# Patient Record
Sex: FEMALE | Race: BLACK OR AFRICAN AMERICAN | NOT HISPANIC OR LATINO | Employment: FULL TIME | URBAN - METROPOLITAN AREA
[De-identification: names, ages, dates, MRNs, and addresses within clinical notes are randomized per-mention and may not be internally consistent; named-entity substitution may affect disease eponyms.]

---

## 2017-09-18 ENCOUNTER — HOSPITAL ENCOUNTER (OUTPATIENT)
Dept: RADIOLOGY | Facility: OTHER | Age: 37
Discharge: HOME | End: 2017-09-18
Attending: ADVANCED PRACTICE MIDWIFE
Payer: COMMERCIAL

## 2017-09-18 ENCOUNTER — OFFICE VISIT (OUTPATIENT)
Dept: OBSTETRICS AND GYNECOLOGY | Facility: CLINIC | Age: 37
End: 2017-09-18
Payer: COMMERCIAL

## 2017-09-18 VITALS
BODY MASS INDEX: 51.4 KG/M2 | WEIGHT: 279.31 LBS | DIASTOLIC BLOOD PRESSURE: 90 MMHG | SYSTOLIC BLOOD PRESSURE: 150 MMHG | HEIGHT: 62 IN

## 2017-09-18 DIAGNOSIS — Z32.00 POSSIBLE PREGNANCY: ICD-10-CM

## 2017-09-18 DIAGNOSIS — O26.849: ICD-10-CM

## 2017-09-18 DIAGNOSIS — O16.2 HIGH BLOOD PRESSURE AFFECTING PREGNANCY IN SECOND TRIMESTER, ANTEPARTUM: ICD-10-CM

## 2017-09-18 DIAGNOSIS — O09.512 ADVANCED MATERNAL AGE, PRIMIGRAVIDA IN SECOND TRIMESTER, ANTEPARTUM: ICD-10-CM

## 2017-09-18 PROCEDURE — 87086 URINE CULTURE/COLONY COUNT: CPT

## 2017-09-18 PROCEDURE — 76817 TRANSVAGINAL US OBSTETRIC: CPT | Mod: TC

## 2017-09-18 PROCEDURE — 76801 OB US < 14 WKS SINGLE FETUS: CPT | Mod: 26,,, | Performed by: INTERNAL MEDICINE

## 2017-09-18 PROCEDURE — 76801 OB US < 14 WKS SINGLE FETUS: CPT | Mod: TC

## 2017-09-18 PROCEDURE — 99204 OFFICE O/P NEW MOD 45 MIN: CPT | Mod: S$GLB,,, | Performed by: ADVANCED PRACTICE MIDWIFE

## 2017-09-18 PROCEDURE — 3008F BODY MASS INDEX DOCD: CPT | Mod: S$GLB,,, | Performed by: ADVANCED PRACTICE MIDWIFE

## 2017-09-18 PROCEDURE — 76817 TRANSVAGINAL US OBSTETRIC: CPT | Mod: 26,,, | Performed by: INTERNAL MEDICINE

## 2017-09-18 PROCEDURE — 87591 N.GONORRHOEAE DNA AMP PROB: CPT

## 2017-09-18 PROCEDURE — 99999 PR PBB SHADOW E&M-NEW PATIENT-LVL III: CPT | Mod: PBBFAC,,, | Performed by: ADVANCED PRACTICE MIDWIFE

## 2017-09-18 NOTE — LETTER
September 18, 2017      [facility]  [address] [place] [address]  Phone: [phone]  Fax: [phone]       Patient: Wang Warren   YOB: 1980  Date of Visit: 09/18/2017    To Whom It May Concern:    Mohini Warren  was at [facility] on 09/18/2017. She may return to work on 9/19/17 with no restrictions. If you have any questions or concerns, or if I can be of further assistance, please do not hesitate to contact me.    Sincerely,        Leyda costa CNM

## 2017-09-18 NOTE — PROGRESS NOTES
Wang Warren is a 36 y.o. No obstetric history on file., presents today for amenorrhea.    Has not seen any other provider for this possible pregnancy.     C/C: amenorrhea    HPI: Reports amenorrhea since No LMP recorded (lmp unknown). Patient is pregnant. Between  and July.  Prior to LMP, menses were  regular occuring every 30 days.  This would be a planned pregnancy. She is not currently on any contraception. + UPT the end of August. Also reports nausea w/o vomiting. Has noticed breast tenderness. Denies vaginal bleeding since LMP.  P150/90 today and reports /80 last Friday. Denies history of high blood pressure.   Genetic screening discussed. Wants MaT21, AFP, CF and SCS    Reports no long-term chronic medical conditions.    Review of patient's allergies indicates:  No Known Allergies  Past Medical History:   Diagnosis Date    Infertility, female     States she has been trying to conceive for four years. Was evaluated at feritility clinic and was given Clomid for three months two years ago.     Past Surgical History:   Procedure Laterality Date    none       Past Surgical History:   Procedure Laterality Date    none       OB History    Para Term  AB Living   1             SAB TAB Ectopic Multiple Live Births                  # Outcome Date GA Lbr Conrado/2nd Weight Sex Delivery Anes PTL Lv   1 Current                 OB History      Para Term  AB Living    1              SAB TAB Ectopic Multiple Live Births                     Social History     Social History    Marital status:      Spouse name: Husam    Number of children: 0    Years of education: N/A     Occupational History         Drives [employer]      Social History Main Topics    Smoking status: Former Smoker    Smokeless tobacco: Never Used    Alcohol use No    Drug use: No    Sexual activity: Yes     Partners: Male     Birth control/ protection: None     Other Topics Concern     Not on file     Social History Narrative    Walks for exercise.     to [place] for five years.     Family History   Problem Relation Age of Onset    Cancer Father      prostate    Diabetes Maternal Aunt      History   Sexual Activity    Sexual activity: Yes    Partners: Male    Birth control/ protection: None       Primary Doctor No     ROS:    Constitutional/Gen: Denies fevers, chills, malaise, or weight loss. + fatigue   Psych: Denies depression, anxiety  Eyes: Denies changes in vision or scotomata  Ears, nose, mouth, throat: Denies sinus tenderness, swelling, or dentition problems  CV/vasc: Denies heart palpitations or edema  Resp: Denies SOB or dyspnea  Breasts: Denies mass, nipple discharge, or trauma. + breast tenderness.  GI: Denies constipation, diarrhea, or vomiting.  : Denies vaginal discharge, dysuria or pelvic pain, urinary frequency  MS: Denies weakness, soreness, or changes in ROM    OBJECTIVE:  General Appearance:    Alert, cooperative, no distress, appears stated age   Head:    Normocephalic, without obvious abnormality, atraumatic   Eyes:    PERRL, conjunctiva/corneas clear both eyes   Ears:    Normal external ear canals, both ears   Nose:   Nares normal, septum midline, mucosa normal, no drainage    or sinus tenderness   Throat:   Lips, mucosa, and tongue normal; teeth and gums normal   Neck:   Supple, symmetrical, trachea midline, no adenopathy;     thyroid:  no enlargement/tenderness/nodules   Back:     Symmetric, no curvature, ROM normal, no CVA tenderness   Lungs:     Clear to auscultation bilaterally, respirations unlabored   Chest Wall:    No tenderness or deformity    Heart:    Regular rate and rhythm, S1 and S2 normal, no murmur, rub   or gallop   Breast Exam:    No tenderness, masses, or nipple abnormality   Abdomen:     Soft, non-tender, bowel sounds active all four quadrants,     no masses, no organomegaly. FH=U-4.   Genitalia:    Normal female without lesion, discharge or  tenderness                               Cervix:   Long, closed, non-tender   Uterus:   Enlargement compatible with approximately 16 weeks gestation; regular, mobile and nontende   Adnexa:   Non-tender, without masses bilaterally       Extremities:   Extremities normal, atraumatic, no cyanosis or edema   Pulses:   2+ and symmetric all extremities   Skin:   Skin color, texture, turgor normal, no rashes or lesions   Lymph nodes:   Cervical, supraclavicular, and axillary nodes normal   Neurologic:   Normal strength, sensation and reflexes     throughout     UPT + in office    ASSESSMENT:  36 y.o. female No obstetric history on file. with amenorrhea  Likely at 16 weeks per size with unknown LMP  Body mass index is 51.09 kg/m².  Patient Active Problem List   Diagnosis    Possible pregnancy    BMI 50.0-59.9, adult    High blood pressure affecting pregnancy in second trimester, antepartum    Advanced maternal age, primigravida in second trimester, antepartum    Unknown LMP with uterine size compatible with 16 weeks gestation   Patient is not a candidate for ABC or [facility] care due to high blood pressures x 2 in early pregnancy and BMI of 51..    PLAN:  Initial OB labs ordered. CMP and early 1 hour GTT ordered.  Dating ultrasound ordered stat. Report of ultrasound of 9/18: 16w5d with JUAN MIGUEL of 2/28/2018  Message sent to [facility] staff regarding desire for MaT21. AFP, CF and Hgb jeff ordered.Pregnancy education and couseling; handouts and booklet provided and reviewed.  Options for transfer to MD care discussed. Will send a message to Dr. Martin regarding accepting this patient.  -- Oriented to practice and anticipated prenatal course of care  -- Precautions/warning signs and how to reach us reviewed  -- Common complaints of pregnancy  -- Routine prenatal labs including HIV  -- Ultrasounds  -- Childbirth education/hospital/[facility] facilities  -- Nutrition and food safety  -- Toxoplasmosis precautions (Cats/Raw Meat)  -- Sexual  activity and exercise  -- Environmental/Work hazards  -- Travel  -- Tobacco (Ask, Advise, Assess, Assist, and Arrange), as well as alcohol and drug use  -- Use of any medications (Including supplements, Vitamins, Herbs, or OTC Drugs)      Reviewed use of B6/Unisom prn and dietary modifications to reduce nausea. Pt will notify us if no relief/worsening symptoms, will consider alternative therapies prn

## 2017-09-19 ENCOUNTER — TELEPHONE (OUTPATIENT)
Dept: MATERNAL FETAL MEDICINE | Facility: CLINIC | Age: 37
End: 2017-09-19

## 2017-09-19 ENCOUNTER — TELEPHONE (OUTPATIENT)
Dept: OBSTETRICS AND GYNECOLOGY | Facility: CLINIC | Age: 37
End: 2017-09-19

## 2017-09-19 PROBLEM — O16.2 HIGH BLOOD PRESSURE AFFECTING PREGNANCY IN SECOND TRIMESTER, ANTEPARTUM: Status: ACTIVE | Noted: 2017-09-19

## 2017-09-19 PROBLEM — O26.849: Status: ACTIVE | Noted: 2017-09-19

## 2017-09-19 PROBLEM — O09.512 ADVANCED MATERNAL AGE, PRIMIGRAVIDA IN SECOND TRIMESTER, ANTEPARTUM: Status: ACTIVE | Noted: 2017-09-19

## 2017-09-19 LAB
C TRACH DNA SPEC QL NAA+PROBE: NOT DETECTED
N GONORRHOEA DNA SPEC QL NAA+PROBE: NOT DETECTED

## 2017-09-19 NOTE — TELEPHONE ENCOUNTER
----- Message from Jayne Joseph sent at 9/19/2017  3:49 PM CDT -----  _x  1st Request  _  2nd Request  _  3rd Request        Who: [email or web address]    Why: pt. Returning phone. Please call to discuss    What Number to Call Back:[phone]    When to Expect a call back: (With in 24 hours)

## 2017-09-19 NOTE — TELEPHONE ENCOUNTER
Patient will call [employer] and then call me back.----- Message from Leyda Boss CNM sent at 9/19/2017  9:12 AM CDT -----  Regarding: Desires MaT21  Hi,   This patient doesn't know her LMP. Ultrasound yesterday dated her pregnancy at 16w5d with JUAN MIGUEL of 2/28/2018. She was given the brochure of MaT21 and instructed to call regarding insurance coverage.  Thanks!  Leyda

## 2017-09-19 NOTE — TELEPHONE ENCOUNTER
----- Message from Abigail Quan sent at 9/18/2017  3:23 PM CDT -----  Contact: Patient   X _1st Request  _  2nd Request  _  3rd Request    Who:MATILDA MCINTOSH [[id number]]    Why:Patient was referred from [facility] she suffers with high blood pressure her last LMP mid may but she was having a ultrasound on today     What Number to Call Back:[phone]    When to Expect a call back: (Before the end of the day)   -- if call after 3:00 call back will be tomorrow.

## 2017-09-19 NOTE — TELEPHONE ENCOUNTER
Pt was informed that the doctor has to be asked if she will see her for high risk before I can schedule an apt for her.

## 2017-09-20 ENCOUNTER — LAB VISIT (OUTPATIENT)
Dept: LAB | Facility: OTHER | Age: 37
End: 2017-09-20
Attending: ADVANCED PRACTICE MIDWIFE
Payer: COMMERCIAL

## 2017-09-20 DIAGNOSIS — Z32.00 POSSIBLE PREGNANCY: ICD-10-CM

## 2017-09-20 DIAGNOSIS — O09.512 ADVANCED MATERNAL AGE, PRIMIGRAVIDA IN SECOND TRIMESTER, ANTEPARTUM: ICD-10-CM

## 2017-09-20 LAB
ABO + RH BLD: NORMAL
ALBUMIN SERPL BCP-MCNC: 3.1 G/DL
ALP SERPL-CCNC: 88 U/L
ALT SERPL W/O P-5'-P-CCNC: 17 U/L
ANION GAP SERPL CALC-SCNC: 8 MMOL/L
AST SERPL-CCNC: 13 U/L
BACTERIA UR CULT: NORMAL
BACTERIA UR CULT: NORMAL
BASOPHILS # BLD AUTO: 0.02 K/UL
BASOPHILS NFR BLD: 0.4 %
BILIRUB SERPL-MCNC: 0.7 MG/DL
BLD GP AB SCN CELLS X3 SERPL QL: NORMAL
BUN SERPL-MCNC: 12 MG/DL
CALCIUM SERPL-MCNC: 9.3 MG/DL
CHLORIDE SERPL-SCNC: 105 MMOL/L
CO2 SERPL-SCNC: 22 MMOL/L
CREAT SERPL-MCNC: 0.8 MG/DL
DIFFERENTIAL METHOD: ABNORMAL
EOSINOPHIL # BLD AUTO: 0.1 K/UL
EOSINOPHIL NFR BLD: 1 %
ERYTHROCYTE [DISTWIDTH] IN BLOOD BY AUTOMATED COUNT: 12.6 %
EST. GFR  (AFRICAN AMERICAN): >60 ML/MIN/1.73 M^2
EST. GFR  (NON AFRICAN AMERICAN): >60 ML/MIN/1.73 M^2
GLUCOSE SERPL-MCNC: 239 MG/DL
GLUCOSE SERPL-MCNC: 243 MG/DL
HCT VFR BLD AUTO: 34 %
HGB BLD-MCNC: 11.1 G/DL
LYMPHOCYTES # BLD AUTO: 1.4 K/UL
LYMPHOCYTES NFR BLD: 27.1 %
MCH RBC QN AUTO: 27.8 PG
MCHC RBC AUTO-ENTMCNC: 32.6 G/DL
MCV RBC AUTO: 85 FL
MONOCYTES # BLD AUTO: 0.3 K/UL
MONOCYTES NFR BLD: 5.6 %
NEUTROPHILS # BLD AUTO: 3.3 K/UL
NEUTROPHILS NFR BLD: 65.3 %
PLATELET # BLD AUTO: 290 K/UL
PMV BLD AUTO: 10.9 FL
POTASSIUM SERPL-SCNC: 3.9 MMOL/L
PROT SERPL-MCNC: 7.5 G/DL
RBC # BLD AUTO: 4 M/UL
SODIUM SERPL-SCNC: 135 MMOL/L
WBC # BLD AUTO: 5.01 K/UL

## 2017-09-20 PROCEDURE — 83020 HEMOGLOBIN ELECTROPHORESIS: CPT

## 2017-09-20 PROCEDURE — 86850 RBC ANTIBODY SCREEN: CPT

## 2017-09-20 PROCEDURE — 86592 SYPHILIS TEST NON-TREP QUAL: CPT

## 2017-09-20 PROCEDURE — 87340 HEPATITIS B SURFACE AG IA: CPT

## 2017-09-20 PROCEDURE — 82950 GLUCOSE TEST: CPT

## 2017-09-20 PROCEDURE — 86703 HIV-1/HIV-2 1 RESULT ANTBDY: CPT

## 2017-09-20 PROCEDURE — 36415 COLL VENOUS BLD VENIPUNCTURE: CPT

## 2017-09-20 PROCEDURE — 83021 HEMOGLOBIN CHROMOTOGRAPHY: CPT

## 2017-09-20 PROCEDURE — 81220 CFTR GENE COM VARIANTS: CPT

## 2017-09-20 PROCEDURE — 82105 ALPHA-FETOPROTEIN SERUM: CPT

## 2017-09-20 PROCEDURE — 85025 COMPLETE CBC W/AUTO DIFF WBC: CPT

## 2017-09-20 PROCEDURE — 80053 COMPREHEN METABOLIC PANEL: CPT

## 2017-09-20 PROCEDURE — 86762 RUBELLA ANTIBODY: CPT

## 2017-09-20 PROCEDURE — 86900 BLOOD TYPING SEROLOGIC ABO: CPT

## 2017-09-21 LAB
HBV SURFACE AG SERPL QL IA: NEGATIVE
HIV 1+2 AB+HIV1 P24 AG SERPL QL IA: NEGATIVE
RPR SER QL: NORMAL
RUBV IGG SER-ACNC: 17 IU/ML
RUBV IGG SER-IMP: REACTIVE

## 2017-09-25 ENCOUNTER — TELEPHONE (OUTPATIENT)
Dept: OBSTETRICS AND GYNECOLOGY | Facility: CLINIC | Age: 37
End: 2017-09-25

## 2017-09-25 DIAGNOSIS — O24.919 DIABETES MELLITUS DURING PREGNANCY, ANTEPARTUM, UNSPECIFIED DIABETES MELLITUS TYPE: ICD-10-CM

## 2017-09-25 DIAGNOSIS — O09.512 ADVANCED MATERNAL AGE, PRIMIGRAVIDA IN SECOND TRIMESTER, ANTEPARTUM: Primary | ICD-10-CM

## 2017-09-25 DIAGNOSIS — O99.810 ABNORMAL GLUCOSE TOLERANCE TEST (GTT) DURING PREGNANCY, ANTEPARTUM: Primary | ICD-10-CM

## 2017-09-25 DIAGNOSIS — O10.919 CHRONIC HYPERTENSION AFFECTING PREGNANCY: Primary | ICD-10-CM

## 2017-09-25 LAB
AFP INTERPRETATION: NORMAL
AFP MATERNAL: NEGATIVE
ALPHA FETOPROTEIN MATERNAL: 24.2 NG/ML
CFTR MUT ANL BLD/T: NORMAL
ETHNIC ORIGIN: NORMAL
GESTATIONAL AGE (DAYS): 0
GESTATIONAL AGE (WEEKS): 17
GESTATIONAL AGE METHOD: NORMAL
HGB A2 MFR BLD HPLC: 2.3 %
HGB FRACT BLD ELPH-IMP: NORMAL
HGB FRACT BLD ELPH-IMP: NORMAL
INSULIN DEPEND. DIABETES: NORMAL
M.O.M. ALPHA FETOPROTEIN: 0.87
MATERNAL AGE AT EDD (YRS): 38
MATERNAL WEIGHT (LBS): 279
MULTIPLE GESTATION: NORMAL
SMOKING STATUS FTND: NO

## 2017-09-25 NOTE — TELEPHONE ENCOUNTER
Called patient and told patient Instead of the 3 /4 hour lab . Dr. Valenzuela would like for you to have 1 lab done and a urine test before your appointment with [facility] October 4th . She also put in orders for Diabetic education someone will contact you to schedule that appointment. Patient verbalized and understood.

## 2017-09-25 NOTE — TELEPHONE ENCOUNTER
----- Message from Mariela Valenzuela MD sent at 9/25/2017 10:45 AM CDT -----  Regarding: FW: Transfer of care of this high risk patient  Please schedule routine OB patient for any morning. Ask patient if she wants to have her glucose tolerance test done that morning. She will need to be fasting and will need to be here for 4 hours that day. She can come see me any time during that morning.     Thanks  ----- Message -----  From: Leyda Boss CNM  Sent: 9/25/2017   8:53 AM  To: Mariela Valenzuela MD  Subject: Transfer of care of this high risk patient       Mariela Mariscal. I saw this patient for an initial amenorrhea visit and she has several risk factors that make her ineligible for our care. I will try to call you about her.   I hope you can accept her as a patient.  Thanks!  Leyda

## 2017-09-25 NOTE — PROGRESS NOTES
Talked to Dr. Valenzuela about this patient this morning and she will accept her as a patient. Patient called and given Dr. Valenzuela's name. Patient informed of 1 hour GTT of 239 and implications of gestational diabetes. AFP ordered as discussed at patient's first visit. Shown is happy with the transfer of care.

## 2017-09-26 ENCOUNTER — PATIENT OUTREACH (OUTPATIENT)
Dept: DIABETES | Facility: OTHER | Age: 37
End: 2017-09-26

## 2017-09-26 NOTE — TELEPHONE ENCOUNTER
----- Message from Violeta Reyes sent at 9/20/2017 10:28 AM CDT -----  Contact: self  x_  1st Request  _  2nd Request  _  3rd Request    Who: pt    Why: pt is 16 weeks and would like to transfer care from [facility]... Please advise    What Number to Call Back: [phone]    When to Expect a call back: (Before the end of the day)   -- if call after 3:00 call back will be tomorrow.

## 2017-09-28 ENCOUNTER — TELEPHONE (OUTPATIENT)
Dept: DIABETES | Facility: OTHER | Age: 37
End: 2017-09-28

## 2017-09-28 ENCOUNTER — TELEPHONE (OUTPATIENT)
Dept: OBSTETRICS AND GYNECOLOGY | Facility: CLINIC | Age: 37
End: 2017-09-28

## 2017-09-28 NOTE — TELEPHONE ENCOUNTER
Returned patient's phone call regarding which prenatal testing would tell her the gender of her baby. Voice mail left stating the MaT21 test would give her that information. There is no note that the testing was done. A message was sent to [facility] informing them of the patient's JUAN MIGUEL and desire for MaT21 secondary to AMA.

## 2017-10-04 ENCOUNTER — HOSPITAL ENCOUNTER (OUTPATIENT)
Dept: DIABETES | Facility: OTHER | Age: 37
Discharge: HOME | End: 2017-10-04
Attending: OBSTETRICS & GYNECOLOGY
Payer: COMMERCIAL

## 2017-10-04 ENCOUNTER — OFFICE VISIT (OUTPATIENT)
Dept: MATERNAL FETAL MEDICINE | Facility: CLINIC | Age: 37
End: 2017-10-04
Payer: COMMERCIAL

## 2017-10-04 VITALS
WEIGHT: 279.75 LBS | BODY MASS INDEX: 51.17 KG/M2 | SYSTOLIC BLOOD PRESSURE: 130 MMHG | DIASTOLIC BLOOD PRESSURE: 78 MMHG

## 2017-10-04 VITALS — BODY MASS INDEX: 51.21 KG/M2 | WEIGHT: 280 LBS

## 2017-10-04 DIAGNOSIS — O24.410 DIET CONTROLLED GESTATIONAL DIABETES MELLITUS (GDM) IN SECOND TRIMESTER: ICD-10-CM

## 2017-10-04 DIAGNOSIS — O10.919 CHRONIC HYPERTENSION AFFECTING PREGNANCY: ICD-10-CM

## 2017-10-04 DIAGNOSIS — Z36.89 ENCOUNTER FOR FETAL ANATOMIC SURVEY: ICD-10-CM

## 2017-10-04 DIAGNOSIS — O09.512 ADVANCED MATERNAL AGE, PRIMIGRAVIDA IN SECOND TRIMESTER, ANTEPARTUM: ICD-10-CM

## 2017-10-04 DIAGNOSIS — O24.812 OTHER PRE-EXISTING DIABETES MELLITUS DURING PREGNANCY IN SECOND TRIMESTER: ICD-10-CM

## 2017-10-04 DIAGNOSIS — Z36.89 ENCOUNTER FOR ULTRASOUND TO CHECK FETAL GROWTH: Primary | ICD-10-CM

## 2017-10-04 PROCEDURE — G0108 DIAB MANAGE TRN  PER INDIV: HCPCS

## 2017-10-04 PROCEDURE — 76811 OB US DETAILED SNGL FETUS: CPT | Mod: S$GLB,,, | Performed by: OBSTETRICS & GYNECOLOGY

## 2017-10-04 PROCEDURE — 99243 OFF/OP CNSLTJ NEW/EST LOW 30: CPT | Mod: 25,S$GLB,, | Performed by: OBSTETRICS & GYNECOLOGY

## 2017-10-04 PROCEDURE — 99999 PR PBB SHADOW E&M-EST. PATIENT-LVL II: CPT | Mod: PBBFAC,,, | Performed by: OBSTETRICS & GYNECOLOGY

## 2017-10-05 NOTE — PROGRESS NOTES
Diabetes Education  Author: Cayla Delgadillo RN  Date: 10/5/2017    Diabetes Education Visit  Diabetes Education Record Assessment/Progress: Initial    Diabetes Type  Diabetes Type : Gestational    Diabetes History  Diabetes Diagnosis: 0-1 year    Nutrition  Meal Plan 24 Hour Recall - Breakfast: 1 c of cooked oatmeal, 1 apple, 1 L water    Meal Plan 24 Hour Recall - Lunch: broccoli, >8oz cream of broccoli soup - water   Meal Plan 24 Hour Recall - Dinner: bowl of cheerios w/ skim milk - water   Meal Plan 24 Hour Recall - Snack: usually fruit if hungry,  not too often     Monitoring   Self Monitoring : meter ed done today   Blood Glucose Logs: No    Exercise   Exercise Type: walking  Intensity: Low  Frequency:  (2 days a week)  Duration: 45 min ([facility].5 miles at [facility])    Current Diabetes Treatment   Current Treatment: Diet    Social History  Preferred Learning Method: Video  Primary Support: Spouse  Educational Level: Some College  Occupation: drive lift bus for [employer]   Smoking Status: Never a Smoker  Alcohol Use: Never                             Barriers to Change  Barriers to Change: None  Learning Challenges : None    Readiness to Learn   Readiness to Learn : Acceptance    Cultural Influences  Cultural Influences: No    Diabetes Education Assessment/Progress  Acute Complications (preventing, detecting, and treating acute complications): Discussion, Written Materials Provided, No Knowledge (discussed s/s of hypoglycemia and how to prevent/treat, discussed acute issues r/t GDM )  Chronic Complications (preventing, detecting, and treating chronic complications): Discussion, Written Materials Provided, No Knowledge (discussed long term effects of GDM - risks possible throughout pregnancy as well as risk for developing Type II DM later on in life; stressed importance of PCP f/u throughout lifetime and maintaining some lifestyle modifications)  Diabetes Disease Process (diabetes disease process and treatment options):  Discussion, Written Materials Provided, No Knowledge (discussed what GDM is, how it affects the pregnancy, mother and baby's health - reviewed all ways to control BG during pregnancy)  Nutrition (Incorporating nutritional management into one's lifestyle): Discussion, Written Materials Provided, Requires Assistance, Instructed, Demonstration, Return Demonstration (CHO counting taught, rec 3 meals/day w/ restricted CHO, plus snacks - rec more complex CHO, avoid concentrated sweets and eliminate sweet drinks, encouraged healthy fats and adequate protein )  Physical Activity (incorporating physical activity into one's lifestyle): Discussion, Written Materials Provided, No Knowledge, Instructed (reviewed ADA rec for physical activity during pregnancy and safety considerations while pregnant )  Medications (states correct name, dose, onset, peak, duration, side effects & timing of meds): Discussion, Written Materials Provided, No Knowledge (discussed potential needs for medications and what they are if diet and exercise alone are not enough to control BG)  Monitoring (monitoring blood glucose/other parameters & using results): Discussion, Demonstration, Return Demonstration, Written Materials Provided, No Knowledge, Instructed (meter demo and ed done today, log sheets given and reviewed BG goals and when to call the Dr. stressed importance of bringing logs to all OB appointments)  Goal Setting and Problem Solving (verbalizes behavior change strategies & sets realistic goals): Discussion, Written Materials Provided, No Knowledge  Behavior Change (developing personal strategies to health & behavior change): Discussion, Written Materials Provided, No Knowledge  Psychosocial Issues (developing personal srategies to address psychosocial concerns): Discussion, Written Materials Provided, No Knowledge (pt  present during appointment, also diabetic and very supportive )    Goals  Healthy Eating: Set (will count CHO and  restrict to rec amounts )  Start Date: 10/04/17  Monitoring: Set (will SMBG 4x/day and bring logs to all OB appointments)  Start Date: 10/04/17         Diabetes Care Plan/Intervention  Education Plan/Intervention: Individual Follow-Up DSMT    Diabetes Meal Plan  Restrictions: Restricted Carbohydrate  Carbohydrate Per Meal:  (30-45grams /Breakfast; 45-60grams/ Lunch & Dinner )    Provided custom [facility] educational materials.     Education Units of Time   Time Spent: 120 min      Health Maintenance Topics with due status: Not Due       Topic Last Completion Date    TETANUS VACCINE 12/02/2013    Hemoglobin A1c 10/04/2017    Urine Microalbumin 10/04/2017     Health Maintenance Due   Topic Date Due    Foot Exam  12/13/1989    Eye Exam  12/13/1989    Pneumococcal PPSV23 (Medium Risk) (1) 12/13/1997    Pap Smear with HPV Cotest  12/13/2000    Lipid Panel  07/28/2015    Influenza Vaccine  08/01/2017

## 2017-10-07 NOTE — PROGRESS NOTES
"Indication  ========    Consult:Fetal anatomy survey/DM/AMA/Obesity/CHTN.    History  ======    General History  Height 157 cm  Height (ft) 5 ft  Height (in) 2 in  Other: OB: MAHENDRA GLEASON  Previous Outcomes   1  Para 0  Risk Factors  History risk factors: Obesity  History risk factors: AMA  History risk factors: Diabetes mellitus  History risk factors: Personal history of hypertension    Pregnancy History  ==============    Maternal Lab Tests  Result:  Single marker AFP negative    Wants to know gender: yes    Maternal Assessment  =================    Weight 126 kg  Weight (lb) 278 lb  Height 157 cm  Height (ft) 5 ft  Height (in) 2 in  BP syst 130 mmHg  BP diast 78 mmHg  BMI 50.81 kg/m²    Method  ======    Transabdominal ultrasound examination, [place] E10. View: Suboptimal view: limited by maternal body habitus. Suboptimal view: limited by  fetal position. Suboptimal view: limited by fetal movements.    Pregnancy  =========    [facility] pregnancy. Number of fetuses: 1.    Dating  ======    Cycle: regular cycle  GA by "stated dating" 18 w + 6 d  JUAN MIGUEL by "stated dating": 3/1/2018  Ultrasound examination on: 10/4/2017  GA by U/S based upon: AC, BPD, Femur, HC  GA by U/S 19 w + 1 d  JUAN MIGUEL by U/S: 2018  Assigned: Dating performed on 10/4/2017, based on the external assessment  Assigned GA 18 w + 6 d  Assigned JUAN MIGUEL: 3/1/2018    General Evaluation  ==============    Cardiac activity: present.  bpm.  Fetal movements: visualized.  Presentation: cephalic.  Placenta:  Placental site: posterior.  Umbilical cord: Cord vessels: 3 vessel cord. Cord insertion: placental insertion: normal.  Amniotic fluid: normal amount.    Fetal Biometry  ============    Fetal Biometry  BPD 44.4 mm 19w 3d Hadlock  OFD 56.8 mm 20w 0d Joseph  .3 mm 19w 0d Hadlock  .8 mm 19w 0d Hadlock  Femur 29.2 mm 19w 0d Hadlock  Cerebellum tr 17.7 mm 18w 1d Mario  CM 3.6 mm  Nuchal fold 4.03 mm  Humerus 29.1 mm 19w 3d " Joseph   g  Calculated by: Hadlock (BPD-HC-AC-FL)  EFW (lb) 0 lb  EFW (oz) 9 oz  Cephalic index 0.78  HC / AC 1.19  FL / BPD 0.66  FL / AC 0.22   bpm  Head / Face / Neck   5.9 mm  Nasal bone 4.5 mm    Fetal Anatomy  ============    Cranium: normal  Lateral ventricles: normal  Choroid plexus: normal  Midline falx: normal  Cavum septi pellucidi: normal  Cerebellum: normal  Cisterna magna: normal  Head shape: normal  Rt lateral ventricle: normal  Lt lateral ventricle: normal  Rt choroid plexus: normal  Lt choroid plexus: normal  Parenchyma: normal  Third ventricle: normal  Posterior fossa: normal  Cerebellar lobes: normal  Vermis: normal  Neck: appears normal  Nuchal fold: normal  Lips: normal  Profile: normal  Nose: normal  Maxilla: normal  Mandible: normal  4-chamber view: normal  RVOT: normal  LVOT: normal  Heart / Thorax: Septal views: normal  Situs: normal  Aortic arch: suboptimal  Ductal arch: suboptimal  SVC: suboptimal  IVC: suboptimal  3-vessel view: normal  3-vessel-trachea view: normal  Cardiac position: normal  Cardiac axis: normal  Cardiac size: normal  Cardiac rhythm: normal  Rt lung: normal  Lt lung: normal  Diaphragm: normal  Cord insertion: normal  Stomach: normal  Kidneys: normal  Bladder: normal  Genitals: normal  Abdom. wall: appears normal  Abdom. cavity: normal  Rt kidney: normal  Lt kidney: normal  Liver: normal  Bowel: normal  Cervical spine: normal  Thoracic spine: normal  Lumbar spine: normal  Sacral spine: normal  Arms: normal  Legs: normal  Rt arm: normal  Lt arm: normal  Rt hand: present  Lt hand: present  Rt leg: normal  Lt leg: normal  Rt foot: normal  Lt foot: normal  Position of hands: normal  Position of feet: normal  Gender: female  Wants to know gender: yes    Maternal Structures  ===============    Uterus / Cervix  Uterus: Normal  Approach: Transabdominal  Cervical length 32.8 mm  Ovaries / Tubes / Adnexa  Rt ovary: Not visualized  Lt ovary: Not  visualized  Other: Uterus and adnexa normal    Consultation  ==========    Type: CHTN, Diabetes, Obesity, AMA.  Ms. Warren is a 37y/o soon to be 36 y/o  at 18 and 6 weeks gestation who presents for a consult due diabetes, CHTN, AMA.  BP in office noted before 150/90 and 155/80  PMHx: HTN  Diabetes  Obesity  AMA  PSHx: None  Social: Denies smoking, alcohol, illicit drug use  Family history: no history of birth defects, no history of sickle cell  Screening: masfp single marker normal  PObHx: none  Glucola 239; Glucose: 243, cr 0.8, AST 13, ALT 17, Hgb elec normal, MSAFP only normal, Plt 290  The patient just met with the dietician and just obtained her meter. Her one hour postprandial was 121    /78    A/P:1. IUP at 18 and 6    2. CHTN:She was counseled on maternal/fetal risks associated with CHTN during pregnancy including but not limited to fetal growth  restriction,  miscarriage,  delivery, development of superimposed preeclampsia, abruption and eclampsia. She was counseled on the  recommendations for blood pressure control, serial sonos for fetal growth, and timing of delivery. We also discussed the potential maternal  consequences of chronic hypertension    A. We have scheduled patient for a follow up sono with us in 4 weeks for fetal anatomical survey and assess interval growth    B. Serial sonos for fetal growth starting at 24 wks gestation.    C. Baseline labs: CMP, CBC, Protein/creatinine ratio or 24 hour urine for protein and creatinine clearance are recommended    D. Diabetes control is recommended    E. Close monitoring of patient's blood pressures:  If BP < 160 mmHg systolic or 105 mmHg diastolic and no evidence of end-organ damage, no antihypertensive therapy suggested    If patient on antihypertensive medication, it is suggested that BP levels be maintained between 120 mmHg systolic and 80mmHg diastolic  and  160 mmHg systolic and 105 mmHg diastolic    F. Fetal surveillance recommended  with twice weekly testing-weekly BPP and weekly NST (on a monday/thursday or tuesday/friday schedule)  beginning at 32 weeks or earlier if indicated.    G. Timing of delivery: Recommend delivery at 37-38 weeks with chronic hypertension, obesity, and diabetes. Earlier delivery may be  warranted.    H. With morbid obesity , recommend obtaining a baseline EKG and a maternal echocardiogram      3. Diabetes-uncertain if pregestational: HgA1c was pending at time of visit and later returned at 7.9 and urine p/c .08. The HgA1c is more  consistent with pregestational diabetes which will again be reviewed with the patient; we did our discussion as if she were pregestational    Today the patient was counseled on the risks of diabetes in pregnancy. I reviewed the physiologic effects of pregnancy on diabetes  and I stressed with the patient the need for meticulous glucose control. I discussed with the patient the increased risks of fetal structural  anomalies, macrosomia, prematurity, shoulder dystocia,  hyperbilirubinemia and electrolyte issues, pulmonary immaturity and  sudden  stillbirth in those patients with poor glucose control. I also discussed with the patient the need for increased fetal surveillance with monthly  fetal  growth assessments and third trimester fetal testing. I also reviewed the possibility of need for early delivery in those with poor glucose  control  or evidence of fetal growth abnormalities.  Recommendations from our MFM group at [facility]:  -Diet and/or medication should be used to keep fasting glucose levels <90-95 mg/dL and 2 hour postprandial levels <120 mg/dL. The patient  had dietary counseling today. She will return in a week with a blood sugar log. She was advised to call prior to that time if her blood sugars  were less than 70 or over 200.  -In those patients with diabetes existing prior to pregnancy we recommend a fetal echocardiogram around 22-24 weeks gestation to rule  out  congenital heart disease.  -Periodic fetal growth assessments should be performed to assess for growth abnormalities and to assess the fetal anomalies.  -Secondary to the high incidence of preeclampsia in patients with pregestational diabetes we recommend a baseline assessment of renal  function which has been done  -The patient should have an eye exam. I would also recommend a podiatric exam.  -Given obesity, hypertension and diabetes an ekg and maternal echocardiogram are recommended.  -Recommend checking a TSH  -I discussed with the patient that insulin is the recommended treatment in pregestational diabetics.  -A baby aspirin daily 81mg PO is recommended for preeclampsia prevention.  - Hemoglobin A1c pending at time of visit.  -Supplemental folic acid 4mg PO daily is typically recommended.    4. Obesity:Maternal Complications of Obesity  Obesity adversely affects maternal health by predisposing patients to gestational hypertension (OR = 2.5-3.2), preeclampsia (1.6-3.3),  gestational diabetes (2.6-4.0), and fetal macrosomia (1.7-1.9). Obese patients, as compared to those of normal weight, are also more likely to  undergo  delivery (47.4% vs. 20.7%) with higher rates of operative complications - greater blood loss, longer operative times, and  increased wound infection/endometritis. Anesthetic difficulties with both regional anesthesia as well as general endotracheal intubation may  complicate delivery.    Fetal Complications of Obesity  Obesity is an independent risk factor for spontaneous  after both natural conception as well as conception following infertility treatment.  For ongoing pregnancies, fetal risks include prematurity, stillbirth, fetal demise, and macrosomia. Moreover, some studies indicate that fetuses  of obese women are more likely to have congenital anomalies (NTDs, cardiac, and gastrointestinal) even when controlled for pregestational  diabetes.    Management of Obesity in  Pregnancy  At the first prenatal visit, height and weight should be recorded and used to calculate BMI. Based on the patients weight, recommendations for  appropriate weight gain should be made. The [employer] recommends a gain of 25-35 lb for women of normal weight, 15-25 pounds  for overweight women, and 11-20 pounds for obese women. Overweight and obese women should be counseled on diet and exercise.    Because of the maternal complications of obesity, certain additional measures are suggested in the antepartum period.  Patients should receive adequate counseling regarding risk factors and possible difficulties with accurate labor monitoring,  delivery  operative complications, and anesthesia.  With the increased risk of gestational diabetes, early 1st trimester screening is often advocated, however, there are no data demonstrating the  effectiveness of this early screening in improving pregnancy outcomes. Until such data are available, early screening should generally be  reserved for patients with a prior history of gestational diabetes or those with a diagnosis of borderline diabetes in the non-pregnant state.  This patient has already been diagnosed with diabetes.  All patients should have a routine anatomy ultrasound at 16-18 weeks. Because of the increased risk of congenital anomalies, all patients with  a BMI 40 or over should undergo targeted ultrasound (TSCAN) between 19-21 weeks gestation. If abnormal screening views or cardiac  anomalies are noted, or if the patient has concomitant diabetes, formal fetal echocardiography should be performed.  Fetal growth assessment via fundal height monitoring is difficult in the setting of obesity. When the fundal height cannot be reliably followed or  BMI 35 or over, serial ultrasounds for fetal growth every 4 weeks are recommended to screen for growth abnormalities starting at 28 weeks.  Patients with obesity are at increased risk for cardiac  and pulmonary complications including sleep apnea, hypertension and cardiomyopathy.  Providers should have a low threshold to seek further evaluation of the cardiac status--echocardiogram, EKG--in patients who present with  symptoms suggestive of cardiac or pulmonary compromise. In addition, given the risk of anesthetic difficulties, consultation with an  anesthesiologist is suggested for patients with a BMI 50 or over, maternal weight over 400 lb, or patients with sleep apnea.  Patients should be counseled on weight loss in the postpartum period.    Intrapartum and Postpartum    The obese patient has an increased risk for abnormal labor progression and  delivery. All patients with a scheduled  should  be instructed to shower with antibacterial soap and wash the abdomen on the day of surgery with cholorhexidine soap. Given the increased  risk of  delivery in the obese population, all obese patients who are undergoing induction of labor should also be instructed to shower  with antibacterial soap prior to the initiation of the induction.    All patients should receive antibiotic prophylaxis for  delivery; however, recent studies suggest that obese patients may require a  higher dosage. If cefazolin is utilized, a weight-based dosing regimen is recommended:  <80kg 1 gm  81-160kg 2 gm  >160kg 3 gm    There are no conclusive data regarding the best incision type for the obese patient undergoing  delivery. Pfannensteil incision with  retraction of the panniculus cepahalad has been advocated, but may be associated with vena caval compression and fetal heart rate changes.  Patient discomfort and inability to ventilate the patient may limit the retraction of the panniculus. Vertical midline incision may be considered  but is associated with a higher rate of wound breakdown and need for classical uterine incision. The incision choice should be based on  individual patient anatomy and  assessment in a flat position and the comfort of the surgeon. Regardless of incision placement, the space under  the panniculus should be prepped prior to starting the procedure.    The subcutaneous dead space should be closed with a running or interrupted suture (e.g. vicryl) for all patients in whom this layer measures >2  cm. There is no benefit (and even harm) to placement of a surgical drain in the subcutaneous layer.    Pneumatic sequential compression stockings is recommended in obese patients undergoing induction and should be placed prior to surgery  for those having a . Early ambulation should be encouraged in the postpartum period and the pneumatic devices should be used in the  postpartum period for thromboembolism prophylaxis at least until post-operative day 2 and until the patient is regularly ambulating.      5. AMA:We discussed the increased risk of chromosomal abnormalities with advanced maternal age. We reviewed the risks, benefits,  alternatives, limitations, and sensitivities of the quad screen. We discussed the risks, benefits, alternatives, limitations, sensitivities of NIPT  and she accepted if her insurance is verified at the lab. The patient was advised of the risks, benefits, alternatives, and limitations of  amniocentesis and the 1 in 1000 risk of pregnancy related complications including pregnancy loss and declined. We discussed the limitations  of ultrasound in detecting chromosomal  abnormalities such as Trisomy 21. We also reviewed that AMA is associated with an increased risk of adverse pregnancy outcomes such as  diabetes, hypertension, and other adverse outcomes. Limitations of NIPT with obesity were reviewed.    6. Please also confirm how the patient was dated.      A total of 45 minutes was spent in face to face time with greater than half of that time spent in counseling and coordination of care.        Impression  =========    Peres live intrauterine  pregnancy.  Biometry agrees with the clinical dating.  Amniotic fluid volume is normal by qualitative assessment.  Some of the anatomy was suboptimally visualized. The anatomy that was seen appeared normal.  Placenta is posterior without previa.  Transabdominal cervical length is normal.    Recommendation  ==============    Follow up visit in one week for blood sugar check.  Follow up ultrasound for fetal growth and suboptimal anatomy in 4 weeks.  Fetal echo due to suboptimal cardiac views and diabetes.  NIPT if insurance verified.

## 2017-10-09 ENCOUNTER — TELEPHONE (OUTPATIENT)
Dept: PEDIATRIC CARDIOLOGY | Facility: CLINIC | Age: 37
End: 2017-10-09

## 2017-10-09 NOTE — TELEPHONE ENCOUNTER
Spoke with patient at [phone]. appt rescheduled for 11/3/17 @ 12 noon. Office number given. Informed patient to arrive at least 10 minutes prior to appt. Address verified.

## 2017-10-10 ENCOUNTER — TELEPHONE (OUTPATIENT)
Dept: MATERNAL FETAL MEDICINE | Facility: CLINIC | Age: 37
End: 2017-10-10

## 2017-10-10 NOTE — TELEPHONE ENCOUNTER
Negative [id number] results phoned to patient. Patient notified and aware that her lab specimen showed an expected representation of chromosome 21, 18 and 13 material and that it was consistent with a female fetus.     Patient verbalized understanding of reported results.

## 2017-10-11 ENCOUNTER — LAB VISIT (OUTPATIENT)
Dept: LAB | Facility: OTHER | Age: 37
End: 2017-10-11
Attending: OBSTETRICS & GYNECOLOGY
Payer: COMMERCIAL

## 2017-10-11 ENCOUNTER — PATIENT MESSAGE (OUTPATIENT)
Dept: OBSTETRICS AND GYNECOLOGY | Facility: CLINIC | Age: 37
End: 2017-10-11

## 2017-10-11 ENCOUNTER — ROUTINE PRENATAL (OUTPATIENT)
Dept: MATERNAL FETAL MEDICINE | Facility: CLINIC | Age: 37
End: 2017-10-11
Payer: COMMERCIAL

## 2017-10-11 ENCOUNTER — ROUTINE PRENATAL (OUTPATIENT)
Dept: OBSTETRICS AND GYNECOLOGY | Facility: CLINIC | Age: 37
End: 2017-10-11
Payer: COMMERCIAL

## 2017-10-11 VITALS
DIASTOLIC BLOOD PRESSURE: 76 MMHG | WEIGHT: 275.81 LBS | SYSTOLIC BLOOD PRESSURE: 132 MMHG | BODY MASS INDEX: 50.44 KG/M2

## 2017-10-11 VITALS — SYSTOLIC BLOOD PRESSURE: 136 MMHG | WEIGHT: 275.56 LBS | DIASTOLIC BLOOD PRESSURE: 78 MMHG | BODY MASS INDEX: 50.4 KG/M2

## 2017-10-11 DIAGNOSIS — O99.210 OBESITY IN PREGNANCY: ICD-10-CM

## 2017-10-11 DIAGNOSIS — Z3A.19 19 WEEKS GESTATION OF PREGNANCY: Primary | ICD-10-CM

## 2017-10-11 DIAGNOSIS — O10.919 CHRONIC HYPERTENSION AFFECTING PREGNANCY: ICD-10-CM

## 2017-10-11 DIAGNOSIS — O24.112 PRE-EXISTING TYPE 2 DIABETES MELLITUS DURING PREGNANCY IN SECOND TRIMESTER: ICD-10-CM

## 2017-10-11 DIAGNOSIS — O24.919 DIABETES MELLITUS DURING PREGNANCY, ANTEPARTUM, UNSPECIFIED DIABETES MELLITUS TYPE: Primary | ICD-10-CM

## 2017-10-11 PROBLEM — O26.849: Status: RESOLVED | Noted: 2017-09-19 | Resolved: 2017-10-11

## 2017-10-11 PROBLEM — Z32.00 POSSIBLE PREGNANCY: Status: RESOLVED | Noted: 2017-09-18 | Resolved: 2017-10-11

## 2017-10-11 PROBLEM — N97.9 INFERTILITY, FEMALE: Status: ACTIVE | Noted: 2017-10-11

## 2017-10-11 PROBLEM — E11.9 DIABETES MELLITUS TYPE II, CONTROLLED, WITH NO COMPLICATIONS: Status: ACTIVE | Noted: 2017-10-11

## 2017-10-11 LAB — TSH SERPL DL<=0.005 MIU/L-ACNC: 0.63 UIU/ML

## 2017-10-11 PROCEDURE — 84443 ASSAY THYROID STIM HORMONE: CPT

## 2017-10-11 PROCEDURE — 99999 PR PBB SHADOW E&M-EST. PATIENT-LVL II: CPT | Mod: PBBFAC,,, | Performed by: OBSTETRICS & GYNECOLOGY

## 2017-10-11 PROCEDURE — 36415 COLL VENOUS BLD VENIPUNCTURE: CPT

## 2017-10-11 PROCEDURE — 99999 PR PBB SHADOW E&M-EST. PATIENT-LVL I: CPT | Mod: PBBFAC,,, | Performed by: OBSTETRICS & GYNECOLOGY

## 2017-10-11 PROCEDURE — 99213 OFFICE O/P EST LOW 20 MIN: CPT | Mod: S$GLB,,, | Performed by: OBSTETRICS & GYNECOLOGY

## 2017-10-11 PROCEDURE — 0502F SUBSEQUENT PRENATAL CARE: CPT | Mod: S$GLB,,, | Performed by: OBSTETRICS & GYNECOLOGY

## 2017-10-11 RX ORDER — DOCUSATE SODIUM 100 MG/1
100 CAPSULE, LIQUID FILLED ORAL 2 TIMES DAILY
Qty: 60 CAPSULE | Refills: 3 | Status: SHIPPED | OUTPATIENT
Start: 2017-10-11 | End: 2018-03-12

## 2017-10-11 RX ORDER — FOLIC ACID 1 MG/1
4 TABLET ORAL DAILY
Qty: 120 TABLET | Refills: 4 | Status: ON HOLD | OUTPATIENT
Start: 2017-10-11 | End: 2018-02-14 | Stop reason: HOSPADM

## 2017-10-11 RX ORDER — LANCETS
1 EACH MISCELLANEOUS 4 TIMES DAILY
Qty: 100 EACH | Refills: 3 | Status: SHIPPED | OUTPATIENT
Start: 2017-10-11

## 2017-10-11 RX ORDER — NAPROXEN SODIUM 220 MG/1
81 TABLET, FILM COATED ORAL DAILY
COMMUNITY
End: 2018-03-12

## 2017-10-11 NOTE — PROGRESS NOTES
The patient was seen today for a follow up blood sugar check. She reports she lost a little bit of weight over the past week. She has been following the diet and is eating better.  She has not been checking all of her blood sugars.  We again reviewed the concept of checking fasting blood sugars and 2 hour postprandials.  She has actually been checking fasting blood sugars, 2 hours after breakfast and 2 hours after dinner but not 2 hours after lunch and they are not labelled correctly on her log.  Her fasting blood sugars have been  (with 2 values 100-125)  2 hour post breakfast have been   2 hour post dinner     The patient did not bring her meter for me to confirm her blood sugars.  Her 9/20 CMP showed a glucose of 243.    We discussed that her hemoglobin A1c returned elevated after our visit at 7.9. We discussed that these findings are most consistent with diabetes outside of pregnancy. We had previously reviewed the risks of pregestational diabetes and again reviewed those today.    Our nurses notified the patient of her negative NIPT.    I reviewed with the patient the importance of compliance with her diabetes.  Plan to have patient return in one week after monitoring blood sugars correctly. I have also asked her to bring her meter to confirm the numbers as her glucose was high on her CMP a few weeks ago.    The patient reports some constipation with iron and will discuss a stool softener with . Dr. Valenzuela will also refill her test strips.  Patient should call if her blood sugar is less than 70 or over 200.    Her blood pressures was normal today at 136/78. FHT 140s-150s.    A/P:  1. IUP at 19 and 6  2. Diabetes-return in one week for BS check-see above  3. HTN stable  4. Keep fetal echo and follow up anatomy appointments (confirm patient scheduled follow up anatomy scan at next visit)    A total of 15 minutes was spent in face to face time with greater than half of that time spent in  counseling and coordination of care.  Discussed with Dr. Valenzuela.

## 2017-10-11 NOTE — PROGRESS NOTES
OB transfer of care from [facility] group. Patient is a 37 y/o G1 with morbid obesity who was found to have CHTN and pre-existing DMII. She was seen for MFM consult and the note was reviewed with the patient. Reviewed plans for repeat growth scans and Peds Cardiology appointment for fetal echo. TSH ordered today.    Patient feeling well today, good fetal movement, no CTX, LOF or VB. Rx for colace sent to pharmacy. Patient is now taking a baby ASA daily. Rx for folic acid 4 mg sent to pharmacy. Refill placed for lancets and test strips.    1. DMII - She was seen for follow up by Dr. Reyes today and reviewed her glucose log. Dr. Reyes instructed patient on correct timing of BS measurement. Patient states that she has been working very hard on her diet, eating more vegetables, apples, ect. Counseled patient to increase protein as this may help with satiety when limiting carbohydrates. Patient has eye doctor (MD) that she will contact for an eye exam. Will refer to Podiatry, but will need to check if our providers accept Medicaid.     2. CHTN - no medications. /76 today. Reviewed risks associated with CHTN in pregnancy. [facility] recommends EKG and maternal Echo, will schedule.

## 2017-10-11 NOTE — Clinical Note
We need to schedule this patient for an EKG and and Echocardiogram. The order was placed.   I also sent a referral for her to see podiatry, we may need to call that department to see if they accept medicaid. Will most likely be at the main campus.

## 2017-10-18 ENCOUNTER — OFFICE VISIT (OUTPATIENT)
Dept: PODIATRY | Facility: CLINIC | Age: 37
End: 2017-10-18
Payer: COMMERCIAL

## 2017-10-18 ENCOUNTER — HOSPITAL ENCOUNTER (OUTPATIENT)
Dept: CARDIOLOGY | Facility: CLINIC | Age: 37
Discharge: HOME | End: 2017-10-18
Payer: COMMERCIAL

## 2017-10-18 ENCOUNTER — LAB VISIT (OUTPATIENT)
Dept: LAB | Facility: OTHER | Age: 37
End: 2017-10-18
Attending: OBSTETRICS & GYNECOLOGY
Payer: COMMERCIAL

## 2017-10-18 ENCOUNTER — ROUTINE PRENATAL (OUTPATIENT)
Dept: MATERNAL FETAL MEDICINE | Facility: CLINIC | Age: 37
End: 2017-10-18
Payer: COMMERCIAL

## 2017-10-18 VITALS
SYSTOLIC BLOOD PRESSURE: 138 MMHG | WEIGHT: 272.5 LBS | DIASTOLIC BLOOD PRESSURE: 82 MMHG | WEIGHT: 272.5 LBS | BODY MASS INDEX: 50.14 KG/M2 | HEIGHT: 62 IN | BODY MASS INDEX: 49.84 KG/M2

## 2017-10-18 DIAGNOSIS — O99.210 OBESITY IN PREGNANCY: ICD-10-CM

## 2017-10-18 DIAGNOSIS — E11.9 CONTROLLED TYPE 2 DIABETES MELLITUS WITHOUT COMPLICATION, WITHOUT LONG-TERM CURRENT USE OF INSULIN: ICD-10-CM

## 2017-10-18 DIAGNOSIS — O24.119 TYPE 2 DIABETES MELLITUS AFFECTING PREGNANCY, ANTEPARTUM: ICD-10-CM

## 2017-10-18 DIAGNOSIS — M20.10 VALGUS DEFORMITY OF GREAT TOE, UNSPECIFIED LATERALITY: ICD-10-CM

## 2017-10-18 DIAGNOSIS — O10.919 CHRONIC HYPERTENSION AFFECTING PREGNANCY: ICD-10-CM

## 2017-10-18 DIAGNOSIS — Z36.89 ENCOUNTER FOR ULTRASOUND TO CHECK FETAL GROWTH: Primary | ICD-10-CM

## 2017-10-18 DIAGNOSIS — E11.9 COMPREHENSIVE DIABETIC FOOT EXAMINATION, TYPE 2 DM, ENCOUNTER FOR: Primary | ICD-10-CM

## 2017-10-18 LAB
DIASTOLIC DYSFUNCTION: NO
GLUCOSE SERPL-MCNC: 78 MG/DL
RETIRED EF AND QEF - SEE NOTES: 55 (ref 55–65)

## 2017-10-18 PROCEDURE — 99999 PR PBB SHADOW E&M-EST. PATIENT-LVL II: CPT | Mod: PBBFAC,,, | Performed by: OBSTETRICS & GYNECOLOGY

## 2017-10-18 PROCEDURE — 99203 OFFICE O/P NEW LOW 30 MIN: CPT | Mod: S$GLB,,,

## 2017-10-18 PROCEDURE — 93307 TTE W/O DOPPLER COMPLETE: CPT | Mod: S$GLB,,, | Performed by: INTERNAL MEDICINE

## 2017-10-18 PROCEDURE — 82947 ASSAY GLUCOSE BLOOD QUANT: CPT

## 2017-10-18 PROCEDURE — 93000 ELECTROCARDIOGRAM COMPLETE: CPT | Mod: S$GLB,,, | Performed by: INTERNAL MEDICINE

## 2017-10-18 PROCEDURE — 36415 COLL VENOUS BLD VENIPUNCTURE: CPT

## 2017-10-18 PROCEDURE — 99213 OFFICE O/P EST LOW 20 MIN: CPT | Mod: S$GLB,,, | Performed by: OBSTETRICS & GYNECOLOGY

## 2017-10-18 RX ORDER — FERROUS SULFATE 325(65) MG
TABLET ORAL
Refills: 1 | COMMUNITY
Start: 2017-09-27

## 2017-10-18 NOTE — PROGRESS NOTES
Subjective:      Patient ID: Wang Warren is a 36 y.o. female.    Chief Complaint: Diabetes Mellitus (HgbA1c: 7.9 10/4/17 PCP: Nicolas Zurita) 10/18/17 )    Wang is a 36 y.o. female who presents to the clinic upon referral from Dr. Valenzuela  for evaluation and treatment of diabetic feet She has chronic HTN and gestational diabetes, relates being closely monitored for her pregnancy, newly diagnosed DM. She relates no foot problems or pain.  Occasional arch pain, but no calluses, wounds or foot abnormalities.  She is controlling the diabetes with diet.      Current shoe gear: Casual shoes    Hemoglobin A1C   Date Value Ref Range Status   10/04/2017 7.9 (H) 4.0 - 5.6 % Final     Comment:     According to ADA guidelines, hemoglobin A1c <7.0% represents  optimal control in non-pregnant diabetic patients. Different  metrics may apply to specific patient populations.   Standards of Medical Care in Diabetes-2016.  For the purpose of screening for the presence of diabetes:  <5.7%     Consistent with the absence of diabetes  5.7-6.4%  Consistent with increasing risk for diabetes   (prediabetes)  >or=6.5%  Consistent with diabetes  Currently, no consensus exists for use of hemoglobin A1c  for diagnosis of diabetes for children.  This Hemoglobin A1c assay has significant interference with fetal   hemoglobin   (HbF). The results are invalid for patients with abnormal amounts of   HbF,   including those with known Hereditary Persistence   of Fetal Hemoglobin. Heterozygous hemoglobin variants (HbAS, HbAC,   HbAD, HbAE, HbA2) do not significantly interfere with this assay;   however, presence of multiple variants in a sample may impact the %   interference.             Past Medical History:   Diagnosis Date    Chronic hypertension affecting pregnancy     Diabetes mellitus type II, controlled, with no complications 10/11/2017    Infertility, female     States she has been trying to conceive for four years. Was  evaluated at [facility] and was given Clomid for three months two years ago.       Past Surgical History:   Procedure Laterality Date    none         Family History   Problem Relation Age of Onset    Cancer Father      prostate    Diabetes Maternal Aunt     Breast cancer Neg Hx        Social History     Social History    Marital status:      Spouse name: Husam    Number of children: 0    Years of education: N/A     Occupational History         Drives [employer]      Social History Main Topics    Smoking status: Former Smoker    Smokeless tobacco: Never Used    Alcohol use No    Drug use: No    Sexual activity: Yes     Partners: Male     Birth control/ protection: None     Other Topics Concern    None     Social History Narrative    Walks for exercise.     to Husam for five years.       Current Outpatient Prescriptions   Medication Sig Dispense Refill    aspirin 81 MG Chew Take 81 mg by mouth once daily.      blood sugar diagnostic (ONETOUCH VERIO) Strp 1 strip by Misc.(Non-Drug; Combo Route) route 4 (four) times daily. 200 each 3    docusate sodium (COLACE) 100 MG capsule Take 1 capsule (100 mg total) by mouth 2 (two) times daily. 60 capsule 3    ferrous sulfate 325 mg (65 mg iron) Tab tablet TK 1 T PO BID  1    folic acid (FOLVITE) 1 MG tablet Take 4 tablets (4 mg total) by mouth once daily. 120 tablet 4    lancets (ONETOUCH ULTRASOFT LANCETS) Misc 1 lancet by Misc.(Non-Drug; Combo Route) route 4 (four) times daily. 200 each 3    PRENATAL VIT CALC,IRON,FOLIC (PRENATAL VITAMIN ORAL) Take by mouth.       No current facility-administered medications for this visit.        Review of patient's allergies indicates:  No Known Allergies      ROS  ROS:  Constitution: Negative for chills, fever, weakness and malaise/fatigue.   HEENT: Negative for headaches.   Cardiovascular: Negative for chest pain and claudication.   Respiratory: Negative for cough and shortness of breath.    Musculoskeletal: Negative for foot pain.  Negative for muscle cramps and muscle weakness.   Gastrointestinal: Negative for nausea and vomiting.   Neurological: Negative for numbness and paresthesias.   Dermatological: Negative for wound.          Objective:      Physical Exam  Constitutional:  Patient is oriented to person, place, and time. Vital signs are normal.  Appears well-developed and well-nourished.     Vascular:  Dorsalis pedis pulses are 2/4 on the right side, and 2/4 on the left side.   Posterior tibial pulses are 2/4 on the right side, and 2/4 on the left side.   - digital hair growth, capillary fill time to all toes <3 seconds, warm toes,  trace pedal swelling    Skin/Dermatological:  Skin is warm and intact.  No cyanosis or clubbing.  No rashes noted.  No open wounds.  All ten toenails mild thickening, elongated, mild discoloration  (--) keratosis     Musculoskeletal:      Pes planus, mild hallux abducto valgus bilaterally.  Pedal rom within normal limits.  Ankle joint DF no restriction with both knee flexed and extened.    Neurological:  (--) deficits to sharp/dull, light touch or vibratory sensation.   Muscle strength to tibialis anterior, extensor hallucis longus, extensor digitorum longus, peroneal muscles, flexor hallucis/digotorum longus, posterior tibial and gastrosoleal complex is 5/5, normal tone without assymmetry   Patellar reflexes are 2+ on the right side and 2+ on the left side.  Achilles reflexes are 2+ on the right side and 2+ on the left side.        Assessment:       Encounter Diagnoses   Name Primary?    Controlled type 2 diabetes mellitus without complication, without long-term current use of insulin Yes    Chronic hypertension affecting pregnancy     BMI 50.0-59.9, adult     Valgus deformity of great toe, unspecified laterality     Comprehensive diabetic foot examination, type 2 DM, encounter for          Plan:       Wang was seen today for diabetes  mellitus.    Diagnoses and all orders for this visit:    Controlled type 2 diabetes mellitus without complication, without long-term current use of insulin    Chronic hypertension affecting pregnancy    BMI 50.0-59.9, adult    Valgus deformity of great toe, unspecified laterality    Comprehensive diabetic foot examination, type 2 DM, encounter for      I counseled the patient on her conditions, their implications and medical management.    Shoe inspection. Diabetic Foot Education. Patient reminded of the importance of good nutrition and blood sugar control to help prevent podiatric complications of diabetes. Patient instructed on proper foot hygeine. We discussed wearing proper shoe gear, daily foot inspections, never walking without protective shoe gear, need for arch supports (recommened Spenco), never putting sharp instruments to feet.  We also discussed padding and shoes with high toe boxes for foot deformities. Disc controlling weight.  Patient will  inspect her feet, wear protective shoe gear when ambulatory, moisturizer to maintain skin integrity and follow in this office in approximately 6 months, sooner p.r.n.        Balwinder Owens DPM

## 2017-10-18 NOTE — LETTER
October 18, 2017      Mariela Valenzuela MD  [address] [facility] [address]           [facility] - [facility]  [address] [address]  Phone: [phone]  Fax: [phone]          Patient: Wang Warren   MR Number: [id number]   YOB: 1980   Date of Visit: 10/18/2017       Dear Dr. Mariela Valenzuela:    Thank you for referring Wang Warren to me for evaluation. Attached you will find relevant portions of my assessment and plan of care.    If you have questions, please do not hesitate to call me. I look forward to following Wang Warren along with you.    Sincerely,    Balwinder Constantino, EZ    Enclosure  CC:  No Recipients    If you would like to receive this communication electronically, please contact [email or web address] or [phone] to request more information on [employer] Link access.    For providers and/or their staff who would like to refer a patient to [facility], please contact us through our one-stop-shop provider referral line, [facility], at [phone].    If you feel you have received this communication in error or would no longer like to receive these types of communications, please e-mail [email or web address]

## 2017-10-18 NOTE — PROGRESS NOTES
F/u MD visit for CHTN and GDM-diet controlled.    BS log reviewed (to be scanned in).  Fastin-106 (2/6 elevated)  Breakfast:  (1 elevated)  Lunch:  (no elevated)  Dinner:  (no elevated)    Recommend continued diet. Meter not brought with her to calibrate, but random glucose obtained in lab-78, so suspect would correlate. Continue to obtain pattern blood sugars. Repeat MD visit in 2 weeks for BS review, next growth scan in 4 weeks. EKG today normal. Echo normal. Fetal echo 11/3. Podiatry visit today. Needs eye exam.    Normotensive today. Recommend continue baby aspirin.    The approximate physician face to face time was 15 minutes. The majority of time (greater than 50%) was spent on counseling of the patient or coordination of care.

## 2017-10-25 ENCOUNTER — PATIENT MESSAGE (OUTPATIENT)
Dept: OBSTETRICS AND GYNECOLOGY | Facility: CLINIC | Age: 37
End: 2017-10-25

## 2017-11-01 ENCOUNTER — OFFICE VISIT (OUTPATIENT)
Dept: MATERNAL FETAL MEDICINE | Facility: CLINIC | Age: 37
End: 2017-11-01
Payer: COMMERCIAL

## 2017-11-01 VITALS
BODY MASS INDEX: 47.98 KG/M2 | SYSTOLIC BLOOD PRESSURE: 138 MMHG | DIASTOLIC BLOOD PRESSURE: 86 MMHG | WEIGHT: 262.38 LBS

## 2017-11-01 DIAGNOSIS — Z36.89 ENCOUNTER FOR ULTRASOUND TO CHECK FETAL GROWTH: ICD-10-CM

## 2017-11-01 DIAGNOSIS — O09.512 ADVANCED MATERNAL AGE, PRIMIGRAVIDA IN SECOND TRIMESTER, ANTEPARTUM: ICD-10-CM

## 2017-11-01 PROCEDURE — 99499 UNLISTED E&M SERVICE: CPT | Mod: S$GLB,,, | Performed by: OBSTETRICS & GYNECOLOGY

## 2017-11-01 PROCEDURE — 99999 PR PBB SHADOW E&M-EST. PATIENT-LVL III: CPT | Mod: PBBFAC,,, | Performed by: OBSTETRICS & GYNECOLOGY

## 2017-11-01 NOTE — LETTER
November 1, 2017      MARIELA CHOWDHURY  [address] [facility] [address]           [facility] - Maternal Fetal [facility]  [address] [facility] [address]  Phone: [phone]          Patient: Wang Warren   MR Number: [id number]   YOB: 1980   Date of Visit: 11/1/2017       Dear Mariela Chowdhury:    Thank you for referring Wang Warren to me for evaluation. Attached you will find relevant portions of my assessment and plan of care.    If you have questions, please do not hesitate to call me. I look forward to following Wang Warren along with you.    Sincerely,    Kayla Zurita MD    Enclosure  CC:  No Recipients    If you would like to receive this communication electronically, please contact [email or web address] or [phone] to request more information on [employer] Link access.    For providers and/or their staff who would like to refer a patient to [facility], please contact us through our one-stop-shop provider referral line, [facility], at [phone].    If you feel you have received this communication in error or would no longer like to receive these types of communications, please e-mail [email or web address]

## 2017-11-01 NOTE — PROGRESS NOTES
Return MD Visit for Dm, CHTN    /86, weight 119 kg (-4 kg) FHTs 138-143 bpm.    Meds: PNV, Folic acid, ASA, colace, iron    Doing well. Eating healthy foods. Feeling flutters and kicks.    Blood sugar log reviewed-all in range. Out of strips. Dr. Valenzuela's office working on getting these refilled. Asked her to please notify us by tomorrow if not resolved.  Continue checking 4x/day. Next growth US on 11/15 with MD visit. S/p normal podiatry visit. Has eye appointment today and fetal echo on Friday.    Will keep eye on weight loss.    CHTN-no meds. Cont low dose ASA for preeclampsia prevention.    The approximate physician face to face time was 15 minutes. The majority of time (greater than 50%) was spent on counseling of the patient or coordination of care.

## 2017-11-03 ENCOUNTER — PATIENT MESSAGE (OUTPATIENT)
Dept: PEDIATRIC CARDIOLOGY | Facility: CLINIC | Age: 37
End: 2017-11-03

## 2017-11-03 ENCOUNTER — CLINICAL SUPPORT (OUTPATIENT)
Dept: PEDIATRIC CARDIOLOGY | Facility: CLINIC | Age: 37
End: 2017-11-03
Attending: PEDIATRICS
Payer: COMMERCIAL

## 2017-11-03 VITALS
WEIGHT: 262.38 LBS | HEIGHT: 62 IN | SYSTOLIC BLOOD PRESSURE: 138 MMHG | BODY MASS INDEX: 48.28 KG/M2 | DIASTOLIC BLOOD PRESSURE: 86 MMHG

## 2017-11-03 DIAGNOSIS — O09.512 ADVANCED MATERNAL AGE, PRIMIGRAVIDA IN SECOND TRIMESTER, ANTEPARTUM: Primary | ICD-10-CM

## 2017-11-03 DIAGNOSIS — O09.512 ADVANCED MATERNAL AGE, PRIMIGRAVIDA IN SECOND TRIMESTER, ANTEPARTUM: ICD-10-CM

## 2017-11-03 DIAGNOSIS — O24.812 OTHER PRE-EXISTING DIABETES MELLITUS DURING PREGNANCY IN SECOND TRIMESTER: ICD-10-CM

## 2017-11-03 DIAGNOSIS — E11.9 CONTROLLED TYPE 2 DIABETES MELLITUS WITHOUT COMPLICATION, WITHOUT LONG-TERM CURRENT USE OF INSULIN: ICD-10-CM

## 2017-11-03 DIAGNOSIS — O10.919 CHRONIC HYPERTENSION AFFECTING PREGNANCY: ICD-10-CM

## 2017-11-03 PROCEDURE — 76825 ECHO EXAM OF FETAL HEART: CPT | Mod: S$GLB,,, | Performed by: PEDIATRICS

## 2017-11-03 PROCEDURE — 93325 DOPPLER ECHO COLOR FLOW MAPG: CPT | Mod: S$GLB,,, | Performed by: PEDIATRICS

## 2017-11-03 PROCEDURE — 99203 OFFICE O/P NEW LOW 30 MIN: CPT | Mod: 25,S$GLB,, | Performed by: PEDIATRICS

## 2017-11-03 PROCEDURE — 76827 ECHO EXAM OF FETAL HEART: CPT | Mod: S$GLB,,, | Performed by: PEDIATRICS

## 2017-11-03 PROCEDURE — 99999 PR PBB SHADOW E&M-EST. PATIENT-LVL III: CPT | Mod: PBBFAC,,, | Performed by: PEDIATRICS

## 2017-11-03 NOTE — PROGRESS NOTES
I had the pleasure of evaluating Wang Warren who is now 36 y.o.  and carrying her 1st pregnancy at 23 1/7 weeks gestation. She was referred for evaluation of the fetal heart due to increased risks for congenital heart disease associated with advanced maternal age, history of diabetes and maternal hypertension  As well as difficulty demonstrating cadiac anatomy at [facility] ultrasound..      Current Outpatient Prescriptions:     aspirin 81 MG Chew, Take 81 mg by mouth once daily., Disp: , Rfl:     blood sugar diagnostic (ONETOUCH VERIO) Strp, 1 strip by Misc.(Non-Drug; Combo Route) route 4 (four) times daily., Disp: 200 each, Rfl: 3    docusate sodium (COLACE) 100 MG capsule, Take 1 capsule (100 mg total) by mouth 2 (two) times daily., Disp: 60 capsule, Rfl: 3    ferrous sulfate 325 mg (65 mg iron) Tab tablet, TK 1 T PO daily, Disp: , Rfl: 1    folic acid (FOLVITE) 1 MG tablet, Take 4 tablets (4 mg total) by mouth once daily., Disp: 120 tablet, Rfl: 4    lancets (ONETOUCH ULTRASOFT LANCETS) Misc, 1 lancet by Misc.(Non-Drug; Combo Route) route 4 (four) times daily., Disp: 200 each, Rfl: 3    PRENATAL VIT CALC,IRON,FOLIC (PRENATAL VITAMIN ORAL), Take by mouth., Disp: , Rfl:   Review of patient's allergies indicates:  No Known Allergies    Maternal factors increasing risk for congenital heart disease: As noted above  Paternal factors increasing risk for congenital heart disease: Un remarkable.    FETAL ECHOCARDIOGRAM (preliminary):  Normal fetal cardiac connections and function for EGA  (Full report in electronic medical record)    I reviewed the strengths and weaknesses of fetal echocardiography. I also reviewed the current study. The echocardiogram today demonstrates normal anatomical connections and function for the estimated gestational age. Within the limitations imposed by fetal physiology, I would expect a high probability that this infant will be born with a normal heart.    I reviewed the  concerns related to advanced maternal age and in particular the risk for Down syndrome which is frequently associated with congenital heart disease. Ms. Warren elected to perform maternal cell free DNA testing which demonstrated no evidence for trisomy 21,18 or 13. This test has a high predictive value and is very reassuring. In addition, the fetal cardiac evaluation also does not demonstrate any evidence for congenital heart disease which is highly associated with each of these genetic abnormalities in some form.    I reviewed the concerns related to maternal diabetes. With normal fetal anatomy and function at this stage of development, the primary concern is the possibility of developing hypertrophic cardiomyopathy if there is difficulty controlling the maternal glucose and insulin. I carefully reviewed the possibility of this problem in the  period, the problems that it could cause and the high probability that it would regress after the infant is no longer exposed to the maternal glucose and insulin. I also reinforced the need to regulate the insulin and glucose carefully during the remainder of the pregnancy to minimize the risk for this complication.    I answered all the parent's questions. I have not scheduled another evaluation; however, if questions arise later during gestation or after delivery, I would be happy to assist in any way. Ms. Warren is comfortable with the plan.    RECOMMENDATIONS:  Evaluation of the infant after delivery if the clinical exam is abnormal        Note:  Over 50% of visit in consultation with the family >30 minutes

## 2017-11-03 NOTE — LETTER
November 5, 2017      Kayla Zurita MD  [address] [facility]  [address] [facility] [address]           [facility] - Pediatric Cardiology  [address] [place] [address] [address] [facility] [address]  Phone: [phone]  Fax: [phone]          Patient: Wang Warren   MR Number: [id number]   YOB: 1980   Date of Visit: 11/3/2017       Dear Dr. Kayla Zurita:    Thank you for referring Wang Warren to me for evaluation. Attached you will find relevant portions of my assessment and plan of care.    If you have questions, please do not hesitate to call me. I look forward to following Wang Warren along with you.    Sincerely,    Ha Rodriguez MD    Enclosure  CC:  No Recipients    If you would like to receive this communication electronically, please contact [email or web address] or [phone] to request more information on [employer] Link access.    For providers and/or their staff who would like to refer a patient to [facility], please contact us through our one-stop-shop provider referral line, [facility], at [phone].    If you feel you have received this communication in error or would no longer like to receive these types of communications, please e-mail [email or web address]

## 2017-11-05 ENCOUNTER — PATIENT MESSAGE (OUTPATIENT)
Dept: PODIATRY | Facility: CLINIC | Age: 37
End: 2017-11-05

## 2017-11-05 ENCOUNTER — PATIENT MESSAGE (OUTPATIENT)
Dept: OBSTETRICS AND GYNECOLOGY | Facility: CLINIC | Age: 37
End: 2017-11-05

## 2017-11-08 ENCOUNTER — LAB VISIT (OUTPATIENT)
Dept: LAB | Facility: OTHER | Age: 37
End: 2017-11-08
Attending: OBSTETRICS & GYNECOLOGY
Payer: COMMERCIAL

## 2017-11-08 ENCOUNTER — ROUTINE PRENATAL (OUTPATIENT)
Dept: OBSTETRICS AND GYNECOLOGY | Facility: CLINIC | Age: 37
End: 2017-11-08
Payer: COMMERCIAL

## 2017-11-08 VITALS
SYSTOLIC BLOOD PRESSURE: 124 MMHG | DIASTOLIC BLOOD PRESSURE: 82 MMHG | BODY MASS INDEX: 48.16 KG/M2 | WEIGHT: 261.69 LBS

## 2017-11-08 DIAGNOSIS — E66.01 MORBID OBESITY WITH BMI OF 45.0-49.9, ADULT: ICD-10-CM

## 2017-11-08 DIAGNOSIS — O10.919 CHRONIC HYPERTENSION AFFECTING PREGNANCY: ICD-10-CM

## 2017-11-08 DIAGNOSIS — O24.112 PRE-EXISTING TYPE 2 DIABETES MELLITUS DURING PREGNANCY IN SECOND TRIMESTER: ICD-10-CM

## 2017-11-08 DIAGNOSIS — Z3A.23 23 WEEKS GESTATION OF PREGNANCY: ICD-10-CM

## 2017-11-08 DIAGNOSIS — Z3A.23 23 WEEKS GESTATION OF PREGNANCY: Primary | ICD-10-CM

## 2017-11-08 LAB
BASOPHILS # BLD AUTO: 0.02 K/UL
BASOPHILS NFR BLD: 0.4 %
DIFFERENTIAL METHOD: ABNORMAL
EOSINOPHIL # BLD AUTO: 0.1 K/UL
EOSINOPHIL NFR BLD: 0.9 %
ERYTHROCYTE [DISTWIDTH] IN BLOOD BY AUTOMATED COUNT: 13.3 %
HCT VFR BLD AUTO: 33.2 %
HGB BLD-MCNC: 11 G/DL
LYMPHOCYTES # BLD AUTO: 1.5 K/UL
LYMPHOCYTES NFR BLD: 27.8 %
MCH RBC QN AUTO: 28.7 PG
MCHC RBC AUTO-ENTMCNC: 33.1 G/DL
MCV RBC AUTO: 87 FL
MONOCYTES # BLD AUTO: 0.6 K/UL
MONOCYTES NFR BLD: 10.6 %
NEUTROPHILS # BLD AUTO: 3.2 K/UL
NEUTROPHILS NFR BLD: 59.7 %
PLATELET # BLD AUTO: 333 K/UL
PMV BLD AUTO: 11.6 FL
RBC # BLD AUTO: 3.83 M/UL
WBC # BLD AUTO: 5.28 K/UL

## 2017-11-08 PROCEDURE — 85025 COMPLETE CBC W/AUTO DIFF WBC: CPT

## 2017-11-08 PROCEDURE — 99214 OFFICE O/P EST MOD 30 MIN: CPT | Mod: TH,S$GLB,, | Performed by: OBSTETRICS & GYNECOLOGY

## 2017-11-08 PROCEDURE — 36415 COLL VENOUS BLD VENIPUNCTURE: CPT

## 2017-11-08 PROCEDURE — 99999 PR PBB SHADOW E&M-EST. PATIENT-LVL I: CPT | Mod: PBBFAC,,, | Performed by: OBSTETRICS & GYNECOLOGY

## 2017-11-08 NOTE — PROGRESS NOTES
Doing well, tolerating diabetic diet. BS Fastings 75-86 2 hour 22 all under 120 per patient. She has all diabetic supplies.  Good FM. Denies VB, LOF, CTX. Labor, ROM and bleeding precautions. SPADD and Flu shot today. 2nd trimester labs today. Patient has seen Podiatry, Peds Cardiology. EKG/Echo wnl. Fetal Echo wnl. Ophtho appt scheduled in one week. F/U 4 weeks.

## 2017-11-09 ENCOUNTER — PATIENT MESSAGE (OUTPATIENT)
Dept: OBSTETRICS AND GYNECOLOGY | Facility: CLINIC | Age: 37
End: 2017-11-09

## 2017-11-15 ENCOUNTER — OFFICE VISIT (OUTPATIENT)
Dept: MATERNAL FETAL MEDICINE | Facility: CLINIC | Age: 37
End: 2017-11-15
Payer: COMMERCIAL

## 2017-11-15 VITALS
SYSTOLIC BLOOD PRESSURE: 120 MMHG | DIASTOLIC BLOOD PRESSURE: 70 MMHG | BODY MASS INDEX: 48.32 KG/M2 | WEIGHT: 262.56 LBS

## 2017-11-15 DIAGNOSIS — E11.9 CONTROLLED TYPE 2 DIABETES MELLITUS WITHOUT COMPLICATION, WITHOUT LONG-TERM CURRENT USE OF INSULIN: ICD-10-CM

## 2017-11-15 DIAGNOSIS — Z36.89 ENCOUNTER FOR ULTRASOUND TO CHECK FETAL GROWTH: ICD-10-CM

## 2017-11-15 PROCEDURE — 76816 OB US FOLLOW-UP PER FETUS: CPT | Mod: S$GLB,,, | Performed by: OBSTETRICS & GYNECOLOGY

## 2017-11-15 PROCEDURE — 99999 PR PBB SHADOW E&M-EST. PATIENT-LVL III: CPT | Mod: PBBFAC,,, | Performed by: OBSTETRICS & GYNECOLOGY

## 2017-11-15 PROCEDURE — 99212 OFFICE O/P EST SF 10 MIN: CPT | Mod: 25,S$GLB,, | Performed by: OBSTETRICS & GYNECOLOGY

## 2017-11-15 NOTE — PROGRESS NOTES
"Indication  ========    Evaluation of fetal growth, BS check.    History  ======    General History  Height 157 cm  Height (ft) 5 ft  Height (in) 2 in  Other: OB: MAHENDRA GLEASON  Previous Outcomes   1  Para 0  Risk Factors  History risk factors: Obesity  History risk factors: AMA  History risk factors: Diabetes mellitus  History risk factors: Personal history of hypertension    Pregnancy History  ==============    Maternal Lab Tests  Result:  Single marker AFP negative    Test: Cell Free DNA Testing  Result: Negative Materni T21  Wants to know gender: yes    Maternal Assessment  =================    Weight 119 kg  Weight (lb) 262 lb  Height 157 cm  Height (ft) 5 ft  Height (in) 2 in  BP syst 120 mmHg  BP diast 70 mmHg  BMI 47.98 kg/m²    Method  ======    Transabdominal ultrasound examination.    Pregnancy  =========    Peres pregnancy. Number of fetuses: 1.    Dating  ======    Cycle: regular cycle  GA by "stated dating" 24 w + 6 d  JUAN MIGUEL by "stated dating": 3/1/2018  Ultrasound examination on: 11/15/2017  GA by U/S based upon: AC, BPD, Femur, HC  GA by U/S 24 w + 4 d  JUAN MIGUEL by U/S: 3/3/2018  Assigned: Dating performed on 10/4/2017, based on the external assessment  Assigned GA 24 w + 6 d  Assigned JUAN MIGUEL: 3/1/2018    General Evaluation  ==============    Cardiac activity: present.  bpm.  Fetal movements: visualized.  Presentation: breech.  Placenta: posterior.  Umbilical cord: 3 vessel cord.  Amniotic fluid: Amount of AF: normal amount. MVP 4.2 cm.    Fetal Biometry  ============    Fetal Biometry  BPD 59.8 mm 24w 3d Hadlock  OFD 77.8 mm 25w 3d Joseph  .7 mm 24w 1d Hadlock  .3 mm 24w 0d Hadlock  Femur 47.7 mm 26w 0d Hadlock   g 43% [place]  Calculated by: Hadlock (BPD-HC-AC-FL)  EFW (lb) 1 lb  EFW (oz) 10 oz  Cephalic index 0.77  HC / AC 1.14  FL / BPD 0.80  FL / AC 0.25  MVP 4.2 cm   bpm    Fetal Anatomy  ===========    Cranium: normal  Posterior fossa: normal  4-chamber " view: documented previously  Heart / Thorax: patient had fetal echo wnl  Aortic arch: normal  Ductal arch: normal  SVC: suboptimal  IVC: suboptimal  Stomach: normal  Kidneys: normal  Bladder: normal  Gender: female  Wants to know gender: yes  Other: A full anatomy survey previously performed.    Consultation  ==========    Return office visit-BS check    Diet controlled GDM-all sugars in range (see scanned log). Following diabetic diet. Growth and fluid normal. No concerns. s/p normal fetal  echocardiogram.  Cont diabetic diet, continue serial growth scans.    HTN-on LDA. Normotensive. Will start PNT at 32 weeks.    Time  I overall spent approximately 10 minutes in face to face time with the patient and her family, greater than 50% of which was in counseling and  care coordination.    Impression  =========    Interval fetal growth has been appropriate and the EFW plots at the 43% percentile for gestational age.  The AFV is normal.    Recommendation  ==============    Recommend MFM visit for BS check in 2 weeks.  Recommend MFM visit/growth ultrasound in 4 weeks.

## 2017-11-15 NOTE — LETTER
November 15, 2017      Mariela Valenzuela MD  [address] [facility] [address]           [facility]etal [facility]  [address] [facility] [address]  Phone: [phone]          Patient: Wang Warren   MR Number: [id number]   YOB: 1980   Date of Visit: 11/15/2017       Dear Dr. Mariela Valenzuela:    Thank you for referring Wang Warren to me for evaluation. Attached you will find relevant portions of my assessment and plan of care.    If you have questions, please do not hesitate to call me. I look forward to following Wang Warren along with you.    Sincerely,    Kayla Zurita MD    Enclosure  CC:  No Recipients    If you would like to receive this communication electronically, please contact [email or web address] or [phone] to request more information on [employer] Link access.    For providers and/or their staff who would like to refer a patient to [facility], please contact us through our one-stop-shop provider referral line, [facility], at [phone].    If you feel you have received this communication in error or would no longer like to receive these types of communications, please e-mail [email or web address]

## 2017-11-30 ENCOUNTER — OFFICE VISIT (OUTPATIENT)
Dept: MATERNAL FETAL MEDICINE | Facility: CLINIC | Age: 37
End: 2017-11-30
Attending: OBSTETRICS & GYNECOLOGY
Payer: COMMERCIAL

## 2017-11-30 VITALS
DIASTOLIC BLOOD PRESSURE: 76 MMHG | BODY MASS INDEX: 49.49 KG/M2 | SYSTOLIC BLOOD PRESSURE: 120 MMHG | WEIGHT: 268.94 LBS

## 2017-11-30 DIAGNOSIS — E11.9 CONTROLLED TYPE 2 DIABETES MELLITUS WITHOUT COMPLICATION, WITHOUT LONG-TERM CURRENT USE OF INSULIN: Primary | ICD-10-CM

## 2017-11-30 PROCEDURE — 99999 PR PBB SHADOW E&M-EST. PATIENT-LVL II: CPT | Mod: PBBFAC,,, | Performed by: OBSTETRICS & GYNECOLOGY

## 2017-11-30 PROCEDURE — 99212 OFFICE O/P EST SF 10 MIN: CPT | Mod: S$GLB,,, | Performed by: OBSTETRICS & GYNECOLOGY

## 2017-11-30 NOTE — PROGRESS NOTES
37 G1 JUAN MIGUEL 3/1/18; 27w    Pt brings BS for review; 4 weeks; very complaint and in good control    SHe has T2DM and is diet controlled    A1c in Oct was 7.0    No changes are recommended

## 2017-11-30 NOTE — LETTER
November 30, 2017      Mariela Valenzuela MD  [address] [facility] [address]           [facility]  [address] [facility] [address]  Phone: [phone]          Patient: Wang Warren   MR Number: [id number]   YOB: 1980   Date of Visit: 11/30/2017       Dear Dr. Mariela Valenzuela:    Thank you for referring Wang Warren to me for evaluation. Attached you will find relevant portions of my assessment and plan of care.    If you have questions, please do not hesitate to call me. I look forward to following Wang Warren along with you.    Sincerely,    Rush Ma III, MD    Enclosure  CC:  No Recipients    If you would like to receive this communication electronically, please contact [email or web address] or [phone] to request more information on [employer] Link access.    For providers and/or their staff who would like to refer a patient to [facility], please contact us through our one-stop-shop provider referral line, [facility], at [phone].    If you feel you have received this communication in error or would no longer like to receive these types of communications, please e-mail [email or web address]

## 2017-12-06 ENCOUNTER — TELEPHONE (OUTPATIENT)
Dept: OBSTETRICS AND GYNECOLOGY | Facility: CLINIC | Age: 37
End: 2017-12-06

## 2017-12-06 ENCOUNTER — PATIENT MESSAGE (OUTPATIENT)
Dept: OBSTETRICS AND GYNECOLOGY | Facility: CLINIC | Age: 37
End: 2017-12-06

## 2017-12-06 NOTE — TELEPHONE ENCOUNTER
----- Message from Cachorro Palacios sent at 12/6/2017  9:12 AM CST -----  Please call pt regarding her fax it was not received [phone]

## 2017-12-11 ENCOUNTER — TELEPHONE (OUTPATIENT)
Dept: OBSTETRICS AND GYNECOLOGY | Facility: CLINIC | Age: 37
End: 2017-12-11

## 2017-12-11 NOTE — TELEPHONE ENCOUNTER
----- Message from Michelle Scott sent at 12/11/2017  9:56 AM CST -----  Contact: pt  _X  1st Request  _  2nd Request  _  3rd Request      Who:PT     Why:  Pt states that her FMLA needs to be faxed back ASAP     What Number to Call Back:     When to Expect a call back: (Before the end of the day)   -- if call after 3:00 call back will be tomorrow.

## 2017-12-12 ENCOUNTER — TELEPHONE (OUTPATIENT)
Dept: OBSTETRICS AND GYNECOLOGY | Facility: CLINIC | Age: 37
End: 2017-12-12

## 2017-12-12 ENCOUNTER — OFFICE VISIT (OUTPATIENT)
Dept: MATERNAL FETAL MEDICINE | Facility: CLINIC | Age: 37
End: 2017-12-12
Payer: COMMERCIAL

## 2017-12-12 VITALS
BODY MASS INDEX: 51.57 KG/M2 | HEIGHT: 61 IN | DIASTOLIC BLOOD PRESSURE: 72 MMHG | WEIGHT: 273.13 LBS | SYSTOLIC BLOOD PRESSURE: 124 MMHG

## 2017-12-12 DIAGNOSIS — O24.119 PRE-EXISTING TYPE 2 DIABETES MELLITUS DURING PREGNANCY, ANTEPARTUM: Primary | ICD-10-CM

## 2017-12-12 DIAGNOSIS — O10.919 CHRONIC HYPERTENSION AFFECTING PREGNANCY: ICD-10-CM

## 2017-12-12 DIAGNOSIS — Z36.89 ENCOUNTER FOR ULTRASOUND TO CHECK FETAL GROWTH: ICD-10-CM

## 2017-12-12 PROCEDURE — 99213 OFFICE O/P EST LOW 20 MIN: CPT | Mod: 25,S$GLB,, | Performed by: OBSTETRICS & GYNECOLOGY

## 2017-12-12 PROCEDURE — 76816 OB US FOLLOW-UP PER FETUS: CPT | Mod: S$GLB,,, | Performed by: OBSTETRICS & GYNECOLOGY

## 2017-12-12 PROCEDURE — 99999 PR PBB SHADOW E&M-EST. PATIENT-LVL III: CPT | Mod: PBBFAC,,, | Performed by: OBSTETRICS & GYNECOLOGY

## 2017-12-12 NOTE — LETTER
December 12, 2017      Mariela Valenzuela MD  [address] [facility] [address]           [facility] - [facility]  [address] [facility] [address]  Phone: [phone]          Patient: Wang Warren   MR Number: [id number]   YOB: 1980   Date of Visit: 12/12/2017       Dear Dr. Mariela Valenzuela:    Thank you for referring Wang Warren to me for evaluation. Attached you will find relevant portions of my assessment and plan of care.    If you have questions, please do not hesitate to call me. I look forward to following Wang Warren along with you.    Sincerely,    Keshia Reyes MD    Enclosure  CC:  No Recipients    If you would like to receive this communication electronically, please contact [email or web address] or [phone] to request more information on [employer] Link access.    For providers and/or their staff who would like to refer a patient to [facility], please contact us through our one-stop-shop provider referral line, [facility], at [phone].    If you feel you have received this communication in error or would no longer like to receive these types of communications, please e-mail [email or web address]

## 2017-12-12 NOTE — PROGRESS NOTES
"Indication  ========    Evaluation of fetal growth.    History  ======    General History  Height 157 cm  Height (ft) 5 ft  Height (in) 2 in  Other: OB: MAHENDRA VICTORINO  Previous Outcomes   1  Para 0  Risk Factors  History risk factors: Obesity  History risk factors: AMA  History risk factors: Diabetes mellitus  History risk factors: Personal history of hypertension    Pregnancy History  ==============    Maternal Lab Tests  Result:  Single marker AFP negative    Test: Cell Free DNA Testing  Result: Negative Materni T21  Wants to know gender: yes    Maternal Assessment  =================    Weight 124 kg  Weight (lb) 273 lb  Height 157 cm  Height (ft) 5 ft  Height (in) 2 in  BP syst 138 mmHg  BP diast 92 mmHg  BMI 50.00 kg/m²    Method  ======    Transabdominal ultrasound examination.    Pregnancy  =========    Peres pregnancy. Number of fetuses: 1.    Dating  ======    Cycle: regular cycle  GA by "stated dating" 28 w + 5 d  JUAN MIGUEL by "stated dating": 3/1/2018  Ultrasound examination on: 2017  GA by U/S based upon: AC, BPD, Femur, HC  GA by U/S 28 w + 6 d  JUAN MIGUEL by U/S: 2018  Assigned: Dating performed on 10/4/2017, based on the external assessment  Assigned GA 28 w + 5 d  Assigned JUAN MIGUEL: 3/1/2018    General Evaluation  ==============    Cardiac activity: present.  bpm.  Fetal movements: visualized.  Presentation: breech.  Placenta: posterior, fundal.  Umbilical cord: 3 vessel cord.  Amniotic fluid: MVP 5.8 cm.    Fetal Biometry  ============    Fetal Biometry  BPD 69.0 mm 27w 5d Hadlock  OFD 93.4 mm 30w 1d Joseph  .0 mm 28w 4d Hadlock  .2 mm 29w 0d Hadlock  Femur 57.7 mm 30w 1d Hadlock  EFW 1,368 g 52% [place]  Calculated by: Hadlock (BPD-HC-AC-FL)  EFW (lb) 3 lb  EFW (oz) 0 oz  Cephalic index 0.74  HC / AC 1.06  FL / BPD 0.84  FL / AC 0.23  MVP 5.8 cm   bpm  Head / Face / Neck   2.2 mm    Fetal Anatomy  ============    Cranium: normal  4-chamber view: normal  Heart / " Thorax: Patient seen by Peds Cardio WNL  SVC: suboptimal  IVC: suboptimal  Stomach: normal  Kidneys: normal  Bladder: normal  Gender: female  Wants to know gender: yes  Other: A full anatomy survey previously performed.    Consultation  ==========    Type: Follow up blood sugar check.  The patient denies any preeclampsia symptoms. She reports she rushed into the office and that was the reason for the initial elevation in her  blood pressure.  She reports she has been doing well from a diabetes standpoint on diet.    /92. Repeat 124/72    Fasting blood sugars: 63-78 (no symptoms with lows-eating bedtime snack-reviewed other snack choices)  2 hour post breakfast: 71-84  2 hour post lunch: 77-92  2 hour post dinner:   The patient reports having a normal eye exam and podiatry exam (no records provided). EKG and echo normal.    In reviewing records, noted patient had elevated HgA1c at 7.5 in 2014 but was called prediabetes. It was also noted that LFTs were increased  thought to be due to fatty liver and hepatitis C testing was recommended but I did not see results. Her AST and ALT have been normal this  pregnancy.    A/P:  1. IUP at 28 and 5  2. Diabetes: Good control on diet. No changes  Recommend blood sugar check in 2 weeks.  Recommend growth ultrasound in 4 weeks and serial ultrasounds for growth. We will do a blood sugar check in 4 weeks.  Recommend Dr. Valenzuela draw at HgA1c in January as will be 3 months and want to confirm control.  Antepartum fetal surveillance is recommended at 32 weeks-to be scheduled by Dr. Valenzuela  3. HTN: on baby aspirin.  First bp high, repeat normal. Has appointment in Dr. Valenzuela's office today. Recommend rechecking bp at Dr. Valenzuela's office. If any concerns, a    preeclampsia workup would be warranted.  Risks of preeclampsia were reviewed.  4. History of increased LFTs -normal this pregnancy, recommend hepatitis c testing as per prior internist recommendations  5. The patient  also had low vitamin D in the past. Recommend consideration of checking levels.    Discussed case with Dr. Valenzuela.    A total of 15 minutes was spent in face to face time with greater than half of that time spent in counseling and coordination of care.      Impression  =========    Peres live intrauterine pregnancy.  Overall normal fetal growth.  Amniotic fluid volume is normal.  Limited fetal anatomy appears normal. SVC and IVC remain suboptimal but the patient had a normal fetal echo.  BPP 8/8.    Recommendation  ==============    Follow up blood sugar check in 2 weeks.  Follow up ultrasound for fetal growth and blood sugar check in 4 weeks.   fetal surveillance to be scheduled beginning at 32 weeks gestation by patient's primary ob due to diabetes and CHTN.  See above note.

## 2017-12-27 ENCOUNTER — ROUTINE PRENATAL (OUTPATIENT)
Dept: OBSTETRICS AND GYNECOLOGY | Facility: CLINIC | Age: 37
End: 2017-12-27
Payer: COMMERCIAL

## 2017-12-27 ENCOUNTER — OFFICE VISIT (OUTPATIENT)
Dept: MATERNAL FETAL MEDICINE | Facility: CLINIC | Age: 37
End: 2017-12-27
Payer: COMMERCIAL

## 2017-12-27 VITALS — DIASTOLIC BLOOD PRESSURE: 78 MMHG | BODY MASS INDEX: 53.19 KG/M2 | WEIGHT: 281.5 LBS | SYSTOLIC BLOOD PRESSURE: 122 MMHG

## 2017-12-27 VITALS
BODY MASS INDEX: 53.28 KG/M2 | WEIGHT: 281.94 LBS | DIASTOLIC BLOOD PRESSURE: 78 MMHG | SYSTOLIC BLOOD PRESSURE: 130 MMHG

## 2017-12-27 DIAGNOSIS — O24.119 PRE-EXISTING TYPE 2 DIABETES MELLITUS DURING PREGNANCY, ANTEPARTUM: ICD-10-CM

## 2017-12-27 DIAGNOSIS — Z36.89 ENCOUNTER FOR ULTRASOUND TO CHECK FETAL GROWTH: ICD-10-CM

## 2017-12-27 DIAGNOSIS — O10.919 CHRONIC HYPERTENSION DURING PREGNANCY: Primary | ICD-10-CM

## 2017-12-27 DIAGNOSIS — Z34.03 ENCOUNTER FOR SUPERVISION OF NORMAL FIRST PREGNANCY IN THIRD TRIMESTER: Primary | ICD-10-CM

## 2017-12-27 PROCEDURE — 0502F SUBSEQUENT PRENATAL CARE: CPT | Mod: S$GLB,,, | Performed by: OBSTETRICS & GYNECOLOGY

## 2017-12-27 PROCEDURE — 99999 PR PBB SHADOW E&M-EST. PATIENT-LVL III: CPT | Mod: PBBFAC,,,

## 2017-12-27 PROCEDURE — 99999 PR PBB SHADOW E&M-EST. PATIENT-LVL III: CPT | Mod: PBBFAC,,, | Performed by: OBSTETRICS & GYNECOLOGY

## 2017-12-27 PROCEDURE — 99212 OFFICE O/P EST SF 10 MIN: CPT | Mod: S$GLB,,, | Performed by: OBSTETRICS & GYNECOLOGY

## 2017-12-27 NOTE — PROGRESS NOTES
Ms. Warren returns for a follow up blood sugar check. She and her significant other have adopted his cousin's baby who is 11 days old and they are thrilled.  She is doing well without any complaints.    /78  -144    Fasting: all below 90 (few in 60s in early December but not since)  2 hour post breakfast: all less than 120  2 hour post lunch: all less than 120  2 hour post dinner: all less than 120  For post prandials now low blood sugar    I congratulated the couple on their adoption    1. IUP at 30 and 6  2. Diabetes: Blood sugars in range on diet only. Continue current regimen. Follow up in 2 weeks with MFM for ultrasound for growth and BS check. PNT needs to be scheduled by primary ob for 32 weeks. HgA1c to be checked by primary of in January.  3. CHTN: stable. Continue baby ASA, no meds, monitor for signs of preeclampsia.  4. Hepatitis panel as per prior note.  5. Consider vitamin D level as per prior note      10 minutes was spent in face to face time with greater than half of that time spent in counseling and coordination of car.

## 2017-12-27 NOTE — PROGRESS NOTES
In with no c/o, reports good FM, reinforced BID. Denies LOF or bleeding. Glucose values reviewed and all WNL. FBS- 70-80. 2hr PP . Has follow up appt in 2 weeks.

## 2017-12-27 NOTE — LETTER
December 29, 2017      Mariela Valenzuela MD  [address] [facility] [address]           [facility]  [address] [facility] [address]  Phone: [phone]          Patient: Wang Warren   MR Number: [id number]   YOB: 1980   Date of Visit: 12/27/2017       Dear Dr. Mariela Valenzuela:    Thank you for referring Wang Warren to me for evaluation. Attached you will find relevant portions of my assessment and plan of care.    If you have questions, please do not hesitate to call me. I look forward to following Wang Warren along with you.    Sincerely,    Keshia Reyes MD    Enclosure  CC:  No Recipients    If you would like to receive this communication electronically, please contact [email or web address] or [phone] to request more information on [employer] Link access.    For providers and/or their staff who would like to refer a patient to [facility], please contact us through our one-stop-shop provider referral line, [facility], at [phone].    If you feel you have received this communication in error or would no longer like to receive these types of communications, please e-mail [email or web address]

## 2017-12-29 DIAGNOSIS — O24.113 PRE-EXISTING TYPE 2 DIABETES MELLITUS DURING PREGNANCY IN THIRD TRIMESTER: Primary | ICD-10-CM

## 2018-01-04 ENCOUNTER — PATIENT MESSAGE (OUTPATIENT)
Dept: OBSTETRICS AND GYNECOLOGY | Facility: CLINIC | Age: 38
End: 2018-01-04

## 2018-01-10 ENCOUNTER — OFFICE VISIT (OUTPATIENT)
Dept: MATERNAL FETAL MEDICINE | Facility: CLINIC | Age: 38
End: 2018-01-10
Payer: COMMERCIAL

## 2018-01-10 ENCOUNTER — LAB VISIT (OUTPATIENT)
Dept: LAB | Facility: OTHER | Age: 38
End: 2018-01-10
Attending: OBSTETRICS & GYNECOLOGY
Payer: COMMERCIAL

## 2018-01-10 ENCOUNTER — ROUTINE PRENATAL (OUTPATIENT)
Dept: OBSTETRICS AND GYNECOLOGY | Facility: CLINIC | Age: 38
End: 2018-01-10
Payer: COMMERCIAL

## 2018-01-10 VITALS — WEIGHT: 282.63 LBS | BODY MASS INDEX: 53.4 KG/M2 | DIASTOLIC BLOOD PRESSURE: 78 MMHG | SYSTOLIC BLOOD PRESSURE: 122 MMHG

## 2018-01-10 VITALS
BODY MASS INDEX: 53.61 KG/M2 | WEIGHT: 283.75 LBS | SYSTOLIC BLOOD PRESSURE: 126 MMHG | DIASTOLIC BLOOD PRESSURE: 80 MMHG

## 2018-01-10 DIAGNOSIS — O10.919 CHRONIC HYPERTENSION AFFECTING PREGNANCY: ICD-10-CM

## 2018-01-10 DIAGNOSIS — Z3A.32 32 WEEKS GESTATION OF PREGNANCY: Primary | ICD-10-CM

## 2018-01-10 DIAGNOSIS — O24.119 PRE-EXISTING TYPE 2 DIABETES MELLITUS DURING PREGNANCY, ANTEPARTUM: ICD-10-CM

## 2018-01-10 DIAGNOSIS — E11.9 CONTROLLED TYPE 2 DIABETES MELLITUS WITHOUT COMPLICATION, WITHOUT LONG-TERM CURRENT USE OF INSULIN: ICD-10-CM

## 2018-01-10 DIAGNOSIS — O24.113 PRE-EXISTING TYPE 2 DIABETES MELLITUS DURING PREGNANCY IN THIRD TRIMESTER: ICD-10-CM

## 2018-01-10 DIAGNOSIS — Z36.89 ENCOUNTER FOR ULTRASOUND TO CHECK FETAL GROWTH: ICD-10-CM

## 2018-01-10 DIAGNOSIS — Z36.89 ENCOUNTER FOR ULTRASOUND TO CHECK FETAL GROWTH: Primary | ICD-10-CM

## 2018-01-10 LAB
ESTIMATED AVG GLUCOSE: 229 MG/DL
HBA1C MFR BLD HPLC: 9.6 %

## 2018-01-10 PROCEDURE — 76819 FETAL BIOPHYS PROFIL W/O NST: CPT | Mod: S$GLB,,, | Performed by: OBSTETRICS & GYNECOLOGY

## 2018-01-10 PROCEDURE — 0502F SUBSEQUENT PRENATAL CARE: CPT | Mod: S$GLB,,, | Performed by: OBSTETRICS & GYNECOLOGY

## 2018-01-10 PROCEDURE — 80074 ACUTE HEPATITIS PANEL: CPT

## 2018-01-10 PROCEDURE — 83036 HEMOGLOBIN GLYCOSYLATED A1C: CPT

## 2018-01-10 PROCEDURE — 76816 OB US FOLLOW-UP PER FETUS: CPT | Mod: S$GLB,,, | Performed by: OBSTETRICS & GYNECOLOGY

## 2018-01-10 PROCEDURE — 99999 PR PBB SHADOW E&M-EST. PATIENT-LVL II: CPT | Mod: PBBFAC,,, | Performed by: OBSTETRICS & GYNECOLOGY

## 2018-01-10 PROCEDURE — 99999 PR PBB SHADOW E&M-EST. PATIENT-LVL III: CPT | Mod: PBBFAC,,, | Performed by: OBSTETRICS & GYNECOLOGY

## 2018-01-10 PROCEDURE — 99212 OFFICE O/P EST SF 10 MIN: CPT | Mod: 25,S$GLB,, | Performed by: OBSTETRICS & GYNECOLOGY

## 2018-01-10 PROCEDURE — 36415 COLL VENOUS BLD VENIPUNCTURE: CPT

## 2018-01-10 NOTE — PROGRESS NOTES
Doing well. Patient seen in M today. Starting PNT weekly (BPP). BS Fasting 60-80s B 70-90s L 80-110s D 90-100s. Discussed Peds and contraception. A1C and hep panel today. Tdap ordered today. Recommended  get Tdap as well as they have adopted a  baby as well. F/U 1 week for BPP and 2 weeks with me.

## 2018-01-10 NOTE — PROGRESS NOTES
"Indication  ========    Evaluation of fetal growth. Evaluation of fetal well-being.    History  ======    General History  Height 157 cm  Height (ft) 5 ft  Height (in) 2 in  Other: OB: MAHENDRA GLEASON  Previous Outcomes   1  Para 0  Risk Factors  History risk factors: Obesity  History risk factors: AMA  History risk factors: Diabetes mellitus  History risk factors: Personal history of hypertension    Pregnancy History  ==============    Maternal Lab Tests  Result:  Single marker AFP negative    Test: Cell Free DNA Testing  Result: Negative Materni T21  Wants to know gender: yes    Maternal Assessment  =================    Weight 128 kg  Weight (lb) 282 lb  Height 157 cm  Height (ft) 5 ft  Height (in) 2 in  BP syst 122 mmHg  BP diast 78 mmHg  BMI 51.61 kg/m²    Method  ======    Transabdominal ultrasound examination. View: Sufficient.    Pregnancy  =========    Peres pregnancy. Number of fetuses: 1.    Dating  ======    Cycle: regular cycle  GA by "stated dating" 32 w + 6 d  JUAN MIGUEL by "stated dating": 3/1/2018  Ultrasound examination on: 1/10/2018  GA by U/S based upon: AC, BPD, Femur, HC  GA by U/S 33 w + 2 d  JUAN MIGUEL by U/S: 2018  Assigned: Dating performed on 10/4/2017, based on the external assessment  Assigned GA 32 w + 6 d  Assigned JUAN MIGUEL: 3/1/2018    General Evaluation  ==============    Cardiac activity: present.  bpm.  Fetal movements: visualized.  Presentation: cephalic.  Placenta: posterior.  Umbilical cord: 3 vessel cord.  Amniotic fluid: MVP 4.0 cm.    Biophysical Profile  ==============    2: Fetal breathing movements  2: Gross body movements  2: Fetal tone  2: Amniotic fluid volume  : Biophysical profile score  Interpretation: normal    Fetal Biometry  ============    Fetal Biometry  BPD 80.0 mm 32w 1d Hadlock  .7 mm 33w 6d Joseph  .5 mm 32w 5d Hadlock  .1 mm 34w 5d Hadlock  Femur 64.7 mm 33w 3d Herman  EFW 2,300 g 42% [place]  Calculated by: Herman " (BPD-HC-AC-FL)  EFW (lb) 5 lb  EFW (oz) 1 oz  Cephalic index 0.77  HC / AC 0.96  FL / BPD 0.81  FL / AC 0.21  MVP 4.0 cm   bpm    Fetal Anatomy  ============    Cranium: normal  Stomach: normal  Kidneys: normal  Bladder: normal  Gender: female  Wants to know gender: yes  Other: A full anatomic survey has been previously performed.    Consultation  ==========    Type: Follow up blood sugars.  Patient doing well.  She felt baby move position.  All blood glucoses are within target range  Continue following diet.  /78.  Recommend primary ob schedule weekly BPP based on new division guidelines. HgA1c and hepatitis panel ordered by primary ob.  Dr. Nicolas nazario.    10 minutes was spent in face to face time with greater than half of that time spent in counseling and coordination of care.    Impression  =========    Peres live intrauterine pregnancy.  Overall normal fetal growth. AC measures at 93rd percentile.  Normal amniotic fluid volume.  Limited fetal anatomy appears normal.  BPP 8/8.    Recommendation  ==============    Follow up visit for blood sugar check in 2 weeks.  Follow up ultrasound for growth and MD visit for blood sugar check in 4 weeks.  Primary ob to schedule  fetal surveillance. Recommend weekly BPP.

## 2018-01-10 NOTE — LETTER
January 10, 2018      Mariela Valenzuela MD  [address] [facility] [address]           [facility]  [address] [facility] [address]  Phone: [phone]          Patient: Wang Warren   MR Number: [id number]   YOB: 1980   Date of Visit: 1/10/2018       Dear Dr. Mariela Valenzuela:    Thank you for referring Wang Warren to me for evaluation. Attached you will find relevant portions of my assessment and plan of care.    If you have questions, please do not hesitate to call me. I look forward to following Wang Warren along with you.    Sincerely,    Keshia Reyes MD    Enclosure  CC:  No Recipients    If you would like to receive this communication electronically, please contact [email or web address] or [phone] to request more information on [email or web address] Link access.    For providers and/or their staff who would like to refer a patient to [facility], please contact us through our one-stop-shop provider referral line, [facility], at [phone].    If you feel you have received this communication in error or would no longer like to receive these types of communications, please e-mail [email or web address]

## 2018-01-11 ENCOUNTER — PATIENT MESSAGE (OUTPATIENT)
Dept: ADMINISTRATIVE | Facility: OTHER | Age: 38
End: 2018-01-11

## 2018-01-11 ENCOUNTER — PATIENT MESSAGE (OUTPATIENT)
Dept: OBSTETRICS AND GYNECOLOGY | Facility: CLINIC | Age: 38
End: 2018-01-11

## 2018-01-11 DIAGNOSIS — R73.09 ELEVATED HEMOGLOBIN A1C: Primary | ICD-10-CM

## 2018-01-11 LAB
HAV IGM SERPL QL IA: NEGATIVE
HBV CORE IGM SERPL QL IA: NEGATIVE
HBV SURFACE AG SERPL QL IA: NEGATIVE
HCV AB SERPL QL IA: NEGATIVE

## 2018-01-17 ENCOUNTER — OFFICE VISIT (OUTPATIENT)
Dept: MATERNAL FETAL MEDICINE | Facility: CLINIC | Age: 38
End: 2018-01-17
Payer: COMMERCIAL

## 2018-01-17 ENCOUNTER — PATIENT MESSAGE (OUTPATIENT)
Dept: MATERNAL FETAL MEDICINE | Facility: CLINIC | Age: 38
End: 2018-01-17

## 2018-01-17 VITALS — BODY MASS INDEX: 53.57 KG/M2 | DIASTOLIC BLOOD PRESSURE: 84 MMHG | SYSTOLIC BLOOD PRESSURE: 124 MMHG | WEIGHT: 283.5 LBS

## 2018-01-17 DIAGNOSIS — Z36.89 ENCOUNTER FOR ULTRASOUND TO CHECK FETAL GROWTH: ICD-10-CM

## 2018-01-17 DIAGNOSIS — O10.919 CHRONIC HYPERTENSION AFFECTING PREGNANCY: ICD-10-CM

## 2018-01-17 DIAGNOSIS — O24.113 PRE-EXISTING TYPE 2 DIABETES MELLITUS DURING PREGNANCY IN THIRD TRIMESTER: Primary | ICD-10-CM

## 2018-01-17 PROCEDURE — 76819 FETAL BIOPHYS PROFIL W/O NST: CPT | Mod: S$GLB,,, | Performed by: OBSTETRICS & GYNECOLOGY

## 2018-01-17 PROCEDURE — 99213 OFFICE O/P EST LOW 20 MIN: CPT | Mod: 25,S$GLB,, | Performed by: OBSTETRICS & GYNECOLOGY

## 2018-01-17 PROCEDURE — 99999 PR PBB SHADOW E&M-EST. PATIENT-LVL III: CPT | Mod: PBBFAC,,, | Performed by: OBSTETRICS & GYNECOLOGY

## 2018-01-17 RX ORDER — METFORMIN HYDROCHLORIDE 500 MG/1
500 TABLET ORAL NIGHTLY
Qty: 30 TABLET | Refills: 2 | Status: ON HOLD | OUTPATIENT
Start: 2018-01-17 | End: 2018-02-12 | Stop reason: HOSPADM

## 2018-01-17 NOTE — PROGRESS NOTES
"Indication  ========    BPP; blood sugar check.    History  ======    General History  Height 157 cm  Height (ft) 5 ft  Height (in) 2 in  Other: OB: MAHENDRA GLEASON  Previous Outcomes   1  Para 0  Risk Factors  History risk factors: Obesity  History risk factors: AMA  History risk factors: Diabetes mellitus  History risk factors: Personal history of hypertension    Maternal Assessment  =================    Weight 129 kg  Weight (lb) 284 lb  Height 157 cm  Height (ft) 5 ft  Height (in) 2 in  BP syst 124 mmHg  BP diast 84 mmHg  BMI 52.02 kg/m²    Method  ======    Transabdominal ultrasound examination, [place] E10. View: Sufficient.    Pregnancy  =========    Peres pregnancy. Number of fetuses: 1.    Dating  ======    Cycle: regular cycle  GA by "stated dating" 33 w + 6 d  JUAN MIGUEL by "stated dating": 3/1/2018  Assigned: Dating performed on 10/4/2017, based on the external assessment  Assigned GA 33 w + 6 d  Assigned JUAN MIGUEL: 3/1/2018    General Evaluation  ==============    Cardiac activity: present.  bpm.  Fetal movements: visualized.  Presentation: cephalic.  Placenta: posterior, fundal.  Umbilical cord: normal.    Amniotic Fluid Assessment  =====================    Amount of AF: normal amount  MVP 8.6 cm. RACHELLE 22.6 cm. Q1 4.0 cm, Q2 7.5 cm, Q3 2.5 cm, Q4 8.6 cm    Biophysical Profile  ==============    2: Fetal breathing movements  2: Gross body movements  2: Fetal tone  2: Amniotic fluid volume  : Biophysical profile score    Consultation  ==========    Type: Blood sugar follow up.  Ms. Warren was added on today after her hga1c last week was 9.6. This did not correlate with all of the normal blood sugars the patient had  in her log. She did not bring her meter today. HgA1c in October was 7.9. The patient reports that she cheated last week and had [employer]  chicken around the time of her blood draw. We reviewed that the hemoglobin A1c is actually reflective of the patient's average glucose over a " 3  month time period so this cheating would not have affected the results. We discussed that she should bring her meter in. We also want to be  sure her meter is working properly and not underestimating her glucose. Also, we discussed that we should add a pharmacologic agent given  the high HgA1c to try to maintain better control the remainder of the pregnancy.  The patient's log initially showed one fasting of 125 and one of 96 and the other sugars were normal. We discussed insulin as the gold  standard but discussed Metformin and the risks as well. The patient desires to avoid insulin. We will try Metformin 500mg at bedtime with her  snack. She was advised to check a 2am blood sugar to be sure she was not going low. There was a second page to her log which had a  fasting of 131 and 116 and 2 postprandials that were increased (126 post lunch and 122 post dinner). I discussed with her that we can try the  Metformin with some normal fastings but if they remain high, insulin would be needed. We will reassess in one week. We will do her BPP  again next week. If the control does not improve, we would need to increase to twice weekly testing. I also recommended that the patient have  a random blood sugar check today (it will likely be about 2 hours postprandial for her). Uncontrolled diabetes can lead to maternal and fetal  adverse outcomes.    15 minutes was spent in face to face time with greater than half of that time spent in counseling and coordination of care.    Impression  =========    Peres live intrauterine pregnancy.  BPP 8/8.  Upper normal amniotic fluid volume.    Recommendation  ==============    Follow up in one week for blood sugar review in MFM and PNT for BPP.  Random glucose today.  Metformin 500mg PO qhs.  Precautions given.  Dr. Nicolas nazario.

## 2018-01-17 NOTE — LETTER
January 17, 2018      Mariela Valenzuela MD  [address] [facility] [address]           [facility]  [address] [facility] [address]  Phone: [phone]          Patient: Wang Warren   MR Number: [id number]   YOB: 1980   Date of Visit: 1/17/2018       Dear Dr. Mariela Valenzuela:    Thank you for referring Wang Warren to me for evaluation. Attached you will find relevant portions of my assessment and plan of care.    If you have questions, please do not hesitate to call me. I look forward to following Wang Warren along with you.    Sincerely,    Keshia Reyes MD    Enclosure  CC:  No Recipients    If you would like to receive this communication electronically, please contact [email or web address] or [phone] to request more information on [employer] Link access.    For providers and/or their staff who would like to refer a patient to [facility], please contact us through our one-stop-shop provider referral line, [facility], at [phone].    If you feel you have received this communication in error or would no longer like to receive these types of communications, please e-mail [email or web address]

## 2018-01-18 ENCOUNTER — HOSPITAL ENCOUNTER (INPATIENT)
Facility: OTHER | Age: 38
LOS: 4 days | Discharge: HOME | End: 2018-01-22
Attending: OBSTETRICS & GYNECOLOGY | Admitting: OBSTETRICS & GYNECOLOGY
Payer: COMMERCIAL

## 2018-01-18 ENCOUNTER — ANESTHESIA EVENT (OUTPATIENT)
Dept: OBSTETRICS AND GYNECOLOGY | Facility: OTHER | Age: 38
End: 2018-01-18

## 2018-01-18 ENCOUNTER — ANESTHESIA (OUTPATIENT)
Dept: OBSTETRICS AND GYNECOLOGY | Facility: OTHER | Age: 38
End: 2018-01-18

## 2018-01-18 ENCOUNTER — TELEPHONE (OUTPATIENT)
Dept: MATERNAL FETAL MEDICINE | Facility: CLINIC | Age: 38
End: 2018-01-18

## 2018-01-18 ENCOUNTER — TELEPHONE (OUTPATIENT)
Dept: OBSTETRICS AND GYNECOLOGY | Facility: CLINIC | Age: 38
End: 2018-01-18

## 2018-01-18 ENCOUNTER — PATIENT MESSAGE (OUTPATIENT)
Dept: MATERNAL FETAL MEDICINE | Facility: CLINIC | Age: 38
End: 2018-01-18

## 2018-01-18 DIAGNOSIS — O24.913 DIABETES MELLITUS AFFECTING PREGNANCY IN THIRD TRIMESTER: Primary | ICD-10-CM

## 2018-01-18 LAB
POCT GLUCOSE: 213 MG/DL (ref 70–110)
POCT GLUCOSE: 215 MG/DL (ref 70–110)
POCT GLUCOSE: 270 MG/DL (ref 70–110)

## 2018-01-18 PROCEDURE — 96372 THER/PROPH/DIAG INJ SC/IM: CPT

## 2018-01-18 PROCEDURE — 82962 GLUCOSE BLOOD TEST: CPT

## 2018-01-18 PROCEDURE — 63600175 PHARM REV CODE 636 W HCPCS: Performed by: STUDENT IN AN ORGANIZED HEALTH CARE EDUCATION/TRAINING PROGRAM

## 2018-01-18 PROCEDURE — 11000001 HC ACUTE MED/SURG PRIVATE ROOM

## 2018-01-18 PROCEDURE — 59025 FETAL NON-STRESS TEST: CPT

## 2018-01-18 RX ORDER — DIPHENHYDRAMINE HCL 25 MG
25 CAPSULE ORAL EVERY 4 HOURS PRN
Status: DISCONTINUED | OUTPATIENT
Start: 2018-01-18 | End: 2018-01-22 | Stop reason: HOSPADM

## 2018-01-18 RX ORDER — NAPROXEN SODIUM 220 MG/1
81 TABLET, FILM COATED ORAL DAILY
Status: DISCONTINUED | OUTPATIENT
Start: 2018-01-19 | End: 2018-01-22 | Stop reason: HOSPADM

## 2018-01-18 RX ORDER — AMOXICILLIN 250 MG
1 CAPSULE ORAL NIGHTLY PRN
Status: DISCONTINUED | OUTPATIENT
Start: 2018-01-18 | End: 2018-01-22 | Stop reason: HOSPADM

## 2018-01-18 RX ORDER — INSULIN ASPART 100 [IU]/ML
21 INJECTION, SOLUTION INTRAVENOUS; SUBCUTANEOUS
Status: DISCONTINUED | OUTPATIENT
Start: 2018-01-19 | End: 2018-01-18

## 2018-01-18 RX ORDER — PRENATAL WITH FERROUS FUM AND FOLIC ACID 3080; 920; 120; 400; 22; 1.84; 3; 20; 10; 1; 12; 200; 27; 25; 2 [IU]/1; [IU]/1; MG/1; [IU]/1; MG/1; MG/1; MG/1; MG/1; MG/1; MG/1; UG/1; MG/1; MG/1; MG/1; MG/1
1 TABLET ORAL DAILY
Status: DISCONTINUED | OUTPATIENT
Start: 2018-01-19 | End: 2018-01-22 | Stop reason: HOSPADM

## 2018-01-18 RX ORDER — ONDANSETRON 8 MG/1
8 TABLET, ORALLY DISINTEGRATING ORAL EVERY 8 HOURS PRN
Status: DISCONTINUED | OUTPATIENT
Start: 2018-01-18 | End: 2018-01-22 | Stop reason: HOSPADM

## 2018-01-18 RX ORDER — INSULIN ASPART 100 [IU]/ML
14 INJECTION, SOLUTION INTRAVENOUS; SUBCUTANEOUS
Status: DISCONTINUED | OUTPATIENT
Start: 2018-01-18 | End: 2018-01-19

## 2018-01-18 RX ORDER — SIMETHICONE 80 MG
1 TABLET,CHEWABLE ORAL EVERY 6 HOURS PRN
Status: DISCONTINUED | OUTPATIENT
Start: 2018-01-18 | End: 2018-01-22 | Stop reason: HOSPADM

## 2018-01-18 RX ADMIN — INSULIN DETEMIR 24 UNITS: 100 INJECTION, SOLUTION SUBCUTANEOUS at 08:01

## 2018-01-18 RX ADMIN — INSULIN ASPART 14 UNITS: 100 INJECTION, SOLUTION INTRAVENOUS; SUBCUTANEOUS at 06:01

## 2018-01-18 NOTE — TELEPHONE ENCOUNTER
Hi Ms. Warren,   I tried to reach you this morning.   Your random blood sugar returned and was very elevated.  I spoke with Dr. Valenzuela and we are recommending that you come into the hospital for blood sugar evaluation and treatment.  Uncontrolled blood sugar can be very dangerous to you and your baby.  Her office was also going to try to contact you.  Please give us a call back.  Keshia Reyes

## 2018-01-18 NOTE — HPI
Wang Warren is a 37 y.o. F at 34w0d presents for blood sugar monitoring after being seen in clinic with POCT 299, later found to have HgbA1c 9.6.   Patient has h/o of GDM diet controlled. She states she is checking her BG every 2 hours, ranging from .  Patient states she has been snacking often lately, reports she has not been following a diabetic diet.  Patient currently feels well. She is eating without nausea or vomiting.  She reports good FM, denies CTX, vaginal bleeding, LOF.   Patient also has h/o cHTN on no medications. She denies HA, denies visual changes, denies RUQ pain, denies SOB, denies CP.

## 2018-01-18 NOTE — TELEPHONE ENCOUNTER
Message left for patient to call MFM clinic at [phone] ASAP regarding random blood glucose lab.       ----- Message from Keshia Reyes MD sent at 1/18/2018  7:40 AM CST -----  Please call the patient regarding her abnormal result and recommend that she go to the OB Ed. I tried to reach her this am unsuccessfully and did let Dr. Valenzuela know as well. Please retry the patient this am.

## 2018-01-18 NOTE — SUBJECTIVE & OBJECTIVE
Obstetric History       T0      L0     SAB0   TAB0   Ectopic0   Multiple0   Live Births0       # Outcome Date GA Lbr Conrado/2nd Weight Sex Delivery Anes PTL Lv   1 Current                 Past Medical History:   Diagnosis Date    Chronic hypertension affecting pregnancy     Diabetes mellitus type II, controlled, with no complications 10/11/2017    Infertility, female     States she has been trying to conceive for four years. Was evaluated at [facility] and was given Clomid for three months two years ago.     Past Surgical History:   Procedure Laterality Date    none         PTA Medications   Medication Sig    aspirin 81 MG Chew Take 81 mg by mouth once daily.    blood sugar diagnostic (ONETOUCH VERIO) Strp 1 strip by Misc.(Non-Drug; Combo Route) route 4 (four) times daily.    docusate sodium (COLACE) 100 MG capsule Take 1 capsule (100 mg total) by mouth 2 (two) times daily.    ferrous sulfate 325 mg (65 mg iron) Tab tablet TK 1 T PO daily    folic acid (FOLVITE) 1 MG tablet Take 4 tablets (4 mg total) by mouth once daily.    lancets (ONETOUCH ULTRASOFT LANCETS) Misc 1 lancet by Misc.(Non-Drug; Combo Route) route 4 (four) times daily.    metFORMIN (GLUCOPHAGE) 500 MG tablet Take 1 tablet (500 mg total) by mouth every evening.    PRENATAL VIT CALC,IRON,FOLIC (PRENATAL VITAMIN ORAL) Take by mouth.       Review of patient's allergies indicates:  No Known Allergies     Family History     Problem Relation (Age of Onset)    Cancer Father    Diabetes Maternal Aunt        Social History Main Topics    Smoking status: Never Smoker    Smokeless tobacco: Never Used    Alcohol use No    Drug use: No    Sexual activity: Yes     Partners: Male     Birth control/ protection: None     Review of Systems   Constitutional: Negative for chills and fever.   Respiratory: Negative for shortness of breath and wheezing.    Cardiovascular: Negative for chest pain.   Gastrointestinal: Negative for  abdominal pain, diarrhea, nausea and vomiting.   Genitourinary: Negative for dysuria, hematuria, pelvic pain, vaginal bleeding and vaginal pain.   Neurological: Negative for headaches.      Objective:     Vital Signs (Most Recent):  Temp: 96.7 °F (35.9 °C) (01/18/18 1526)  Pulse: 70 (01/18/18 1526)  Resp: 18 (01/18/18 1526)  BP: 131/75 (01/18/18 1526)  SpO2: 100 % (01/18/18 1526) Vital Signs (24h Range):  Temp:  [96.7 °F (35.9 °C)] 96.7 °F (35.9 °C)  Pulse:  [70] 70  Resp:  [18] 18  SpO2:  [100 %] 100 %  BP: (131)/(75) 131/75     Weight: 128.6 kg (283 lb 8.2 oz)  Body mass index is 53.57 kg/m².    FHT: Cat 1 (reassuring) 130 bpm, Mod BTBV, + accel, - Decel  TOCO:  None    Physical Exam:   Constitutional: She is oriented to person, place, and time. She appears well-developed and well-nourished.     Eyes: EOM are normal.    Neck: Normal range of motion.    Cardiovascular: Normal rate.     Pulmonary/Chest: Effort normal. No respiratory distress. She has no wheezes.        Abdominal: Soft. She exhibits no distension. There is no tenderness. There is no rebound and no guarding.             Musculoskeletal: Normal range of motion.       Neurological: She is alert and oriented to person, place, and time.    Skin: Skin is warm and dry.    Psychiatric: She has a normal mood and affect. Her behavior is normal. Judgment and thought content normal.       Cervix:  Deferred     Significant Labs:  Lab Results   Component Value Date    GROUPTRH O POS 09/20/2017    HEPBSAG Negative 01/10/2018       I have personallly reviewed all pertinent lab results from the last 24 hours.

## 2018-01-18 NOTE — PROGRESS NOTES
Results for MATILDA MCINTOSH (MRN [id number]) as of 1/18/2018 15:43   Ref. Range 1/18/2018 15:22   POCT Glucose Latest Ref Range: 70 - 110 mg/dL 270 (H)    notified

## 2018-01-18 NOTE — HOSPITAL COURSE
01/18/2018- Admit for blood sugar patterning  01/19/2018  Started on insulin regimen of Levemir BID, Aspart TID w/ meals.  Patent without symptoms. A1C 9.6.  1/20-22/2018 - Glucose monitored closely and insulin titrated accordingly. Adequate control with following regimen: (will be lantus due to insurance): 32 units lantus q am; 10 units lantus qhs. Aspart 28 with breakfast/20 with lunch/18 with dinner. She met with DM education and was instructed on how use her glucometer. Stable for discharge home with close follow up in MFM clinic in 2 days. Also set up for PNT twice weekly.

## 2018-01-18 NOTE — ASSESSMENT & PLAN NOTE
- On no meds  - On ASA daily  - Asymptomatic  - BP: (131)/(75) 131/75  - Cont close monitoring while inhouse

## 2018-01-18 NOTE — ANESTHESIA PREPROCEDURE EVALUATION
Wang Warren is a 37 y.o. female  @ 34w here for management of her diabetes. There is currently no concern for a  delivery.     OB History    Para Term  AB Living   1             SAB TAB Ectopic Multiple Live Births                  # Outcome Date GA Lbr Conrado/2nd Weight Sex Delivery Anes PTL Lv   1 Current                   Wt Readings from Last 1 Encounters:   18 1526 128.6 kg (283 lb 8.2 oz)       BP Readings from Last 3 Encounters:   18 131/75   18 124/84   01/10/18 126/80       Patient Active Problem List   Diagnosis    BMI 50.0-59.9, adult    Chronic hypertension affecting pregnancy    Advanced maternal age, primigravida in second trimester, antepartum    Diabetes mellitus affecting pregnancy in third trimester    Infertility, female       Past Surgical History:   Procedure Laterality Date    none         Social History     Social History    Marital status:      Spouse name: Husam    Number of children: 0    Years of education: N/A     Occupational History         Drives [employer]      Social History Main Topics    Smoking status: Never Smoker    Smokeless tobacco: Never Used    Alcohol use No    Drug use: No    Sexual activity: Yes     Partners: Male     Birth control/ protection: None     Other Topics Concern    Not on file     Social History Narrative    Walks for exercise.     to [place] for five years.         Chemistry        Component Value Date/Time     (L) 2017 1007    K 3.9 2017 1007     2017 1007    CO2 22 (L) 2017 1007    BUN 12 2017 1007    CREATININE 0.8 2017 1007     (H) 2018 1443        Component Value Date/Time    CALCIUM 9.3 2017 1007    ALKPHOS 88 2017 1007    AST 13 2017 1007    ALT 17 2017 1007    BILITOT 0.7 2017 1007    ESTGFRAFRICA >60 2017 1007    EGFRNONAA >60 2017 1007            Lab Results    Component Value Date    WBC 5.28 11/08/2017    HGB 11.0 (L) 11/08/2017    HCT 33.2 (L) 11/08/2017    MCV 87 11/08/2017     11/08/2017       No results for input(s): PT, INR, PROTIME, APTT in the last 72 hours.                  Anesthesia Evaluation    I have reviewed the Patient Summary Reports.    I have reviewed the Nursing Notes.   I have reviewed the Medications.     Review of Systems  Anesthesia Hx:  No problems with previous Anesthesia  History of prior surgery of interest to airway management or planning: Denies Family Hx of Anesthesia complications.   Denies Personal Hx of Anesthesia complications.   Hematology/Oncology:  Hematology Normal   Oncology Normal     EENT/Dental:EENT/Dental Normal   Cardiovascular:   Exercise tolerance: good Hypertension    Pulmonary:  Pulmonary Normal    Renal/:  Renal/ Normal     Hepatic/GI:  Hepatic/GI Normal    Neurological:  Neurology Normal    Endocrine:   Diabetes, poorly controlled, type 2        Physical Exam  General:  Well nourished    Airway/Jaw/Neck:  Airway Findings: Mouth Opening: Normal Tongue: Normal  General Airway Assessment: Adult      Dental:  Dental Findings:   Chest/Lungs:  Chest/Lungs Findings: Clear to auscultation, Normal Respiratory Rate     Heart/Vascular:  Heart Findings: Rate: Normal  Rhythm: Regular Rhythm  Sounds: Normal  Heart murmur: negative    Abdomen:  Abdomen Findings: Normal           Anesthesia Plan  Type of Anesthesia, risks & benefits discussed:  Anesthesia Type:  epidural, CSE, general, spinal  Patient's Preference:   Intra-op Monitoring Plan: standard ASA monitors  Intra-op Monitoring Plan Comments:   Post Op Pain Control Plan:   Post Op Pain Control Plan Comments:   Induction:   IV  Beta Blocker:  Patient is not currently on a Beta-Blocker (No further documentation required).       Informed Consent: Patient understands risks and agrees with Anesthesia plan.  Questions answered. Anesthesia consent signed with patient.  ASA  Score: 3     Day of Surgery Review of History & Physical: I have interviewed and examined the patient. I have reviewed the patient's H&P dated:            Ready For Surgery From Anesthesia Perspective.

## 2018-01-18 NOTE — PROGRESS NOTES
[facility]  Obstetrics  Antepartum Progress Note    Patient Name: Wang Warren  MRN: [id number]  Admission Date: 2018  Hospital Length of Stay: 0 days  Attending Physician: Mariela Valenzuela MD  Primary Care Provider: Primary Doctor No    Subjective:     Principal Problem:Diabetes mellitus affecting pregnancy in third trimester    HPI:  Wang Warren is a 37 y.o. F at 34w0d presents for blood sugar monitoring after being seen in clinic with POCT 299, later found to have HgbA1c 9.6.   Patient has h/o of GDM diet controlled. She states she is checking her BG every 2 hours, ranging from .  Patient states she has been snacking often lately, reports she has not been following a diabetic diet.  Patient currently feels well. She is eating without nausea or vomiting.  She reports good FM, denies CTX, vaginal bleeding, LOF.   Patient also has h/o cHTN on no medications. She denies HA, denies visual changes, denies RUQ pain, denies SOB, denies CP.        Hospital Course:  2018- Admit for blood sugar patterning        Obstetric History       T0      L0     SAB0   TAB0   Ectopic0   Multiple0   Live Births0       # Outcome Date GA Lbr Conrado/2nd Weight Sex Delivery Anes PTL Lv   1 Current                 Past Medical History:   Diagnosis Date    Chronic hypertension affecting pregnancy     Diabetes mellitus type II, controlled, with no complications 10/11/2017    Infertility, female     States she has been trying to conceive for four years. Was evaluated at feritility clinic and was given Clomid for three months two years ago.     Past Surgical History:   Procedure Laterality Date    none         PTA Medications   Medication Sig    aspirin 81 MG Chew Take 81 mg by mouth once daily.    blood sugar diagnostic (ONETOUCH VERIO) Strp 1 strip by Misc.(Non-Drug; Combo Route) route 4 (four) times daily.    docusate sodium (COLACE) 100 MG capsule Take 1  capsule (100 mg total) by mouth 2 (two) times daily.    ferrous sulfate 325 mg (65 mg iron) Tab tablet TK 1 T PO daily    folic acid (FOLVITE) 1 MG tablet Take 4 tablets (4 mg total) by mouth once daily.    lancets (ONETOUCH ULTRASOFT LANCETS) Misc 1 lancet by Misc.(Non-Drug; Combo Route) route 4 (four) times daily.    metFORMIN (GLUCOPHAGE) 500 MG tablet Take 1 tablet (500 mg total) by mouth every evening.    PRENATAL VIT CALC,IRON,FOLIC (PRENATAL VITAMIN ORAL) Take by mouth.       Review of patient's allergies indicates:  No Known Allergies     Family History     Problem Relation (Age of Onset)    Cancer Father    Diabetes Maternal Aunt        Social History Main Topics    Smoking status: Never Smoker    Smokeless tobacco: Never Used    Alcohol use No    Drug use: No    Sexual activity: Yes     Partners: Male     Birth control/ protection: None     Review of Systems   Constitutional: Negative for chills and fever.   Respiratory: Negative for shortness of breath and wheezing.    Cardiovascular: Negative for chest pain.   Gastrointestinal: Negative for abdominal pain, diarrhea, nausea and vomiting.   Genitourinary: Negative for dysuria, hematuria, pelvic pain, vaginal bleeding and vaginal pain.   Neurological: Negative for headaches.      Objective:     Vital Signs (Most Recent):  Temp: 96.7 °F (35.9 °C) (01/18/18 1526)  Pulse: 70 (01/18/18 1526)  Resp: 18 (01/18/18 1526)  BP: 131/75 (01/18/18 1526)  SpO2: 100 % (01/18/18 1526) Vital Signs (24h Range):  Temp:  [96.7 °F (35.9 °C)] 96.7 °F (35.9 °C)  Pulse:  [70] 70  Resp:  [18] 18  SpO2:  [100 %] 100 %  BP: (131)/(75) 131/75     Weight: 128.6 kg (283 lb 8.2 oz)  Body mass index is 53.57 kg/m².    FHT: Cat 1 (reassuring) 130 bpm, Mod BTBV, + accel, - Decel  TOCO:  None    Physical Exam:   Constitutional: She is oriented to person, place, and time. She appears well-developed and well-nourished.     Eyes: EOM are normal.    Neck: Normal range of motion.     Cardiovascular: Normal rate.     Pulmonary/Chest: Effort normal. No respiratory distress. She has no wheezes.        Abdominal: Soft. She exhibits no distension. There is no tenderness. There is no rebound and no guarding.             Musculoskeletal: Normal range of motion.       Neurological: She is alert and oriented to person, place, and time.    Skin: Skin is warm and dry.    Psychiatric: She has a normal mood and affect. Her behavior is normal. Judgment and thought content normal.       Cervix:  Deferred     Significant Labs:  Lab Results   Component Value Date    GROUPTRH O POS 2017    HEPBSAG Negative 01/10/2018       I have personallly reviewed all pertinent lab results from the last 24 hours.    Assessment/Plan:     37 y.o. female  at 34w0d for:    * Diabetes mellitus affecting pregnancy in third trimester    - POCT 299 in clinic  - HgbA1c 9.6  - Per patient, glucose log ranging from   - Will start levemir 24 BID, Aspart 14 TID WM  - Blood sugar checks at 2AM, Fasting, PrePrandial, PostPrandial, 9PM  - Adjust medications accordingly        Infertility, female    - IVF pregnancy        Advanced maternal age, primigravida in second trimester, antepartum    - Maternity T21 negative        Chronic hypertension affecting pregnancy    - On no meds  - On ASA daily  - Asymptomatic  - BP: (131)/(75) 131/75  - Cont close monitoring while inhouse              Sunita Sellers MD  Obstetrics  [facility]

## 2018-01-18 NOTE — TELEPHONE ENCOUNTER
Spoke with patient this morning. Reviewed her random glucose that was collected yesterday was 299, which does correlate with the A1C of 9.6. Discussed case with MFM (Dr. Reyes) and we recommended to patient that she come into the hospital today to begin insulin regimen. Patient became very upset and stated that she is willing to take the metformin that was started yesterday by Dr. Reyes, but does not feel comfortable injecting herself with insulin, mostly due to concerns of harming her baby. Counseled patient that it is more harmful to have elevated BS and that insulin is safe in pregnancy. Patient agreed to come into the hospital and stated that she had transportation. L&D notified.

## 2018-01-18 NOTE — ASSESSMENT & PLAN NOTE
- POCT 299 in clinic  - HgbA1c 9.6  - Per patient, glucose log ranging from   - Will start levemir 24 BID, Aspart 14 TID WM  - Blood sugar checks at 2AM, Fasting, PrePrandial, PostPrandial, 9PM  - Adjust medications accordingly

## 2018-01-19 LAB
ABO + RH BLD: NORMAL
ALBUMIN SERPL BCP-MCNC: 2.5 G/DL
ALP SERPL-CCNC: 103 U/L
ALT SERPL W/O P-5'-P-CCNC: 8 U/L
ANION GAP SERPL CALC-SCNC: 9 MMOL/L
AST SERPL-CCNC: 11 U/L
BACTERIA #/AREA URNS HPF: ABNORMAL /HPF
BASOPHILS # BLD AUTO: 0.02 K/UL
BASOPHILS NFR BLD: 0.3 %
BILIRUB SERPL-MCNC: 0.4 MG/DL
BILIRUB UR QL STRIP: NEGATIVE
BLD GP AB SCN CELLS X3 SERPL QL: NORMAL
BUN SERPL-MCNC: 16 MG/DL
CALCIUM SERPL-MCNC: 9.1 MG/DL
CHLORIDE SERPL-SCNC: 103 MMOL/L
CLARITY UR: ABNORMAL
CO2 SERPL-SCNC: 21 MMOL/L
COLOR UR: YELLOW
CREAT SERPL-MCNC: 1 MG/DL
DIFFERENTIAL METHOD: ABNORMAL
EOSINOPHIL # BLD AUTO: 0.1 K/UL
EOSINOPHIL NFR BLD: 0.9 %
ERYTHROCYTE [DISTWIDTH] IN BLOOD BY AUTOMATED COUNT: 13 %
EST. GFR  (AFRICAN AMERICAN): >60 ML/MIN/1.73 M^2
EST. GFR  (NON AFRICAN AMERICAN): >60 ML/MIN/1.73 M^2
GLUCOSE SERPL-MCNC: 185 MG/DL
GLUCOSE UR QL STRIP: NEGATIVE
HCT VFR BLD AUTO: 34.2 %
HGB BLD-MCNC: 11.2 G/DL
HGB UR QL STRIP: ABNORMAL
HYALINE CASTS #/AREA URNS LPF: 0 /LPF
KETONES UR QL STRIP: NEGATIVE
LEUKOCYTE ESTERASE UR QL STRIP: ABNORMAL
LYMPHOCYTES # BLD AUTO: 1.9 K/UL
LYMPHOCYTES NFR BLD: 28.9 %
MCH RBC QN AUTO: 28.3 PG
MCHC RBC AUTO-ENTMCNC: 32.7 G/DL
MCV RBC AUTO: 86 FL
MICROSCOPIC COMMENT: ABNORMAL
MONOCYTES # BLD AUTO: 0.4 K/UL
MONOCYTES NFR BLD: 6.6 %
NEUTROPHILS # BLD AUTO: 4 K/UL
NEUTROPHILS NFR BLD: 62.5 %
NITRITE UR QL STRIP: NEGATIVE
NON-SQ EPI CELLS #/AREA URNS HPF: 1 /HPF
PH UR STRIP: 6 [PH] (ref 5–8)
PLATELET # BLD AUTO: 246 K/UL
PMV BLD AUTO: 12 FL
POCT GLUCOSE: 132 MG/DL (ref 70–110)
POCT GLUCOSE: 141 MG/DL (ref 70–110)
POCT GLUCOSE: 162 MG/DL (ref 70–110)
POCT GLUCOSE: 177 MG/DL (ref 70–110)
POCT GLUCOSE: 189 MG/DL (ref 70–110)
POCT GLUCOSE: 59 MG/DL (ref 70–110)
POCT GLUCOSE: 61 MG/DL (ref 70–110)
POTASSIUM SERPL-SCNC: 4.3 MMOL/L
PROT SERPL-MCNC: 6.5 G/DL
PROT UR QL STRIP: ABNORMAL
RBC # BLD AUTO: 3.96 M/UL
RBC #/AREA URNS HPF: 100 /HPF (ref 0–4)
SODIUM SERPL-SCNC: 133 MMOL/L
SP GR UR STRIP: >=1.03 (ref 1–1.03)
SQUAMOUS #/AREA URNS HPF: 3 /HPF
URN SPEC COLLECT METH UR: ABNORMAL
UROBILINOGEN UR STRIP-ACNC: 1 EU/DL
WBC # BLD AUTO: 6.47 K/UL
WBC #/AREA URNS HPF: 3 /HPF (ref 0–5)

## 2018-01-19 PROCEDURE — 99233 SBSQ HOSP IP/OBS HIGH 50: CPT | Mod: ,,, | Performed by: OBSTETRICS & GYNECOLOGY

## 2018-01-19 PROCEDURE — 80053 COMPREHEN METABOLIC PANEL: CPT

## 2018-01-19 PROCEDURE — 85025 COMPLETE CBC W/AUTO DIFF WBC: CPT

## 2018-01-19 PROCEDURE — 25000003 PHARM REV CODE 250: Performed by: STUDENT IN AN ORGANIZED HEALTH CARE EDUCATION/TRAINING PROGRAM

## 2018-01-19 PROCEDURE — 86850 RBC ANTIBODY SCREEN: CPT

## 2018-01-19 PROCEDURE — 81000 URINALYSIS NONAUTO W/SCOPE: CPT

## 2018-01-19 PROCEDURE — 87086 URINE CULTURE/COLONY COUNT: CPT

## 2018-01-19 PROCEDURE — 63600175 PHARM REV CODE 636 W HCPCS: Performed by: STUDENT IN AN ORGANIZED HEALTH CARE EDUCATION/TRAINING PROGRAM

## 2018-01-19 PROCEDURE — 11000001 HC ACUTE MED/SURG PRIVATE ROOM

## 2018-01-19 RX ORDER — NITROFURANTOIN 25; 75 MG/1; MG/1
100 CAPSULE ORAL EVERY 12 HOURS
Status: DISCONTINUED | OUTPATIENT
Start: 2018-01-19 | End: 2018-01-19

## 2018-01-19 RX ORDER — INSULIN ASPART 100 [IU]/ML
18 INJECTION, SOLUTION INTRAVENOUS; SUBCUTANEOUS
Status: DISCONTINUED | OUTPATIENT
Start: 2018-01-19 | End: 2018-01-19

## 2018-01-19 RX ORDER — INSULIN ASPART 100 [IU]/ML
22 INJECTION, SOLUTION INTRAVENOUS; SUBCUTANEOUS
Status: DISCONTINUED | OUTPATIENT
Start: 2018-01-19 | End: 2018-01-20

## 2018-01-19 RX ADMIN — NITROFURANTOIN (MONOHYDRATE/MACROCRYSTALS) 100 MG: 75; 25 CAPSULE ORAL at 03:01

## 2018-01-19 RX ADMIN — INSULIN ASPART 18 UNITS: 100 INJECTION, SOLUTION INTRAVENOUS; SUBCUTANEOUS at 01:01

## 2018-01-19 RX ADMIN — ASPIRIN 81 MG CHEWABLE TABLET 81 MG: 81 TABLET CHEWABLE at 10:01

## 2018-01-19 RX ADMIN — INSULIN ASPART 22 UNITS: 100 INJECTION, SOLUTION INTRAVENOUS; SUBCUTANEOUS at 06:01

## 2018-01-19 RX ADMIN — INSULIN ASPART 18 UNITS: 100 INJECTION, SOLUTION INTRAVENOUS; SUBCUTANEOUS at 11:01

## 2018-01-19 RX ADMIN — INSULIN DETEMIR 28 UNITS: 100 INJECTION, SOLUTION SUBCUTANEOUS at 10:01

## 2018-01-19 NOTE — H&P
[facility]  Obstetrics  History & Physical    Patient Name: Wang Warren  MRN: [id number]  Admission Date: 2018  Primary Care Provider: Primary Doctor No    Subjective:     Principal Problem:Diabetes mellitus affecting pregnancy in third trimester    History of Present Illness:  Wang Warren is a 37 y.o. F at 34w0d presents for blood sugar monitoring after being seen in clinic with POCT 299, later found to have HgbA1c 9.6.   Patient has h/o of GDM diet controlled. She states she is checking her BG every 2 hours, ranging from .  Patient states she has been snacking often lately, reports she has not been following a diabetic diet.  Patient currently feels well. She is eating without nausea or vomiting.  She reports good FM, denies CTX, vaginal bleeding, LOF.   Patient also has h/o cHTN on no medications. She denies HA, denies visual changes, denies RUQ pain, denies SOB, denies CP.            Obstetric History       T0      L0     SAB0   TAB0   Ectopic0   Multiple0   Live Births0       # Outcome Date GA Lbr Conrado/2nd Weight Sex Delivery Anes PTL Lv   1 Current                 Past Medical History:   Diagnosis Date    Chronic hypertension affecting pregnancy     Diabetes mellitus type II, controlled, with no complications 10/11/2017    Infertility, female     States she has been trying to conceive for four years. Was evaluated at feritility clinic and was given Clomid for three months two years ago.     Past Surgical History:   Procedure Laterality Date    none         PTA Medications   Medication Sig    aspirin 81 MG Chew Take 81 mg by mouth once daily.    blood sugar diagnostic (ONETOUCH VERIO) Strp 1 strip by Misc.(Non-Drug; Combo Route) route 4 (four) times daily.    docusate sodium (COLACE) 100 MG capsule Take 1 capsule (100 mg total) by mouth 2 (two) times daily.    ferrous sulfate 325 mg (65 mg iron) Tab tablet TK 1 T PO daily     folic acid (FOLVITE) 1 MG tablet Take 4 tablets (4 mg total) by mouth once daily.    lancets (ONETOUCH ULTRASOFT LANCETS) Misc 1 lancet by Misc.(Non-Drug; Combo Route) route 4 (four) times daily.    metFORMIN (GLUCOPHAGE) 500 MG tablet Take 1 tablet (500 mg total) by mouth every evening.    PRENATAL VIT CALC,IRON,FOLIC (PRENATAL VITAMIN ORAL) Take by mouth.       Review of patient's allergies indicates:  No Known Allergies     Family History     Problem Relation (Age of Onset)    Cancer Father    Diabetes Maternal Aunt        Social History Main Topics    Smoking status: Never Smoker    Smokeless tobacco: Never Used    Alcohol use No    Drug use: No    Sexual activity: Yes     Partners: Male     Birth control/ protection: None     Review of Systems   Constitutional: Negative for chills and fever.   HENT: Negative for tinnitus.    Respiratory: Negative for shortness of breath.    Cardiovascular: Negative for chest pain and leg swelling.   Gastrointestinal: Negative for abdominal pain, nausea and vomiting.   Genitourinary: Negative for dysuria, pelvic pain and vaginal bleeding.   Neurological: Negative for headaches.      Objective:     Vital Signs (Most Recent):  Temp: 96.4 °F (35.8 °C) (01/19/18 0753)  Pulse: 71 (01/19/18 0753)  Resp: 16 (01/19/18 0753)  BP: 132/74 (01/19/18 0753)  SpO2: 100 % (01/19/18 0753) Vital Signs (24h Range):  Temp:  [96.1 °F (35.6 °C)-97.3 °F (36.3 °C)] 96.4 °F (35.8 °C)  Pulse:  [70-83] 71  Resp:  [16-18] 16  SpO2:  [100 %] 100 %  BP: (116-133)/(61-75) 132/74     Weight: 128.6 kg (283 lb 8.2 oz)  Body mass index is 53.57 kg/m².    FHT: Cat 1 (reassuring) 130 bpm, mod BTBV, +accels, - decels  TOCO: none     Physical Exam:   Constitutional: She is oriented to person, place, and time. She appears well-developed and well-nourished. No distress.    HENT:   Head: Normocephalic and atraumatic.    Eyes: EOM are normal.     Cardiovascular: Normal rate and regular rhythm.  Exam reveals no  edema.     Pulmonary/Chest: Effort normal and breath sounds normal.        Abdominal: Soft. Bowel sounds are normal. She exhibits no distension. There is no tenderness. There is no rebound and no guarding.     Genitourinary: Uterus normal.               Neurological: She is alert and oriented to person, place, and time.    Skin: Skin is warm and dry.    Psychiatric: She has a normal mood and affect.       Cervix:  Deferred     Significant Labs:  Lab Results   Component Value Date    GROUPTRH O POS 2017    HEPBSAG Negative 01/10/2018         Assessment/Plan:     37 y.o. female  at 34w1d for:    * Diabetes mellitus affecting pregnancy in third trimester    - POCT 299 in clinic  - HgbA1c 9.6  - Per patient, glucose log ranging from   - Will start levemir 24 BID, Aspart 14 TID WM  - Blood sugar checks at 2AM, Fasting, PrePrandial, PostPrandial, 9PM  - Adjust medications accordingly        Infertility, female    - IVF pregnancy        Advanced maternal age, primigravida in second trimester, antepartum    - Maternity T21 negative        Chronic hypertension affecting pregnancy    - On no meds  - On ASA daily  - Asymptomatic  - BP: (131)/(75) 131/75  - Cont close monitoring while inhouse            Corina Miguel MD  Obstetrics  [facility]

## 2018-01-19 NOTE — H&P
[facility]  Obstetrics  History & Physical    Patient Name: Wang Warren  MRN: [id number]  Admission Date: 2018  Primary Care Provider: Primary Doctor No    Subjective:     Principal Problem:Diabetes mellitus affecting pregnancy in third trimester    History of Present Illness:  Wang Warren is a 37 y.o. F at 34w0d presents for blood sugar monitoring after being seen in clinic with POCT 299, later found to have HgbA1c 9.6.   Patient has h/o of GDM diet controlled. She states she is checking her BG every 2 hours, ranging from .  Patient states she has been snacking often lately, reports she has not been following a diabetic diet.  Patient currently feels well. She is eating without nausea or vomiting.  She reports good FM, denies CTX, vaginal bleeding, LOF.   Patient also has h/o cHTN on no medications. She denies HA, denies visual changes, denies RUQ pain, denies SOB, denies CP.            Obstetric History       T0      L0     SAB0   TAB0   Ectopic0   Multiple0   Live Births0       # Outcome Date GA Lbr Conrado/2nd Weight Sex Delivery Anes PTL Lv   1 Current                 Past Medical History:   Diagnosis Date    Chronic hypertension affecting pregnancy     Diabetes mellitus type II, controlled, with no complications 10/11/2017    Infertility, female     States she has been trying to conceive for four years. [facility] and was given Clomid for three months two years ago.     Past Surgical History:   Procedure Laterality Date    none         PTA Medications   Medication Sig    aspirin 81 MG Chew Take 81 mg by mouth once daily.    blood sugar diagnostic (ONETOUCH VERIO) Strp 1 strip by Misc.(Non-Drug; Combo Route) route 4 (four) times daily.    docusate sodium (COLACE) 100 MG capsule Take 1 capsule (100 mg total) by mouth 2 (two) times daily.    ferrous sulfate 325 mg (65 mg iron) Tab tablet TK 1 T PO daily     folic acid (FOLVITE) 1 MG tablet Take 4 tablets (4 mg total) by mouth once daily.    lancets (ONETOUCH ULTRASOFT LANCETS) Misc 1 lancet by Misc.(Non-Drug; Combo Route) route 4 (four) times daily.    metFORMIN (GLUCOPHAGE) 500 MG tablet Take 1 tablet (500 mg total) by mouth every evening.    PRENATAL VIT CALC,IRON,FOLIC (PRENATAL VITAMIN ORAL) Take by mouth.       Review of patient's allergies indicates:  No Known Allergies     Family History     Problem Relation (Age of Onset)    Cancer Father    Diabetes Maternal Aunt        Social History Main Topics    Smoking status: Never Smoker    Smokeless tobacco: Never Used    Alcohol use No    Drug use: No    Sexual activity: Yes     Partners: Male     Birth control/ protection: None     Review of Systems   Constitutional: Negative for chills and fever.   Respiratory: Negative for shortness of breath and wheezing.    Cardiovascular: Negative for chest pain.   Gastrointestinal: Negative for abdominal pain, diarrhea, nausea and vomiting.   Genitourinary: Negative for dysuria, hematuria, pelvic pain, vaginal bleeding and vaginal pain.   Neurological: Negative for headaches.      Objective:     Vital Signs (Most Recent):  Temp: 96.9 °F (36.1 °C) (01/19/18 1603)  Pulse: 75 (01/19/18 1603)  Resp: 16 (01/19/18 0753)  BP: 132/71 (01/19/18 1603)  SpO2: 100 % (01/19/18 1603) Vital Signs (24h Range):  Temp:  [96.1 °F (35.6 °C)-97.3 °F (36.3 °C)] 96.9 °F (36.1 °C)  Pulse:  [71-83] 75  Resp:  [16] 16  SpO2:  [100 %] 100 %  BP: (116-134)/(61-78) 132/71     Weight: 128.6 kg (283 lb 8.2 oz)  Body mass index is 53.57 kg/m².    FHT: Cat 1 (reassuring) 130 bpm, Mod BTBV, + accel, - Decel  TOCO:  None    Physical Exam:   Constitutional: She is oriented to person, place, and time. She appears well-developed and well-nourished.     Eyes: EOM are normal.    Neck: Normal range of motion.    Cardiovascular: Normal rate.     Pulmonary/Chest: Effort normal. No respiratory distress. She has  no wheezes.        Abdominal: Soft. She exhibits no distension. There is no tenderness. There is no rebound and no guarding.             Musculoskeletal: Normal range of motion.       Neurological: She is alert and oriented to person, place, and time.    Skin: Skin is warm and dry.    Psychiatric: She has a normal mood and affect. Her behavior is normal. Judgment and thought content normal.       Cervix:  Deferred     Significant Labs:  Lab Results   Component Value Date    GROUPTRH O POS 2017    HEPBSAG Negative 01/10/2018       I have personallly reviewed all pertinent lab results from the last 24 hours.    Obstetric History       T0      L0     SAB0   TAB0   Ectopic0   Multiple0   Live Births0       # Outcome Date GA Lbr Conrado/2nd Weight Sex Delivery Anes PTL Lv   1 Current                 Past Medical History:   Diagnosis Date    Chronic hypertension affecting pregnancy     Diabetes mellitus type II, controlled, with no complications 10/11/2017    Infertility, female     States she has been trying to conceive for four years. Was evaluated at [facility] and was given Clomid for three months two years ago.     Past Surgical History:   Procedure Laterality Date    none         PTA Medications   Medication Sig    aspirin 81 MG Chew Take 81 mg by mouth once daily.    blood sugar diagnostic (ONETOUCH VERIO) Strp 1 strip by Misc.(Non-Drug; Combo Route) route 4 (four) times daily.    docusate sodium (COLACE) 100 MG capsule Take 1 capsule (100 mg total) by mouth 2 (two) times daily.    ferrous sulfate 325 mg (65 mg iron) Tab tablet TK 1 T PO daily    folic acid (FOLVITE) 1 MG tablet Take 4 tablets (4 mg total) by mouth once daily.    lancets (ONETOUCH ULTRASOFT LANCETS) Misc 1 lancet by Misc.(Non-Drug; Combo Route) route 4 (four) times daily.    metFORMIN (GLUCOPHAGE) 500 MG tablet Take 1 tablet (500 mg total) by mouth every evening.    PRENATAL VIT CALC,IRON,FOLIC (PRENATAL VITAMIN  ORAL) Take by mouth.       Review of patient's allergies indicates:  No Known Allergies     Family History     Problem Relation (Age of Onset)    Cancer Father    Diabetes Maternal Aunt        Social History Main Topics    Smoking status: Never Smoker    Smokeless tobacco: Never Used    Alcohol use No    Drug use: No    Sexual activity: Yes     Partners: Male     Birth control/ protection: None     Review of Systems   Constitutional: Negative for chills and fever.   HENT: Negative for tinnitus.    Respiratory: Negative for shortness of breath.    Cardiovascular: Negative for chest pain and leg swelling.   Gastrointestinal: Negative for abdominal pain, nausea and vomiting.   Genitourinary: Negative for dysuria, pelvic pain and vaginal bleeding.   Neurological: Negative for headaches.      Objective:     Vital Signs (Most Recent):  Temp: 96.4 °F (35.8 °C) (01/19/18 0753)  Pulse: 71 (01/19/18 0753)  Resp: 16 (01/19/18 0753)  BP: 132/74 (01/19/18 0753)  SpO2: 100 % (01/19/18 0753) Vital Signs (24h Range):  Temp:  [96.1 °F (35.6 °C)-97.3 °F (36.3 °C)] 96.4 °F (35.8 °C)  Pulse:  [70-83] 71  Resp:  [16-18] 16  SpO2:  [100 %] 100 %  BP: (116-133)/(61-75) 132/74     Weight: 128.6 kg (283 lb 8.2 oz)  Body mass index is 53.57 kg/m².    FHT: Cat 1 (reassuring) 130 bpm, mod BTBV, +accels, - decels  TOCO: none     Physical Exam:   Constitutional: She is oriented to person, place, and time. She appears well-developed and well-nourished. No distress.    HENT:   Head: Normocephalic and atraumatic.    Eyes: EOM are normal.     Cardiovascular: Normal rate and regular rhythm.  Exam reveals no edema.     Pulmonary/Chest: Effort normal and breath sounds normal.        Abdominal: Soft. Bowel sounds are normal. She exhibits no distension. There is no tenderness. There is no rebound and no guarding.     Genitourinary: Uterus normal.               Neurological: She is alert and oriented to person, place, and time.    Skin: Skin is warm  and dry.    Psychiatric: She has a normal mood and affect.       Cervix:  Deferred     Significant Labs:  Lab Results   Component Value Date    GROUPTRH O POS 2017    HEPBSAG Negative 01/10/2018         Assessment/Plan:     37 y.o. female  at 34w1d for:    * Diabetes mellitus affecting pregnancy in third trimester    - POCT 299 in clinic  - HgbA1c 9.6  - Per patient, glucose log ranging from   - Will start levemir 24 BID, Aspart 14 TID WM  - Blood sugar checks at 2AM, Fasting, PrePrandial, PostPrandial, 9PM  - Adjust medications accordingly        Infertility, female    - IVF pregnancy        Advanced maternal age, primigravida in second trimester, antepartum    - Maternity T21 negative        Chronic hypertension affecting pregnancy    - On no meds  - On ASA daily  - Asymptomatic  - BP: (131)/(75) 131/75  - Cont close monitoring while inhouse            Corina Miguel MD  Obstetrics  [facility]

## 2018-01-19 NOTE — SUBJECTIVE & OBJECTIVE
Obstetric History       T0      L0     SAB0   TAB0   Ectopic0   Multiple0   Live Births0       # Outcome Date GA Lbr Conrado/2nd Weight Sex Delivery Anes PTL Lv   1 Current                 Past Medical History:   Diagnosis Date    Chronic hypertension affecting pregnancy     Diabetes mellitus type II, controlled, with no complications 10/11/2017    Infertility, female     States she has been trying to conceive for four years. Was evaluated at [facility] and was given Clomid for three months two years ago.     Past Surgical History:   Procedure Laterality Date    none         PTA Medications   Medication Sig    aspirin 81 MG Chew Take 81 mg by mouth once daily.    blood sugar diagnostic (ONETOUCH VERIO) Strp 1 strip by Misc.(Non-Drug; Combo Route) route 4 (four) times daily.    docusate sodium (COLACE) 100 MG capsule Take 1 capsule (100 mg total) by mouth 2 (two) times daily.    ferrous sulfate 325 mg (65 mg iron) Tab tablet TK 1 T PO daily    folic acid (FOLVITE) 1 MG tablet Take 4 tablets (4 mg total) by mouth once daily.    lancets (ONETOUCH ULTRASOFT LANCETS) Misc 1 lancet by Misc.(Non-Drug; Combo Route) route 4 (four) times daily.    metFORMIN (GLUCOPHAGE) 500 MG tablet Take 1 tablet (500 mg total) by mouth every evening.    PRENATAL VIT CALC,IRON,FOLIC (PRENATAL VITAMIN ORAL) Take by mouth.       Review of patient's allergies indicates:  No Known Allergies     Family History     Problem Relation (Age of Onset)    Cancer Father    Diabetes Maternal Aunt        Social History Main Topics    Smoking status: Never Smoker    Smokeless tobacco: Never Used    Alcohol use No    Drug use: No    Sexual activity: Yes     Partners: Male     Birth control/ protection: None     Review of Systems   Constitutional: Negative for chills and fever.   Respiratory: Negative for shortness of breath and wheezing.    Cardiovascular: Negative for chest pain.   Gastrointestinal: Negative for  abdominal pain, diarrhea, nausea and vomiting.   Genitourinary: Negative for dysuria, hematuria, pelvic pain, vaginal bleeding and vaginal pain.   Neurological: Negative for headaches.      Objective:     Vital Signs (Most Recent):  Temp: 96.9 °F (36.1 °C) (18 1603)  Pulse: 75 (18 1603)  Resp: 16 (18 0753)  BP: 132/71 (18 1603)  SpO2: 100 % (18 1603) Vital Signs (24h Range):  Temp:  [96.1 °F (35.6 °C)-97.3 °F (36.3 °C)] 96.9 °F (36.1 °C)  Pulse:  [71-83] 75  Resp:  [16] 16  SpO2:  [100 %] 100 %  BP: (116-134)/(61-78) 132/71     Weight: 128.6 kg (283 lb 8.2 oz)  Body mass index is 53.57 kg/m².    FHT: Cat 1 (reassuring) 130 bpm, Mod BTBV, + accel, - Decel  TOCO:  None    Physical Exam:   Constitutional: She is oriented to person, place, and time. She appears well-developed and well-nourished.     Eyes: EOM are normal.    Neck: Normal range of motion.    Cardiovascular: Normal rate.     Pulmonary/Chest: Effort normal. No respiratory distress. She has no wheezes.        Abdominal: Soft. She exhibits no distension. There is no tenderness. There is no rebound and no guarding.             Musculoskeletal: Normal range of motion.       Neurological: She is alert and oriented to person, place, and time.    Skin: Skin is warm and dry.    Psychiatric: She has a normal mood and affect. Her behavior is normal. Judgment and thought content normal.       Cervix:  Deferred     Significant Labs:  Lab Results   Component Value Date    GROUPTRH O POS 2017    HEPBSAG Negative 01/10/2018       I have personallly reviewed all pertinent lab results from the last 24 hours.    Obstetric History       T0      L0     SAB0   TAB0   Ectopic0   Multiple0   Live Births0       # Outcome Date GA Lbr Conrado/2nd Weight Sex Delivery Anes PTL Lv   1 Current                 Past Medical History:   Diagnosis Date    Chronic hypertension affecting pregnancy     Diabetes mellitus type II, controlled, with  no complications 10/11/2017    Infertility, female     States she has been trying to conceive for four years. Was evaluated at [facility] and was given Clomid for three months two years ago.     Past Surgical History:   Procedure Laterality Date    none         PTA Medications   Medication Sig    aspirin 81 MG Chew Take 81 mg by mouth once daily.    blood sugar diagnostic (ONETOUCH VERIO) Strp 1 strip by Misc.(Non-Drug; Combo Route) route 4 (four) times daily.    docusate sodium (COLACE) 100 MG capsule Take 1 capsule (100 mg total) by mouth 2 (two) times daily.    ferrous sulfate 325 mg (65 mg iron) Tab tablet TK 1 T PO daily    folic acid (FOLVITE) 1 MG tablet Take 4 tablets (4 mg total) by mouth once daily.    lancets (ONETOUCH ULTRASOFT LANCETS) Misc 1 lancet by Misc.(Non-Drug; Combo Route) route 4 (four) times daily.    metFORMIN (GLUCOPHAGE) 500 MG tablet Take 1 tablet (500 mg total) by mouth every evening.    PRENATAL VIT CALC,IRON,FOLIC (PRENATAL VITAMIN ORAL) Take by mouth.       Review of patient's allergies indicates:  No Known Allergies     Family History     Problem Relation (Age of Onset)    Cancer Father    Diabetes Maternal Aunt        Social History Main Topics    Smoking status: Never Smoker    Smokeless tobacco: Never Used    Alcohol use No    Drug use: No    Sexual activity: Yes     Partners: Male     Birth control/ protection: None     Review of Systems   Constitutional: Negative for chills and fever.   HENT: Negative for tinnitus.    Respiratory: Negative for shortness of breath.    Cardiovascular: Negative for chest pain and leg swelling.   Gastrointestinal: Negative for abdominal pain, nausea and vomiting.   Genitourinary: Negative for dysuria, pelvic pain and vaginal bleeding.   Neurological: Negative for headaches.      Objective:     Vital Signs (Most Recent):  Temp: 96.4 °F (35.8 °C) (01/19/18 0753)  Pulse: 71 (01/19/18 0753)  Resp: 16 (01/19/18 0753)  BP: 132/74  (01/19/18 0753)  SpO2: 100 % (01/19/18 0753) Vital Signs (24h Range):  Temp:  [96.1 °F (35.6 °C)-97.3 °F (36.3 °C)] 96.4 °F (35.8 °C)  Pulse:  [70-83] 71  Resp:  [16-18] 16  SpO2:  [100 %] 100 %  BP: (116-133)/(61-75) 132/74     Weight: 128.6 kg (283 lb 8.2 oz)  Body mass index is 53.57 kg/m².    FHT: Cat 1 (reassuring) 130 bpm, mod BTBV, +accels, - decels  TOCO: none     Physical Exam:   Constitutional: She is oriented to person, place, and time. She appears well-developed and well-nourished. No distress.    HENT:   Head: Normocephalic and atraumatic.    Eyes: EOM are normal.     Cardiovascular: Normal rate and regular rhythm.  Exam reveals no edema.     Pulmonary/Chest: Effort normal and breath sounds normal.        Abdominal: Soft. Bowel sounds are normal. She exhibits no distension. There is no tenderness. There is no rebound and no guarding.     Genitourinary: Uterus normal.               Neurological: She is alert and oriented to person, place, and time.    Skin: Skin is warm and dry.    Psychiatric: She has a normal mood and affect.       Cervix:  Deferred     Significant Labs:  Lab Results   Component Value Date    GROUPTRH O POS 09/20/2017    HEPBSAG Negative 01/10/2018

## 2018-01-19 NOTE — PROGRESS NOTES
[facility]  Obstetrics  Antepartum Progress Note    Patient Name: Wang Warren  MRN: [id number]  Admission Date: 2018  Hospital Length of Stay: 1 days  Attending Physician: Mariela Valenzuela MD  Primary Care Provider: Primary Doctor No    Subjective:     Principal Problem:Diabetes mellitus affecting pregnancy in third trimester    HPI:  Wang Warren is a 37 y.o. F at 34w0d presents for blood sugar monitoring after being seen in clinic with POCT 299, later found to have HgbA1c 9.6.   Patient has h/o of GDM diet controlled. She states she is checking her BG every 2 hours, ranging from .  Patient states she has been snacking often lately, reports she has not been following a diabetic diet.  Patient currently feels well. She is eating without nausea or vomiting.  She reports good FM, denies CTX, vaginal bleeding, LOF.   Patient also has h/o cHTN on no medications. She denies HA, denies visual changes, denies RUQ pain, denies SOB, denies CP.        Hospital Course:  2018- Admit for blood sugar patterning  2018  Started on Levemir 24 BID, Aspart 14 TID.  Patent without symptoms. A1C 9.6.      No new subjective & objective note has been filed under this hospital service since the last note was generated.    Assessment/Plan:     37 y.o. female  at 34w1d for:    * Diabetes mellitus affecting pregnancy in third trimester    - POCT 299 in clinic  - HgbA1c 9.6  - Per patient, glucose log ranging from   - Will start levemir 24 BID, Aspart 14 TID WM  - Blood sugar checks at 2AM, Fasting, PrePrandial, PostPrandial, 9PM  - Adjust medications accordingly        Infertility, female    - IVF pregnancy        Advanced maternal age, primigravida in second trimester, antepartum    - Maternity T21 negative        Chronic hypertension affecting pregnancy    - On no meds  - On ASA daily  - Asymptomatic  - BP: (131)/(75) 131/75  - Cont close monitoring while  inhouse              Coirna Miguel MD  Obstetrics  [facility]

## 2018-01-19 NOTE — PLAN OF CARE
Problem: Patient Care Overview  Goal: Plan of Care Review  Outcome: Ongoing (interventions implemented as appropriate)  Plan reviewed with pt and spouse, pt verbalizes understanding. VS stable and pt remains afebrile. +FM per pt. Denies pain, ctx, lof, or vaginal bleeding. BG remains elevated.  at bedside. Call light within reach. No questions or concerns at this time. Will continue to monitor.

## 2018-01-19 NOTE — PROGRESS NOTES
[facility]  Obstetrics  Antepartum Progress Note    Patient Name: Wang Warren  MRN: [id number]  Admission Date: 2018  Hospital Length of Stay: 1 days  Attending Physician: Mariela Valenzuela MD  Primary Care Provider: Primary Doctor No    Subjective:     Principal Problem:Diabetes mellitus affecting pregnancy in third trimester    HPI:  Wang Warren is a 37 y.o. F at 34w0d presents for blood sugar monitoring after being seen in clinic with POCT 299, later found to have HgbA1c 9.6.   Patient has h/o of GDM diet controlled. She states she is checking her BG every 2 hours, ranging from .  Patient states she has been snacking often lately, reports she has not been following a diabetic diet.  Patient currently feels well. She is eating without nausea or vomiting.  She reports good FM, denies CTX, vaginal bleeding, LOF.   Patient also has h/o cHTN on no medications. She denies HA, denies visual changes, denies RUQ pain, denies SOB, denies CP.        Hospital Course:  2018- Admit for blood sugar patterning  2018  Started on Levemir 24 BID, Aspart 14 TID.  Patent without symptoms. A1C 9.6.        Interval Hx:  Patient doing well. Denies Alcaraz, N/V, dizziness.  Reports normal fetal movement. Started on Levemir 24 BID, Aspart 14 TID.        Obstetric History       T0      L0     SAB0   TAB0   Ectopic0   Multiple0   Live Births0       # Outcome Date GA Lbr Conrado/2nd Weight Sex Delivery Anes PTL Lv   1 Current                 Past Medical History:   Diagnosis Date    Chronic hypertension affecting pregnancy     Diabetes mellitus type II, controlled, with no complications 10/11/2017    Infertility, female     States she has been trying to conceive for four years. Was evaluated [facility] and was given Clomid for three months two years ago.     Past Surgical History:   Procedure Laterality Date    none         PTA Medications    Medication Sig    aspirin 81 MG Chew Take 81 mg by mouth once daily.    blood sugar diagnostic (ONETOUCH VERIO) Strp 1 strip by Misc.(Non-Drug; Combo Route) route 4 (four) times daily.    docusate sodium (COLACE) 100 MG capsule Take 1 capsule (100 mg total) by mouth 2 (two) times daily.    ferrous sulfate 325 mg (65 mg iron) Tab tablet TK 1 T PO daily    folic acid (FOLVITE) 1 MG tablet Take 4 tablets (4 mg total) by mouth once daily.    lancets (ONETOUCH ULTRASOFT LANCETS) Misc 1 lancet by Misc.(Non-Drug; Combo Route) route 4 (four) times daily.    metFORMIN (GLUCOPHAGE) 500 MG tablet Take 1 tablet (500 mg total) by mouth every evening.    PRENATAL VIT CALC,IRON,FOLIC (PRENATAL VITAMIN ORAL) Take by mouth.       Review of patient's allergies indicates:  No Known Allergies     Family History     Problem Relation (Age of Onset)    Cancer Father    Diabetes Maternal Aunt        Social History Main Topics    Smoking status: Never Smoker    Smokeless tobacco: Never Used    Alcohol use No    Drug use: No    Sexual activity: Yes     Partners: Male     Birth control/ protection: None     Review of Systems   Constitutional: Negative for chills and fever.   Respiratory: Negative for shortness of breath and wheezing.    Cardiovascular: Negative for chest pain.   Gastrointestinal: Negative for abdominal pain, diarrhea, nausea and vomiting.   Genitourinary: Negative for dysuria, hematuria, pelvic pain, vaginal bleeding and vaginal pain.   Neurological: Negative for headaches.      Objective:     Vital Signs (Most Recent):  Temp: 96.1 °F (35.6 °C) (01/19/18 0613)  Pulse: 83 (01/19/18 0613)  Resp: 16 (01/19/18 0613)  BP: 116/61 (01/19/18 0613)  SpO2: 100 % (01/18/18 1526) Vital Signs (24h Range):  Temp:  [96.1 °F (35.6 °C)-97.3 °F (36.3 °C)] 96.1 °F (35.6 °C)  Pulse:  [70-83] 83  Resp:  [16-18] 16  SpO2:  [100 %] 100 %  BP: (116-133)/(61-75) 116/61     Weight: 128.6 kg (283 lb 8.2 oz)  Body mass index is 53.57  kg/m².    FHT: Cat 1 (reassuring) 125 bpm, Mod BTBV, + accel, - Decel  TOCO: Irregular    Physical Exam:   Constitutional: She is oriented to person, place, and time. She appears well-developed and well-nourished.     Eyes: EOM are normal.    Neck: Normal range of motion.    Cardiovascular: Normal rate.     Pulmonary/Chest: Effort normal. No respiratory distress. She has no wheezes.        Abdominal: Soft. She exhibits no distension. There is no tenderness. There is no rebound and no guarding.             Musculoskeletal: Normal range of motion.       Neurological: She is alert and oriented to person, place, and time.    Skin: Skin is warm and dry.    Psychiatric: She has a normal mood and affect. Her behavior is normal. Judgment and thought content normal.       Cervix:  Deferred     Significant Labs:  Lab Results   Component Value Date    GROUPTRH O POS 2017    HEPBSAG Negative 01/10/2018       I have personallly reviewed all pertinent lab results from the last 24 hours.    Assessment/Plan:     37 y.o. female  at 34w1d for:    * Diabetes mellitus affecting pregnancy in third trimester    - POCT 299 in clinic  - HgbA1c 9.6  - Per patient, glucose log ranging from   - Will start levemir 24 BID, Aspart 14 TID WM  - Blood sugar checks at 2AM, Fasting, PrePrandial, PostPrandial, 9PM  - Adjust medications accordingly        Infertility, female    - IVF pregnancy        Advanced maternal age, primigravida in second trimester, antepartum    - Maternity T21 negative        Chronic hypertension affecting pregnancy    - On no meds  - On ASA daily  - Asymptomatic  - BP: (131)/(75) 131/75  - Cont close monitoring while inhouse              Corina Miguel MD  Obstetrics  [facility]

## 2018-01-19 NOTE — SUBJECTIVE & OBJECTIVE
Interval Hx:  Patient doing well. Denies Alcaraz, N/V, dizziness.  Reports normal fetal movement. Started on Levemir 24 BID, Aspart 14 TID.        Obstetric History       T0      L0     SAB0   TAB0   Ectopic0   Multiple0   Live Births0       # Outcome Date GA Lbr Conrado/2nd Weight Sex Delivery Anes PTL Lv   1 Current                 Past Medical History:   Diagnosis Date    Chronic hypertension affecting pregnancy     Diabetes mellitus type II, controlled, with no complications 10/11/2017    Infertility, female     States she has been trying to conceive for four years. Was evaluated at [facility] and was given Clomid for three months two years ago.     Past Surgical History:   Procedure Laterality Date    none         PTA Medications   Medication Sig    aspirin 81 MG Chew Take 81 mg by mouth once daily.    blood sugar diagnostic (ONETOUCH VERIO) Strp 1 strip by Misc.(Non-Drug; Combo Route) route 4 (four) times daily.    docusate sodium (COLACE) 100 MG capsule Take 1 capsule (100 mg total) by mouth 2 (two) times daily.    ferrous sulfate 325 mg (65 mg iron) Tab tablet TK 1 T PO daily    folic acid (FOLVITE) 1 MG tablet Take 4 tablets (4 mg total) by mouth once daily.    lancets (ONETOUCH ULTRASOFT LANCETS) Misc 1 lancet by Misc.(Non-Drug; Combo Route) route 4 (four) times daily.    metFORMIN (GLUCOPHAGE) 500 MG tablet Take 1 tablet (500 mg total) by mouth every evening.    PRENATAL VIT CALC,IRON,FOLIC (PRENATAL VITAMIN ORAL) Take by mouth.       Review of patient's allergies indicates:  No Known Allergies     Family History     Problem Relation (Age of Onset)    Cancer Father    Diabetes Maternal Aunt        Social History Main Topics    Smoking status: Never Smoker    Smokeless tobacco: Never Used    Alcohol use No    Drug use: No    Sexual activity: Yes     Partners: Male     Birth control/ protection: None     Review of Systems   Constitutional: Negative for chills and fever.    Respiratory: Negative for shortness of breath and wheezing.    Cardiovascular: Negative for chest pain.   Gastrointestinal: Negative for abdominal pain, diarrhea, nausea and vomiting.   Genitourinary: Negative for dysuria, hematuria, pelvic pain, vaginal bleeding and vaginal pain.   Neurological: Negative for headaches.      Objective:     Vital Signs (Most Recent):  Temp: 96.1 °F (35.6 °C) (01/19/18 0613)  Pulse: 83 (01/19/18 0613)  Resp: 16 (01/19/18 0613)  BP: 116/61 (01/19/18 0613)  SpO2: 100 % (01/18/18 1526) Vital Signs (24h Range):  Temp:  [96.1 °F (35.6 °C)-97.3 °F (36.3 °C)] 96.1 °F (35.6 °C)  Pulse:  [70-83] 83  Resp:  [16-18] 16  SpO2:  [100 %] 100 %  BP: (116-133)/(61-75) 116/61     Weight: 128.6 kg (283 lb 8.2 oz)  Body mass index is 53.57 kg/m².    FHT: Cat 1 (reassuring) 130 bpm, Mod BTBV, + accel, - Decel  TOCO:  None    Physical Exam:   Constitutional: She is oriented to person, place, and time. She appears well-developed and well-nourished.     Eyes: EOM are normal.    Neck: Normal range of motion.    Cardiovascular: Normal rate.     Pulmonary/Chest: Effort normal. No respiratory distress. She has no wheezes.        Abdominal: Soft. She exhibits no distension. There is no tenderness. There is no rebound and no guarding.             Musculoskeletal: Normal range of motion.       Neurological: She is alert and oriented to person, place, and time.    Skin: Skin is warm and dry.    Psychiatric: She has a normal mood and affect. Her behavior is normal. Judgment and thought content normal.       Cervix:  Deferred     Significant Labs:  Lab Results   Component Value Date    GROUPTRH O POS 09/20/2017    HEPBSAG Negative 01/10/2018       I have personallly reviewed all pertinent lab results from the last 24 hours.

## 2018-01-20 LAB
BACTERIA UR CULT: NO GROWTH
POCT GLUCOSE: 111 MG/DL (ref 70–110)
POCT GLUCOSE: 113 MG/DL (ref 70–110)
POCT GLUCOSE: 125 MG/DL (ref 70–110)
POCT GLUCOSE: 128 MG/DL (ref 70–110)
POCT GLUCOSE: 147 MG/DL (ref 70–110)
POCT GLUCOSE: 75 MG/DL (ref 70–110)
POCT GLUCOSE: 90 MG/DL (ref 70–110)
POCT GLUCOSE: 94 MG/DL (ref 70–110)

## 2018-01-20 PROCEDURE — 99233 SBSQ HOSP IP/OBS HIGH 50: CPT | Mod: 25,,, | Performed by: OBSTETRICS & GYNECOLOGY

## 2018-01-20 PROCEDURE — 63600175 PHARM REV CODE 636 W HCPCS: Performed by: OBSTETRICS & GYNECOLOGY

## 2018-01-20 PROCEDURE — 11000001 HC ACUTE MED/SURG PRIVATE ROOM

## 2018-01-20 PROCEDURE — 59025 FETAL NON-STRESS TEST: CPT | Mod: 26,,, | Performed by: OBSTETRICS & GYNECOLOGY

## 2018-01-20 PROCEDURE — 25000003 PHARM REV CODE 250: Performed by: STUDENT IN AN ORGANIZED HEALTH CARE EDUCATION/TRAINING PROGRAM

## 2018-01-20 PROCEDURE — 63600175 PHARM REV CODE 636 W HCPCS: Performed by: STUDENT IN AN ORGANIZED HEALTH CARE EDUCATION/TRAINING PROGRAM

## 2018-01-20 RX ORDER — INSULIN ASPART 100 [IU]/ML
18 INJECTION, SOLUTION INTRAVENOUS; SUBCUTANEOUS
Status: DISCONTINUED | OUTPATIENT
Start: 2018-01-20 | End: 2018-01-20

## 2018-01-20 RX ORDER — INSULIN ASPART 100 [IU]/ML
14 INJECTION, SOLUTION INTRAVENOUS; SUBCUTANEOUS
Status: DISCONTINUED | OUTPATIENT
Start: 2018-01-21 | End: 2018-01-20

## 2018-01-20 RX ORDER — INSULIN ASPART 100 [IU]/ML
16 INJECTION, SOLUTION INTRAVENOUS; SUBCUTANEOUS
Status: DISCONTINUED | OUTPATIENT
Start: 2018-01-20 | End: 2018-01-20

## 2018-01-20 RX ORDER — INSULIN ASPART 100 [IU]/ML
14 INJECTION, SOLUTION INTRAVENOUS; SUBCUTANEOUS
Status: DISCONTINUED | OUTPATIENT
Start: 2018-01-20 | End: 2018-01-21

## 2018-01-20 RX ORDER — INSULIN ASPART 100 [IU]/ML
22 INJECTION, SOLUTION INTRAVENOUS; SUBCUTANEOUS 2 TIMES DAILY WITH MEALS
Status: DISCONTINUED | OUTPATIENT
Start: 2018-01-20 | End: 2018-01-21

## 2018-01-20 RX ADMIN — INSULIN ASPART 22 UNITS: 100 INJECTION, SOLUTION INTRAVENOUS; SUBCUTANEOUS at 12:01

## 2018-01-20 RX ADMIN — INSULIN DETEMIR 32 UNITS: 100 INJECTION, SOLUTION SUBCUTANEOUS at 08:01

## 2018-01-20 RX ADMIN — INSULIN ASPART 14 UNITS: 100 INJECTION, SOLUTION INTRAVENOUS; SUBCUTANEOUS at 05:01

## 2018-01-20 RX ADMIN — INSULIN ASPART 22 UNITS: 100 INJECTION, SOLUTION INTRAVENOUS; SUBCUTANEOUS at 09:01

## 2018-01-20 RX ADMIN — ASPIRIN 81 MG CHEWABLE TABLET 81 MG: 81 TABLET CHEWABLE at 09:01

## 2018-01-20 NOTE — PLAN OF CARE
Problem: Patient Care Overview  Goal: Plan of Care Review  Outcome: Ongoing (interventions implemented as appropriate)  Pt denies pain, blurry vision, n/v, h/a, dizziness, SOB, chest pains, LOF, vaginal bleeding, reports +FM, strip was reactive with accels and no decels, VSS, bed at lowest position, call light within reach, side rails up x2, spouse to bedside, pt has no further questions, comments, or concerns at this time, report given to night shift RN.

## 2018-01-20 NOTE — ASSESSMENT & PLAN NOTE
- On no meds  - On ASA daily  - Asymptomatic  BP: (112-135)/(61-78) 135/75  - Cont close monitoring while inhouse

## 2018-01-20 NOTE — PROGRESS NOTES
[facility]  Obstetrics  Antepartum Progress Note    Patient Name: Wang Warren  MRN: [id number]  Admission Date: 2018  Hospital Length of Stay: 2 days  Attending Physician: Mariela Valenzuela MD  Primary Care Provider: Primary Doctor No    Subjective:     Principal Problem:Diabetes mellitus affecting pregnancy in third trimester    HPI:  Wang Warren is a 37 y.o. F at 34w0d presents for blood sugar monitoring after being seen in clinic with POCT 299, later found to have HgbA1c 9.6.   Patient has h/o of GDM diet controlled. She states she is checking her BG every 2 hours, ranging from .  Patient states she has been snacking often lately, reports she has not been following a diabetic diet.  Patient currently feels well. She is eating without nausea or vomiting.  She reports good FM, denies CTX, vaginal bleeding, LOF.   Patient also has h/o cHTN on no medications. She denies HA, denies visual changes, denies RUQ pain, denies SOB, denies CP.        Hospital Course:  2018- Admit for blood sugar patterning  2018  Started on Levemir 24 BID, Aspart 18TID.  Patent without symptoms. A1C 9.6 Blood sugars in th 170s to 160s. Pre prandial and post prandial dinner 59/61  2018 Patient given 28qhs of levemir, 32 of levemir this AM. Aspart 22 with lunch and breakfast. Will decrease dinner dosage.        No events overnight. Patient is doing well. Denies CTX, VB, LOF.    Obstetric History       T0      L0     SAB0   TAB0   Ectopic0   Multiple0   Live Births0       # Outcome Date GA Lbr Conrado/2nd Weight Sex Delivery Anes PTL Lv   1 Current                 Past Medical History:   Diagnosis Date    Chronic hypertension affecting pregnancy     Diabetes mellitus type II, controlled, with no complications 10/11/2017    Infertility, female     States she has been trying to conceive for four years. [facility] and was given  Clomid for three months two years ago.     Past Surgical History:   Procedure Laterality Date    none         PTA Medications   Medication Sig    aspirin 81 MG Chew Take 81 mg by mouth once daily.    blood sugar diagnostic (ONETOUCH VERIO) Strp 1 strip by Misc.(Non-Drug; Combo Route) route 4 (four) times daily.    docusate sodium (COLACE) 100 MG capsule Take 1 capsule (100 mg total) by mouth 2 (two) times daily.    ferrous sulfate 325 mg (65 mg iron) Tab tablet TK 1 T PO daily    folic acid (FOLVITE) 1 MG tablet Take 4 tablets (4 mg total) by mouth once daily.    lancets (ONETOUCH ULTRASOFT LANCETS) Misc 1 lancet by Misc.(Non-Drug; Combo Route) route 4 (four) times daily.    metFORMIN (GLUCOPHAGE) 500 MG tablet Take 1 tablet (500 mg total) by mouth every evening.    PRENATAL VIT CALC,IRON,FOLIC (PRENATAL VITAMIN ORAL) Take by mouth.       Review of patient's allergies indicates:  No Known Allergies     Family History     Problem Relation (Age of Onset)    Cancer Father    Diabetes Maternal Aunt        Social History Main Topics    Smoking status: Never Smoker    Smokeless tobacco: Never Used    Alcohol use No    Drug use: No    Sexual activity: Yes     Partners: Male     Birth control/ protection: None     Review of Systems   Constitutional: Negative for chills and fever.   Respiratory: Negative for shortness of breath and wheezing.    Cardiovascular: Negative for chest pain.   Gastrointestinal: Negative for abdominal pain, diarrhea, nausea and vomiting.   Genitourinary: Negative for dysuria, hematuria, pelvic pain, vaginal bleeding and vaginal pain.   Neurological: Negative for headaches.      Objective:     Vital Signs (Most Recent):  Temp: 96.6 °F (35.9 °C) (01/20/18 0406)  Pulse: 69 (01/20/18 0409)  Resp: 18 (01/20/18 0406)  BP: 135/75 (01/20/18 0409)  SpO2: 97 % (01/20/18 0407) Vital Signs (24h Range):  Temp:  [96.1 °F (35.6 °C)-97.1 °F (36.2 °C)] 96.6 °F (35.9 °C)  Pulse:  [68-83] 69  Resp:   [16-18] 18  SpO2:  [96 %-100 %] 97 %  BP: (112-135)/(61-78) 135/75     Weight: 128.6 kg (283 lb 8.2 oz)  Body mass index is 53.57 kg/m².    FHT: AM NST pending  TOCO:  None    Physical Exam:   Constitutional: She is oriented to person, place, and time. She appears well-developed and well-nourished.     Eyes: EOM are normal.    Neck: Normal range of motion.    Cardiovascular: Normal rate.     Pulmonary/Chest: Effort normal. No respiratory distress. She has no wheezes.        Abdominal: Soft. She exhibits no distension. There is no tenderness. There is no rebound and no guarding.             Musculoskeletal: Normal range of motion.       Neurological: She is alert and oriented to person, place, and time.    Skin: Skin is warm and dry.    Psychiatric: She has a normal mood and affect. Her behavior is normal. Judgment and thought content normal.       Cervix:  Deferred     Significant Labs:  Lab Results   Component Value Date    GROUPTRH O POS 2017    HEPBSAG Negative 01/10/2018       I have personallly reviewed all pertinent lab results from the last 24 hours.    Obstetric History       T0      L0     SAB0   TAB0   Ectopic0   Multiple0   Live Births0       # Outcome Date GA Lbr Conrado/2nd Weight Sex Delivery Anes PTL Lv   1 Current                 Past Medical History:   Diagnosis Date    Chronic hypertension affecting pregnancy     Diabetes mellitus type II, controlled, with no complications 10/11/2017    Infertility, female     States she has been trying to conceive for four years. Was evaluated at [facility] and was given Clomid for three months two years ago.     Past Surgical History:   Procedure Laterality Date    none         PTA Medications   Medication Sig    aspirin 81 MG Chew Take 81 mg by mouth once daily.    blood sugar diagnostic (ONETOUCH VERIO) Strp 1 strip by Misc.(Non-Drug; Combo Route) route 4 (four) times daily.    docusate sodium (COLACE) 100 MG capsule Take 1 capsule  (100 mg total) by mouth 2 (two) times daily.    ferrous sulfate 325 mg (65 mg iron) Tab tablet TK 1 T PO daily    folic acid (FOLVITE) 1 MG tablet Take 4 tablets (4 mg total) by mouth once daily.    lancets (ONETOUCH ULTRASOFT LANCETS) Misc 1 lancet by Misc.(Non-Drug; Combo Route) route 4 (four) times daily.    metFORMIN (GLUCOPHAGE) 500 MG tablet Take 1 tablet (500 mg total) by mouth every evening.    PRENATAL VIT CALC,IRON,FOLIC (PRENATAL VITAMIN ORAL) Take by mouth.       Review of patient's allergies indicates:  No Known Allergies     Family History     Problem Relation (Age of Onset)    Cancer Father    Diabetes Maternal Aunt        Social History Main Topics    Smoking status: Never Smoker    Smokeless tobacco: Never Used    Alcohol use No    Drug use: No    Sexual activity: Yes     Partners: Male     Birth control/ protection: None     Review of Systems   Constitutional: Negative for chills and fever.   HENT: Negative for tinnitus.    Respiratory: Negative for shortness of breath.    Cardiovascular: Negative for chest pain and leg swelling.   Gastrointestinal: Negative for abdominal pain, nausea and vomiting.   Genitourinary: Negative for dysuria, pelvic pain and vaginal bleeding.   Neurological: Negative for headaches.      Objective:     Vital Signs (Most Recent):  Temp: 96.6 °F (35.9 °C) (01/20/18 0406)  Pulse: 69 (01/20/18 0409)  Resp: 18 (01/20/18 0406)  BP: 135/75 (01/20/18 0409)  SpO2: 97 % (01/20/18 0407) Vital Signs (24h Range):  Temp:  [96.1 °F (35.6 °C)-97.1 °F (36.2 °C)] 96.6 °F (35.9 °C)  Pulse:  [68-83] 69  Resp:  [16-18] 18  SpO2:  [96 %-100 %] 97 %  BP: (112-135)/(61-78) 135/75     Weight: 128.6 kg (283 lb 8.2 oz)  Body mass index is 53.57 kg/m².    FHT: Cat 1 (reassuring) 130 bpm, mod BTBV, +accels, - decels  TOCO: none     Physical Exam:   Constitutional: She is oriented to person, place, and time. She appears well-developed and well-nourished. No distress.    HENT:   Head:  Normocephalic and atraumatic.    Eyes: EOM are normal.     Cardiovascular: Normal rate and regular rhythm.  Exam reveals no edema.     Pulmonary/Chest: Effort normal and breath sounds normal.        Abdominal: Soft. Bowel sounds are normal. She exhibits no distension. There is no tenderness. There is no rebound and no guarding.     Genitourinary: Uterus normal.               Neurological: She is alert and oriented to person, place, and time.    Skin: Skin is warm and dry.    Psychiatric: She has a normal mood and affect.       Cervix:  Deferred     Significant Labs:  Lab Results   Component Value Date    GROUPTRH O POS 2017    HEPBSAG Negative 01/10/2018         Assessment/Plan:     37 y.o. female  at 34w2d for:    * Diabetes mellitus affecting pregnancy in third trimester    - HgbA1c 9.6  - Per patient, glucose log ranging from   - Levemir 28 BID yesterday, increased to 32 for AM dose. Given aspart 18 TID yesterday, increased to 22  - Blood sugar checks at 2AM, Fasting, PrePrandial, PostPrandial, 9PM, 9pm and 2am not drawn as nursing was not aware  - Blood sugars                         Fasting        Pre/ppB         Pre/ppL      Pre/ppD       9PM          2am                                         270                215            215           141                    171          162/189          189/132      59/61        Not drawn   Not drawn                     90    Will have to see how increase in levemir affects breakfast and lunch sugars, will decrease dinner levemir dosage.          Infertility, female    - IVF pregnancy        Advanced maternal age, primigravida in second trimester, antepartum    - Maternity T21 negative        Chronic hypertension affecting pregnancy    - On no meds  - On ASA daily  - Asymptomatic  BP: (112-135)/(61-78) 135/75  - Cont close monitoring while inhouse              B Lamin Wray MD  Obstetrics  [facility]

## 2018-01-20 NOTE — SUBJECTIVE & OBJECTIVE
No events overnight. Patient is doing well. Denies CTX, VB, LOF.    Obstetric History       T0      L0     SAB0   TAB0   Ectopic0   Multiple0   Live Births0       # Outcome Date GA Lbr Conrado/2nd Weight Sex Delivery Anes PTL Lv   1 Current                 Past Medical History:   Diagnosis Date    Chronic hypertension affecting pregnancy     Diabetes mellitus type II, controlled, with no complications 10/11/2017    Infertility, female     States she has been trying to conceive for four years. Was evaluated at [facility] and was given Clomid for three months two years ago.     Past Surgical History:   Procedure Laterality Date    none         PTA Medications   Medication Sig    aspirin 81 MG Chew Take 81 mg by mouth once daily.    blood sugar diagnostic (ONETOUCH VERIO) Strp 1 strip by Misc.(Non-Drug; Combo Route) route 4 (four) times daily.    docusate sodium (COLACE) 100 MG capsule Take 1 capsule (100 mg total) by mouth 2 (two) times daily.    ferrous sulfate 325 mg (65 mg iron) Tab tablet TK 1 T PO daily    folic acid (FOLVITE) 1 MG tablet Take 4 tablets (4 mg total) by mouth once daily.    lancets (ONETOUCH ULTRASOFT LANCETS) Misc 1 lancet by Misc.(Non-Drug; Combo Route) route 4 (four) times daily.    metFORMIN (GLUCOPHAGE) 500 MG tablet Take 1 tablet (500 mg total) by mouth every evening.    PRENATAL VIT CALC,IRON,FOLIC (PRENATAL VITAMIN ORAL) Take by mouth.       Review of patient's allergies indicates:  No Known Allergies     Family History     Problem Relation (Age of Onset)    Cancer Father    Diabetes Maternal Aunt        Social History Main Topics    Smoking status: Never Smoker    Smokeless tobacco: Never Used    Alcohol use No    Drug use: No    Sexual activity: Yes     Partners: Male     Birth control/ protection: None     Review of Systems   Constitutional: Negative for chills and fever.   Respiratory: Negative for shortness of breath and wheezing.    Cardiovascular:  Negative for chest pain.   Gastrointestinal: Negative for abdominal pain, diarrhea, nausea and vomiting.   Genitourinary: Negative for dysuria, hematuria, pelvic pain, vaginal bleeding and vaginal pain.   Neurological: Negative for headaches.      Objective:     Vital Signs (Most Recent):  Temp: 96.6 °F (35.9 °C) (18 0406)  Pulse: 69 (18 0409)  Resp: 18 (18 0406)  BP: 135/75 (18 0409)  SpO2: 97 % (18 0407) Vital Signs (24h Range):  Temp:  [96.1 °F (35.6 °C)-97.1 °F (36.2 °C)] 96.6 °F (35.9 °C)  Pulse:  [68-83] 69  Resp:  [16-18] 18  SpO2:  [96 %-100 %] 97 %  BP: (112-135)/(61-78) 135/75     Weight: 128.6 kg (283 lb 8.2 oz)  Body mass index is 53.57 kg/m².    FHT: AM NST pending  TOCO:  None    Physical Exam:   Constitutional: She is oriented to person, place, and time. She appears well-developed and well-nourished.     Eyes: EOM are normal.    Neck: Normal range of motion.    Cardiovascular: Normal rate.     Pulmonary/Chest: Effort normal. No respiratory distress. She has no wheezes.        Abdominal: Soft. She exhibits no distension. There is no tenderness. There is no rebound and no guarding.             Musculoskeletal: Normal range of motion.       Neurological: She is alert and oriented to person, place, and time.    Skin: Skin is warm and dry.    Psychiatric: She has a normal mood and affect. Her behavior is normal. Judgment and thought content normal.       Cervix:  Deferred     Significant Labs:  Lab Results   Component Value Date    GROUPTRH O POS 2017    HEPBSAG Negative 01/10/2018       I have personallly reviewed all pertinent lab results from the last 24 hours.    Obstetric History       T0      L0     SAB0   TAB0   Ectopic0   Multiple0   Live Births0       # Outcome Date GA Lbr Conrado/2nd Weight Sex Delivery Anes PTL Lv   1 Current                 Past Medical History:   Diagnosis Date    Chronic hypertension affecting pregnancy     Diabetes mellitus  type II, controlled, with no complications 10/11/2017    Infertility, female     States she has been trying to conceive for four years. Was evaluated at [facility] and was given Clomid for three months two years ago.     Past Surgical History:   Procedure Laterality Date    none         PTA Medications   Medication Sig    aspirin 81 MG Chew Take 81 mg by mouth once daily.    blood sugar diagnostic (ONETOUCH VERIO) Strp 1 strip by Misc.(Non-Drug; Combo Route) route 4 (four) times daily.    docusate sodium (COLACE) 100 MG capsule Take 1 capsule (100 mg total) by mouth 2 (two) times daily.    ferrous sulfate 325 mg (65 mg iron) Tab tablet TK 1 T PO daily    folic acid (FOLVITE) 1 MG tablet Take 4 tablets (4 mg total) by mouth once daily.    lancets (ONETOUCH ULTRASOFT LANCETS) Misc 1 lancet by Misc.(Non-Drug; Combo Route) route 4 (four) times daily.    metFORMIN (GLUCOPHAGE) 500 MG tablet Take 1 tablet (500 mg total) by mouth every evening.    PRENATAL VIT CALC,IRON,FOLIC (PRENATAL VITAMIN ORAL) Take by mouth.       Review of patient's allergies indicates:  No Known Allergies     Family History     Problem Relation (Age of Onset)    Cancer Father    Diabetes Maternal Aunt        Social History Main Topics    Smoking status: Never Smoker    Smokeless tobacco: Never Used    Alcohol use No    Drug use: No    Sexual activity: Yes     Partners: Male     Birth control/ protection: None     Review of Systems   Constitutional: Negative for chills and fever.   HENT: Negative for tinnitus.    Respiratory: Negative for shortness of breath.    Cardiovascular: Negative for chest pain and leg swelling.   Gastrointestinal: Negative for abdominal pain, nausea and vomiting.   Genitourinary: Negative for dysuria, pelvic pain and vaginal bleeding.   Neurological: Negative for headaches.      Objective:     Vital Signs (Most Recent):  Temp: 96.6 °F (35.9 °C) (01/20/18 0406)  Pulse: 69 (01/20/18 0409)  Resp: 18  (01/20/18 0406)  BP: 135/75 (01/20/18 0409)  SpO2: 97 % (01/20/18 0407) Vital Signs (24h Range):  Temp:  [96.1 °F (35.6 °C)-97.1 °F (36.2 °C)] 96.6 °F (35.9 °C)  Pulse:  [68-83] 69  Resp:  [16-18] 18  SpO2:  [96 %-100 %] 97 %  BP: (112-135)/(61-78) 135/75     Weight: 128.6 kg (283 lb 8.2 oz)  Body mass index is 53.57 kg/m².    FHT: Cat 1 (reassuring) 130 bpm, mod BTBV, +accels, - decels  TOCO: none     Physical Exam:   Constitutional: She is oriented to person, place, and time. She appears well-developed and well-nourished. No distress.    HENT:   Head: Normocephalic and atraumatic.    Eyes: EOM are normal.     Cardiovascular: Normal rate and regular rhythm.  Exam reveals no edema.     Pulmonary/Chest: Effort normal and breath sounds normal.        Abdominal: Soft. Bowel sounds are normal. She exhibits no distension. There is no tenderness. There is no rebound and no guarding.     Genitourinary: Uterus normal.               Neurological: She is alert and oriented to person, place, and time.    Skin: Skin is warm and dry.    Psychiatric: She has a normal mood and affect.       Cervix:  Deferred     Significant Labs:  Lab Results   Component Value Date    GROUPTRH O POS 09/20/2017    HEPBSAG Negative 01/10/2018

## 2018-01-20 NOTE — ASSESSMENT & PLAN NOTE
- HgbA1c 9.6  - Per patient, glucose log ranging from   - Levemir 28 BID yesterday, increased to 32 for AM dose. Given aspart 18 TID yesterday, increased to 22  - Blood sugar checks at 2AM, Fasting, PrePrandial, PostPrandial, 9PM, 9pm and 2am not drawn as nursing was not aware  - Blood sugars                         Fasting        Pre/ppB         Pre/ppL      Pre/ppD       9PM          2am  1/18                                       270                215            215           141  1/19                  171          162/189          189/132      59/61        Not drawn   Not drawn  1/20                   90    Will have to see how increase in levemir affects breakfast and lunch sugars, will decrease dinner levemir dosage.

## 2018-01-21 LAB
POCT GLUCOSE: 100 MG/DL (ref 70–110)
POCT GLUCOSE: 109 MG/DL (ref 70–110)
POCT GLUCOSE: 133 MG/DL (ref 70–110)
POCT GLUCOSE: 139 MG/DL (ref 70–110)
POCT GLUCOSE: 142 MG/DL (ref 70–110)
POCT GLUCOSE: 65 MG/DL (ref 70–110)
POCT GLUCOSE: 94 MG/DL (ref 70–110)

## 2018-01-21 PROCEDURE — 11000001 HC ACUTE MED/SURG PRIVATE ROOM

## 2018-01-21 PROCEDURE — 59025 FETAL NON-STRESS TEST: CPT | Mod: 26,,, | Performed by: OBSTETRICS & GYNECOLOGY

## 2018-01-21 PROCEDURE — 99233 SBSQ HOSP IP/OBS HIGH 50: CPT | Mod: 25,,, | Performed by: OBSTETRICS & GYNECOLOGY

## 2018-01-21 PROCEDURE — 25000003 PHARM REV CODE 250: Performed by: STUDENT IN AN ORGANIZED HEALTH CARE EDUCATION/TRAINING PROGRAM

## 2018-01-21 PROCEDURE — 63600175 PHARM REV CODE 636 W HCPCS: Performed by: STUDENT IN AN ORGANIZED HEALTH CARE EDUCATION/TRAINING PROGRAM

## 2018-01-21 RX ORDER — INSULIN ASPART 100 [IU]/ML
26 INJECTION, SOLUTION INTRAVENOUS; SUBCUTANEOUS
Status: DISCONTINUED | OUTPATIENT
Start: 2018-01-22 | End: 2018-01-22 | Stop reason: HOSPADM

## 2018-01-21 RX ORDER — INSULIN ASPART 100 [IU]/ML
22 INJECTION, SOLUTION INTRAVENOUS; SUBCUTANEOUS
Status: DISCONTINUED | OUTPATIENT
Start: 2018-01-22 | End: 2018-01-21

## 2018-01-21 RX ORDER — INSULIN ASPART 100 [IU]/ML
16 INJECTION, SOLUTION INTRAVENOUS; SUBCUTANEOUS
Status: DISCONTINUED | OUTPATIENT
Start: 2018-01-21 | End: 2018-01-22 | Stop reason: HOSPADM

## 2018-01-21 RX ORDER — INSULIN ASPART 100 [IU]/ML
20 INJECTION, SOLUTION INTRAVENOUS; SUBCUTANEOUS
Status: DISCONTINUED | OUTPATIENT
Start: 2018-01-21 | End: 2018-01-22 | Stop reason: HOSPADM

## 2018-01-21 RX ADMIN — INSULIN ASPART 22 UNITS: 100 INJECTION, SOLUTION INTRAVENOUS; SUBCUTANEOUS at 09:01

## 2018-01-21 RX ADMIN — INSULIN ASPART 16 UNITS: 100 INJECTION, SOLUTION INTRAVENOUS; SUBCUTANEOUS at 04:01

## 2018-01-21 RX ADMIN — INSULIN ASPART 20 UNITS: 100 INJECTION, SOLUTION INTRAVENOUS; SUBCUTANEOUS at 12:01

## 2018-01-21 RX ADMIN — INSULIN DETEMIR 32 UNITS: 100 INJECTION, SOLUTION SUBCUTANEOUS at 09:01

## 2018-01-21 RX ADMIN — ASPIRIN 81 MG CHEWABLE TABLET 81 MG: 81 TABLET CHEWABLE at 08:01

## 2018-01-21 NOTE — PROGRESS NOTES
Results for MATILDA MCINTOSH (MRN [id number]) as of 1/20/2018 19:49   Ref. Range 1/20/2018 19:31   POCT Glucose Latest Ref Range: 70 - 110 mg/dL 128 (H)     Dr. Oliva notified. Orders to give patient snack and recheck blood sugar in 2 hours.

## 2018-01-21 NOTE — PLAN OF CARE
Problem: Patient Care Overview  Goal: Plan of Care Review  Outcome: Ongoing (interventions implemented as appropriate)  Plan of care reviewed with pt. All questions were answered. Made available throughout the shift to answer any additional questions. Pt rested comfortably throughout the night without any s/sx of pain or complaints. Pt reports +FM denies LOF, VB, or ctx. Accuchecks done per order. See flowsheets. Pt with some mildly elevated bp. Plan to continue to monitor pt and to continue bs patterning. Will update POC as needed.

## 2018-01-21 NOTE — PROGRESS NOTES
Results for DAYNA ALEJANDROAKI TELLEZ (MRN [id number]) as of 1/21/2018 00:05   Ref. Range 1/20/2018 22:08   POCT Glucose Latest Ref Range: 70 - 110 mg/dL 125 (H)     Per Dr. Oliva call her if CBG greater than 150.

## 2018-01-21 NOTE — PROGRESS NOTES
Patient this am without complaints  Reports fetal movement  Walking after meals and before her 2hr PP accucheck  Afebrile, other VSS     NST: 125 bpm, reactive, moderate variability  [place]: no contractions noted     Urine culture (01-): negative     Accuchecks improving with adjustment in insulin.   FBS this am 109    Plan:   Insulin regimen:     Levemir 32 in am     Pre bkfst: 26 aspart     Pre lunch: 20 Aspart     Pre dinner: 16 Aspart    Continue with NST bid   Anticipate discharge tomorrow with further management of insulin/glucose control as outpatient   Close monitoring of blood pressures

## 2018-01-22 VITALS
WEIGHT: 283.5 LBS | SYSTOLIC BLOOD PRESSURE: 106 MMHG | HEIGHT: 61 IN | DIASTOLIC BLOOD PRESSURE: 61 MMHG | BODY MASS INDEX: 53.52 KG/M2 | HEART RATE: 93 BPM | TEMPERATURE: 97 F | OXYGEN SATURATION: 96 % | RESPIRATION RATE: 16 BRPM

## 2018-01-22 PROBLEM — N97.9 INFERTILITY, FEMALE: Status: RESOLVED | Noted: 2017-10-11 | Resolved: 2018-01-22

## 2018-01-22 LAB
POCT GLUCOSE: 127 MG/DL (ref 70–110)
POCT GLUCOSE: 157 MG/DL (ref 70–110)
POCT GLUCOSE: 178 MG/DL (ref 70–110)
POCT GLUCOSE: 98 MG/DL (ref 70–110)

## 2018-01-22 PROCEDURE — 25000003 PHARM REV CODE 250: Performed by: STUDENT IN AN ORGANIZED HEALTH CARE EDUCATION/TRAINING PROGRAM

## 2018-01-22 PROCEDURE — 63600175 PHARM REV CODE 636 W HCPCS: Performed by: STUDENT IN AN ORGANIZED HEALTH CARE EDUCATION/TRAINING PROGRAM

## 2018-01-22 PROCEDURE — 99238 HOSP IP/OBS DSCHRG MGMT 30/<: CPT | Mod: 25,,, | Performed by: OBSTETRICS & GYNECOLOGY

## 2018-01-22 PROCEDURE — 59025 FETAL NON-STRESS TEST: CPT | Mod: 26,,, | Performed by: OBSTETRICS & GYNECOLOGY

## 2018-01-22 RX ORDER — INSULIN ASPART 100 [IU]/ML
16 INJECTION, SOLUTION INTRAVENOUS; SUBCUTANEOUS
Qty: 14.4 ML | Refills: 3 | Status: SHIPPED | OUTPATIENT
Start: 2018-01-22 | End: 2018-01-22

## 2018-01-22 RX ORDER — INSULIN ASPART 100 [IU]/ML
18 INJECTION, SOLUTION INTRAVENOUS; SUBCUTANEOUS
Qty: 16.2 ML | Refills: 3 | Status: ON HOLD | OUTPATIENT
Start: 2018-01-22 | End: 2018-02-12

## 2018-01-22 RX ORDER — INSULIN ASPART 100 [IU]/ML
26 INJECTION, SOLUTION INTRAVENOUS; SUBCUTANEOUS
Qty: 23.4 ML | Refills: 3 | Status: SHIPPED | OUTPATIENT
Start: 2018-01-22 | End: 2018-01-22

## 2018-01-22 RX ORDER — INSULIN ASPART 100 [IU]/ML
28 INJECTION, SOLUTION INTRAVENOUS; SUBCUTANEOUS
Qty: 25.2 ML | Refills: 3 | Status: ON HOLD | OUTPATIENT
Start: 2018-01-22 | End: 2018-02-12

## 2018-01-22 RX ORDER — INSULIN ASPART 100 [IU]/ML
20 INJECTION, SOLUTION INTRAVENOUS; SUBCUTANEOUS
Qty: 18 ML | Refills: 3 | Status: ON HOLD | OUTPATIENT
Start: 2018-01-22 | End: 2018-02-12

## 2018-01-22 RX ADMIN — INSULIN ASPART 20 UNITS: 100 INJECTION, SOLUTION INTRAVENOUS; SUBCUTANEOUS at 12:01

## 2018-01-22 RX ADMIN — PRENATAL VIT W/ FE FUMARATE-FA TAB 27-0.8 MG 1 TABLET: 27-0.8 TAB at 08:01

## 2018-01-22 RX ADMIN — INSULIN DETEMIR 32 UNITS: 100 INJECTION, SOLUTION SUBCUTANEOUS at 08:01

## 2018-01-22 RX ADMIN — ASPIRIN 81 MG CHEWABLE TABLET 81 MG: 81 TABLET CHEWABLE at 08:01

## 2018-01-22 RX ADMIN — INSULIN ASPART 26 UNITS: 100 INJECTION, SOLUTION INTRAVENOUS; SUBCUTANEOUS at 08:01

## 2018-01-22 NOTE — PROGRESS NOTES
[facility]  Obstetrics  Antepartum Progress Note    Patient Name: Wang Warren  MRN: [id number]  Admission Date: 2018  Hospital Length of Stay: 4 days  Attending Physician: Mariela Valenzuela MD  Primary Care Provider: Primary Doctor No    Subjective:     Principal Problem:Diabetes mellitus affecting pregnancy in third trimester    HPI:  Wang Warren is a 37 y.o. F at 34w0d presents for blood sugar monitoring after being seen in clinic with POCT 299, later found to have HgbA1c 9.6.   Patient has h/o of GDM diet controlled. She states she is checking her BG every 2 hours, ranging from .  Patient states she has been snacking often lately, reports she has not been following a diabetic diet.  Patient currently feels well. She is eating without nausea or vomiting.  She reports good FM, denies CTX, vaginal bleeding, LOF.   Patient also has h/o cHTN on no medications. She denies HA, denies visual changes, denies RUQ pain, denies SOB, denies CP.        Hospital Course:  2018- Admit for blood sugar patterning  2018  Started on Levemir 24 BID, Aspart 18TID.  Patent without symptoms. A1C 9.6 Blood sugars in th 170s to 160s. Pre prandial and post prandial dinner 59/61  2018 Patient given 28qhs of levemir, 32 of levemir this AM. Aspart 22 with lunch and breakfast. Will decrease dinner dosage.  2018 - Patient with elevated fasting this AM. No other complaints.     No events overnight. Patient is doing well. Denies CTX, VB, LOF. AM fasting 178. Patient did not receive her evening dose of levemir on the evening of . The subsequent fasting glucose of the morning of  was in target range (90) and so decision made to not give PM levemir. Patient's current regimen is levemir 32 qAM, Aspart pre prandial . Patient denies increased urination, fatigue, other complaints.     Obstetric History       T0      L0     SAB0   TAB0    Ectopic0   Multiple0   Live Births0       # Outcome Date GA Lbr Conrado/2nd Weight Sex Delivery Anes PTL Lv   1 Current                 Past Medical History:   Diagnosis Date    Chronic hypertension affecting pregnancy     Diabetes mellitus type II, controlled, with no complications 10/11/2017    Infertility, female     States she has been trying to conceive for four years. Was evaluated at [facility] clinic and was given Clomid for three months two years ago.     Past Surgical History:   Procedure Laterality Date    none         PTA Medications   Medication Sig    aspirin 81 MG Chew Take 81 mg by mouth once daily.    blood sugar diagnostic (ONETOUCH VERIO) Strp 1 strip by Misc.(Non-Drug; Combo Route) route 4 (four) times daily.    docusate sodium (COLACE) 100 MG capsule Take 1 capsule (100 mg total) by mouth 2 (two) times daily.    ferrous sulfate 325 mg (65 mg iron) Tab tablet TK 1 T PO daily    folic acid (FOLVITE) 1 MG tablet Take 4 tablets (4 mg total) by mouth once daily.    lancets (ONETOUCH ULTRASOFT LANCETS) Misc 1 lancet by Misc.(Non-Drug; Combo Route) route 4 (four) times daily.    metFORMIN (GLUCOPHAGE) 500 MG tablet Take 1 tablet (500 mg total) by mouth every evening.    PRENATAL VIT CALC,IRON,FOLIC (PRENATAL VITAMIN ORAL) Take by mouth.       Review of patient's allergies indicates:  No Known Allergies     Family History     Problem Relation (Age of Onset)    Cancer Father    Diabetes Maternal Aunt        Social History Main Topics    Smoking status: Never Smoker    Smokeless tobacco: Never Used    Alcohol use No    Drug use: No    Sexual activity: Yes     Partners: Male     Birth control/ protection: None     Review of Systems   Constitutional: Negative for chills and fever.   Respiratory: Negative for shortness of breath and wheezing.    Cardiovascular: Negative for chest pain.   Gastrointestinal: Negative for abdominal pain, diarrhea, nausea and vomiting.   Genitourinary: Negative for  dysuria, hematuria, pelvic pain, vaginal bleeding and vaginal pain.   Neurological: Negative for headaches.      Objective:     Vital Signs (Most Recent):  Temp: 96.6 °F (35.9 °C) (18)  Pulse: 74 (18)  Resp: 18 (18)  BP: 135/72 (18)  SpO2: 98 % (18) Vital Signs (24h Range):  Temp:  [96.1 °F (35.6 °C)-97.7 °F (36.5 °C)] 96.6 °F (35.9 °C)  Pulse:  [65-86] 74  Resp:  [18] 18  SpO2:  [97 %-99 %] 98 %  BP: (135-152)/(72-83) 135/72     Weight: 128.6 kg (283 lb 8.2 oz)  Body mass index is 53.57 kg/m².    FHT: AM NST pending  TOCO:  None    Physical Exam:   Constitutional: She is oriented to person, place, and time. She appears well-developed and well-nourished.     Eyes: EOM are normal.    Neck: Normal range of motion.    Cardiovascular: Normal rate.     Pulmonary/Chest: Effort normal. No respiratory distress. She has no wheezes.        Abdominal: Soft. She exhibits no distension. There is no tenderness. There is no rebound and no guarding.             Musculoskeletal: Normal range of motion.       Neurological: She is alert and oriented to person, place, and time.    Skin: Skin is warm and dry.    Psychiatric: She has a normal mood and affect. Her behavior is normal. Judgment and thought content normal.       Cervix:  Deferred     Significant Labs:  Lab Results   Component Value Date    GROUPTRH O POS 2017    HEPBSAG Negative 01/10/2018       I have personallly reviewed all pertinent lab results from the last 24 hours.    Obstetric History       T0      L0     SAB0   TAB0   Ectopic0   Multiple0   Live Births0       # Outcome Date GA Lbr Conrado/2nd Weight Sex Delivery Anes PTL Lv   1 Current                 Past Medical History:   Diagnosis Date    Chronic hypertension affecting pregnancy     Diabetes mellitus type II, controlled, with no complications 10/11/2017    Infertility, female     States she has been trying to conceive for four years. Was  evaluated at [facility] and was given Clomid for three months two years ago.     Past Surgical History:   Procedure Laterality Date    none         PTA Medications   Medication Sig    aspirin 81 MG Chew Take 81 mg by mouth once daily.    blood sugar diagnostic (ONETOUCH VERIO) Strp 1 strip by Misc.(Non-Drug; Combo Route) route 4 (four) times daily.    docusate sodium (COLACE) 100 MG capsule Take 1 capsule (100 mg total) by mouth 2 (two) times daily.    ferrous sulfate 325 mg (65 mg iron) Tab tablet TK 1 T PO daily    folic acid (FOLVITE) 1 MG tablet Take 4 tablets (4 mg total) by mouth once daily.    lancets (ONETOUCH ULTRASOFT LANCETS) Misc 1 lancet by Misc.(Non-Drug; Combo Route) route 4 (four) times daily.    metFORMIN (GLUCOPHAGE) 500 MG tablet Take 1 tablet (500 mg total) by mouth every evening.    PRENATAL VIT CALC,IRON,FOLIC (PRENATAL VITAMIN ORAL) Take by mouth.       Review of patient's allergies indicates:  No Known Allergies     Family History     Problem Relation (Age of Onset)    Cancer Father    Diabetes Maternal Aunt        Social History Main Topics    Smoking status: Never Smoker    Smokeless tobacco: Never Used    Alcohol use No    Drug use: No    Sexual activity: Yes     Partners: Male     Birth control/ protection: None     Review of Systems   Constitutional: Negative for chills and fever.   HENT: Negative for tinnitus.    Respiratory: Negative for shortness of breath.    Cardiovascular: Negative for chest pain and leg swelling.   Gastrointestinal: Negative for abdominal pain, nausea and vomiting.   Genitourinary: Negative for dysuria, pelvic pain and vaginal bleeding.   Neurological: Negative for headaches.      Objective:     Vital Signs (Most Recent):  Temp: 96.6 °F (35.9 °C) (01/22/18 0421)  Pulse: 74 (01/22/18 0422)  Resp: 18 (01/22/18 0423)  BP: 135/72 (01/22/18 0422)  SpO2: 98 % (01/22/18 0421) Vital Signs (24h Range):  Temp:  [96.1 °F (35.6 °C)-97.7 °F (36.5 °C)] 96.6  °F (35.9 °C)  Pulse:  [65-86] 74  Resp:  [18] 18  SpO2:  [97 %-99 %] 98 %  BP: (135-152)/(72-83) 135/72     Weight: 128.6 kg (283 lb 8.2 oz)  Body mass index is 53.57 kg/m².    FHT  PM: Cat 1 (reassuring) 130 bpm, mod BTBV, +accels, - decels  TOCO: none     Physical Exam:   Constitutional: She is oriented to person, place, and time. She appears well-developed and well-nourished. No distress.    HENT:   Head: Normocephalic and atraumatic.    Eyes: EOM are normal.     Cardiovascular: Normal rate and regular rhythm.  Exam reveals no edema.     Pulmonary/Chest: Effort normal and breath sounds normal.        Abdominal: Soft. Bowel sounds are normal. She exhibits no distension. There is no tenderness. There is no rebound and no guarding.     Genitourinary: Uterus normal.               Neurological: She is alert and oriented to person, place, and time.    Skin: Skin is warm and dry.    Psychiatric: She has a normal mood and affect.       Cervix:  Deferred     Significant Labs:  Lab Results   Component Value Date    GROUPTRH O POS 2017    HEPBSAG Negative 01/10/2018         Assessment/Plan:     37 y.o. female  at 34w4d for:    * Diabetes mellitus affecting pregnancy in third trimester    - HgbA1c 9.6  - Per patient, glucose log ranging from   - Levemir 32 qAM, Aspart   - Blood sugars            Fasting       Pre/ppB         Pre/ppL           Pre/ppD       9PM          2am                                                       270                215            215           141          171              --/162          189/132            59/61             --              --           90            111/147          113/94             75/128         125            100          109           142/133          94/65               139                 --             157           178                Infertility, female    - IVF pregnancy        Advanced maternal age, primigravida in  second trimester, antepartum    - Maternity T21 negative        Chronic hypertension affecting pregnancy    - On no meds  - On ASA daily  - Asymptomatic  BP: (135-152)/(72-83) 135/72  - Cont close monitoring while inhouse              Nic Reynolds MD  Obstetrics  [facility]

## 2018-01-22 NOTE — DISCHARGE SUMMARY
[facility]  Obstetrics & Gynecology  Discharge Summary    Patient Name: Wang Warren  MRN: [id number]  Admission Date: 2018  Hospital Length of Stay: 4 days  Discharge Date and Time: 2018  3:17 PM  Attending Physician: Kayla Zurita MD  Discharging Provider: Tona Wei MD  Primary Care Provider: Primary Doctor No    HPI:  Wang Warren is a 37 y.o. F at 34w0d presents for blood sugar monitoring after being seen in clinic with POCT 299, later found to have HgbA1c 9.6.   Patient has h/o of GDM diet controlled. She states she is checking her BG every 2 hours, ranging from .  Patient states she has been snacking often lately, reports she has not been following a diabetic diet.  Patient currently feels well. She is eating without nausea or vomiting.  She reports good FM, denies CTX, vaginal bleeding, LOF.   Patient also has h/o cHTN on no medications. She denies HA, denies visual changes, denies RUQ pain, denies SOB, denies CP.    Hospital Course:  2018- Admit for blood sugar patterning  2018  Started on insulin regimen of Levemir BID, Aspart TID w/ meals.  Patent without symptoms. A1C 9.6.  - - Glucose monitored closely and insulin titrated accordingly. Adequate control with following regimen: (will be lantus due to insurance): 32 units lantus q am; 10 units lantus qhs. Aspart 28 with breakfast/20 with lunch/18 with dinner. She met with DM education and was instructed on how use her glucometer. Stable for discharge home with close follow up in MFM clinic in 2 days. Also set up for PNT twice weekly.    * No surgery found *     Consults         Status Ordering Provider     Inpatient consult to Diabetes educator  Once     Provider:  (Not yet assigned)    JULIETA Rogers          Significant Diagnostic Studies: none    Pending Diagnostic Studies:     None        Final Active Diagnoses:    Diagnosis Date Noted POA     PRINCIPAL PROBLEM:  Diabetes mellitus affecting pregnancy in third trimester [O24.913] 10/11/2017 Yes    Chronic hypertension affecting pregnancy [O10.919] 09/19/2017 Yes    BMI 50.0-59.9, adult [Z68.43] 09/19/2017 Not Applicable    Advanced maternal age, primigravida in second trimester, antepartum [O09.512] 09/19/2017 Yes      Problems Resolved During this Admission:    Diagnosis Date Noted Date Resolved POA    Infertility, female [N97.9] 10/11/2017 01/22/2018 Yes        Discharged Condition: good    Disposition: Home or Self Care    Follow Up:  Follow-up Information     Keshia Reyes MD In 2 days.    Specialty:  Maternal and Fetal Medicine  Why:  As Previously Scheduled for follow up visit. Bring glucose logs.  Contact information:  [address] [facility] [id number] [phone]                 Patient Instructions:     Activity as tolerated     Notify your health care provider if you experience any of the following:  temperature >100.4     Notify your health care provider if you experience any of the following:  persistent nausea and vomiting or diarrhea     Notify your health care provider if you experience any of the following:  severe uncontrolled pain     Notify your health care provider if you experience any of the following:  redness, tenderness, or signs of infection (pain, swelling, redness, odor or green/yellow discharge around incision site)     Notify your health care provider if you experience any of the following:   Order Comments: - Call MD if blood glucose levels less than 70 or greater than 200     Activity as tolerated     Notify your health care provider if you experience any of the following:  persistent dizziness, light-headedness, or visual disturbances     Notify your health care provider if you experience any of the following:  increased confusion or weakness     Notify your health care provider if you experience any of the following:   Order Comments: VB like a  period, LOF, decreased fetal movement, or contractions every 10 minutes or sooner for >1 hr     Prenatal Testing   Standing Status: Standing Number of Occurrences: 10 Standing Exp. Date: 01/22/19   Order Comments: NST 2x/week, RACHELLE or MVP 1x/week   Order Specific Question Answer Comments   Procedures to be performed: Amniotic Fluid Index (RACHELLE)    Procedures to be performed: Non Stress Test (NST)        Medications:  Reconciled Home Medications:   Discharge Medication List as of 1/22/2018  2:54 PM      START taking these medications    Details   glucagon (human recombinant) inj 1mg/mL kit Inject 1 mL (1 mg total) into the muscle as needed. Administer if blood glucose <50-60 and symptomatic, Starting Mon 1/22/2018, Until Tue 1/22/2019, Normal      !! insulin detemir (LEVEMIR FLEXTOUCH) 100 unit/mL (3 mL) SubQ InPn pen Inject 32 Units into the skin once daily., Starting Tue 1/23/2018, Until Wed 1/23/2019, Normal      !! insulin detemir (LEVEMIR FLEXTOUCH) 100 unit/mL (3 mL) SubQ InPn pen Inject 10 Units into the skin every evening., Starting Mon 1/22/2018, Until Tue 1/22/2019, Normal       !! - Potential duplicate medications found. Please discuss with provider.      CONTINUE these medications which have CHANGED    Details   !! insulin aspart (NOVOLOG) 100 unit/mL InPn pen Inject 20 Units into the skin with lunch., Starting Mon 1/22/2018, Until Tue 1/22/2019, Normal      !! insulin aspart (NOVOLOG) 100 unit/mL InPn pen Inject 28 Units into the skin daily with breakfast., Starting Mon 1/22/2018, Until Tue 1/22/2019, Normal      !! insulin aspart (NOVOLOG) 100 unit/mL InPn pen Inject 18 Units into the skin before dinner., Starting Mon 1/22/2018, Until Tue 1/22/2019, Normal       !! - Potential duplicate medications found. Please discuss with provider.      CONTINUE these medications which have NOT CHANGED    Details   aspirin 81 MG Chew Take 81 mg by mouth once daily., Historical Med      blood sugar diagnostic (ONETOUCH  VERIO) Strp 1 strip by Misc.(Non-Drug; Combo Route) route 4 (four) times daily., Starting Fri 10/13/2017, Normal      docusate sodium (COLACE) 100 MG capsule Take 1 capsule (100 mg total) by mouth 2 (two) times daily., Starting Wed 10/11/2017, Until Thu 10/11/2018, Normal      ferrous sulfate 325 mg (65 mg iron) Tab tablet TK 1 T PO daily, Historical Med      folic acid (FOLVITE) 1 MG tablet Take 4 tablets (4 mg total) by mouth once daily., Starting Wed 10/11/2017, Until Thu 10/11/2018, Normal      lancets (ONETOUCH ULTRASOFT LANCETS) Misc 1 lancet by Misc.(Non-Drug; Combo Route) route 4 (four) times daily., Starting Wed 10/11/2017, Normal      metFORMIN (GLUCOPHAGE) 500 MG tablet Take 1 tablet (500 mg total) by mouth every evening., Starting Wed 1/17/2018, Until Thu 1/17/2019, Normal      PRENATAL VIT CALC,IRON,FOLIC (PRENATAL VITAMIN ORAL) Take by mouth., Historical Med             Tona Wei MD  Obstetrics & Gynecology  [facility]

## 2018-01-22 NOTE — PROGRESS NOTES
Dr. Singh notified of 2hr PP ; no new orders at this time. Plan to continue to monitor, next BG due @0200.

## 2018-01-22 NOTE — ASSESSMENT & PLAN NOTE
- On no meds  - On ASA daily  - Asymptomatic  BP: (135-152)/(72-83) 135/72  - Cont close monitoring while inhouse

## 2018-01-22 NOTE — PLAN OF CARE
Problem: Patient Care Overview  Goal: Discharge Needs Assessment  Outcome: Outcome(s) achieved Date Met: 01/22/18  Discharge planned for today. Pt met with diabetic educator, received glucometer, testing strips, lancets, and insulin. Reviewed insulin regimen and blood sugar testing regimen with pt, verbalizes understanding. Will follow up on Wednesday with Dr. Reyes and Prenatal testing.

## 2018-01-22 NOTE — PLAN OF CARE
Problem: Patient Care Overview  Goal: Plan of Care Review  Outcome: Ongoing (interventions implemented as appropriate)  Ms. Warren remained free from fall or injury overnight. Slept between care. BG monitored overnight, see lab results. No complaints of abd cramping/ctx, denies LOF or VB. Reports active fetus. Independent with care. VSS, afebrile.

## 2018-01-22 NOTE — SUBJECTIVE & OBJECTIVE
No events overnight. Patient is doing well. Denies CTX, VB, LOF. AM fasting 178. Patient did not receive her evening dose of levemir on the evening of . The subsequent fasting glucose of the morning of  was in target range (90) and so decision made to not give PM levemir. Patient's current regimen is levemir 32 qAM, Aspart pre prandial . Patient denies increased urination, fatigue, other complaints.     Obstetric History       T0      L0     SAB0   TAB0   Ectopic0   Multiple0   Live Births0       # Outcome Date GA Lbr Conrado/2nd Weight Sex Delivery Anes PTL Lv   1 Current                 Past Medical History:   Diagnosis Date    Chronic hypertension affecting pregnancy     Diabetes mellitus type II, controlled, with no complications 10/11/2017    Infertility, female     States she has been trying to conceive for four years. [facility] was given [facility] for three months two years ago.     Past Surgical History:   Procedure Laterality Date    none         PTA Medications   Medication Sig    aspirin 81 MG Chew Take 81 mg by mouth once daily.    blood sugar diagnostic (ONETOUCH VERIO) Strp 1 strip by Misc.(Non-Drug; Combo Route) route 4 (four) times daily.    docusate sodium (COLACE) 100 MG capsule Take 1 capsule (100 mg total) by mouth 2 (two) times daily.    ferrous sulfate 325 mg (65 mg iron) Tab tablet TK 1 T PO daily    folic acid (FOLVITE) 1 MG tablet Take 4 tablets (4 mg total) by mouth once daily.    lancets (ONETOUCH ULTRASOFT LANCETS) Misc 1 lancet by Misc.(Non-Drug; Combo Route) route 4 (four) times daily.    metFORMIN (GLUCOPHAGE) 500 MG tablet Take 1 tablet (500 mg total) by mouth every evening.    PRENATAL VIT CALC,IRON,FOLIC (PRENATAL VITAMIN ORAL) Take by mouth.       Review of patient's allergies indicates:  No Known Allergies     Family History     Problem Relation (Age of Onset)    Cancer Father    Diabetes Maternal Aunt        Social  History Main Topics    Smoking status: Never Smoker    Smokeless tobacco: Never Used    Alcohol use No    Drug use: No    Sexual activity: Yes     Partners: Male     Birth control/ protection: None     Review of Systems   Constitutional: Negative for chills and fever.   Respiratory: Negative for shortness of breath and wheezing.    Cardiovascular: Negative for chest pain.   Gastrointestinal: Negative for abdominal pain, diarrhea, nausea and vomiting.   Genitourinary: Negative for dysuria, hematuria, pelvic pain, vaginal bleeding and vaginal pain.   Neurological: Negative for headaches.      Objective:     Vital Signs (Most Recent):  Temp: 96.6 °F (35.9 °C) (01/22/18 0421)  Pulse: 74 (01/22/18 0422)  Resp: 18 (01/22/18 0423)  BP: 135/72 (01/22/18 0422)  SpO2: 98 % (01/22/18 0421) Vital Signs (24h Range):  Temp:  [96.1 °F (35.6 °C)-97.7 °F (36.5 °C)] 96.6 °F (35.9 °C)  Pulse:  [65-86] 74  Resp:  [18] 18  SpO2:  [97 %-99 %] 98 %  BP: (135-152)/(72-83) 135/72     Weight: 128.6 kg (283 lb 8.2 oz)  Body mass index is 53.57 kg/m².    FHT: AM NST pending  TOCO:  None    Physical Exam:   Constitutional: She is oriented to person, place, and time. She appears well-developed and well-nourished.     Eyes: EOM are normal.    Neck: Normal range of motion.    Cardiovascular: Normal rate.     Pulmonary/Chest: Effort normal. No respiratory distress. She has no wheezes.        Abdominal: Soft. She exhibits no distension. There is no tenderness. There is no rebound and no guarding.             Musculoskeletal: Normal range of motion.       Neurological: She is alert and oriented to person, place, and time.    Skin: Skin is warm and dry.    Psychiatric: She has a normal mood and affect. Her behavior is normal. Judgment and thought content normal.       Cervix:  Deferred     Significant Labs:  Lab Results   Component Value Date    GROUPTRH O POS 09/20/2017    HEPBSAG Negative 01/10/2018       I have personallly reviewed all  pertinent lab results from the last 24 hours.    Obstetric History       T0      L0     SAB0   TAB0   Ectopic0   Multiple0   Live Births0       # Outcome Date GA Lbr Conrado/2nd Weight Sex Delivery Anes PTL Lv   1 Current                 Past Medical History:   Diagnosis Date    Chronic hypertension affecting pregnancy     Diabetes mellitus type II, controlled, with no complications 10/11/2017    Infertility, female     States she has been trying to conceive for four years. Was evaluated at [facility] and was given Clomid for three months two years ago.     Past Surgical History:   Procedure Laterality Date    none         PTA Medications   Medication Sig    aspirin 81 MG Chew Take 81 mg by mouth once daily.    blood sugar diagnostic (ONETOUCH VERIO) Strp 1 strip by Misc.(Non-Drug; Combo Route) route 4 (four) times daily.    docusate sodium (COLACE) 100 MG capsule Take 1 capsule (100 mg total) by mouth 2 (two) times daily.    ferrous sulfate 325 mg (65 mg iron) Tab tablet TK 1 T PO daily    folic acid (FOLVITE) 1 MG tablet Take 4 tablets (4 mg total) by mouth once daily.    lancets (ONETOUCH ULTRASOFT LANCETS) Misc 1 lancet by Misc.(Non-Drug; Combo Route) route 4 (four) times daily.    metFORMIN (GLUCOPHAGE) 500 MG tablet Take 1 tablet (500 mg total) by mouth every evening.    PRENATAL VIT CALC,IRON,FOLIC (PRENATAL VITAMIN ORAL) Take by mouth.       Review of patient's allergies indicates:  No Known Allergies     Family History     Problem Relation (Age of Onset)    Cancer Father    Diabetes Maternal Aunt        Social History Main Topics    Smoking status: Never Smoker    Smokeless tobacco: Never Used    Alcohol use No    Drug use: No    Sexual activity: Yes     Partners: Male     Birth control/ protection: None     Review of Systems   Constitutional: Negative for chills and fever.   HENT: Negative for tinnitus.    Respiratory: Negative for shortness of breath.    Cardiovascular:  Negative for chest pain and leg swelling.   Gastrointestinal: Negative for abdominal pain, nausea and vomiting.   Genitourinary: Negative for dysuria, pelvic pain and vaginal bleeding.   Neurological: Negative for headaches.      Objective:     Vital Signs (Most Recent):  Temp: 96.6 °F (35.9 °C) (01/22/18 0421)  Pulse: 74 (01/22/18 0422)  Resp: 18 (01/22/18 0423)  BP: 135/72 (01/22/18 0422)  SpO2: 98 % (01/22/18 0421) Vital Signs (24h Range):  Temp:  [96.1 °F (35.6 °C)-97.7 °F (36.5 °C)] 96.6 °F (35.9 °C)  Pulse:  [65-86] 74  Resp:  [18] 18  SpO2:  [97 %-99 %] 98 %  BP: (135-152)/(72-83) 135/72     Weight: 128.6 kg (283 lb 8.2 oz)  Body mass index is 53.57 kg/m².    FHT 1/21 PM: Cat 1 (reassuring) 130 bpm, mod BTBV, +accels, - decels  TOCO: none     Physical Exam:   Constitutional: She is oriented to person, place, and time. She appears well-developed and well-nourished. No distress.    HENT:   Head: Normocephalic and atraumatic.    Eyes: EOM are normal.     Cardiovascular: Normal rate and regular rhythm.  Exam reveals no edema.     Pulmonary/Chest: Effort normal and breath sounds normal.        Abdominal: Soft. Bowel sounds are normal. She exhibits no distension. There is no tenderness. There is no rebound and no guarding.     Genitourinary: Uterus normal.               Neurological: She is alert and oriented to person, place, and time.    Skin: Skin is warm and dry.    Psychiatric: She has a normal mood and affect.       Cervix:  Deferred     Significant Labs:  Lab Results   Component Value Date    GROUPTRH O POS 09/20/2017    HEPBSAG Negative 01/10/2018

## 2018-01-22 NOTE — CONSULTS
Patient seen by diabetes educator. Reviewed testing schedule and new meter supplies. Set up new meter and reviewed how to use. Ed on tx of low BG/ s/s of low BG.     Ed on insulin fact sheet, long/rapid acting insulins (Basaglar pen and humalog vial). Ed on insulin injection technique, site rotation, safety considerations, storage and disposal. Ed on pen and syringe techniques. Spouse at bedside, very supportive. All questions answered regarding insulin injections.      Pt has been craving sweets more, discussed importance of reading labels of SF items and continuing to limit total CHO from sweets.     Time spent with patient: 50 minutes

## 2018-01-22 NOTE — ASSESSMENT & PLAN NOTE
- HgbA1c 9.6  - Per patient, glucose log ranging from   - Levemir 32 qAM, Aspart 26/20/16  - Blood sugars            Fasting       Pre/ppB         Pre/ppL           Pre/ppD       9PM          2am  1/18                                                     270                215            215           141  1/19        171              --/162          189/132            59/61             --              --  1/20         90            111/147          113/94             75/128         125            100  1/21        109           142/133          94/65               139                 --             157  1/22         178

## 2018-01-24 ENCOUNTER — LAB VISIT (OUTPATIENT)
Dept: LAB | Facility: OTHER | Age: 38
End: 2018-01-24
Attending: OBSTETRICS & GYNECOLOGY
Payer: COMMERCIAL

## 2018-01-24 ENCOUNTER — TELEPHONE (OUTPATIENT)
Dept: MATERNAL FETAL MEDICINE | Facility: CLINIC | Age: 38
End: 2018-01-24

## 2018-01-24 ENCOUNTER — ROUTINE PRENATAL (OUTPATIENT)
Dept: MATERNAL FETAL MEDICINE | Facility: CLINIC | Age: 38
End: 2018-01-24
Payer: COMMERCIAL

## 2018-01-24 ENCOUNTER — ROUTINE PRENATAL (OUTPATIENT)
Dept: OBSTETRICS AND GYNECOLOGY | Facility: CLINIC | Age: 38
End: 2018-01-24
Payer: COMMERCIAL

## 2018-01-24 VITALS
DIASTOLIC BLOOD PRESSURE: 86 MMHG | SYSTOLIC BLOOD PRESSURE: 112 MMHG | BODY MASS INDEX: 54.49 KG/M2 | WEIGHT: 288.38 LBS

## 2018-01-24 VITALS
WEIGHT: 286.63 LBS | DIASTOLIC BLOOD PRESSURE: 76 MMHG | BODY MASS INDEX: 54.15 KG/M2 | SYSTOLIC BLOOD PRESSURE: 110 MMHG

## 2018-01-24 DIAGNOSIS — O24.913 DIABETES MELLITUS AFFECTING PREGNANCY IN THIRD TRIMESTER: ICD-10-CM

## 2018-01-24 DIAGNOSIS — Z3A.34 34 WEEKS GESTATION OF PREGNANCY: ICD-10-CM

## 2018-01-24 DIAGNOSIS — O10.919 CHRONIC HYPERTENSION AFFECTING PREGNANCY: ICD-10-CM

## 2018-01-24 DIAGNOSIS — O40.3XX0 POLYHYDRAMNIOS AFFECTING PREGNANCY IN THIRD TRIMESTER: ICD-10-CM

## 2018-01-24 DIAGNOSIS — Z3A.34 34 WEEKS GESTATION OF PREGNANCY: Primary | ICD-10-CM

## 2018-01-24 DIAGNOSIS — Z36.89 ENCOUNTER FOR ULTRASOUND TO CHECK FETAL GROWTH: ICD-10-CM

## 2018-01-24 DIAGNOSIS — O09.512 ADVANCED MATERNAL AGE, PRIMIGRAVIDA IN SECOND TRIMESTER, ANTEPARTUM: ICD-10-CM

## 2018-01-24 LAB
BASOPHILS # BLD AUTO: 0.02 K/UL
BASOPHILS NFR BLD: 0.3 %
DIFFERENTIAL METHOD: ABNORMAL
EOSINOPHIL # BLD AUTO: 0.1 K/UL
EOSINOPHIL NFR BLD: 0.8 %
ERYTHROCYTE [DISTWIDTH] IN BLOOD BY AUTOMATED COUNT: 13 %
HCT VFR BLD AUTO: 35.7 %
HGB BLD-MCNC: 11.6 G/DL
LYMPHOCYTES # BLD AUTO: 1.9 K/UL
LYMPHOCYTES NFR BLD: 30 %
MCH RBC QN AUTO: 28.3 PG
MCHC RBC AUTO-ENTMCNC: 32.5 G/DL
MCV RBC AUTO: 87 FL
MONOCYTES # BLD AUTO: 0.6 K/UL
MONOCYTES NFR BLD: 10.2 %
NEUTROPHILS # BLD AUTO: 3.6 K/UL
NEUTROPHILS NFR BLD: 58.2 %
PLATELET # BLD AUTO: 257 K/UL
PMV BLD AUTO: 12.3 FL
RBC # BLD AUTO: 4.1 M/UL
WBC # BLD AUTO: 6.16 K/UL

## 2018-01-24 PROCEDURE — 86703 HIV-1/HIV-2 1 RESULT ANTBDY: CPT

## 2018-01-24 PROCEDURE — 87147 CULTURE TYPE IMMUNOLOGIC: CPT

## 2018-01-24 PROCEDURE — 87184 SC STD DISK METHOD PER PLATE: CPT

## 2018-01-24 PROCEDURE — 86592 SYPHILIS TEST NON-TREP QUAL: CPT

## 2018-01-24 PROCEDURE — 0502F SUBSEQUENT PRENATAL CARE: CPT | Mod: S$GLB,,, | Performed by: OBSTETRICS & GYNECOLOGY

## 2018-01-24 PROCEDURE — 76819 FETAL BIOPHYS PROFIL W/O NST: CPT | Mod: S$GLB,,, | Performed by: OBSTETRICS & GYNECOLOGY

## 2018-01-24 PROCEDURE — 99999 PR PBB SHADOW E&M-EST. PATIENT-LVL III: CPT | Mod: PBBFAC,,, | Performed by: OBSTETRICS & GYNECOLOGY

## 2018-01-24 PROCEDURE — 87081 CULTURE SCREEN ONLY: CPT

## 2018-01-24 PROCEDURE — 36415 COLL VENOUS BLD VENIPUNCTURE: CPT

## 2018-01-24 PROCEDURE — 99999 PR PBB SHADOW E&M-EST. PATIENT-LVL II: CPT | Mod: PBBFAC,,, | Performed by: OBSTETRICS & GYNECOLOGY

## 2018-01-24 PROCEDURE — 99213 OFFICE O/P EST LOW 20 MIN: CPT | Mod: 25,S$GLB,, | Performed by: OBSTETRICS & GYNECOLOGY

## 2018-01-24 PROCEDURE — 85025 COMPLETE CBC W/AUTO DIFF WBC: CPT

## 2018-01-24 RX ORDER — INSULIN GLARGINE 100 [IU]/ML
INJECTION, SOLUTION SUBCUTANEOUS
Status: ON HOLD | COMMUNITY
Start: 2018-01-22 | End: 2018-02-14 | Stop reason: HOSPADM

## 2018-01-24 NOTE — PHYSICIAN QUERY
PT Name: Wang Warren  MR #: [id number]     Physician Query Form - Documentation Clarification      CDS/: Melonie Velez RN, CCDS               Contact information: [email or web address]    This form is a permanent document in the medical record.     Query Date: 2018    By submitting this query, we are merely seeking further clarification of documentation. Please utilize your independent clinical judgment when addressing the question(s) below.    The Medical record reflects the following:    Supporting Clinical Findings Location in Medical Record     Patient has h/o of GDM diet controlled.    F at 34w0d presents for blood sugar monitoring after being seen in clinic with POCT 299, later found to have HgbA1c 9.6.     She states she is checking her BG every 2 hours, ranging from . Patient states she has been snacking often lately, reports she has not been following a diabetic diet.      H&P     Diabetes mellitus affecting pregnancy in third trimester     Diabetes mellitus type II, controlled, with no complications    metFORMIN (GLUCOPHAGE) 500 MG tablet    Glucose 185      2018- Admit for blood sugar patterning   2018 Started on Levemir 24 BID, Aspart 18TID. Patent without symptoms. A1C 9.6 Blood sugars in th 170s to 160s. Pre prandial and post prandial dinner 59/61   2018 Patient given 28qhs of levemir, 32 of levemir this AM. Aspart 22 with lunch and breakfast.       Will need twice weekly testing due to poor control of diabetes. Has PNT on Wednesday prior to M appointment. Recommend PNT on Friday and getting on twice weekly PNT schedule. Delivery timing dependent upon control of diabetes and blood pressures.  Precautions discussed.   DC summary      MD PN 2018      Home meds    Chem Profile 2018      MD SHEIKH 2018                    MD SHEIKH 2018                                                                            Doctor, Please  specify diagnosis or diagnoses associated with above clinical findings.    Please clarify the conflicting documentation regarding Diabetes. Thank you.    Provider Use Only    [   ]  Gestational Diabetes    [x   ]  Diabetes Mellitus, Type II, affecting pregnancy - with Hyperglycemia    [   ]  Other: __________________                                                                                                             [  ] Clinically undetermined

## 2018-01-24 NOTE — TELEPHONE ENCOUNTER
Patient reports current insulin (per her medications at home) are Basaglar Pen and Humalog vial. Dr. Reyes informed and no additional adjustments at this time.    Pt verbalized understanding of information.

## 2018-01-24 NOTE — PHYSICIAN QUERY
"PT Name: Wang Warren  MR #: [id number]     Physician Query Form - Documentation Clarification      CDS/: Melonie Velez RN, CCDS               Contact information: [email or web address]    This form is a permanent document in the medical record.     Query Date: January 24, 2018    By submitting this query, we are merely seeking further clarification of documentation. Please utilize your independent clinical judgment when addressing the question(s) below.    The Medical record reflects the following:    Supporting Clinical Findings Location in Medical Record     Height 5'1"  Weight 128.6 kg (283 lb 8.2 oz)  BMI 53.7     Anthropometrics flowsheet     Past Medical History:  Chronic hypertension affecting pregnancy  Diabetes mellitus affecting pregnancy  Infertility      Pt has been craving sweets more, discussed importance of reading labels of SF items and continuing to limit total CHO from sweets.      H&P            Dietician c/s 01/22/2018                                                                            Doctor, Please specify diagnosis or diagnoses associated with above clinical findings.    Please clarify if there is a correlating diagnosis. Thank you.    Provider Use Only      [ x  ]  Morbid Obesity    [   ] Other: _________________                                                                                                             [  ] Clinically undetermined            "

## 2018-01-24 NOTE — PROGRESS NOTES
Doing well. Discharged from hospital on Monday. MFM appt today, per patient normal BPP. Metformin stopped and insulin regimen changed to: Levemir 8 units am, 32 units nightly. Aspart 26 breakfast, 16 lunch, 18 dinner. Patient had several postprandial values in the 60s. GBS, third trimesters today. Discussed induction at 38 weeks, will schedule for 2/14/18 at 8 PM.

## 2018-01-24 NOTE — PHYSICIAN QUERY
PT Name: Wang Warren  MR #: [id number]     Physician Query Form - Documentation Clarification      CDS/: Melonie Velez RN, CCDS               Contact information: [email or web address]    This form is a permanent document in the medical record.     Query Date: January 24, 2018    By submitting this query, we are merely seeking further clarification of documentation. Please utilize your independent clinical judgment when addressing the question(s) below.    The Medical record reflects the following:    Supporting Clinical Findings Location in Medical Record     Chronic hypertension affecting   - On no meds   - On ASA daily   - Asymptomatic   - BP: (131)/(75) 131/75   - Cont close monitoring while inhouse        H&P     Continued close monitoring of BP's And ASA 81 mg daily (CHTN - no meds)        MD SHEIKH 01/19/2018                                                                            Doctor, Please specify diagnosis or diagnoses associated with above clinical findings.    Please document further specificity of the Chronic Hypertension. Thank you.    Provider Use Only      [  x ]  Essential (primary) hypertension    [   ]  Other: ____________________                                                                                                           [  ] Clinically undetermined

## 2018-01-25 LAB
HIV 1+2 AB+HIV1 P24 AG SERPL QL IA: NEGATIVE
RPR SER QL: NORMAL

## 2018-01-25 NOTE — PROGRESS NOTES
"Indication  ========    BPP; blood sugar check.    History  ======    General History  Height 157 cm  Height (ft) 5 ft  Height (in) 2 in  Other: OB: MAHENDRA GLEASON  Previous Outcomes   1  Para 0  Risk Factors  History risk factors: Obesity  History risk factors: AMA  History risk factors: Diabetes mellitus  History risk factors: Personal history of hypertension    Maternal Assessment  =================    Weight 131 kg  Weight (lb) 289 lb  Height 157 cm  Height (ft) 5 ft  Height (in) 2 in  BP syst 112 mmHg  BP diast 86 mmHg  BMI 52.82 kg/m²    Method  ======    Transabdominal ultrasound examination. View: Sufficient.    Pregnancy  =========    Peres pregnancy. Number of fetuses: 1.    Dating  ======    Cycle: regular cycle  GA by "stated dating" 34 w + 6 d  JUAN MIGUEL by "stated dating": 3/1/2018  Assigned: Dating performed on 10/4/2017, based on the external assessment  Assigned GA 34 w + 6 d  Assigned JUAN MIGUEL: 3/1/2018    General Evaluation  ==============    Cardiac activity: present.  bpm.  Fetal movements: visualized.  Presentation: cephalic.  Placenta: posterior.    Amniotic Fluid Assessment  =====================    Amount of AF: polyhydramnios  MVP 9.8 cm. RACHELLE 26.3 cm. Q1 8.5 cm, Q2 5.9 cm, Q3 5.4 cm, Q4 6.5 cm    Biophysical Profile  ==============    2: Fetal breathing movements  2: Gross body movements  2: Fetal tone  2: Amniotic fluid volume  : Biophysical profile score    Consultation  ==========    Type: Follow up blood sugar check.  The patient was discharged from the hospital on  after starting insulin. Her discharge summary lists Levemir but says her insurance  coverage is Lantus and the other areas in EPIC indicated that she is on Basaglar. The patient believes she is taking Levemir and Aspart but will  call with what her pens say when she returns home.  She reports she is taking Levemir 10 units in the am and 32 units in the pm.  She takes Aspart 28 units with breakfast, 20 units " with lunch, and 18 units with dinner.  She was not taking Metformin in the hospital but restarted this with 500mg in the am when she went home.  Her log is only 2 days worth but she has had some low values in the 60s intermittently.  We discussed that she should stop the Metformin.  She is to alter her regimen to Levemir or whatever long acting she has the prescription for to 8 units in the am and 32 units in the pm.  She is to take Aspart 26 units with breakfast, 16 units with lunch, and 18 units with dinner.  I advised her to check a few 2am blood sugars to confirm she is not going low.  We discussed the maternal and fetal risks of uncontrolled diabetes.  I recommend that she begin twice weekly  fetal surveillance with a BPP on  and NST on . Primary ob to schedule.  Her blood pressure is 112/86. Given the poorly controlled diabetes, recommend delivery at 38 weeks. Earlier delivery may be warranted  depending on blood sugar control. If her blood sugars are varying significantly, we may need to consider 37 weeks of gestation. We will see  how her blood pressures and blood sugars do in the coming weeks.  We again reviewed the concerns of polyhydramnios. This is most likely reflective of the patient's diabetes. Fetal stomach appeared normal.  The patient was advised to return in one week for a blood sugar check. She was advised to call earlier if any concerns.    15 minutes was spent in face to face time with greater than half of that time spent in counseling and coordination of care.    Discussed with Dr. Valenzuela.    Addendum: The patient called back that she was taking Basaglar and Humalog. No additional changes were made aside from the ones at  made at her visit today as the Basaglar dosing would be similar to the Lantus which was expected to be prescribed in her d/c summary. While  Novolog and Humalog are not interchangeable and Novolog acts slightly faster, given that I already reduced her  dose, no additional changes  were made.    Impression  =========    Peres live intrauterine pregnancy.  BPP .  Mild polyhydramnios is noted.    Recommendation  ==============    Follow up ultrasound in 1 week for BPP and blood sugar check.  Primary ob to scheduled  fetal surveillance-bpp on Tuesday/NST on Friday.

## 2018-01-26 ENCOUNTER — HOSPITAL ENCOUNTER (OUTPATIENT)
Dept: PERINATAL CARE | Facility: OTHER | Age: 38
Discharge: HOME | End: 2018-01-26
Attending: OBSTETRICS & GYNECOLOGY
Payer: COMMERCIAL

## 2018-01-26 DIAGNOSIS — O24.913 DIABETES MELLITUS AFFECTING PREGNANCY IN THIRD TRIMESTER: ICD-10-CM

## 2018-01-26 LAB — BACTERIA SPEC AEROBE CULT: NORMAL

## 2018-01-26 PROCEDURE — 76818 FETAL BIOPHYS PROFILE W/NST: CPT | Mod: 26,,, | Performed by: OBSTETRICS & GYNECOLOGY

## 2018-01-26 PROCEDURE — 76818 FETAL BIOPHYS PROFILE W/NST: CPT

## 2018-01-30 ENCOUNTER — OFFICE VISIT (OUTPATIENT)
Dept: MATERNAL FETAL MEDICINE | Facility: CLINIC | Age: 38
End: 2018-01-30
Payer: COMMERCIAL

## 2018-01-30 VITALS — WEIGHT: 293 LBS | DIASTOLIC BLOOD PRESSURE: 82 MMHG | BODY MASS INDEX: 56.48 KG/M2 | SYSTOLIC BLOOD PRESSURE: 126 MMHG

## 2018-01-30 DIAGNOSIS — O24.913 DIABETES MELLITUS AFFECTING PREGNANCY IN THIRD TRIMESTER: ICD-10-CM

## 2018-01-30 DIAGNOSIS — Z36.89 ENCOUNTER FOR ULTRASOUND TO CHECK FETAL GROWTH: ICD-10-CM

## 2018-01-30 DIAGNOSIS — O10.919 CHRONIC HYPERTENSION AFFECTING PREGNANCY: ICD-10-CM

## 2018-01-30 PROCEDURE — 76819 FETAL BIOPHYS PROFIL W/O NST: CPT | Mod: S$GLB,,, | Performed by: OBSTETRICS & GYNECOLOGY

## 2018-01-30 PROCEDURE — 99213 OFFICE O/P EST LOW 20 MIN: CPT | Mod: 25,S$GLB,, | Performed by: OBSTETRICS & GYNECOLOGY

## 2018-01-30 PROCEDURE — 99999 PR PBB SHADOW E&M-EST. PATIENT-LVL III: CPT | Mod: PBBFAC,,, | Performed by: OBSTETRICS & GYNECOLOGY

## 2018-01-30 PROCEDURE — 3008F BODY MASS INDEX DOCD: CPT | Mod: S$GLB,,, | Performed by: OBSTETRICS & GYNECOLOGY

## 2018-01-30 RX ORDER — PEN NEEDLE, DIABETIC 29 G X1/2"
NEEDLE, DISPOSABLE MISCELLANEOUS
Status: ON HOLD | COMMUNITY
Start: 2018-01-22 | End: 2020-01-17 | Stop reason: SDUPTHER

## 2018-01-30 RX ORDER — PEN NEEDLE, DIABETIC 31 GX5/16"
NEEDLE, DISPOSABLE MISCELLANEOUS
COMMUNITY
Start: 2018-01-22 | End: 2019-02-13 | Stop reason: SDUPTHER

## 2018-01-30 RX ORDER — LANCETS 33 GAUGE
EACH MISCELLANEOUS
COMMUNITY
Start: 2018-01-22 | End: 2018-03-12

## 2018-01-30 NOTE — LETTER
January 30, 2018      Mariela Valenzuela MD  [address] [facility] [address]           [facility]  [address] [facility] [address]  Phone: [phone]          Patient: Wang Warren   MR Number: [id number]   YOB: 1980   Date of Visit: 1/30/2018       Dear Dr. Mariela Valenzuela:    Thank you for referring Wang Warren to me for evaluation. Attached you will find relevant portions of my assessment and plan of care.    If you have questions, please do not hesitate to call me. I look forward to following Wang Warren along with you.    Sincerely,    Keshia Reyes MD    Enclosure  CC:  No Recipients    If you would like to receive this communication electronically, please contact [email or web address] or [phone] to request more information on [facility] Link access.    For providers and/or their staff who would like to refer a patient to [facility], please contact us through our one-stop-shop provider referral line, [facility], at [phone].    If you feel you have received this communication in error or would no longer like to receive these types of communications, please e-mail [email or web address]

## 2018-01-30 NOTE — PROGRESS NOTES
"Indication  ========    Return MD: BPP/BS check.    History  ======    General History  Height 157 cm  Height (ft) 5 ft  Height (in) 2 in  Other: OB: MAHENDRA GLEASON  Previous Outcomes   1  Para 0  Risk Factors  History risk factors: Obesity  History risk factors: AMA  History risk factors: Diabetes mellitus  History risk factors: Personal history of hypertension    Maternal Assessment  =================    Weight 136 kg  Weight (lb) 300 lb  Height 157 cm  Height (ft) 5 ft  Height (in) 2 in  BP syst 142 mmHg  BP diast 84 mmHg  BMI 54.84 kg/m²  Other: 126/82    Method  ======    Transabdominal ultrasound examination. View: Good view.    Pregnancy  =========    Peres pregnancy. Number of fetuses: 1.    Dating  ======    Cycle: regular cycle  GA by "stated dating" 35 w + 5 d  JUAN MIGUEL by "stated dating": 3/1/2018  Assigned: Dating performed on 10/4/2017, based on the external assessment  Assigned GA 35 w + 5 d  Assigned JUAN MIGUEL: 3/1/2018    General Evaluation  ==============    Cardiac activity: present.  bpm.  Fetal movements: visualized.  Presentation: cephalic.  Placenta:  Placental site: posterior.  Umbilical cord: Cord vessels: 3 vessel cord.    Amniotic Fluid Assessment  =====================    Amount of AF: normal amount  MVP 5.4 cm    Biophysical Profile  ==============    2: Fetal breathing movements  2: Gross body movements  2: Fetal tone  2: Amniotic fluid volume  : Biophysical profile score    Consultation  ==========    Type: Follow up blood sugar check.  Ms. Warren presents for a follow up blood sugar check. She has not been checking her blood sugars as regularly in the recommended  pattern. We reviewed assessment of the fasting, 2 hour postprandial and 2 am if able. She reports she feels low around lunch because she  does not eat a big breakfast or lunch. She did not eat her snack last evening. We confirmed that her long acting insulin is Basaglar and short  acting is Humalog. Patient " reported some carpal tunnel like symptoms mainly in right arm but more after insulin injection. No focal deficits.  Precautions given. Will avoid using injections in this am and see is symptoms improve. Discussed signs of carpal tunnel. No symptoms  currently. Neurlogic precautions were given.    She has been taking Basaglar 32 and Humalog . She stopped the Metformin.    Fastin, 112, 92, 66 (not eating snack regularly)  2 hour post breakfast: 100, 78  2 hour post lunch: 100, 113, 76  2 hour post dinner: 98    2 am 78    /84 and repeat 126/82    A/P:  1.IUP at 35 and 6 weeks  2. Diabetes: Patient advised to check fasting, 2 hour postprandial and an occasional 2 am blood sugar. She can check additional blood sugars  if she desires but we at least need those values to adjust her insulin.  Recommend continuing Basaglar 8 units in the am and 32 units in the evening.  Change Humalog to   Advised patient to check blood sugars regularly.  Continue twice weekly  fetal surveillance.  Delivery at 38 weeks or earlier if indicated.  3. CHTN: No preeclampsia symptoms. Initial bp lower mild range. Repeat normal. Precautions given. Patient reports that she was rushing this  am.    15 minutes was spent in face to face time with greater than half of that time spent in counseling and coordination of care.    Impression  =========    Peres live intrauterine pregnancy.  BPP .  Normal amniotic fluid volume.    Recommendation  ==============    Follow up with MFM in 1 week for BS check/BPP and growth.  Continue twice weekly  fetal surveillance-patient reports she has NST on Friday.  Adjustment to insulin as above.  Delivery has been scheduled per Dr. Valenzuela.

## 2018-02-02 ENCOUNTER — HOSPITAL ENCOUNTER (OUTPATIENT)
Dept: PERINATAL CARE | Facility: OTHER | Age: 38
Discharge: HOME | End: 2018-02-02
Attending: ADVANCED PRACTICE MIDWIFE
Payer: COMMERCIAL

## 2018-02-02 DIAGNOSIS — O24.913 DIABETES MELLITUS AFFECTING PREGNANCY IN THIRD TRIMESTER: ICD-10-CM

## 2018-02-02 PROCEDURE — 76818 FETAL BIOPHYS PROFILE W/NST: CPT | Mod: 26,,, | Performed by: OBSTETRICS & GYNECOLOGY

## 2018-02-02 PROCEDURE — 76818 FETAL BIOPHYS PROFILE W/NST: CPT

## 2018-02-06 ENCOUNTER — HOSPITAL ENCOUNTER (EMERGENCY)
Facility: OTHER | Age: 38
Discharge: HOME | End: 2018-02-06
Attending: OBSTETRICS & GYNECOLOGY
Payer: COMMERCIAL

## 2018-02-06 ENCOUNTER — OFFICE VISIT (OUTPATIENT)
Dept: MATERNAL FETAL MEDICINE | Facility: CLINIC | Age: 38
End: 2018-02-06
Payer: COMMERCIAL

## 2018-02-06 VITALS
OXYGEN SATURATION: 100 % | DIASTOLIC BLOOD PRESSURE: 89 MMHG | RESPIRATION RATE: 20 BRPM | TEMPERATURE: 98 F | SYSTOLIC BLOOD PRESSURE: 137 MMHG | HEART RATE: 75 BPM

## 2018-02-06 VITALS — DIASTOLIC BLOOD PRESSURE: 92 MMHG | BODY MASS INDEX: 55.78 KG/M2 | SYSTOLIC BLOOD PRESSURE: 138 MMHG | WEIGHT: 293 LBS

## 2018-02-06 DIAGNOSIS — Z36.89 ENCOUNTER FOR ULTRASOUND TO CHECK FETAL GROWTH: ICD-10-CM

## 2018-02-06 DIAGNOSIS — O09.512 ADVANCED MATERNAL AGE, PRIMIGRAVIDA IN SECOND TRIMESTER, ANTEPARTUM: ICD-10-CM

## 2018-02-06 DIAGNOSIS — Z3A.36 36 WEEKS GESTATION OF PREGNANCY: ICD-10-CM

## 2018-02-06 DIAGNOSIS — O10.919 CHRONIC HYPERTENSION AFFECTING PREGNANCY: ICD-10-CM

## 2018-02-06 DIAGNOSIS — O24.913 DIABETES MELLITUS AFFECTING PREGNANCY IN THIRD TRIMESTER: Primary | ICD-10-CM

## 2018-02-06 DIAGNOSIS — O24.913 DIABETES MELLITUS AFFECTING PREGNANCY IN THIRD TRIMESTER: ICD-10-CM

## 2018-02-06 LAB
ALBUMIN SERPL BCP-MCNC: 2.6 G/DL
ALP SERPL-CCNC: 132 U/L
ALT SERPL W/O P-5'-P-CCNC: 13 U/L
ANION GAP SERPL CALC-SCNC: 7 MMOL/L
AST SERPL-CCNC: 13 U/L
BASOPHILS # BLD AUTO: 0.02 K/UL
BASOPHILS NFR BLD: 0.3 %
BILIRUB SERPL-MCNC: 0.3 MG/DL
BUN SERPL-MCNC: 17 MG/DL
CALCIUM SERPL-MCNC: 9.2 MG/DL
CHLORIDE SERPL-SCNC: 109 MMOL/L
CO2 SERPL-SCNC: 22 MMOL/L
CREAT SERPL-MCNC: 0.9 MG/DL
CREAT UR-MCNC: 143.5 MG/DL
DIFFERENTIAL METHOD: ABNORMAL
EOSINOPHIL # BLD AUTO: 0.1 K/UL
EOSINOPHIL NFR BLD: 1.5 %
ERYTHROCYTE [DISTWIDTH] IN BLOOD BY AUTOMATED COUNT: 13.4 %
EST. GFR  (AFRICAN AMERICAN): >60 ML/MIN/1.73 M^2
EST. GFR  (NON AFRICAN AMERICAN): >60 ML/MIN/1.73 M^2
GLUCOSE SERPL-MCNC: 62 MG/DL
HCT VFR BLD AUTO: 33.1 %
HGB BLD-MCNC: 10.7 G/DL
LYMPHOCYTES # BLD AUTO: 1.8 K/UL
LYMPHOCYTES NFR BLD: 24.2 %
MCH RBC QN AUTO: 28.4 PG
MCHC RBC AUTO-ENTMCNC: 32.3 G/DL
MCV RBC AUTO: 88 FL
MONOCYTES # BLD AUTO: 0.5 K/UL
MONOCYTES NFR BLD: 6.5 %
NEUTROPHILS # BLD AUTO: 4.9 K/UL
NEUTROPHILS NFR BLD: 66.8 %
PLATELET # BLD AUTO: 276 K/UL
PMV BLD AUTO: 11 FL
POTASSIUM SERPL-SCNC: 3.9 MMOL/L
PROT SERPL-MCNC: 6.7 G/DL
PROT UR-MCNC: 18 MG/DL
PROT/CREAT RATIO, UR: 0.13
RBC # BLD AUTO: 3.77 M/UL
SODIUM SERPL-SCNC: 138 MMOL/L
WBC # BLD AUTO: 7.37 K/UL

## 2018-02-06 PROCEDURE — 80053 COMPREHEN METABOLIC PANEL: CPT

## 2018-02-06 PROCEDURE — 84156 ASSAY OF PROTEIN URINE: CPT

## 2018-02-06 PROCEDURE — 76819 FETAL BIOPHYS PROFIL W/O NST: CPT | Mod: S$GLB,,, | Performed by: OBSTETRICS & GYNECOLOGY

## 2018-02-06 PROCEDURE — 99284 EMERGENCY DEPT VISIT MOD MDM: CPT | Mod: 25,,, | Performed by: OBSTETRICS & GYNECOLOGY

## 2018-02-06 PROCEDURE — 59025 FETAL NON-STRESS TEST: CPT

## 2018-02-06 PROCEDURE — 3008F BODY MASS INDEX DOCD: CPT | Mod: S$GLB,,, | Performed by: OBSTETRICS & GYNECOLOGY

## 2018-02-06 PROCEDURE — 59025 FETAL NON-STRESS TEST: CPT | Mod: 26,,, | Performed by: OBSTETRICS & GYNECOLOGY

## 2018-02-06 PROCEDURE — 99213 OFFICE O/P EST LOW 20 MIN: CPT | Mod: 25,S$GLB,, | Performed by: OBSTETRICS & GYNECOLOGY

## 2018-02-06 PROCEDURE — 99284 EMERGENCY DEPT VISIT MOD MDM: CPT | Mod: 25

## 2018-02-06 PROCEDURE — 76816 OB US FOLLOW-UP PER FETUS: CPT | Mod: S$GLB,,, | Performed by: OBSTETRICS & GYNECOLOGY

## 2018-02-06 PROCEDURE — 99999 PR PBB SHADOW E&M-EST. PATIENT-LVL III: CPT | Mod: PBBFAC,,, | Performed by: OBSTETRICS & GYNECOLOGY

## 2018-02-06 PROCEDURE — 85025 COMPLETE CBC W/AUTO DIFF WBC: CPT

## 2018-02-06 NOTE — LETTER
February 6, 2018      Mariela Valenzuela MD  [address] [facility] [address]           [facility]  [address] [facility] [address]  Phone: [phone]          Patient: Wang Warren   MR Number: [id number]   YOB: 1980   Date of Visit: 2/6/2018       Dear Dr. Mariela Valenzuela:    Thank you for referring Wang Warren to me for evaluation. Attached you will find relevant portions of my assessment and plan of care.    If you have questions, please do not hesitate to call me. I look forward to following Wang Warren along with you.    Sincerely,    Keshia Reyes MD    Enclosure  CC:  No Recipients    If you would like to receive this communication electronically, please contact [email or web address] or [phone] to request more information on [employer] Link access.    For providers and/or their staff who would like to refer a patient to [facility], please contact us through our one-stop-shop provider referral line, [facility], at [phone].    If you feel you have received this communication in error or would no longer like to receive these types of communications, please e-mail [email or web address]

## 2018-02-06 NOTE — ED PROVIDER NOTES
Encounter Date: 2018       History     Chief Complaint   Patient presents with    Hypertension     Wang Warren is a 37 y.o. F at 36w5d presents following elevated blood pressures in [facility] clinic (138/100) for evaluation of pre-eclampsia superimposed over cHTN. Patient has a h/o cHTN well-controlled without medications. P/C ratio on 10/04/17 was 0.08. She denies headache, chest pain, SOB, RUQ pain, or vision changes.  She also has a h/o GDM on insulin (basalgar and humalog).   Patient denies contractions, denies vaginal bleeding, denies LOF.   Fetal Movement: normal.  This IUP is complicated by cHTN, GDM, MO, GBS positive status.          Review of patient's allergies indicates:  No Known Allergies  Past Medical History:   Diagnosis Date    Chronic hypertension affecting pregnancy     Diabetes mellitus type II, controlled, with no complications 10/11/2017    Infertility, female     States she has been trying to conceive for four years. Was evaluated at [facility] clinic and was given Clomid for three months two years ago.     Past Surgical History:   Procedure Laterality Date    none       Family History   Problem Relation Age of Onset    Cancer Father      prostate    Diabetes Maternal Aunt     Breast cancer Neg Hx      Social History   Substance Use Topics    Smoking status: Never Smoker    Smokeless tobacco: Never Used    Alcohol use No     Review of Systems   Constitutional: Negative for chills and fever.   Eyes: Negative for visual disturbance.   Respiratory: Negative for shortness of breath.    Cardiovascular: Negative for chest pain.   Gastrointestinal: Negative for abdominal pain, nausea and vomiting.   Genitourinary: Negative for vaginal bleeding and vaginal discharge.   Neurological: Negative for headaches.       Physical Exam     Initial Vitals [18 1025]   BP Pulse Resp Temp SpO2   (!) 143/92 84 20 97.9 °F (36.6 °C) --      MAP       109       Temp:  [97.9 °F (36.6  °C)] 97.9 °F (36.6 °C)  Pulse:  [75-90] 75  Resp:  [20] 20  SpO2:  [100 %] 100 %  BP: (124-143)/() 137/89    Physical Exam    Vitals reviewed.  Constitutional: She appears well-developed and well-nourished. She is not diaphoretic. No distress.   HENT:   Head: Normocephalic and atraumatic.   Eyes: EOM are normal.   Neck: Normal range of motion.   Cardiovascular: Normal rate, regular rhythm and normal heart sounds.   Pulmonary/Chest: Breath sounds normal. No respiratory distress. She has no wheezes. She has no rhonchi. She has no rales.   Abdominal: Soft. There is no tenderness.   Neurological: She is alert and oriented to person, place, and time. She has normal reflexes.   Skin: Skin is warm and dry.   Psychiatric: She has a normal mood and affect. Her behavior is normal. Judgment and thought content normal.         ED Course   Obtain Fetal nonstress test (NST)  Date/Time: 2/6/2018 8:09 PM  Performed by: REDDY, SUSHMA K  Authorized by: ANTWAN PRECIADO     Nonstress Test:     Variability:  6-25 BPM    Decelerations:  None    Accelerations:  15 bpm    Acoustic Stimulator: No      Baseline:  135    Uterine Irritability: No      Contractions:  Not present  Biophysical Profile:     Nonstress Test Interpretation: reactive      Overall Impression:  Reassuring      Labs Reviewed   COMPREHENSIVE METABOLIC PANEL   CBC W/ AUTO DIFFERENTIAL   PROTEIN / CREATININE RATIO, URINE             Medical Decision Making:   ED Management:  NST started on arrival. Reactive and reassuring. No ctx on toco.  Asymptomatic (negative pre-E ROS and negative labor ROS)  P/C ratio on 10/04/17 was 0.08.  1/19: CBC 6.4/11.2/34.2/246   1/19: Cr: 1.0, AST 11, ALT 8    Today:  CBC: 7.3/10.7/33.1/276  CMP: Cr 0.9, AST 13, ALT 13   - glucose: 62  PCR:    BP mild range --> cuff changed to large, normal BP --> cuff placed on forearm, normal BP followed by one elevated mild range BP.    Patient has appt with primary OB tomorrow. Patient will likely  have induction scheduled for tomorrow night.  Other:   I have discussed this case with another health care provider.       <> Summary of the Discussion: D/w Dr. Abbott and Dr. Valenzuela.              Attending Attestation:   Physician Attestation Statement for Resident:  As the supervising MD   Physician Attestation Statement: I have personally seen and examined this patient.   I agree with the above history. -:   As the supervising MD I agree with the above PE.    As the supervising MD I agree with the above treatment, course, plan, and disposition.   -:   NST  I independently reviewed the fetal non-stress test with the following interpretation:  135 BPM baseline  Variability: moderate  Accelerations: present  Decelerations: absent  Contractions: none  Category 1    Clinical Interpretation:reactive    Patient evaluated and found to be stable, agree with resident's assessment of chronic hypertension with no s/s pre-eclamptic exacerbation and GDM and plan to discharge to home with pre-e precautions.  I was personally present during the critical portions of the procedure(s) performed by the resident and was immediately available in the ED to provide services and assistance as needed during the entire procedure.  I have reviewed and agree with the residents interpretation of the following: lab data.  I have reviewed the following: old records at this facility.                    ED Course      Clinical Impression:   The primary encounter diagnosis was Diabetes mellitus affecting pregnancy in third trimester. Diagnoses of BMI 50.0-59.9, adult, Chronic hypertension affecting pregnancy, Advanced maternal age, primigravida in second trimester, antepartum, and 36 weeks gestation of pregnancy were also pertinent to this visit.    Disposition:   Disposition: Discharged  Condition: Stable                        Sushma K Reddy, MD  Resident  02/06/18 2012       Payton Abbott MD  02/06/18 7445

## 2018-02-06 NOTE — PROGRESS NOTES
Notified IDRIS Mott, L&D charge of Dr. Reyes sending patient to OB ED for evaluation. Pt is currently 36 weeks and 5 days with BP = 142/102. Pt given instructions and will report to 6th floor [facility].

## 2018-02-06 NOTE — ED NOTES
Pt presents to [facility] from [facility] clinic with elevated bp. Pt placed in assessment 2, oriented to room  And dr carpenter notified of arrival.

## 2018-02-06 NOTE — PROGRESS NOTES
"Indication  ========    Evaluation of fetal growth; blood sugar check.    History  ======    General History  Height 157 cm  Height (ft) 5 ft  Height (in) 2 in  Other: OB: MAHENDRA GLEASON  Previous Outcomes   1  Para 0  Risk Factors  History risk factors: Obesity  History risk factors: AMA  History risk factors: Diabetes mellitus  History risk factors: Personal history of hypertension    Pregnancy History  ==============    Maternal Lab Tests  Result:  Single marker AFP negative    Test: Cell Free DNA Testing  Result: Negative Materni T21  Wants to know gender: yes    Maternal Assessment  =================    Height 157 cm  Height (ft) 5 ft  Height (in) 2 in    Method  ======    Transabdominal ultrasound examination, Voluson E10. View: Suboptimal view: limited by maternal body habitus.    Pregnancy  =========    Peres pregnancy. Number of fetuses: 1.    Dating  ======    Cycle: regular cycle  GA by "stated dating" 36 w + 5 d  JUAN MIGUEL by "stated dating": 3/1/2018  Ultrasound examination on: 2018  GA by U/S based upon: AC, BPD, Femur, HC  GA by U/S 35 w + 4 d  JUAN MIGUEL by U/S: 3/9/2018  Assigned: Dating performed on 10/4/2017, based on the external assessment  Assigned GA 36 w + 5 d  Assigned JUAN MIGUEL: 3/1/2018    General Evaluation  ==============    Cardiac activity: present.  bpm.  Fetal movements: visualized.  Presentation: cephalic.  Placenta: posterior.  Umbilical cord: 3 vessel cord.  Amniotic fluid: MVP 4.1 cm. RACHELLE 9.5 cm. Q1 0.6 cm, Q2 4.1 cm, Q3 3.2 cm, Q4 1.6 cm.    Biophysical Profile  ==============    2: Fetal breathing movements  2: Gross body movements  2: Fetal tone  2: Amniotic fluid volume  : Biophysical profile score    Fetal Biometry  ============    Fetal Biometry  BPD 84.8 mm 34w 1d Hadlock  .8 mm 36w 6d Joseph  .9 mm 35w 0d Hadlock  .1 mm 36w 1d Hadlock  Femur 71.8 mm 36w 5d Hadlock  EFW 2,811 g 28% [place]  Calculated by: Hadlock (BPD-HC-AC-FL)  EFW (lb) 6 " lb  EFW (oz) 3 oz  Cephalic index 0.77  HC / AC 0.97  FL / BPD 0.85  FL / AC 0.22  MVP 4.1 cm  RACHELLE 9.5 cm   bpm    Fetal Anatomy  ============    Cranium: normal  4-chamber view: documented previously  Stomach: normal  Kidneys: normal  Bladder: normal  Wants to know gender: yes    Consultation  ==========    Type: Follow up blood sugar check.  The patient denies preeclampsia symptoms.  She has been nesting.  /100, 138/92, 142/102  Current regimen: Basaglar  and Humalog     Fastin-92  2 hour post breakfast: 78-82  2 hour post lunch: 78-88  2 hour post dinner:   2am:64--92    A/P:  1. IUP at 36 and 5  2. Diabetes: Change Basaglar to 8 units am/30 units pm  Keep Humalog   3. CHTN: Patient's bp has been controlled off meds. Today, all bp are elevated. Discussed with Dr. Valenzuela. Patient to OB ed for evaluation. If  patient with persistent HTN, would recommend delivery given her poorly controlled diabetes with HTN. If bp and labs are normal and no  symptoms and no evidence of concerning fetal status etc, deliver at 37 weeks. The patient's amniotic fluid volume is normal but is definitely  lower than on her prior scans. I discussed with the patient the recommendation to go to [facility] for evaluation and delivery timing. The patient is  not a candidate for late  steroids given her pregestational diabetes.    Dr. Valenzuela aware as are ob resident staff. I have not scheduled her for any MFM follow up. Please be sure the patient has twice weekly   fetal surveillance until delivery.      15 minutes was spent in face to face time with greater than half of that time spent in counseling and coordination of care.      Impression  =========    Peres live intrauterine pregnancy.  Overall normal fetal growth.  Normal amniotic fluid volume.  Limited fetal anatomy appears normal.  BPP .    Recommendation  ==============    No further MFM follow up scheduled.  Please see consult  note above.  To [facility] for evaluation.

## 2018-02-06 NOTE — DISCHARGE INSTRUCTIONS
Call clinic [phone] or L & D after hours at [phone] for vaginal bleeding, leakage of fluids, regular contractions every 5 mins for 2 hours, decreased fetal movements ( 10 kicks in 2 hours), headache not relieved by Tylenol, blurry vision, or temp of 100.4 or greater.  Begin doing fetal kick counts, at least 10 movements in 2 hours starting at 28 weeks gestation.  Keep next clinic appointment.

## 2018-02-07 ENCOUNTER — ANESTHESIA (OUTPATIENT)
Dept: OBSTETRICS AND GYNECOLOGY | Facility: OTHER | Age: 38
End: 2018-02-07
Payer: COMMERCIAL

## 2018-02-07 ENCOUNTER — HOSPITAL ENCOUNTER (INPATIENT)
Facility: OTHER | Age: 38
LOS: 7 days | Discharge: HOME | End: 2018-02-14
Attending: OBSTETRICS & GYNECOLOGY | Admitting: OBSTETRICS & GYNECOLOGY
Payer: COMMERCIAL

## 2018-02-07 ENCOUNTER — ROUTINE PRENATAL (OUTPATIENT)
Dept: OBSTETRICS AND GYNECOLOGY | Facility: CLINIC | Age: 38
End: 2018-02-07
Payer: COMMERCIAL

## 2018-02-07 ENCOUNTER — ANESTHESIA EVENT (OUTPATIENT)
Dept: OBSTETRICS AND GYNECOLOGY | Facility: OTHER | Age: 38
End: 2018-02-07
Payer: COMMERCIAL

## 2018-02-07 VITALS — SYSTOLIC BLOOD PRESSURE: 126 MMHG | DIASTOLIC BLOOD PRESSURE: 86 MMHG | BODY MASS INDEX: 56.24 KG/M2 | WEIGHT: 293 LBS

## 2018-02-07 DIAGNOSIS — Z34.90 ENCOUNTER FOR INDUCTION OF LABOR: ICD-10-CM

## 2018-02-07 DIAGNOSIS — O09.512 ADVANCED MATERNAL AGE, PRIMIGRAVIDA IN SECOND TRIMESTER, ANTEPARTUM: ICD-10-CM

## 2018-02-07 DIAGNOSIS — O11.9 CHRONIC HYPERTENSION WITH SUPERIMPOSED PREECLAMPSIA: ICD-10-CM

## 2018-02-07 DIAGNOSIS — Z3A.36 36 WEEKS GESTATION OF PREGNANCY: Primary | ICD-10-CM

## 2018-02-07 DIAGNOSIS — O24.913 DIABETES MELLITUS AFFECTING PREGNANCY IN THIRD TRIMESTER: ICD-10-CM

## 2018-02-07 DIAGNOSIS — O10.919 CHRONIC HYPERTENSION AFFECTING PREGNANCY: ICD-10-CM

## 2018-02-07 LAB
ABO + RH BLD: NORMAL
ALBUMIN SERPL BCP-MCNC: 2.6 G/DL
ALP SERPL-CCNC: 148 U/L
ALT SERPL W/O P-5'-P-CCNC: 14 U/L
ANION GAP SERPL CALC-SCNC: 6 MMOL/L
AST SERPL-CCNC: 17 U/L
BASOPHILS # BLD AUTO: 0.02 K/UL
BASOPHILS NFR BLD: 0.3 %
BILIRUB SERPL-MCNC: 0.2 MG/DL
BLD GP AB SCN CELLS X3 SERPL QL: NORMAL
BUN SERPL-MCNC: 21 MG/DL
CALCIUM SERPL-MCNC: 8.7 MG/DL
CHLORIDE SERPL-SCNC: 109 MMOL/L
CO2 SERPL-SCNC: 24 MMOL/L
CREAT SERPL-MCNC: 0.9 MG/DL
DIFFERENTIAL METHOD: ABNORMAL
EOSINOPHIL # BLD AUTO: 0.1 K/UL
EOSINOPHIL NFR BLD: 1.5 %
ERYTHROCYTE [DISTWIDTH] IN BLOOD BY AUTOMATED COUNT: 13.6 %
EST. GFR  (AFRICAN AMERICAN): >60 ML/MIN/1.73 M^2
EST. GFR  (NON AFRICAN AMERICAN): >60 ML/MIN/1.73 M^2
GLUCOSE SERPL-MCNC: 52 MG/DL
HCT VFR BLD AUTO: 33 %
HGB BLD-MCNC: 10.6 G/DL
LYMPHOCYTES # BLD AUTO: 1.9 K/UL
LYMPHOCYTES NFR BLD: 27 %
MCH RBC QN AUTO: 28.2 PG
MCHC RBC AUTO-ENTMCNC: 32.1 G/DL
MCV RBC AUTO: 88 FL
MONOCYTES # BLD AUTO: 0.6 K/UL
MONOCYTES NFR BLD: 8.6 %
NEUTROPHILS # BLD AUTO: 4.5 K/UL
NEUTROPHILS NFR BLD: 62 %
PLATELET # BLD AUTO: 279 K/UL
PMV BLD AUTO: 11.3 FL
POCT GLUCOSE: 63 MG/DL (ref 70–110)
POTASSIUM SERPL-SCNC: 3.8 MMOL/L
PROT SERPL-MCNC: 6.5 G/DL
RBC # BLD AUTO: 3.76 M/UL
SODIUM SERPL-SCNC: 139 MMOL/L
WBC # BLD AUTO: 7.18 K/UL

## 2018-02-07 PROCEDURE — 0502F SUBSEQUENT PRENATAL CARE: CPT | Mod: S$GLB,,, | Performed by: OBSTETRICS & GYNECOLOGY

## 2018-02-07 PROCEDURE — 25000003 PHARM REV CODE 250: Performed by: STUDENT IN AN ORGANIZED HEALTH CARE EDUCATION/TRAINING PROGRAM

## 2018-02-07 PROCEDURE — 85025 COMPLETE CBC W/AUTO DIFF WBC: CPT

## 2018-02-07 PROCEDURE — 80053 COMPREHEN METABOLIC PANEL: CPT

## 2018-02-07 PROCEDURE — 86901 BLOOD TYPING SEROLOGIC RH(D): CPT

## 2018-02-07 PROCEDURE — 11000001 HC ACUTE MED/SURG PRIVATE ROOM

## 2018-02-07 RX ORDER — OXYTOCIN/RINGER'S LACTATE 20/1000 ML
41.7 PLASTIC BAG, INJECTION (ML) INTRAVENOUS CONTINUOUS
Status: ACTIVE | OUTPATIENT
Start: 2018-02-07 | End: 2018-02-08

## 2018-02-07 RX ORDER — SODIUM CHLORIDE, SODIUM LACTATE, POTASSIUM CHLORIDE, CALCIUM CHLORIDE 600; 310; 30; 20 MG/100ML; MG/100ML; MG/100ML; MG/100ML
INJECTION, SOLUTION INTRAVENOUS CONTINUOUS
Status: DISCONTINUED | OUTPATIENT
Start: 2018-02-07 | End: 2018-02-08

## 2018-02-07 RX ORDER — MAGNESIUM SULFATE HEPTAHYDRATE 40 MG/ML
2 INJECTION, SOLUTION INTRAVENOUS ONCE
Status: COMPLETED | OUTPATIENT
Start: 2018-02-08 | End: 2018-02-07

## 2018-02-07 RX ORDER — OXYTOCIN/RINGER'S LACTATE 20/1000 ML
20 PLASTIC BAG, INJECTION (ML) INTRAVENOUS
Status: DISCONTINUED | OUTPATIENT
Start: 2018-02-07 | End: 2018-02-09

## 2018-02-07 RX ORDER — MAGNESIUM SULFATE HEPTAHYDRATE 40 MG/ML
2 INJECTION, SOLUTION INTRAVENOUS CONTINUOUS
Status: DISCONTINUED | OUTPATIENT
Start: 2018-02-08 | End: 2018-02-13

## 2018-02-07 RX ORDER — SODIUM CHLORIDE, SODIUM LACTATE, POTASSIUM CHLORIDE, CALCIUM CHLORIDE 600; 310; 30; 20 MG/100ML; MG/100ML; MG/100ML; MG/100ML
1000 INJECTION, SOLUTION INTRAVENOUS CONTINUOUS
Status: ACTIVE | OUTPATIENT
Start: 2018-02-08 | End: 2018-02-08

## 2018-02-07 RX ORDER — OXYTOCIN/RINGER'S LACTATE 20/1000 ML
333 PLASTIC BAG, INJECTION (ML) INTRAVENOUS CONTINUOUS
Status: ACTIVE | OUTPATIENT
Start: 2018-02-07 | End: 2018-02-07

## 2018-02-07 RX ORDER — LABETALOL HYDROCHLORIDE 5 MG/ML
20 INJECTION, SOLUTION INTRAVENOUS ONCE
Status: COMPLETED | OUTPATIENT
Start: 2018-02-07 | End: 2018-02-07

## 2018-02-07 RX ORDER — GLUCAGON 1 MG
1 KIT INJECTION
Status: DISCONTINUED | OUTPATIENT
Start: 2018-02-07 | End: 2018-02-07

## 2018-02-07 RX ORDER — ONDANSETRON 8 MG/1
8 TABLET, ORALLY DISINTEGRATING ORAL EVERY 8 HOURS PRN
Status: DISCONTINUED | OUTPATIENT
Start: 2018-02-07 | End: 2018-02-09

## 2018-02-07 RX ORDER — CALCIUM GLUCONATE 98 MG/ML
1 INJECTION, SOLUTION INTRAVENOUS
Status: DISCONTINUED | OUTPATIENT
Start: 2018-02-08 | End: 2018-02-14 | Stop reason: HOSPADM

## 2018-02-07 RX ORDER — INSULIN ASPART 100 [IU]/ML
1-10 INJECTION, SOLUTION INTRAVENOUS; SUBCUTANEOUS EVERY 6 HOURS PRN
Status: DISCONTINUED | OUTPATIENT
Start: 2018-02-07 | End: 2018-02-07

## 2018-02-07 RX ORDER — SODIUM CHLORIDE 9 MG/ML
INJECTION, SOLUTION INTRAVENOUS
Status: DISCONTINUED | OUTPATIENT
Start: 2018-02-07 | End: 2018-02-09

## 2018-02-07 RX ORDER — TERBUTALINE SULFATE 1 MG/ML
0.25 INJECTION SUBCUTANEOUS
Status: DISCONTINUED | OUTPATIENT
Start: 2018-02-07 | End: 2018-02-09

## 2018-02-07 RX ADMIN — SODIUM CHLORIDE, SODIUM LACTATE, POTASSIUM CHLORIDE, AND CALCIUM CHLORIDE: .6; .31; .03; .02 INJECTION, SOLUTION INTRAVENOUS at 10:02

## 2018-02-07 RX ADMIN — LABETALOL HYDROCHLORIDE 20 MG: 5 INJECTION, SOLUTION INTRAVENOUS at 11:02

## 2018-02-07 RX ADMIN — MAGNESIUM SULFATE IN WATER 2 G: 40 INJECTION, SOLUTION INTRAVENOUS at 11:02

## 2018-02-07 RX ADMIN — MAGNESIUM SULFATE IN WATER 2 G/HR: 40 INJECTION, SOLUTION INTRAVENOUS at 11:02

## 2018-02-07 RX ADMIN — Medication 5 MILLION UNITS: at 10:02

## 2018-02-07 RX ADMIN — MISOPROSTOL 50 MCG: 100 TABLET ORAL at 10:02

## 2018-02-07 NOTE — PROGRESS NOTES
Good FM. Denies VB, LOF, CTX. Labor, ROM and bleeding precautions. BP today wnl. BS this am 86 per patient. Patient sent in OB ED yesterday for PreE workup, was negative and elevated BP spontaneously resolved. Plan for IOL tonight due to DM and CHTN per [facility] recs, coming at 830 PM, oral cytotec.

## 2018-02-08 ENCOUNTER — RESEARCH ENCOUNTER (OUTPATIENT)
Dept: RESEARCH | Facility: HOSPITAL | Age: 38
End: 2018-02-08

## 2018-02-08 LAB
CREAT UR-MCNC: 118.8 MG/DL
POCT GLUCOSE: 103 MG/DL (ref 70–110)
POCT GLUCOSE: 108 MG/DL (ref 70–110)
POCT GLUCOSE: 57 MG/DL (ref 70–110)
POCT GLUCOSE: 70 MG/DL (ref 70–110)
POCT GLUCOSE: 78 MG/DL (ref 70–110)
POCT GLUCOSE: 78 MG/DL (ref 70–110)
POCT GLUCOSE: 82 MG/DL (ref 70–110)
POCT GLUCOSE: 84 MG/DL (ref 70–110)
POCT GLUCOSE: 88 MG/DL (ref 70–110)
POCT GLUCOSE: 94 MG/DL (ref 70–110)
PROT UR-MCNC: 12 MG/DL
PROT/CREAT RATIO, UR: 0.1

## 2018-02-08 PROCEDURE — 72100002 HC LABOR CARE, 1ST 8 HOURS

## 2018-02-08 PROCEDURE — 25000003 PHARM REV CODE 250: Performed by: STUDENT IN AN ORGANIZED HEALTH CARE EDUCATION/TRAINING PROGRAM

## 2018-02-08 PROCEDURE — 82570 ASSAY OF URINE CREATININE: CPT

## 2018-02-08 PROCEDURE — 51702 INSERT TEMP BLADDER CATH: CPT

## 2018-02-08 PROCEDURE — 27000181 HC CABLE, IUPC

## 2018-02-08 PROCEDURE — 63600175 PHARM REV CODE 636 W HCPCS: Performed by: STUDENT IN AN ORGANIZED HEALTH CARE EDUCATION/TRAINING PROGRAM

## 2018-02-08 PROCEDURE — 27200710 HC EPIDURAL INFUSION PUMP SET: Performed by: STUDENT IN AN ORGANIZED HEALTH CARE EDUCATION/TRAINING PROGRAM

## 2018-02-08 PROCEDURE — 25000003 PHARM REV CODE 250: Performed by: ANESTHESIOLOGY

## 2018-02-08 PROCEDURE — 51701 INSERT BLADDER CATHETER: CPT

## 2018-02-08 PROCEDURE — 11000001 HC ACUTE MED/SURG PRIVATE ROOM

## 2018-02-08 PROCEDURE — 27800517 HC TRAY,EPIDURAL-CONTINUOUS: Performed by: STUDENT IN AN ORGANIZED HEALTH CARE EDUCATION/TRAINING PROGRAM

## 2018-02-08 PROCEDURE — 59200 INSERT CERVICAL DILATOR: CPT

## 2018-02-08 PROCEDURE — 59510 CESAREAN DELIVERY: CPT | Mod: ,,, | Performed by: ANESTHESIOLOGY

## 2018-02-08 PROCEDURE — 62326 NJX INTERLAMINAR LMBR/SAC: CPT | Performed by: STUDENT IN AN ORGANIZED HEALTH CARE EDUCATION/TRAINING PROGRAM

## 2018-02-08 RX ORDER — FENTANYL/BUPIVACAINE/NS/PF 2MCG/ML-.1
PLASTIC BAG, INJECTION (ML) INJECTION
Status: DISPENSED
Start: 2018-02-08 | End: 2018-02-08

## 2018-02-08 RX ORDER — GLUCAGON 1 MG
1 KIT INJECTION CONTINUOUS PRN
Status: DISCONTINUED | OUTPATIENT
Start: 2018-02-08 | End: 2018-02-08

## 2018-02-08 RX ORDER — DEXTROSE, SODIUM CHLORIDE, SODIUM LACTATE, POTASSIUM CHLORIDE, AND CALCIUM CHLORIDE 5; .6; .31; .03; .02 G/100ML; G/100ML; G/100ML; G/100ML; G/100ML
INJECTION, SOLUTION INTRAVENOUS CONTINUOUS
Status: DISCONTINUED | OUTPATIENT
Start: 2018-02-08 | End: 2018-02-08

## 2018-02-08 RX ORDER — IBUPROFEN 200 MG
24 TABLET ORAL
Status: DISCONTINUED | OUTPATIENT
Start: 2018-02-08 | End: 2018-02-08

## 2018-02-08 RX ORDER — LABETALOL HYDROCHLORIDE 5 MG/ML
20 INJECTION, SOLUTION INTRAVENOUS ONCE
Status: COMPLETED | OUTPATIENT
Start: 2018-02-08 | End: 2018-02-08

## 2018-02-08 RX ORDER — FENTANYL/BUPIVACAINE/NS/PF 2MCG/ML-.1
PLASTIC BAG, INJECTION (ML) INJECTION
Status: DISPENSED
Start: 2018-02-08 | End: 2018-02-09

## 2018-02-08 RX ORDER — OXYTOCIN/RINGER'S LACTATE 20/1000 ML
2 PLASTIC BAG, INJECTION (ML) INTRAVENOUS CONTINUOUS
Status: DISCONTINUED | OUTPATIENT
Start: 2018-02-08 | End: 2018-02-09

## 2018-02-08 RX ORDER — LABETALOL HYDROCHLORIDE 5 MG/ML
20 INJECTION, SOLUTION INTRAVENOUS ONCE
Status: COMPLETED | OUTPATIENT
Start: 2018-02-08 | End: 2018-02-09

## 2018-02-08 RX ADMIN — MAGNESIUM SULFATE IN WATER 2 G/HR: 40 INJECTION, SOLUTION INTRAVENOUS at 09:02

## 2018-02-08 RX ADMIN — DEXTROSE 2.5 MILLION UNITS: 50 INJECTION, SOLUTION INTRAVENOUS at 03:02

## 2018-02-08 RX ADMIN — DEXTROSE 2.5 MILLION UNITS: 50 INJECTION, SOLUTION INTRAVENOUS at 06:02

## 2018-02-08 RX ADMIN — Medication 2 MILLI-UNITS/MIN: at 01:02

## 2018-02-08 RX ADMIN — MISOPROSTOL 50 MCG: 100 TABLET ORAL at 02:02

## 2018-02-08 RX ADMIN — DEXTROSE 2.5 MILLION UNITS: 50 INJECTION, SOLUTION INTRAVENOUS at 02:02

## 2018-02-08 RX ADMIN — DEXTROSE 2.5 MILLION UNITS: 50 INJECTION, SOLUTION INTRAVENOUS at 08:02

## 2018-02-08 RX ADMIN — DEXTROSE 2.5 MILLION UNITS: 50 INJECTION, SOLUTION INTRAVENOUS at 10:02

## 2018-02-08 RX ADMIN — LABETALOL HYDROCHLORIDE 20 MG: 5 INJECTION, SOLUTION INTRAVENOUS at 02:02

## 2018-02-08 RX ADMIN — Medication 10 ML/HR: at 07:02

## 2018-02-08 RX ADMIN — SODIUM CHLORIDE, SODIUM LACTATE, POTASSIUM CHLORIDE, AND CALCIUM CHLORIDE 1000 ML: .6; .31; .03; .02 INJECTION, SOLUTION INTRAVENOUS at 01:02

## 2018-02-08 RX ADMIN — MISOPROSTOL 50 MCG: 100 TABLET ORAL at 07:02

## 2018-02-08 RX ADMIN — DEXTROSE, SODIUM CHLORIDE, SODIUM LACTATE, POTASSIUM CHLORIDE, AND CALCIUM CHLORIDE: 5; .6; .31; .03; .02 INJECTION, SOLUTION INTRAVENOUS at 06:02

## 2018-02-08 NOTE — PROGRESS NOTES
"LABOR NOTE/ MAG NOTE     S:  Complaints: No.  Epidural working:  yes     Patient denies HA, vision changes, SOB, CP, RUQ Pain     O: BP (!) 148/92   Pulse 67   Temp 96.8 °F (36 °C)   Resp 18   Ht 5' 1" (1.549 m)   Wt 135 kg (297 lb 9.9 oz)   LMP  (LMP Unknown)   SpO2 100%   Breastfeeding? No   BMI 56.24 kg/m²         FHT: 130, moderate variability, accels present, no decells, Category 2 - Overall Reassuring  CTX: q 2-5 minutes  SVE: 3/60/-3 , AROM Clear  LUNGS: CTAB  DTR: 2+        ASSESSMENT:   37 y.o.  at 37w0d, IOL 2/2 CHTN at 37 weeks     FHT reassuring                 Active Hospital Problems     Diagnosis   POA    *Encounter for induction of labor [Z34.90]   Not Applicable    Diabetes mellitus affecting pregnancy in third trimester [O24.913]   Yes       Diagnosed in pregnancy but A1C indicates pre-existing condition.    BMI 50.0-59.9, adult [Z68.43]   Not Applicable    Chronic hypertension affecting pregnancy [O10.919]   Yes       No medications    Advanced maternal age, primigravida in second trimester, antepartum [O09.512]   Yes       Resolved Hospital Problems     Diagnosis Date Resolved POA   No resolved problems to display.            PLAN:     LABOR INDUCTION  S/P Cytotec x 3 (Last at 0730)  Cook Balloon Placed @ 0930  1200 3/70/-3  1400 3/60-3   1700 - 3/60/-3 Incidental AROM (Clear) on Cervical Check Around Balloon  IUPC placed  Pitocin Augmentation, @ 12  Noted one minute Decell after AROM with spontaneous resolution  FHTs - OverallReactive and Reassuring  Continue Close Maternal/Fetal Monitoring  Recheck 2 hours or PRN     T2DM  Monitoring BGs Q2 Latent Phase, Q1 Active Phase  D5 1/2 NS @ 30  Insulin Drip in Active Phase  BG 63>57>70>78>88>103>108> 94     SI PreE  - On Mag for seizure Prophylaxis  - No signs or symptoms of Mag Toxicity  - BP: (129-178)/(66-99) 146/86  - UOP - > 150/hr, pink tinged        Catarino Wilson M.D.  PGY1 OB/GYN    "

## 2018-02-08 NOTE — PROGRESS NOTES
Magnesium Assessment Note    Subjective:         Wang Warren is a 37 y.o. female at 37w0d on IV MgSO4 for seizure prophylaxis.    MD to bedside for Mag check and cervical exam. Patient denies headaches, denies blurry vision and denies right upper quadrant pain. Denies CP, denies SOB. Patient reports no nausea/no vomiting.     Objective:      Temp:  [97.9 °F (36.6 °C)] 97.9 °F (36.6 °C)  Pulse:  [61-88] 64  Resp:  [18] 18  SpO2:  [95 %-100 %] 96 %  BP: (126-178)/(79-98) 159/85    No intake/output data recorded.  I/O this shift:  In: 366.7 [I.V.:366.7]  Out: 175 [Urine:175]    General:   alert, appears stated age and cooperative   Lungs:   clear to auscultation bilaterally   Heart::   regular rate and rhythm, S1, S2 normal, no murmur, click, rub or gallop   Extremities: peripheral pulses normal, 1+ LE edema, no clubbing or cyanosis   DTRs- 2+  bilaterally     NST: reassuring, Category 1, 130 bpm, moderate BTBV, (+) accelerations, (--) decelerations  TOCO: Q 4 min      Lab Review  Recent Results (from the past 24 hour(s))   POCT glucose    Collection Time: 02/07/18 10:05 PM   Result Value Ref Range    POCT Glucose 63 (L) 70 - 110 mg/dL   CBC with Auto Differential    Collection Time: 02/07/18 10:25 PM   Result Value Ref Range    WBC 7.18 3.90 - 12.70 K/uL    RBC 3.76 (L) 4.00 - 5.40 M/uL    Hemoglobin 10.6 (L) 12.0 - 16.0 g/dL    Hematocrit 33.0 (L) 37.0 - 48.5 %    MCV 88 82 - 98 fL    MCH 28.2 27.0 - 31.0 pg    MCHC 32.1 32.0 - 36.0 g/dL    RDW 13.6 11.5 - 14.5 %    Platelets 279 150 - 350 K/uL    MPV 11.3 9.2 - 12.9 fL    Gran # (ANC) 4.5 1.8 - 7.7 K/uL    Lymph # 1.9 1.0 - 4.8 K/uL    Mono # 0.6 0.3 - 1.0 K/uL    Eos # 0.1 0.0 - 0.5 K/uL    Baso # 0.02 0.00 - 0.20 K/uL    Gran% 62.0 38.0 - 73.0 %    Lymph% 27.0 18.0 - 48.0 %    Mono% 8.6 4.0 - 15.0 %    Eosinophil% 1.5 0.0 - 8.0 %    Basophil% 0.3 0.0 - 1.9 %    Differential Method Automated    Type & Screen    Collection Time: 02/07/18 10:25 PM    Result Value Ref Range    Group & Rh O POS     Indirect Lisa NEG    Comprehensive metabolic panel    Collection Time: 18 10:25 PM   Result Value Ref Range    Sodium 139 136 - 145 mmol/L    Potassium 3.8 3.5 - 5.1 mmol/L    Chloride 109 95 - 110 mmol/L    CO2 24 23 - 29 mmol/L    Glucose 52 (L) 70 - 110 mg/dL    BUN, Bld 21 (H) 6 - 20 mg/dL    Creatinine 0.9 0.5 - 1.4 mg/dL    Calcium 8.7 8.7 - 10.5 mg/dL    Total Protein 6.5 6.0 - 8.4 g/dL    Albumin 2.6 (L) 3.5 - 5.2 g/dL    Total Bilirubin 0.2 0.1 - 1.0 mg/dL    Alkaline Phosphatase 148 (H) 55 - 135 U/L    AST 17 10 - 40 U/L    ALT 14 10 - 44 U/L    Anion Gap 6 (L) 8 - 16 mmol/L    eGFR if African American >60 >60 mL/min/1.73 m^2    eGFR if non African American >60 >60 mL/min/1.73 m^2   POCT glucose    Collection Time: 18 11:59 PM   Result Value Ref Range    POCT Glucose 57 (L) 70 - 110 mg/dL   POCT glucose    Collection Time: 18  1:53 AM   Result Value Ref Range    POCT Glucose 70 70 - 110 mg/dL        Assessment:     37 y.o.  female at 37w0d, on IV magnesium sulfate for cHTN with MAXIMILIAN.    Active Hospital Problems    Diagnosis  POA    *Encounter for induction of labor [Z34.90]  Not Applicable    Diabetes mellitus affecting pregnancy in third trimester [O24.913]  Yes     Diagnosed in pregnancy but A1C indicates pre-existing condition.      BMI 50.0-59.9, adult [Z68.43]  Not Applicable    Chronic hypertension affecting pregnancy [O10.919]  Yes     No medications      Advanced maternal age, primigravida in second trimester, antepartum [O09.512]  Yes      Resolved Hospital Problems    Diagnosis Date Resolved POA   No resolved problems to display.        Plan:     cHTN with MAXIMILIAN  - MgSO4 for seizure ppx started  @ 2322   - Continue magnesium sulfate, no signs of toxicity at this time   - Close maternal monitoring including UOP and BP    - BP: (126-178)/(79-98) 159/85     - s/p IV labetalol 20 IV @ 7500    - now with new severe  sustained BPs: pushing labetalol 20 now     - UOP: 175 cc out in last 2 hours = adequate    - cath urine sample collected for P/C ratio  - Recheck in 2-4 hours or PRN    IOL for cHTN, now with MAXIMILIAN  - Labor course: no epidural at this time   - 2240: 0/40/-3, cytotec 50 mcg PO   - 0240: 0/40/-3, cytotec 50 mcg PO  - GBS+: PCN for GBS ppx    T2DM  - BG 63, 57, 70  - no insulin at this time  - asymptomatic since admission    Becki Singh MD, PhD  OBGYN, PGY-2

## 2018-02-08 NOTE — ASSESSMENT & PLAN NOTE
- Admit to Labor and Delivery unit  - Consents for delivery including vaginal or  section and blood transfusion signed and to chart  - Risks, benefits, alternatives and possible complications have been discussed in detail with the patient.   - Epidural per Anesthesia  - Draw CBC, T&S  - Notify Staff  - cytotec 50 mcg PO @ ~2200  - Recheck in 4 hrs or PRN    Post-Partum Hemorrhage risk - low

## 2018-02-08 NOTE — ASSESSMENT & PLAN NOTE
- BP: (126)/(86) 126/86   - most recent PreE labs 2/6: CBC 7/10.7/33.1/276  Cr 0.9   AST/ALT 13/13  PCR 0.13

## 2018-02-08 NOTE — HPI
Wang Warren is a 37 y.o. F at 36w6d presents for scheduled IOL. She has no complaints today.  Patient denies contractions, denies vaginal bleeding, denies LOF.   Fetal Movement: normal.      This IUP is complicated by the following:  T2DM (basaglar , humalog )  cHTN controlled without medication, but recently elevated, prompting IOL to begin today    Recent Labs:   CBC 7/10.7/33.1/276  Cr 0.9   AST/ALT   PCR 0.13    Last U/S @ 36w5d  Presentation: cephalic.  Placenta: posterior.  Umbilical cord: 3 vessel cord.  Amniotic fluid: MVP 4.1 cm. RACHELLE 9.5 cm.   EFW 2,811 g 28% [place]

## 2018-02-08 NOTE — PROGRESS NOTES
"LABOR NOTE/ MAG NOTE     S:  Complaints: No.  Epidural working:  yes     Patient denies HA, vision changes, SOB, CP, RUQ Pain     O: BP (!) 148/92   Pulse 67   Temp 96.8 °F (36 °C)   Resp 18   Ht 5' 1" (1.549 m)   Wt 135 kg (297 lb 9.9 oz)   LMP  (LMP Unknown)   SpO2 100%   Breastfeeding? No   BMI 56.24 kg/m²         FHT: 125, moderate variability, accels present, no decells, Category 1 - Reassuring  CTX: q 6 minutes  SVE: 3/50/-3        ASSESSMENT:   37 y.o.  at 37w0d, IOL 2/2 CHTN at 37 weeks     FHT reassuring            Active Hospital Problems     Diagnosis   POA    *Encounter for induction of labor [Z34.90]   Not Applicable    Diabetes mellitus affecting pregnancy in third trimester [O24.913]   Yes       Diagnosed in pregnancy but A1C indicates pre-existing condition.    BMI 50.0-59.9, adult [Z68.43]   Not Applicable    Chronic hypertension affecting pregnancy [O10.919]   Yes       No medications    Advanced maternal age, primigravida in second trimester, antepartum [O09.512]   Yes       Resolved Hospital Problems     Diagnosis Date Resolved POA   No resolved problems to display.            PLAN:     LABOR INDUCTION  S/P Cytotec x 3 (Last at 0730)  Cook Balloon Placed @ 0930  1200 3/70/-3  Will start Pitocin Augmentation  Will consider starting Pitocin at Next Check  FHTs - Reactive and Reassuring  Continue Close Maternal/Fetal Monitoring  Recheck 2 hours or PRN     T2DM  Monitoring BGs Q2 Latent Phase, Q1 Active Phase  D5 1/2 NS @ 30  Insulin Drip in Active Phase  BG 63>57>70>78>88>103>108     SI PreE  - On Mag for seizure Prophylaxis  - No signs or symptoms of Mag Toxicity  - BP (129-178)/(66-99) 151/93  - UOP - ~ 175/hr        Catarino Wilson M.D.  PGY1 OB/GYN    "

## 2018-02-08 NOTE — PROGRESS NOTES
"LABOR NOTE/ MAG NOTE     S:  Complaints: No.  Epidural working:  yes     Patient denies HA, vision changes, SOB, CP, RUQ Pain     O: BP (!) 148/92   Pulse 67   Temp 96.8 °F (36 °C)   Resp 18   Ht 5' 1" (1.549 m)   Wt 135 kg (297 lb 9.9 oz)   LMP  (LMP Unknown)   SpO2 100%   Breastfeeding? No   BMI 56.24 kg/m²         FHT: 130, moderate variability, accels present, no decells, Category 1 - Reassuring  CTX: q 2-5 minutes  SVE: 3/60/-3 , Cook Balloon remains in place   LUNGS: CTAB  DTR: 2+        ASSESSMENT:   37 y.o.  at 37w0d, IOL 2/2 CHTN at 37 weeks     FHT reassuring                 Active Hospital Problems     Diagnosis   POA    *Encounter for induction of labor [Z34.90]   Not Applicable    Diabetes mellitus affecting pregnancy in third trimester [O24.913]   Yes       Diagnosed in pregnancy but A1C indicates pre-existing condition.    BMI 50.0-59.9, adult [Z68.43]   Not Applicable    Chronic hypertension affecting pregnancy [O10.919]   Yes       No medications    Advanced maternal age, primigravida in second trimester, antepartum [O09.512]   Yes       Resolved Hospital Problems     Diagnosis Date Resolved POA   No resolved problems to display.            PLAN:     LABOR INDUCTION  S/P Cytotec x 3 (Last at 0730)  Cook Balloon Placed @ 0930  1200 370/-3  1400 3/60-3   Pitocin Augmentation   FHTs - Reactive and Reassuring  Continue Close Maternal/Fetal Monitoring/  Recheck 2 hours or PRN     T2DM  Monitoring BGs Q2 Latent Phase, Q1 Active Phase  D5 1/2 NS @ 30  Insulin Drip in Active Phase  BG 63>57>70>78>88>103>108> 94     SI PreE  - On Mag for seizure Prophylaxis  - No signs or symptoms of Mag Toxicity  - BP: (129-178)/(66-99) 151/93  - UOP - ~ 150/hr        Catarino Wilson M.D.  PGY1 OB/GYN    "

## 2018-02-08 NOTE — ASSESSMENT & PLAN NOTE
- home regimen basaglar 8/30, humalog 24/14/18  - POCT glucose q2 hours in latent labor; q1h in active labor  - insulin gtt as indicated  - plan to halve insulin regimen following delivery

## 2018-02-08 NOTE — H&P
"[facility]  Obstetrics  History & Physical    Patient Name: Wang Warrne  MRN: [id number]  Admission Date: 2018  Primary Care Provider: Primary Doctor No    Subjective:     Principal Problem:Encounter for induction of labor    History of Present Illness:  Wang Warren is a 37 y.o. F at 36w6d presents for scheduled IOL. She has no complaints today.  Patient denies contractions, denies vaginal bleeding, denies LOF.   Fetal Movement: normal.      This IUP is complicated by the following:  T2DM (basaglar , humalog )  cHTN controlled without medication, but recently elevated, prompting IOL to begin today    Recent Labs:   CBC 7/10.7/33.1/276  Cr 0.9   AST/ALT   PCR 0.13    Last U/S @ 36w5d  Presentation: cephalic.  Placenta: posterior.  Umbilical cord: 3 vessel cord.  Amniotic fluid: MVP 4.1 cm. RACHELLE 9.5 cm.   EFW 2,811 g 28% [place]      Obstetric History       T0      L0     SAB0   TAB0   Ectopic0   Multiple0   Live Births0       # Outcome Date GA Lbr Conrado/2nd Weight Sex Delivery Anes PTL Lv   1 Current                 Past Medical History:   Diagnosis Date    Chronic hypertension affecting pregnancy     Diabetes mellitus type II, controlled, with no complications 10/11/2017    Infertility, female     States she has been trying to conceive for four years. [facility] and was given Clomid for three months two years ago.     Past Surgical History:   Procedure Laterality Date    none         PTA Medications   Medication Sig    aspirin 81 MG Chew Take 81 mg by mouth once daily.    BASAGLAR KWIKPEN 100 unit/mL (3 mL) InPn pen     BD INSULIN PEN NEEDLE UF MINI 31 gauge x 3/16" Ndle     BD INSULIN SYRINGE ULTRA-FINE 1 mL 31 gauge x 5/16 Syrg     blood sugar diagnostic (ONETOUCH VERIO) Strp 1 strip by Misc.(Non-Drug; Combo Route) route 4 (four) times daily.    docusate sodium (COLACE) 100 MG capsule Take 1 " capsule (100 mg total) by mouth 2 (two) times daily.    ferrous sulfate 325 mg (65 mg iron) Tab tablet TK 1 T PO daily    folic acid (FOLVITE) 1 MG tablet Take 4 tablets (4 mg total) by mouth once daily.    glucagon (human recombinant) inj 1mg/mL kit Inject 1 mL (1 mg total) into the muscle as needed. Administer if blood glucose <50-60 and symptomatic    insulin aspart (NOVOLOG) 100 unit/mL InPn pen Inject 20 Units into the skin with lunch.    insulin aspart (NOVOLOG) 100 unit/mL InPn pen Inject 28 Units into the skin daily with breakfast.    insulin aspart (NOVOLOG) 100 unit/mL InPn pen Inject 18 Units into the skin before dinner.    insulin detemir (LEVEMIR FLEXTOUCH) 100 unit/mL (3 mL) SubQ InPn pen Inject 32 Units into the skin once daily.    insulin detemir (LEVEMIR FLEXTOUCH) 100 unit/mL (3 mL) SubQ InPn pen Inject 10 Units into the skin every evening.    lancets (ONETOUCH ULTRASOFT LANCETS) Misc 1 lancet by Misc.(Non-Drug; Combo Route) route 4 (four) times daily.    metFORMIN (GLUCOPHAGE) 500 MG tablet Take 1 tablet (500 mg total) by mouth every evening.    PRENATAL VIT CALC,IRON,FOLIC (PRENATAL VITAMIN ORAL) Take by mouth.    TRUEPLUS LANCETS 33 gauge Misc      Review of patient's allergies indicates:  No Known Allergies     Family History     Problem Relation (Age of Onset)    Cancer Father    Diabetes Maternal Aunt        Social History Main Topics    Smoking status: Never Smoker    Smokeless tobacco: Never Used    Alcohol use No    Drug use: No    Sexual activity: Yes     Partners: Male     Birth control/ protection: None     Review of Systems   Objective:     Vital Signs (Most Recent):    Vital Signs (24h Range):  BP: (126)/(86) 126/86      There is no height or weight on file to calculate BMI.    FHT: Cat 1 (reassuring) 135 bpm, + accels, no decels, moderate BTBV  TOCO: No ctx, minor irritability    Physical Exam:   Constitutional: She is oriented to person, place, and time. She appears  well-developed and well-nourished.    HENT:   Head: Normocephalic and atraumatic.    Eyes: EOM are normal. Pupils are equal, round, and reactive to light.    Neck: Normal range of motion. Neck supple.    Cardiovascular: Normal rate, regular rhythm and normal heart sounds.     Pulmonary/Chest: Effort normal and breath sounds normal. No respiratory distress.        Abdominal: Soft. Bowel sounds are normal. She exhibits no distension. There is no tenderness. There is no rebound and no guarding.   Gravid, size appropriate for dates              Musculoskeletal: Normal range of motion.       Neurological: She is alert and oriented to person, place, and time.    Skin: Skin is warm and dry. No rash noted.    Psychiatric: She has a normal mood and affect. Her behavior is normal. Judgment and thought content normal.     Cervix:  Dilation:  0  Effacement:  40  Station: -3  Presentation: Vertex     Significant Labs:  Lab Results   Component Value Date    GROUPTRH O POS 2018    HEPBSAG Negative 01/10/2018    STREPBCULT  2018     STREPTOCOCCUS AGALACTIAE (GROUP B)  Beta-hemolytic streptococci are routinely susceptible to   penicillins,cephalosporins and carbapenems.       I have personallly reviewed all pertinent lab results from the last 24 hours.    Assessment/Plan:     37 y.o. female  at 36w6d for:    * Encounter for induction of labor    - Admit to Labor and Delivery unit  - Consents for delivery including vaginal or  section and blood transfusion signed and to chart  - Risks, benefits, alternatives and possible complications have been discussed in detail with the patient.   - Epidural per Anesthesia  - Draw CBC, T&S  - Notify Staff  - cytotec 50 mcg PO @ ~2200  - Recheck in 4 hrs or PRN    Post-Partum Hemorrhage risk - low         Diabetes mellitus affecting pregnancy in third trimester    - home regimen basaglar , humalog   - POCT glucose q2 hours in latent labor; q1h in active labor  -  insulin gtt as indicated  - plan to halve insulin regimen following delivery  - admit POCT glucose 63        Advanced maternal age, primigravida in second trimester, antepartum    - age 37        Chronic hypertension affecting pregnancy    - BP: (126)/(86) 126/86   - most recent PreE labs 2/6: CBC 7/10.7/33.1/276  Cr 0.9   AST/ALT 13/13  PCR 0.13        BMI 50.0-59.9, adult    - BMI 56.2 on admission            Becki Singh MD  Obstetrics  [facility]

## 2018-02-08 NOTE — SUBJECTIVE & OBJECTIVE
"  Obstetric History       T0      L0     SAB0   TAB0   Ectopic0   Multiple0   Live Births0       # Outcome Date GA Lbr Conrado/2nd Weight Sex Delivery Anes PTL Lv   1 Current                 Past Medical History:   Diagnosis Date    Chronic hypertension affecting pregnancy     Diabetes mellitus type II, controlled, with no complications 10/11/2017    Infertility, female     States she has been trying to conceive for four years. Was evaluated at [facility] and was given Clomid for three months two years ago.     Past Surgical History:   Procedure Laterality Date    none         PTA Medications   Medication Sig    aspirin 81 MG Chew Take 81 mg by mouth once daily.    BASAGLAR KWIKPEN 100 unit/mL (3 mL) InPn pen     BD INSULIN PEN NEEDLE UF MINI 31 gauge x 3/16" Ndle     BD INSULIN SYRINGE ULTRA-FINE 1 mL 31 gauge x 5/16 Syrg     blood sugar diagnostic (ONETOUCH VERIO) Strp 1 strip by Misc.(Non-Drug; Combo Route) route 4 (four) times daily.    docusate sodium (COLACE) 100 MG capsule Take 1 capsule (100 mg total) by mouth 2 (two) times daily.    ferrous sulfate 325 mg (65 mg iron) Tab tablet TK 1 T PO daily    folic acid (FOLVITE) 1 MG tablet Take 4 tablets (4 mg total) by mouth once daily.    glucagon (human recombinant) inj 1mg/mL kit Inject 1 mL (1 mg total) into the muscle as needed. Administer if blood glucose <50-60 and symptomatic    insulin aspart (NOVOLOG) 100 unit/mL InPn pen Inject 20 Units into the skin with lunch.    insulin aspart (NOVOLOG) 100 unit/mL InPn pen Inject 28 Units into the skin daily with breakfast.    insulin aspart (NOVOLOG) 100 unit/mL InPn pen Inject 18 Units into the skin before dinner.    insulin detemir (LEVEMIR FLEXTOUCH) 100 unit/mL (3 mL) SubQ InPn pen Inject 32 Units into the skin once daily.    insulin detemir (LEVEMIR FLEXTOUCH) 100 unit/mL (3 mL) SubQ InPn pen Inject 10 Units into the skin every evening.    lancets (ONETOUCH ULTRASOFT LANCETS) " Misc 1 lancet by Misc.(Non-Drug; Combo Route) route 4 (four) times daily.    metFORMIN (GLUCOPHAGE) 500 MG tablet Take 1 tablet (500 mg total) by mouth every evening.    PRENATAL VIT CALC,IRON,FOLIC (PRENATAL VITAMIN ORAL) Take by mouth.    TRUEPLUS LANCETS 33 gauge Misc      Review of patient's allergies indicates:  No Known Allergies     Family History     Problem Relation (Age of Onset)    Cancer Father    Diabetes Maternal Aunt        Social History Main Topics    Smoking status: Never Smoker    Smokeless tobacco: Never Used    Alcohol use No    Drug use: No    Sexual activity: Yes     Partners: Male     Birth control/ protection: None     Review of Systems   Objective:     Vital Signs (Most Recent):    Vital Signs (24h Range):  BP: (126)/(86) 126/86      There is no height or weight on file to calculate BMI.    FHT: Cat 1 (reassuring) 135 bpm, + accels, no decels, moderate BTBV  TOCO: No ctx, minor irritability    Physical Exam:   Constitutional: She is oriented to person, place, and time. She appears well-developed and well-nourished.    HENT:   Head: Normocephalic and atraumatic.    Eyes: EOM are normal. Pupils are equal, round, and reactive to light.    Neck: Normal range of motion. Neck supple.    Cardiovascular: Normal rate, regular rhythm and normal heart sounds.     Pulmonary/Chest: Effort normal and breath sounds normal. No respiratory distress.        Abdominal: Soft. Bowel sounds are normal. She exhibits no distension. There is no tenderness. There is no rebound and no guarding.   Gravid, size appropriate for dates              Musculoskeletal: Normal range of motion.       Neurological: She is alert and oriented to person, place, and time.    Skin: Skin is warm and dry. No rash noted.    Psychiatric: She has a normal mood and affect. Her behavior is normal. Judgment and thought content normal.     Cervix:  Dilation:  2  Effacement:  60  Station: -3  Presentation: Vertex     Significant  Labs:  Lab Results   Component Value Date    GROUPTRH O POS 01/19/2018    HEPBSAG Negative 01/10/2018    STREPBCULT  01/24/2018     STREPTOCOCCUS AGALACTIAE (GROUP B)  Beta-hemolytic streptococci are routinely susceptible to   penicillins,cephalosporins and carbapenems.       I have personallly reviewed all pertinent lab results from the last 24 hours.

## 2018-02-08 NOTE — PROGRESS NOTES
"LABOR NOTE/ MAG NOTE    S:  Complaints: No.  Epidural working:  yes    Patient denies HA, vision changes, SOB, CP, RUQ Pain    O: BP (!) 148/92   Pulse 67   Temp 96.8 °F (36 °C)   Resp 18   Ht 5' 1" (1.549 m)   Wt 135 kg (297 lb 9.9 oz)   LMP  (LMP Unknown)   SpO2 100%   Breastfeeding? No   BMI 56.24 kg/m²       FHT: 125, moderate variability, accels present, no decells, Category 1 - Reassuring  CTX: q 2-7 minutes  SVE: /      ASSESSMENT:   37 y.o.  at 37w0d, IOL 2/2 CHTN at 37 weeks    FHT reassuring    Active Hospital Problems    Diagnosis  POA    *Encounter for induction of labor [Z34.90]  Not Applicable    Diabetes mellitus affecting pregnancy in third trimester [O24.913]  Yes     Diagnosed in pregnancy but A1C indicates pre-existing condition.      BMI 50.0-59.9, adult [Z68.43]  Not Applicable    Chronic hypertension affecting pregnancy [O10.919]  Yes     No medications      Advanced maternal age, primigravida in second trimester, antepartum [O09.512]  Yes      Resolved Hospital Problems    Diagnosis Date Resolved POA   No resolved problems to display.         PLAN:    LABOR INDUCTION  S/P Cytotec x 3 (Last at 0730)  Cook Balloon Placed @ 0930  Will consider starting Pitocin at Next Check  FHTs - Reactive and Reassuring  Continue Close Maternal/Fetal Monitoring  Recheck 2 hours or PRN    T2DM  Monitoring BGs Q2 Latent Phase, Q1 Active Phase  D5 1/2 NS @ 30  Insulin Drip in Active Phase  BG 63>57>70>78>88>103    SI PreE  - On Mag for seizure Prophylaxis  - No signs or symptoms of Mag Toxicity  - BP - (126-178)/(66-99) 148/92  - UOP - ~ 200/hr      Catarino Wilson M.D.  PGY1 OB/GYN      "

## 2018-02-08 NOTE — PROGRESS NOTES
Mei [facility]BRAMS study consent discussion    I met with MsRhea Kelvin and sheryl in her room in L&D this morning; she is being induced. Dr. Wei and a medical student were also present. We discussed the study in detail: purpose, procedures, numbers/length, cost/payment, risk/benefit, contacts, alternatives/voluntary nature/withdrawal, optional procedures, confidentiality/HIPAA. She had no questions- knows both Jaelyn Oliva and Francesca. Her  wanted to discuss the study with her in private. I left her with the consent and my contact information. Dr. Oliva was available.    I returned to her room this afternoon. They had reviewed the consent and had no questions. She willingly signed it, denying participation in any other studies and agreeing to future data storage. She was given a copy for her records. Her nurse, Ob, and residents were informed.

## 2018-02-08 NOTE — ANESTHESIA PREPROCEDURE EVALUATION
2018  Wang Warren is a 37 y.o. female F at 36w6d presents for scheduled IOL. Pt current pregnancy complicated by gestational DM and chronic HTN. Of note, HTN has been controlled without medication, however has become elevated recently (140s/90s in KUN yesterday) which prompted IOL to begin today.     OB History    Para Term  AB Living   1             SAB TAB Ectopic Multiple Live Births                  # Outcome Date GA Lbr Conrado/2nd Weight Sex Delivery Anes PTL Lv   1 Current                   Wt Readings from Last 1 Encounters:   18 1128 135 kg (297 lb 9.9 oz)       BP Readings from Last 3 Encounters:   18 126/86   18 137/89   18 (!) 138/92       Patient Active Problem List   Diagnosis    BMI 50.0-59.9, adult    Chronic hypertension affecting pregnancy    Advanced maternal age, primigravida in second trimester, antepartum    Diabetes mellitus affecting pregnancy in third trimester    Encounter for induction of labor       Past Surgical History:   Procedure Laterality Date    none         Social History     Social History    Marital status:      Spouse name: Husam    Number of children: 0    Years of education: N/A     Occupational History         [employer]      Social History Main Topics    Smoking status: Never Smoker    Smokeless tobacco: Never Used    Alcohol use No    Drug use: No    Sexual activity: Yes     Partners: Male     Birth control/ protection: None     Other Topics Concern    Not on file     Social History Narrative    Walks for exercise.     to [place] for five years.         Chemistry        Component Value Date/Time     2018 1032    K 3.9 2018 1032     2018 1032    CO2 22 (L) 2018 1032    BUN 17 2018 1032    CREATININE 0.9 2018 1032    GLU  62 (L) 02/06/2018 1032        Component Value Date/Time    CALCIUM 9.2 02/06/2018 1032    ALKPHOS 132 02/06/2018 1032    AST 13 02/06/2018 1032    ALT 13 02/06/2018 1032    BILITOT 0.3 02/06/2018 1032    ESTGFRAFRICA >60 02/06/2018 1032    EGFRNONAA >60 02/06/2018 1032            Lab Results   Component Value Date    WBC 7.37 02/06/2018    HGB 10.7 (L) 02/06/2018    HCT 33.1 (L) 02/06/2018    MCV 88 02/06/2018     02/06/2018       No results for input(s): PT, INR, PROTIME, APTT in the last 72 hours.      Anesthesia Evaluation    I have reviewed the Patient Summary Reports.     I have reviewed the Medications.     Review of Systems  Anesthesia Hx:  No previous Anesthesia  Neg history of prior surgery. Denies Family Hx of Anesthesia complications.   Denies Personal Hx of Anesthesia complications.   Hematology/Oncology:  Hematology Normal   Oncology Normal     EENT/Dental:EENT/Dental Normal   Cardiovascular:   Hypertension    Pulmonary:  Pulmonary Normal    Hepatic/GI:  Hepatic/GI Normal    Musculoskeletal:  Musculoskeletal Normal    Neurological:  Neurology Normal    Endocrine:   Diabetes, type 2    Psych:  Psychiatric Normal           Physical Exam  General:  Well nourished, Obesity    Airway/Jaw/Neck:  Airway Findings: Mouth Opening: Normal Tongue: Normal  General Airway Assessment: Adult  Mallampati: III  Improves to II with phonation.     Eyes/Ears/Nose:  EYES/EARS/NOSE FINDINGS: Normal   Dental:  Dental Findings: In tact   Chest/Lungs:  Chest/Lungs Findings: Clear to auscultation, Normal Respiratory Rate     Heart/Vascular:  Heart Findings: Rate: Normal  Rhythm: Regular Rhythm  Sounds: Normal  Heart murmur: negative    Abdomen:  Abdomen Findings: Normal      Mental Status:  Mental Status Findings: Normal        Anesthesia Plan  Type of Anesthesia, risks & benefits discussed:  Anesthesia Type:  CSE, epidural, general, spinal  Patient's Preference:   Intra-op Monitoring Plan: standard ASA monitors  Intra-op  Monitoring Plan Comments:   Post Op Pain Control Plan:   Post Op Pain Control Plan Comments:   Induction:   IV  Beta Blocker:  Patient is not currently on a Beta-Blocker (No further documentation required).       Informed Consent: Patient understands risks and agrees with Anesthesia plan.  Questions answered. Anesthesia consent signed with patient.  ASA Score: 2     Day of Surgery Review of History & Physical: I have interviewed and examined the patient. I have reviewed the patient's H&P dated:    H&P update referred to the surgeon.         Ready For Surgery From Anesthesia Perspective.

## 2018-02-08 NOTE — ANESTHESIA PROCEDURE NOTES
Epidural    Patient location during procedure: OB   Reason for block: primary anesthetic   Start time: 2/8/2018 7:18 AM  Timeout: 2/8/2018 7:18 AM  End time: 2/8/2018 7:24 AM  Staffing  Anesthesiologist: LAUREN DEL ANGEL  Resident/CRNA: KAYLA LAURA  Performed: resident/CRNA   Preanesthetic Checklist  Completed: patient identified, site marked, surgical consent, pre-op evaluation, timeout performed, IV checked, risks and benefits discussed, monitors and equipment checked, anesthesia consent given, hand hygiene performed and patient being monitored  Preparation  Patient position: sitting  Prep: ChloraPrep  Patient monitoring: ECG, Pulse Ox, continuous capnometry and Blood Pressure  Epidural  Skin Anesthetic: lidocaine 1%  Skin Wheal: 3 mL  Administration type: single shot  Approach: midline  Interspace: L3-4  Injection technique: KADI saline  Needle and Epidural Catheter  Needle type: Tuohy   Needle gauge: 17  Needle insertion depth: 9 cm  Catheter type: springwound and multi-orifice  Catheter size: 19 G  Catheter at skin depth: 14 cm  Test dose: 3 mL of lidocaine 1.5% with Epi 1-to-200,000  Additional Documentation: incremental injection, negative aspiration for heme and CSF and no paresthesia on injection  Needle localization: anatomical landmarks  Medications:  Bolus administered: 10 mL of 0.75% bupivacaine  Opioid administered: 20 mcg of   fentanyl  Assessment  Upper dermatomal levels - Left: T8  Right: T6   Dermatomal levels determined by ice  Ease of block: easy  Patient's tolerance of the procedure: comfortable throughout block

## 2018-02-08 NOTE — PROGRESS NOTES
RN called about 3 severe range pressures: 170/91 > 173/91 > 170/98  Patient is asymptomatic, with history of cHTN.    Temp:  [97.9 °F (36.6 °C)] 97.9 °F (36.6 °C)  Pulse:  [68-88] 73  Resp:  [18] 18  SpO2:  [99 %-100 %] 99 %  BP: (126-173)/(83-98) 170/98       Recent Labs  Lab 02/06/18  1032 02/07/18  2225   WBC 7.37 7.18   HGB 10.7* 10.6*   HCT 33.1* 33.0*   MCV 88 88    279       CMP pending  P/R ratio pending    Plan:  Start MgSO4 for seizure ppx  20 mg IV labetalol ordered  Discussed plan with patient and RN    Becki Singh MD, PhD  OBGYN, PGY-2

## 2018-02-08 NOTE — HOSPITAL COURSE
02/07/2018 Admit to L&D for IOL secondary to cHTN with increasing BPs over the last week.  02/10/2018 - Patient POD 1 s/p c/s due to non reassuring fetal heart tones and unchanging cervix. This morning patient is doing well. Currently on Detemir 4/15 and Aspart 12/8/10. Patient's sugars have been mildly elevated. Asymptomatic. Is off magnesium sulfate, which she was on for seizure prophylaxis in setting of Pre E.   02/11/2018 - POD#2 s/p c/s due to non reassuring fetal heart tones in a pregnancy complicated by DM2, cHTN with SI Pre E. No acute events overnight. Pt feeling well this morning, asymptomatic with glucose 's throughout the day.   02/12/2018 - POD#3 s/p 1LTCS for NRFHT. Doing well this morning, meeting all post-operative goals. Glucoses were between 98 and 134 yesterday. Patient stable for discharge home with 6 week postpartum follow up.  02/13/2018 - POD#4 s/p 1LTCS for NRFHTs. Doing well this morning. Meeting all post op milestones. BG 44 pre-lunch, insulin held. Patient asymptomatic. Continued to watch blood pressures. Procardia 30XL given this morning. 10mg immediate release procardia given and started on Procardia 60XL to start tomorrow AM.  02/14/2018 - POD#5 s/p 1LTCS for NFHTs. Doing well this morning, meeting all post-operative milestones. Fasting BG this morning is 94. BP borderline elevated. First dose of procardia 60XL given this morning.

## 2018-02-09 PROBLEM — Z34.90 ENCOUNTER FOR INDUCTION OF LABOR: Status: RESOLVED | Noted: 2018-02-07 | Resolved: 2018-02-09

## 2018-02-09 PROBLEM — O11.9 CHRONIC HYPERTENSION WITH SUPERIMPOSED PREECLAMPSIA: Status: ACTIVE | Noted: 2017-09-19

## 2018-02-09 PROBLEM — Z34.90 ENCOUNTER FOR INDUCTION OF LABOR: Status: ACTIVE | Noted: 2018-02-09

## 2018-02-09 LAB
ALBUMIN SERPL BCP-MCNC: 2.6 G/DL
ALLENS TEST: ABNORMAL
ALLENS TEST: ABNORMAL
ALP SERPL-CCNC: 178 U/L
ALT SERPL W/O P-5'-P-CCNC: 12 U/L
ANION GAP SERPL CALC-SCNC: 10 MMOL/L
AST SERPL-CCNC: 17 U/L
BASOPHILS # BLD AUTO: 0.02 K/UL
BASOPHILS NFR BLD: 0.2 %
BILIRUB SERPL-MCNC: 0.6 MG/DL
BUN SERPL-MCNC: 14 MG/DL
CALCIUM SERPL-MCNC: 8 MG/DL
CHLORIDE SERPL-SCNC: 104 MMOL/L
CK MB SERPL-MCNC: 0.7 NG/ML
CK MB SERPL-RTO: 1.6 %
CK SERPL-CCNC: 44 U/L
CO2 SERPL-SCNC: 19 MMOL/L
CREAT SERPL-MCNC: 1.1 MG/DL
DIFFERENTIAL METHOD: ABNORMAL
EOSINOPHIL # BLD AUTO: 0 K/UL
EOSINOPHIL NFR BLD: 0.2 %
ERYTHROCYTE [DISTWIDTH] IN BLOOD BY AUTOMATED COUNT: 13.7 %
EST. GFR  (AFRICAN AMERICAN): >60 ML/MIN/1.73 M^2
EST. GFR  (NON AFRICAN AMERICAN): >60 ML/MIN/1.73 M^2
GLUCOSE SERPL-MCNC: 118 MG/DL
HCO3 UR-SCNC: 22.6 MMOL/L (ref 24–28)
HCO3 UR-SCNC: 23.9 MMOL/L (ref 24–28)
HCT VFR BLD AUTO: 34.9 %
HGB BLD-MCNC: 11.4 G/DL
LYMPHOCYTES # BLD AUTO: 0.9 K/UL
LYMPHOCYTES NFR BLD: 9.3 %
MAGNESIUM SERPL-MCNC: 5.9 MG/DL
MCH RBC QN AUTO: 28.4 PG
MCHC RBC AUTO-ENTMCNC: 32.7 G/DL
MCV RBC AUTO: 87 FL
MONOCYTES # BLD AUTO: 0.7 K/UL
MONOCYTES NFR BLD: 6.7 %
NEUTROPHILS # BLD AUTO: 8.1 K/UL
NEUTROPHILS NFR BLD: 83.1 %
PCO2 BLDA: 44.6 MMHG (ref 35–45)
PCO2 BLDA: 60.4 MMHG (ref 35–45)
PH SMN: 7.21 [PH] (ref 7.35–7.45)
PH SMN: 7.31 [PH] (ref 7.35–7.45)
PLATELET # BLD AUTO: 252 K/UL
PMV BLD AUTO: 10.7 FL
PO2 BLDA: 23 MMHG (ref 80–100)
PO2 BLDA: 8 MMHG (ref 80–100)
POC BE: -4 MMOL/L
POC BE: -4 MMOL/L
POC SATURATED O2: 34 % (ref 95–100)
POC SATURATED O2: 5 % (ref 95–100)
POCT GLUCOSE: 116 MG/DL (ref 70–110)
POCT GLUCOSE: 135 MG/DL (ref 70–110)
POCT GLUCOSE: 142 MG/DL (ref 70–110)
POCT GLUCOSE: 156 MG/DL (ref 70–110)
POCT GLUCOSE: 158 MG/DL (ref 70–110)
POCT GLUCOSE: 170 MG/DL (ref 70–110)
POCT GLUCOSE: 173 MG/DL (ref 70–110)
POCT GLUCOSE: 174 MG/DL (ref 70–110)
POCT GLUCOSE: 97 MG/DL (ref 70–110)
POTASSIUM SERPL-SCNC: 4.5 MMOL/L
PROT SERPL-MCNC: 7.1 G/DL
RBC # BLD AUTO: 4.01 M/UL
SAMPLE: ABNORMAL
SAMPLE: ABNORMAL
SITE: ABNORMAL
SITE: ABNORMAL
SODIUM SERPL-SCNC: 133 MMOL/L
WBC # BLD AUTO: 9.79 K/UL

## 2018-02-09 PROCEDURE — 63600175 PHARM REV CODE 636 W HCPCS: Performed by: STUDENT IN AN ORGANIZED HEALTH CARE EDUCATION/TRAINING PROGRAM

## 2018-02-09 PROCEDURE — 82803 BLOOD GASES ANY COMBINATION: CPT

## 2018-02-09 PROCEDURE — 82553 CREATINE MB FRACTION: CPT

## 2018-02-09 PROCEDURE — 80053 COMPREHEN METABOLIC PANEL: CPT

## 2018-02-09 PROCEDURE — 37000008 HC ANESTHESIA 1ST 15 MINUTES: Performed by: OBSTETRICS & GYNECOLOGY

## 2018-02-09 PROCEDURE — 83735 ASSAY OF MAGNESIUM: CPT

## 2018-02-09 PROCEDURE — 88307 TISSUE EXAM BY PATHOLOGIST: CPT | Performed by: PATHOLOGY

## 2018-02-09 PROCEDURE — 25000003 PHARM REV CODE 250: Performed by: STUDENT IN AN ORGANIZED HEALTH CARE EDUCATION/TRAINING PROGRAM

## 2018-02-09 PROCEDURE — 99900035 HC TECH TIME PER 15 MIN (STAT)

## 2018-02-09 PROCEDURE — 36000685 HC CESAREAN SECTION LEVEL I

## 2018-02-09 PROCEDURE — 36415 COLL VENOUS BLD VENIPUNCTURE: CPT

## 2018-02-09 PROCEDURE — 25000003 PHARM REV CODE 250

## 2018-02-09 PROCEDURE — 3E0P7VZ INTRODUCTION OF HORMONE INTO FEMALE REPRODUCTIVE, VIA NATURAL OR ARTIFICIAL OPENING: ICD-10-PCS | Performed by: OBSTETRICS & GYNECOLOGY

## 2018-02-09 PROCEDURE — 63600175 PHARM REV CODE 636 W HCPCS: Performed by: OBSTETRICS & GYNECOLOGY

## 2018-02-09 PROCEDURE — 59510 CESAREAN DELIVERY: CPT | Mod: ,,, | Performed by: OBSTETRICS & GYNECOLOGY

## 2018-02-09 PROCEDURE — 59510 CESAREAN DELIVERY: CPT | Mod: ,,, | Performed by: ANESTHESIOLOGY

## 2018-02-09 PROCEDURE — 11000001 HC ACUTE MED/SURG PRIVATE ROOM

## 2018-02-09 PROCEDURE — 10907ZC DRAINAGE OF AMNIOTIC FLUID, THERAPEUTIC FROM PRODUCTS OF CONCEPTION, VIA NATURAL OR ARTIFICIAL OPENING: ICD-10-PCS | Performed by: OBSTETRICS & GYNECOLOGY

## 2018-02-09 PROCEDURE — 85025 COMPLETE CBC W/AUTO DIFF WBC: CPT

## 2018-02-09 PROCEDURE — 51702 INSERT TEMP BLADDER CATH: CPT

## 2018-02-09 PROCEDURE — 88307 TISSUE EXAM BY PATHOLOGIST: CPT | Mod: 26,,, | Performed by: PATHOLOGY

## 2018-02-09 PROCEDURE — 27200033

## 2018-02-09 PROCEDURE — 37000009 HC ANESTHESIA EA ADD 15 MINS: Performed by: OBSTETRICS & GYNECOLOGY

## 2018-02-09 RX ORDER — OXYTOCIN/RINGER'S LACTATE 20/1000 ML
20 PLASTIC BAG, INJECTION (ML) INTRAVENOUS ONCE
Status: DISCONTINUED | OUTPATIENT
Start: 2018-02-09 | End: 2018-02-09

## 2018-02-09 RX ORDER — MISOPROSTOL 200 UG/1
600 TABLET ORAL
Status: DISCONTINUED | OUTPATIENT
Start: 2018-02-09 | End: 2018-02-09

## 2018-02-09 RX ORDER — MUPIROCIN 20 MG/G
1 OINTMENT TOPICAL 2 TIMES DAILY PRN
Status: DISPENSED | OUTPATIENT
Start: 2018-02-09 | End: 2018-02-14

## 2018-02-09 RX ORDER — SIMETHICONE 80 MG
1 TABLET,CHEWABLE ORAL EVERY 6 HOURS PRN
Status: DISCONTINUED | OUTPATIENT
Start: 2018-02-09 | End: 2018-02-14 | Stop reason: HOSPADM

## 2018-02-09 RX ORDER — ONDANSETRON 2 MG/ML
4 INJECTION INTRAMUSCULAR; INTRAVENOUS EVERY 6 HOURS PRN
Status: ACTIVE | OUTPATIENT
Start: 2018-02-09 | End: 2018-02-10

## 2018-02-09 RX ORDER — KETOROLAC TROMETHAMINE 30 MG/ML
30 INJECTION, SOLUTION INTRAMUSCULAR; INTRAVENOUS EVERY 6 HOURS
Status: COMPLETED | OUTPATIENT
Start: 2018-02-09 | End: 2018-02-09

## 2018-02-09 RX ORDER — LABETALOL HYDROCHLORIDE 5 MG/ML
40 INJECTION, SOLUTION INTRAVENOUS ONCE
Status: COMPLETED | OUTPATIENT
Start: 2018-02-09 | End: 2018-02-09

## 2018-02-09 RX ORDER — FENTANYL/BUPIVACAINE/NS/PF 2MCG/ML-.1
PLASTIC BAG, INJECTION (ML) INJECTION CONTINUOUS
Status: DISCONTINUED | OUTPATIENT
Start: 2018-02-09 | End: 2018-02-09

## 2018-02-09 RX ORDER — HYDROCODONE BITARTRATE AND ACETAMINOPHEN 10; 325 MG/1; MG/1
1 TABLET ORAL EVERY 4 HOURS PRN
Status: DISCONTINUED | OUTPATIENT
Start: 2018-02-10 | End: 2018-02-14 | Stop reason: HOSPADM

## 2018-02-09 RX ORDER — SODIUM CITRATE AND CITRIC ACID MONOHYDRATE 334; 500 MG/5ML; MG/5ML
SOLUTION ORAL
Status: DISPENSED
Start: 2018-02-09 | End: 2018-02-09

## 2018-02-09 RX ORDER — ACETAMINOPHEN 10 MG/ML
INJECTION, SOLUTION INTRAVENOUS
Status: DISCONTINUED | OUTPATIENT
Start: 2018-02-09 | End: 2018-02-09

## 2018-02-09 RX ORDER — IBUPROFEN 600 MG/1
600 TABLET ORAL EVERY 6 HOURS
Status: DISCONTINUED | OUTPATIENT
Start: 2018-02-10 | End: 2018-02-14 | Stop reason: HOSPADM

## 2018-02-09 RX ORDER — FENTANYL/BUPIVACAINE/NS/PF 2MCG/ML-.1
PLASTIC BAG, INJECTION (ML) INJECTION CONTINUOUS PRN
Status: DISCONTINUED | OUTPATIENT
Start: 2018-02-08 | End: 2018-02-09

## 2018-02-09 RX ORDER — INSULIN ASPART 100 [IU]/ML
1 INJECTION, SOLUTION INTRAVENOUS; SUBCUTANEOUS ONCE
Status: DISCONTINUED | OUTPATIENT
Start: 2018-02-09 | End: 2018-02-09

## 2018-02-09 RX ORDER — LIDOCAINE HCL/EPINEPHRINE/PF 2%-1:200K
VIAL (ML) INJECTION
Status: DISCONTINUED | OUTPATIENT
Start: 2018-02-09 | End: 2018-02-09

## 2018-02-09 RX ORDER — ACETAMINOPHEN 325 MG/1
650 TABLET ORAL EVERY 6 HOURS
Status: DISPENSED | OUTPATIENT
Start: 2018-02-09 | End: 2018-02-10

## 2018-02-09 RX ORDER — INSULIN ASPART 100 [IU]/ML
8 INJECTION, SOLUTION INTRAVENOUS; SUBCUTANEOUS
Status: DISCONTINUED | OUTPATIENT
Start: 2018-02-09 | End: 2018-02-09

## 2018-02-09 RX ORDER — FENTANYL CITRATE 50 UG/ML
INJECTION, SOLUTION INTRAMUSCULAR; INTRAVENOUS
Status: DISCONTINUED | OUTPATIENT
Start: 2018-02-09 | End: 2018-02-09

## 2018-02-09 RX ORDER — CEFAZOLIN SODIUM 2 G/50ML
2 SOLUTION INTRAVENOUS
Status: DISCONTINUED | OUTPATIENT
Start: 2018-02-09 | End: 2018-02-09

## 2018-02-09 RX ORDER — MORPHINE SULFATE 0.5 MG/ML
INJECTION, SOLUTION EPIDURAL; INTRATHECAL; INTRAVENOUS
Status: DISCONTINUED
Start: 2018-02-09 | End: 2018-02-09 | Stop reason: WASHOUT

## 2018-02-09 RX ORDER — METHYLERGONOVINE MALEATE 0.2 MG/ML
200 INJECTION INTRAVENOUS
Status: DISCONTINUED | OUTPATIENT
Start: 2018-02-09 | End: 2018-02-09

## 2018-02-09 RX ORDER — INSULIN ASPART 100 [IU]/ML
12 INJECTION, SOLUTION INTRAVENOUS; SUBCUTANEOUS
Status: DISCONTINUED | OUTPATIENT
Start: 2018-02-10 | End: 2018-02-12

## 2018-02-09 RX ORDER — SODIUM CITRATE AND CITRIC ACID MONOHYDRATE 334; 500 MG/5ML; MG/5ML
30 SOLUTION ORAL ONCE
Status: COMPLETED | OUTPATIENT
Start: 2018-02-09 | End: 2018-02-09

## 2018-02-09 RX ORDER — DOCUSATE SODIUM 100 MG/1
200 CAPSULE, LIQUID FILLED ORAL 2 TIMES DAILY PRN
Status: DISCONTINUED | OUTPATIENT
Start: 2018-02-09 | End: 2018-02-14 | Stop reason: HOSPADM

## 2018-02-09 RX ORDER — LABETALOL HYDROCHLORIDE 5 MG/ML
INJECTION, SOLUTION INTRAVENOUS
Status: COMPLETED
Start: 2018-02-09 | End: 2018-02-09

## 2018-02-09 RX ORDER — ONDANSETRON HYDROCHLORIDE 2 MG/ML
INJECTION, SOLUTION INTRAMUSCULAR; INTRAVENOUS
Status: DISCONTINUED | OUTPATIENT
Start: 2018-02-09 | End: 2018-02-09

## 2018-02-09 RX ORDER — OXYTOCIN/RINGER'S LACTATE 20/1000 ML
41.65 PLASTIC BAG, INJECTION (ML) INTRAVENOUS CONTINUOUS
Status: ACTIVE | OUTPATIENT
Start: 2018-02-09 | End: 2018-02-09

## 2018-02-09 RX ORDER — OXYCODONE HYDROCHLORIDE 5 MG/1
5 TABLET ORAL EVERY 4 HOURS PRN
Status: ACTIVE | OUTPATIENT
Start: 2018-02-09 | End: 2018-02-10

## 2018-02-09 RX ORDER — DIPHENHYDRAMINE HCL 25 MG
25 CAPSULE ORAL EVERY 4 HOURS PRN
Status: DISCONTINUED | OUTPATIENT
Start: 2018-02-09 | End: 2018-02-14 | Stop reason: HOSPADM

## 2018-02-09 RX ORDER — HYDROCODONE BITARTRATE AND ACETAMINOPHEN 5; 325 MG/1; MG/1
1 TABLET ORAL EVERY 4 HOURS PRN
Status: DISCONTINUED | OUTPATIENT
Start: 2018-02-10 | End: 2018-02-14 | Stop reason: HOSPADM

## 2018-02-09 RX ORDER — CARBOPROST TROMETHAMINE 250 UG/ML
250 INJECTION, SOLUTION INTRAMUSCULAR
Status: DISCONTINUED | OUTPATIENT
Start: 2018-02-09 | End: 2018-02-09

## 2018-02-09 RX ORDER — HYDROMORPHONE HYDROCHLORIDE 1 MG/ML
1 INJECTION, SOLUTION INTRAMUSCULAR; INTRAVENOUS; SUBCUTANEOUS
Status: DISCONTINUED | OUTPATIENT
Start: 2018-02-09 | End: 2018-02-14 | Stop reason: HOSPADM

## 2018-02-09 RX ORDER — MIDAZOLAM HYDROCHLORIDE 1 MG/ML
INJECTION INTRAMUSCULAR; INTRAVENOUS
Status: DISCONTINUED | OUTPATIENT
Start: 2018-02-09 | End: 2018-02-09

## 2018-02-09 RX ORDER — METOCLOPRAMIDE HYDROCHLORIDE 5 MG/ML
5 INJECTION INTRAMUSCULAR; INTRAVENOUS EVERY 6 HOURS PRN
Status: DISCONTINUED | OUTPATIENT
Start: 2018-02-09 | End: 2018-02-14 | Stop reason: HOSPADM

## 2018-02-09 RX ORDER — OXYTOCIN/RINGER'S LACTATE 20/1000 ML
41.65 PLASTIC BAG, INJECTION (ML) INTRAVENOUS CONTINUOUS
Status: DISCONTINUED | OUTPATIENT
Start: 2018-02-09 | End: 2018-02-09

## 2018-02-09 RX ORDER — KETOROLAC TROMETHAMINE 30 MG/ML
INJECTION, SOLUTION INTRAMUSCULAR; INTRAVENOUS
Status: DISCONTINUED | OUTPATIENT
Start: 2018-02-09 | End: 2018-02-09

## 2018-02-09 RX ORDER — INSULIN ASPART 100 [IU]/ML
10 INJECTION, SOLUTION INTRAVENOUS; SUBCUTANEOUS
Status: DISCONTINUED | OUTPATIENT
Start: 2018-02-09 | End: 2018-02-14 | Stop reason: HOSPADM

## 2018-02-09 RX ORDER — OXYCODONE HYDROCHLORIDE 5 MG/1
10 TABLET ORAL EVERY 4 HOURS PRN
Status: DISPENSED | OUTPATIENT
Start: 2018-02-09 | End: 2018-02-10

## 2018-02-09 RX ORDER — INSULIN ASPART 100 [IU]/ML
2 INJECTION, SOLUTION INTRAVENOUS; SUBCUTANEOUS ONCE
Status: COMPLETED | OUTPATIENT
Start: 2018-02-09 | End: 2018-02-09

## 2018-02-09 RX ORDER — FAMOTIDINE 10 MG/ML
20 INJECTION INTRAVENOUS ONCE
Status: DISCONTINUED | OUTPATIENT
Start: 2018-02-09 | End: 2018-02-09

## 2018-02-09 RX ORDER — CARBOPROST TROMETHAMINE 250 UG/ML
INJECTION, SOLUTION INTRAMUSCULAR
Status: DISCONTINUED
Start: 2018-02-09 | End: 2018-02-09 | Stop reason: WASHOUT

## 2018-02-09 RX ORDER — OXYTOCIN 10 [USP'U]/ML
INJECTION, SOLUTION INTRAMUSCULAR; INTRAVENOUS
Status: DISCONTINUED | OUTPATIENT
Start: 2018-02-09 | End: 2018-02-09

## 2018-02-09 RX ORDER — INSULIN ASPART 100 [IU]/ML
10 INJECTION, SOLUTION INTRAVENOUS; SUBCUTANEOUS
Status: DISCONTINUED | OUTPATIENT
Start: 2018-02-09 | End: 2018-02-09

## 2018-02-09 RX ORDER — SODIUM CHLORIDE, SODIUM LACTATE, POTASSIUM CHLORIDE, CALCIUM CHLORIDE 600; 310; 30; 20 MG/100ML; MG/100ML; MG/100ML; MG/100ML
INJECTION, SOLUTION INTRAVENOUS CONTINUOUS
Status: DISCONTINUED | OUTPATIENT
Start: 2018-02-09 | End: 2018-02-14 | Stop reason: HOSPADM

## 2018-02-09 RX ORDER — INSULIN ASPART 100 [IU]/ML
8 INJECTION, SOLUTION INTRAVENOUS; SUBCUTANEOUS
Status: DISCONTINUED | OUTPATIENT
Start: 2018-02-09 | End: 2018-02-13

## 2018-02-09 RX ORDER — LABETALOL HYDROCHLORIDE 5 MG/ML
20 INJECTION, SOLUTION INTRAVENOUS ONCE
Status: DISCONTINUED | OUTPATIENT
Start: 2018-02-09 | End: 2018-02-09

## 2018-02-09 RX ORDER — ONDANSETRON 8 MG/1
8 TABLET, ORALLY DISINTEGRATING ORAL EVERY 8 HOURS PRN
Status: DISCONTINUED | OUTPATIENT
Start: 2018-02-09 | End: 2018-02-14 | Stop reason: HOSPADM

## 2018-02-09 RX ORDER — FAMOTIDINE 10 MG/ML
INJECTION INTRAVENOUS
Status: DISPENSED
Start: 2018-02-09 | End: 2018-02-09

## 2018-02-09 RX ORDER — INSULIN ASPART 100 [IU]/ML
12 INJECTION, SOLUTION INTRAVENOUS; SUBCUTANEOUS
Status: DISCONTINUED | OUTPATIENT
Start: 2018-02-09 | End: 2018-02-09

## 2018-02-09 RX ORDER — SODIUM CHLORIDE, SODIUM LACTATE, POTASSIUM CHLORIDE, CALCIUM CHLORIDE 600; 310; 30; 20 MG/100ML; MG/100ML; MG/100ML; MG/100ML
INJECTION, SOLUTION INTRAVENOUS CONTINUOUS PRN
Status: DISCONTINUED | OUTPATIENT
Start: 2018-02-09 | End: 2018-02-09

## 2018-02-09 RX ADMIN — LABETALOL HYDROCHLORIDE 20 MG: 5 INJECTION, SOLUTION INTRAVENOUS at 02:02

## 2018-02-09 RX ADMIN — SODIUM CHLORIDE, SODIUM LACTATE, POTASSIUM CHLORIDE, AND CALCIUM CHLORIDE: 600; 310; 30; 20 INJECTION, SOLUTION INTRAVENOUS at 05:02

## 2018-02-09 RX ADMIN — ONDANSETRON 8 MG: 8 TABLET, ORALLY DISINTEGRATING ORAL at 12:02

## 2018-02-09 RX ADMIN — KETOROLAC TROMETHAMINE 30 MG: 30 INJECTION, SOLUTION INTRAMUSCULAR at 11:02

## 2018-02-09 RX ADMIN — DEXTROSE 2.5 MILLION UNITS: 50 INJECTION, SOLUTION INTRAVENOUS at 04:02

## 2018-02-09 RX ADMIN — OXYTOCIN 2 UNITS: 10 INJECTION, SOLUTION INTRAMUSCULAR; INTRAVENOUS at 05:02

## 2018-02-09 RX ADMIN — AZITHROMYCIN MONOHYDRATE 500 MG: 500 INJECTION, POWDER, LYOPHILIZED, FOR SOLUTION INTRAVENOUS at 05:02

## 2018-02-09 RX ADMIN — KETOROLAC TROMETHAMINE 30 MG: 30 INJECTION, SOLUTION INTRAMUSCULAR at 01:02

## 2018-02-09 RX ADMIN — SODIUM CITRATE AND CITRIC ACID MONOHYDRATE 30 ML: 500; 334 SOLUTION ORAL at 05:02

## 2018-02-09 RX ADMIN — DOCUSATE SODIUM 200 MG: 100 CAPSULE, LIQUID FILLED ORAL at 09:02

## 2018-02-09 RX ADMIN — INSULIN ASPART 10 UNITS: 100 INJECTION, SOLUTION INTRAVENOUS; SUBCUTANEOUS at 05:02

## 2018-02-09 RX ADMIN — FENTANYL CITRATE 25 MCG: 50 INJECTION, SOLUTION INTRAMUSCULAR; INTRAVENOUS at 06:02

## 2018-02-09 RX ADMIN — ONDANSETRON 4 MG: 2 INJECTION, SOLUTION INTRAMUSCULAR; INTRAVENOUS at 05:02

## 2018-02-09 RX ADMIN — ACETAMINOPHEN 650 MG: 325 TABLET ORAL at 06:02

## 2018-02-09 RX ADMIN — LIDOCAINE HYDROCHLORIDE,EPINEPHRINE BITARTRATE 5 ML: 20; .005 INJECTION, SOLUTION EPIDURAL; INFILTRATION; INTRACAUDAL; PERINEURAL at 05:02

## 2018-02-09 RX ADMIN — DEXTROSE 3 G: 50 INJECTION, SOLUTION INTRAVENOUS at 05:02

## 2018-02-09 RX ADMIN — ACETAMINOPHEN 650 MG: 325 TABLET ORAL at 11:02

## 2018-02-09 RX ADMIN — OXYCODONE HYDROCHLORIDE 10 MG: 5 TABLET ORAL at 06:02

## 2018-02-09 RX ADMIN — MIDAZOLAM HYDROCHLORIDE 1 MG: 1 INJECTION, SOLUTION INTRAMUSCULAR; INTRAVENOUS at 06:02

## 2018-02-09 RX ADMIN — ACETAMINOPHEN 650 MG: 325 TABLET ORAL at 01:02

## 2018-02-09 RX ADMIN — KETOROLAC TROMETHAMINE 30 MG: 30 INJECTION, SOLUTION INTRAMUSCULAR at 06:02

## 2018-02-09 RX ADMIN — INSULIN ASPART 2 UNITS: 100 INJECTION, SOLUTION INTRAVENOUS; SUBCUTANEOUS at 05:02

## 2018-02-09 RX ADMIN — FENTANYL CITRATE 100 MCG: 50 INJECTION, SOLUTION INTRAMUSCULAR; INTRAVENOUS at 05:02

## 2018-02-09 RX ADMIN — LIDOCAINE HYDROCHLORIDE,EPINEPHRINE BITARTRATE 8 ML: 20; .005 INJECTION, SOLUTION EPIDURAL; INFILTRATION; INTRACAUDAL; PERINEURAL at 05:02

## 2018-02-09 RX ADMIN — OXYCODONE HYDROCHLORIDE 10 MG: 5 TABLET ORAL at 09:02

## 2018-02-09 RX ADMIN — INSULIN ASPART 8 UNITS: 100 INJECTION, SOLUTION INTRAVENOUS; SUBCUTANEOUS at 01:02

## 2018-02-09 RX ADMIN — MAGNESIUM SULFATE IN WATER 2 G/HR: 40 INJECTION, SOLUTION INTRAVENOUS at 11:02

## 2018-02-09 RX ADMIN — ACETAMINOPHEN 1000 MG: 10 INJECTION, SOLUTION INTRAVENOUS at 06:02

## 2018-02-09 RX ADMIN — KETOROLAC TROMETHAMINE 30 MG: 30 INJECTION, SOLUTION INTRAMUSCULAR; INTRAVENOUS at 06:02

## 2018-02-09 RX ADMIN — INSULIN DETEMIR 15 UNITS: 100 INJECTION, SOLUTION SUBCUTANEOUS at 09:02

## 2018-02-09 RX ADMIN — INSULIN DETEMIR 4 UNITS: 100 INJECTION, SOLUTION SUBCUTANEOUS at 10:02

## 2018-02-09 RX ADMIN — LABETALOL HYDROCHLORIDE 40 MG: 5 INJECTION, SOLUTION INTRAVENOUS at 03:02

## 2018-02-09 RX ADMIN — SODIUM CHLORIDE, SODIUM GLUCONATE, SODIUM ACETATE, POTASSIUM CHLORIDE, MAGNESIUM CHLORIDE, SODIUM PHOSPHATE, DIBASIC, AND POTASSIUM PHOSPHATE: .53; .5; .37; .037; .03; .012; .00082 INJECTION, SOLUTION INTRAVENOUS at 04:02

## 2018-02-09 RX ADMIN — DEXTROSE 2.5 MILLION UNITS: 50 INJECTION, SOLUTION INTRAVENOUS at 12:02

## 2018-02-09 NOTE — L&D DELIVERY NOTE
[facility]   Section   Operative Note    SUMMARY     Date of Procedure: 2018     Procedure: Procedure(s) (LRB):  DELIVERY- SECTION (N/A)    Surgeon(s) and Role:     * Mariela Valenzuela MD - Primary    Assisting Surgeon: None    Pre-Operative Diagnosis: Encounter for induction of labor [Z34.90]    Post-Operative Diagnosis: Post-Op Diagnosis Codes:     * Encounter for induction of labor [Z34.90]    Anesthesia: Spinal/Epidural           Description of the Findings of the Procedure:   1) Single viable  male, see report below  2) Filmy adhesions over the posterior uterus, otherwise normal appearing uterus, fallopian tubes and ovaries    Complications: No    Blood Loss: 920mL     With patient in supine position, the legs are  and Martinez Catheter placed and positioning to supine done.   Abdomen prepped with Chloroprep and 3 minute drying time allowed prior to draping of the abdomen.   Time out taken with OR team members.  Pfannenstiel Incision made through the skin, transverse fascial incision developed, rectus muscles  in the midline and the peritoneum entered.   Filmy adhesions over the posterior side of the uterus noted.  The lower uterine segment and position of the fetus identified.   Bladder flap taken down through transverse peritoneal incision.    Low Transverse Incision made through well developer lower uterine segment and extended laterally with blunt dissection.   Clear fluid noted.  Infant delivered from vertex presentation.  Cord clamped after one minute and  handed to attending nurse.  Cord blood taken, placenta delivered.  The uterus was exteriorized.  The edges of the uterine incision are grasped with Patel clamps at the angles and the inferior and superior midline edges of the incision.    Closure with running lock 0 Chromic, starting at each angle, tying in the midline. Additional figure of eight suture at the midline and at the  angle for hemostasis control.  Observation for bleeding with suture of any bleeding along the hysterotomy line.   With good hemostasis noted, the anterior pelvis is rinsed with sterile saline.   Right and left adnexa with normal anatomy.     Closure of the abdomen with 2 0 Vicryl running of the peritoneum, fascial closure with #1 PDS starting at the both angles and tying the knot at middle.  Skin closure with 4 0 Vicryl subcuticular.  Wound dressed with pressure dressing.          Specimens:   Specimen (12h ago through future)    Start     Ordered    18  Specimen to Pathology - Surgery  Once     Comments:  Placenta of 37.1 weeks sent due to pre-e.      18          Condition: Good    Disposition: PACU - hemodynamically stable.    Attestation: Good         Delivery Information for  Girl Wang Warren    Birth information:  YOB: 2019   Time of birth: 5:51 AM   Sex: female   Head Delivery Date/Time: 2018  5:51 AM   Delivery type: , Low Transverse   Gestational Age: 37w1d    Delivery Providers    Delivering clinician:  Mariela Valenzuela MD   Other personnel:   Provider Role   Robina Barrera MD Resident   Loan Cooley, [facility]   Judit Mejia RN Charge Nurse   Catalina Hsieh RN Registered Nurse               [place] Measurements    Weight:  2820 g Length:  50.8 cm   Head circum.:  31.1 cm Chest circum.:  31.8 cm           Assessment    Living status:  Living  Apgars:     1 Minute:   5 Minute:   10 Minute:   15 Minute:   20 Minute:     Skin Color:   0  1       Heart Rate:   2  2       Reflex Irritability:   2  2       Muscle Tone:   1  1       Respiratory Effort:   2  2       Total:   7  8               Apgars Assigned By:  NICU         Assisted Delivery Details:    Forceps attempted?:  No  Vacuum extractor attempted?:  No         Shoulder Dystocia    Shoulder dystocia present?:  No           Presentation and Position    Presentation:   Vertex    Position:   Middle    Occiput    Anterior            Interventions/Resuscitation    Method:  NICU Attended       Cord    Vessels:  3 vessels  Complications:  None  Delayed Cord Clamping?:  No  Cord Blood Disposition:  Sent with Baby  Gases Sent?:  Yes  Stem Cell Collection (by MD):  No       Placenta    Date and time:  2018  5:53 AM  Removal:  Manual removal  Appearance:  Intact  Placenta disposition:  pathology           Labor Events:       labor: No     Labor Onset Date/Time:         Dilation Complete Date/Time:         Start Pushing Date/Time:       Rupture Date/Time:              Rupture type:           Fluid Amount:        Fluid Color:        Fluid Odor:        Membrane Status (PeriCalm): SRM (Spontaneous Rupture)      Rupture Date/Time (PeriCalm): 2018 16:36:00      Fluid Amount (PeriCalm): Large      Fluid Color (PeriCalm): Clear       steroids: None     Antibiotics given for GBS: Yes     Induction: balloon dilation (Martinez);misoprostol;oxytocin     Indications for induction:  Hypertension     Augmentation:       Indications for augmentation:       Labor complications: Fetal Intolerance     Additional complications:          Cervical ripening:                     Delivery:      Episiotomy: None     Indication for Episiotomy:       Perineal Lacerations: None Repaired:      Periurethral Laceration: none Repaired:     Labial Laceration: none Repaired:     Sulcus Laceration: none Repaired:     Vaginal Laceration: No Repaired:     Cervical Laceration: No Repaired:     Repair suture:       Repair # of packets: 8     Vaginal delivery QBL (mL): 920      QBL (mL): 0     Combined Blood Loss (mL): 920     Vaginal Sweep Performed: No     Surgicount Correct: Yes       Other providers:       Anesthesia    Method:  Epidural          Details (if applicable):  Trial of Labor Yes    Categorization: Primary    Priority: Routine   Indications for : Fetal  Intolerance of Labor;Failure to Progress   Incision Type: low transverse     Additional  information:  Forceps:    Vacuum:    Breech:    Observed anomalies    Other (Comments):

## 2018-02-09 NOTE — LACTATION NOTE
"   02/09/18 1235   Maternal Infant Assessment   Breast Shape Bilateral:;pendulous;wide   Breast Density Bilateral:;soft   Areola Bilateral:;elastic   Nipple(s) Bilateral:;everted   Infant Assessment   Sucking Reflex present   Rooting Reflex present   Swallow Reflex present   LATCH Score   Latch 1-->repeated attempts, holds nipple in mouth, stimulate to suck   Audible Swallowing 1-->a few with stimulation   Type Of Nipple 2-->everted (after stimulation)   Comfort (Breast/Nipple) 2-->soft/nontender   Hold (Positioning) 0-->full assist (staff holds infant at breast)   Score (less than 7 for 2/more consecutive times, consult Lactation Consultant) 6       Number Scale   Presence of Pain denies   Location - Side Left   Location nipple(s)   Maternal Infant Feeding   Infant Positioning clutch/"football"   Time Spent (min) 15-30 min   Latch Assistance yes   Breastfeeding Education adequate infant intake;adequate milk volume;importance of skin-to-skin contact;increasing milk supply   Infant First Feeding   Skin-to-Skin Contact Maintained   Feeding Infant   Effective Latch During Feeding yes   Skin-to-Skin Contact During Feeding yes   Lactation Referrals   Lactation Consult Breastfeeding assessment;Initial assessment   Lactation Interventions   Attachment Promotion breastfeeding assistance provided;counseling provided;skin-to-skin contact encouraged   Breastfeeding Assistance assisted with positioning;infant latch-on verified;infant stimulated to wakeful state;infant suck/swallow verified   Maternal Breastfeeding Support diary/feeding log utilized;encouragement offered;maternal rest encouraged     "

## 2018-02-09 NOTE — PHYSICIAN QUERY
"PT Name: Wang Warren  MR #: [id number]     Physician Query Form - Documentation Clarification      CDS/: LUCERO Rebolledo,[email or web address]           Contact information:[email or web address]    This form is a permanent document in the medical record.     Query Date: 2018    By submitting this query, we are merely seeking further clarification of documentation. Please utilize your independent clinical judgment when addressing the question(s) below.    The Medical record reflects the following:    Supporting Clinical Findings Location in Medical Record   General:  Well nourished, Obesity     BMI=56.4  Ht=5' 1" (1.549 m )  Kx=219 kg (297 lb 9.9 oz )    Delivery type: , Low Transverse   Anesthesia note       Anthropometrics         L&D Delivery record                                                                                       Doctor, Please specify diagnosis or diagnoses associated with above clinical findings.    Provider Use Only        [ x] Morbid obesity complicating childbirth  [  ] Other, please specify:________________________________________                                                                                                                   [  ] Clinically undetermined            "

## 2018-02-09 NOTE — PROGRESS NOTES
LABOR NOTE/ MAG NOTE     S:  Complaints: No.  Epidural working:  yes     Patient denies HA, vision changes, SOB, CP, RUQ Pain. Reports being very tired.     Vitals:    18 0045 18 0100 18 0145 18 0200   BP: (!) 151/84  (!) 160/84 (!) 163/85  Comment: Dr. Barrera notified. To come evaluate patient.   Pulse: 72  75 78   Resp: 16      Temp:  97.5 °F (36.4 °C)     TempSrc:  Temporal     SpO2: 96%  95% 95%   Weight:       Height:           GEN: lethargic, takes longer to arouse than previous checks  LUNGS: CTAB  DTR: 1+, very diminished but present        FHT: 130, moderate variability, accels present, early vs late decels with ctx, Category 2 - Overall Reassuring  CTX: q 2-5 minutes  SVE: 70/-2, IUPC in     UPO: 40cc last hour, 60 in previous hour, urine is bright red (previously pink tinged)     ASSESSMENT:   37 y.o.  at 37w0d, IOL 2/2 CHTN at 37 weeks, on magnesium sulfate for seizure ppx     FHT reassuring                 Active Hospital Problems     Diagnosis   POA    *Encounter for induction of labor [Z34.90]   Not Applicable    Diabetes mellitus affecting pregnancy in third trimester [O24.913]   Yes       Diagnosed in pregnancy but A1C indicates pre-existing condition.    BMI 50.0-59.9, adult [Z68.43]   Not Applicable    Chronic hypertension affecting pregnancy [O10.919]   Yes       No medications    Advanced maternal age, primigravida in second trimester, antepartum [O09.512]   Yes       Resolved Hospital Problems     Diagnosis Date Resolved POA   No resolved problems to display.            PLAN:     LABOR INDUCTION  S/P Cytotec x 3 (Last at 730)  Cook Balloon Placed @ 0930  1200 - 3/70/-3  1400 - 3/60-3   1700 - 3/60/-3 Incidental SROM (Clear) on Cervical Check Around Balloon, IUPC placed, Noted one minute Decel after AROM with spontaneous resolution  1930 - /-4, no presenting fetal part palpated, U/S performed to ensure that Fetus was still in vertex presentation, continue  Pitocin Augmentation  2315: 4/60/-3, MVU not adequate, pitocin increased to 14 mU/min, will consider amnioinfusion if variables continue  0215: 4/70/-2, MVU have been adequate over the last few hours, pitocin at 16mU/min, given decreased variability and ? Decels, will decrease back to 14 mU of pitocin, BP elevated in severe range, will push 20mg IV labetalol now, will decrease to 1g/hr of Mag and check CBC/CMP/Mag level given new increase in BP, bright read urine  FHTs - OverallReactive and Reassuring, will continue to monitor after BP improves  Continue Close Maternal/Fetal Monitoring  Recheck 2-4 hours or PRN, will space checks to try to avoid increasing infection risk     T2DM  Monitoring BGs Q2 Latent Phase, Q1 Active Phase  D5 1/2 NS @ 30  Insulin Drip in Active Phase  BG 63>57>70>78>88>103>108>94>84>82>78>97     SI PreE  - On Mag for seizure Prophylaxis  - No signs or symptoms of Mag Toxicity  - BP: (129-171)/(66-99) 132/82  - UOP - 40/last hr, red urine  - BP elevated in severe range, will push 20mg IV labetalol now, will decrease to 1g/hr of Mag and check CBC/CMP/Mag level given new increase in BP, bright read urine,

## 2018-02-09 NOTE — PROGRESS NOTES
LABOR NOTE/ MAG NOTE     S:  Complaints: No.  Epidural working:  yes     Patient denies HA, vision changes, SOB, CP, RUQ Pain     Vitals:    18 1920 18 1933 18 2047 18 2155   BP: (!) 147/72 (!) 144/69 138/82 (!) 156/74   Pulse: 80 82 84 83   Resp:   16 16   Temp:   98.7 °F (37.1 °C)    TempSrc:   Oral    SpO2: 96% 98% 98% 97%   Weight:       Height:                 FHT: 130, moderate variability, accels present, variable decels with ctx, Category 2 - Overall Reassuring  CTX: q 2-5 minutes  SVE: -3  LUNGS: CTAB  DTR: 2+        ASSESSMENT:   37 y.o.  at 37w0d, IOL 2/2 CHTN at 37 weeks, on magnesium sulfate for seizure ppx     FHT reassuring                 Active Hospital Problems     Diagnosis   POA    *Encounter for induction of labor [Z34.90]   Not Applicable    Diabetes mellitus affecting pregnancy in third trimester [O24.913]   Yes       Diagnosed in pregnancy but A1C indicates pre-existing condition.    BMI 50.0-59.9, adult [Z68.43]   Not Applicable    Chronic hypertension affecting pregnancy [O10.919]   Yes       No medications    Advanced maternal age, primigravida in second trimester, antepartum [O09.512]   Yes       Resolved Hospital Problems     Diagnosis Date Resolved POA   No resolved problems to display.            PLAN:     LABOR INDUCTION  S/P Cytotec x 3 (Last at 0730)  Cook Balloon Placed @ 0930  1200 - 3/70/-3  1400 - 3/60-3   1700 - 3/60/-3 Incidental SROM (Clear) on Cervical Check Around Balloon, IUPC placed, Noted one minute Decel after AROM with spontaneous resolution  1930 - -4, no presenting fetal part palpated, U/S performed to ensure that Fetus was still in vertex presentation, continue Pitocin Augmentation  2315: /-3, MVU not adequate, pitocin increased to 14 mU/min, will consider amnioinfusion if variables continue  FHTs - OverallReactive and Reassuring  Continue Close Maternal/Fetal Monitoring  Recheck 2-4 hours or PRN, will space checks to  try to avoid increasing infection risk     T2DM  Monitoring BGs Q2 Latent Phase, Q1 Active Phase  D5 1/2 NS @ 30  Insulin Drip in Active Phase  BG 63>57>70>78>88>103>108>94>84>82>78     SI PreE  - On Mag for seizure Prophylaxis  - No signs or symptoms of Mag Toxicity  - BP: (129-171)/(66-99) 132/82  - UOP - >200/last hr, pink urine

## 2018-02-09 NOTE — PROGRESS NOTES
Dr. Barrera notified urine in alves catheter still red. UOP this hour 40cc. No new orders received. Will continue to monitor pt.

## 2018-02-09 NOTE — PROGRESS NOTES
LABOR NOTE/ MAG NOTE     S:  Complaints: No.  Epidural working:  yes     Patient denies HA, vision changes, SOB, CP, RUQ Pain. Reports being very tired.     Vitals:    18 0200 18 0245 18 0306 18 0316   BP: (!) 163/85  Comment: Dr. Cunha notified. To come evaluate patient. (!) 159/82  Comment: dr. cunha notified (!) 166/79  Comment: Dr. Cunha notified    Pulse: 78 77 75    Resp:       Temp:    98.1 °F (36.7 °C)   TempSrc:    Temporal   SpO2: 95% 97% 96%    Weight:       Height:           GEN: lethargic, takes longer to arouse than previous checks  LUNGS: CTAB  DTR: 1+, very diminished but present        FHT: 130, moderate variability, accels present, early vs late decels with ctx, Category 2 - Overall Reassuring  CTX: q 2-5 minutes  SVE: 4/70/-2, IUPC in     UPO: 40cc last hour, 60 in previous hour, urine is bright red (previously pink tinged)       Recent Labs  Lab 18  1032 18  2225 18  0249   WBC 7.37 7.18 9.79   HGB 10.7* 10.6* 11.4*   HCT 33.1* 33.0* 34.9*   MCV 88 88 87    279 252          Recent Labs  Lab 18  1032 18  2225 18  0249    139 133*   K 3.9 3.8 4.5    109 104   CO2 22* 24 19*   BUN 17 21* 14   CREATININE 0.9 0.9 1.1   GLU 62* 52* 118*   PROT 6.7 6.5 7.1   BILITOT 0.3 0.2 0.6   ALKPHOS 132 148* 178*   ALT 13 14 12   AST 13 17 17   MG  --   --  5.9*          ASSESSMENT:   37 y.o.  at 37w0d, IOL 2/2 CHTN at 37 weeks, on magnesium sulfate for seizure ppx     FHT reassuring                 Active Hospital Problems     Diagnosis   POA    *Encounter for induction of labor [Z34.90]   Not Applicable    Diabetes mellitus affecting pregnancy in third trimester [O24.913]   Yes       Diagnosed in pregnancy but A1C indicates pre-existing condition.    BMI 50.0-59.9, adult [Z68.43]   Not Applicable    Chronic hypertension affecting pregnancy [O10.919]   Yes       No medications    Advanced maternal age, primigravida in second  trimester, antepartum [O09.512]   Yes       Resolved Hospital Problems     Diagnosis Date Resolved POA   No resolved problems to display.            PLAN:     LABOR INDUCTION  S/P Cytotec x 3 (Last at 0730)  Cook Balloon Placed @ 0930  1200 - 3/70/-3  1400 - 3/60-3   1700 - 3/60/-3 Incidental SROM (Clear) on Cervical Check Around Balloon, IUPC placed, Noted one minute Decel after AROM with spontaneous resolution  1930 - 4/60/-4, no presenting fetal part palpated, U/S performed to ensure that Fetus was still in vertex presentation, continue Pitocin Augmentation  2315: 4/60/-3, MVU not adequate, pitocin increased to 14 mU/min, will consider amnioinfusion if variables continue  0215: 4/70/-2, MVU have been adequate over the last few hours, pitocin at 16mU/min, given decreased variability and ? Decels, FSE placd will decrease back to 14 mU of pitocin, BP elevated in severe range, will push 20mg IV labetalol now, will decrease to 1g/hr of Mag and check CBC/CMP/Mag level given new increase in BP, bright read urine  1630: Cervix unchanged, late decels continued after d/c of pitocin, strip reviewed with Dr. Price, decision made to proceed with C/S 2/2 intolerance to labor  FHTs - OverallReactive and Reassuring, will continue to monitor after BP improves  Continue Close Maternal/Fetal Monitoring  Recheck 2-4 hours or PRN, will space checks to try to avoid increasing infection risk     T2DM  Monitoring BGs Q2 Latent Phase, Q1 Active Phase  D5 1/2 NS @ 30  Had planned to start Insulin Drip in Active Phase  BG 63>57>70>78>88>103>108>94>84>82>78>97>116>118>135>156  Will give 2 units aspart now     SI PreE  - On Mag for seizure Prophylaxis  - No signs or symptoms of Mag Toxicity  BP: (129-171)/(66-99) 131/72  - UOP - 60/last hr, pink urine  - CBC stable, CMP with elevated Cr to 1.1, CKMB normal,   - Mag in therapeutic range, increased to 2 gr/hr

## 2018-02-09 NOTE — PROGRESS NOTES
Per MD. Martinez flushed with 60cc. 25cc returned. Martinez still with red urine. Martinez removed and replaced. Martinez still with red urine. 30cc returned upon replacement. MD notified.

## 2018-02-09 NOTE — PLAN OF CARE
Problem: Patient Care Overview  Goal: Plan of Care Review  Outcome: Ongoing (interventions implemented as appropriate)  Lactation note:  Lactation consultant's first visit with patient. Reviewed the breastfeeding guide and encouraged the patient to feed infant 8 or more times in 24 hours on cue until content, waking to feed at least every 3 hours. Discussed benefits of breast milk as mother states she wants to offer both breastmilk and formula. After education, mother states she wants to exclusively breastfeed.  Offered to assist with breastfeeding. Helped with positioning and latching; infant nursing effectively with breast compression/stimulation.  Left  phone number on board for patient to call for assistance.

## 2018-02-09 NOTE — PROGRESS NOTES
LABOR NOTE/ MAG NOTE     S:  Complaints: No.  Epidural working:  yes     Patient denies HA, vision changes, SOB, CP, RUQ Pain     Vitals:    18 1903 18 1909 18 1920 18 1933   BP: (!) 145/75  (!) 147/72 (!) 144/69   Pulse: 78  80 82   Resp: 16      Temp:  99.5 °F (37.5 °C)     TempSrc:  Temporal     SpO2: 98%  96% 98%   Weight:       Height:                 FHT: 135, moderate variability, accels present, early and variable decels with ctx, Category 2 - Overall Reassuring  CTX: q 2-5 minutes  SVE: /-4, no presenting fetal part palpated   LUNGS: CTAB  DTR: 2+        ASSESSMENT:   37 y.o.  at 37w0d, IOL 2/2 CHTN at 37 weeks, on magnesium sulfate for seizure ppx     FHT reassuring                 Active Hospital Problems     Diagnosis   POA    *Encounter for induction of labor [Z34.90]   Not Applicable    Diabetes mellitus affecting pregnancy in third trimester [O24.913]   Yes       Diagnosed in pregnancy but A1C indicates pre-existing condition.    BMI 50.0-59.9, adult [Z68.43]   Not Applicable    Chronic hypertension affecting pregnancy [O10.919]   Yes       No medications    Advanced maternal age, primigravida in second trimester, antepartum [O09.512]   Yes       Resolved Hospital Problems     Diagnosis Date Resolved POA   No resolved problems to display.            PLAN:     LABOR INDUCTION  S/P Cytotec x 3 (Last at 0730)  Cook Balloon Placed @ 0930  1200 - 370/-3  1400 - 3/60-3   1700 - 3/60/-3 Incidental SROM (Clear) on Cervical Check Around Balloon, IUPC placed  1930 - /-4, no presenting fetal part palpated, U/S performed to ensure that Fetus was still in vertex presentation, continue Pitocin Augmentation  Noted one minute Decel after AROM with spontaneous resolution  FHTs - OverallReactive and Reassuring  Continue Close Maternal/Fetal Monitoring  Recheck 2-4 hours or PRN, will space checks to try to avoid increasing infection risk     T2DM  Monitoring BGs Q2 Latent Phase,  Q1 Active Phase  D5 1/2 NS @ 30  Insulin Drip in Active Phase  BG 63>57>70>78>88>103>108> 94     SI PreE  - On Mag for seizure Prophylaxis  - No signs or symptoms of Mag Toxicity  - BP: (129-178)/(66-99) 144/69  - UOP - 250/last hr

## 2018-02-09 NOTE — TRANSFER OF CARE
"Anesthesia Transfer of Care Note    Patient: Wang Warren    Procedure(s) Performed: Procedure(s) (LRB):  DELIVERY- SECTION (N/A)    Patient location: Labor and Delivery    Anesthesia Type: epidural    Transport from OR: Transported from OR on room air with adequate spontaneous ventilation    Post pain: adequate analgesia    Post assessment: no apparent anesthetic complications    Post vital signs: stable    Level of consciousness: awake and alert    Nausea/Vomiting: no nausea/vomiting    Complications: none    Transfer of care protocol was followed      Last vitals:   Visit Vitals  /78   Pulse 74   Temp 36.8 °C (98.2 °F) (Oral)   Resp 16   Ht 5' 1" (1.549 m)   Wt 135 kg (297 lb 9.9 oz)   LMP  (LMP Unknown)   SpO2 98%   Breastfeeding? No   BMI 56.24 kg/m²     "

## 2018-02-10 PROBLEM — O09.512 ADVANCED MATERNAL AGE, PRIMIGRAVIDA IN SECOND TRIMESTER, ANTEPARTUM: Status: RESOLVED | Noted: 2017-09-19 | Resolved: 2018-02-10

## 2018-02-10 PROBLEM — O24.913 DIABETES MELLITUS AFFECTING PREGNANCY IN THIRD TRIMESTER: Status: RESOLVED | Noted: 2017-10-11 | Resolved: 2018-02-10

## 2018-02-10 LAB
ANION GAP SERPL CALC-SCNC: 6 MMOL/L
BASOPHILS # BLD AUTO: 0.01 K/UL
BASOPHILS NFR BLD: 0.1 %
BUN SERPL-MCNC: 17 MG/DL
CALCIUM SERPL-MCNC: 7.5 MG/DL
CHLORIDE SERPL-SCNC: 104 MMOL/L
CO2 SERPL-SCNC: 23 MMOL/L
CREAT SERPL-MCNC: 1 MG/DL
DIFFERENTIAL METHOD: ABNORMAL
EOSINOPHIL # BLD AUTO: 0.1 K/UL
EOSINOPHIL NFR BLD: 1.6 %
ERYTHROCYTE [DISTWIDTH] IN BLOOD BY AUTOMATED COUNT: 13.8 %
EST. GFR  (AFRICAN AMERICAN): >60 ML/MIN/1.73 M^2
EST. GFR  (NON AFRICAN AMERICAN): >60 ML/MIN/1.73 M^2
GLUCOSE SERPL-MCNC: 157 MG/DL
HCT VFR BLD AUTO: 28.6 %
HGB BLD-MCNC: 9.1 G/DL
LYMPHOCYTES # BLD AUTO: 1.5 K/UL
LYMPHOCYTES NFR BLD: 18.2 %
MCH RBC QN AUTO: 28.1 PG
MCHC RBC AUTO-ENTMCNC: 31.8 G/DL
MCV RBC AUTO: 88 FL
MONOCYTES # BLD AUTO: 0.6 K/UL
MONOCYTES NFR BLD: 7.2 %
NEUTROPHILS # BLD AUTO: 5.9 K/UL
NEUTROPHILS NFR BLD: 72.5 %
PLATELET # BLD AUTO: 228 K/UL
PMV BLD AUTO: 10.5 FL
POCT GLUCOSE: 109 MG/DL (ref 70–110)
POCT GLUCOSE: 129 MG/DL (ref 70–110)
POCT GLUCOSE: 136 MG/DL (ref 70–110)
POCT GLUCOSE: 167 MG/DL (ref 70–110)
POCT GLUCOSE: 170 MG/DL (ref 70–110)
POCT GLUCOSE: 94 MG/DL (ref 70–110)
POCT GLUCOSE: 96 MG/DL (ref 70–110)
POTASSIUM SERPL-SCNC: 4.8 MMOL/L
RBC # BLD AUTO: 3.24 M/UL
SODIUM SERPL-SCNC: 133 MMOL/L
WBC # BLD AUTO: 8.18 K/UL

## 2018-02-10 PROCEDURE — 80048 BASIC METABOLIC PNL TOTAL CA: CPT

## 2018-02-10 PROCEDURE — 36415 COLL VENOUS BLD VENIPUNCTURE: CPT

## 2018-02-10 PROCEDURE — 11000001 HC ACUTE MED/SURG PRIVATE ROOM

## 2018-02-10 PROCEDURE — 85025 COMPLETE CBC W/AUTO DIFF WBC: CPT

## 2018-02-10 PROCEDURE — 63600175 PHARM REV CODE 636 W HCPCS: Performed by: STUDENT IN AN ORGANIZED HEALTH CARE EDUCATION/TRAINING PROGRAM

## 2018-02-10 PROCEDURE — 25000003 PHARM REV CODE 250: Performed by: STUDENT IN AN ORGANIZED HEALTH CARE EDUCATION/TRAINING PROGRAM

## 2018-02-10 RX ORDER — PSEUDOEPHEDRINE HCL 120 MG/1
120 TABLET, FILM COATED, EXTENDED RELEASE ORAL 2 TIMES DAILY
Status: DISCONTINUED | OUTPATIENT
Start: 2018-02-10 | End: 2018-02-10

## 2018-02-10 RX ADMIN — INSULIN ASPART 10 UNITS: 100 INJECTION, SOLUTION INTRAVENOUS; SUBCUTANEOUS at 06:02

## 2018-02-10 RX ADMIN — INSULIN ASPART 8 UNITS: 100 INJECTION, SOLUTION INTRAVENOUS; SUBCUTANEOUS at 12:02

## 2018-02-10 RX ADMIN — OXYCODONE HYDROCHLORIDE 10 MG: 5 TABLET ORAL at 04:02

## 2018-02-10 RX ADMIN — HYDROCODONE BITARTRATE AND ACETAMINOPHEN 1 TABLET: 10; 325 TABLET ORAL at 10:02

## 2018-02-10 RX ADMIN — IBUPROFEN 600 MG: 600 TABLET, FILM COATED ORAL at 06:02

## 2018-02-10 RX ADMIN — HYDROCODONE BITARTRATE AND ACETAMINOPHEN 1 TABLET: 10; 325 TABLET ORAL at 08:02

## 2018-02-10 RX ADMIN — HYDROCODONE BITARTRATE AND ACETAMINOPHEN 1 TABLET: 10; 325 TABLET ORAL at 06:02

## 2018-02-10 RX ADMIN — IBUPROFEN 600 MG: 600 TABLET, FILM COATED ORAL at 08:02

## 2018-02-10 RX ADMIN — DOCUSATE SODIUM 200 MG: 100 CAPSULE, LIQUID FILLED ORAL at 09:02

## 2018-02-10 RX ADMIN — INSULIN DETEMIR 15 UNITS: 100 INJECTION, SOLUTION SUBCUTANEOUS at 09:02

## 2018-02-10 RX ADMIN — HYDROCODONE BITARTRATE AND ACETAMINOPHEN 1 TABLET: 10; 325 TABLET ORAL at 12:02

## 2018-02-10 RX ADMIN — INSULIN ASPART 12 UNITS: 100 INJECTION, SOLUTION INTRAVENOUS; SUBCUTANEOUS at 08:02

## 2018-02-10 RX ADMIN — INSULIN DETEMIR 4 UNITS: 100 INJECTION, SOLUTION SUBCUTANEOUS at 08:02

## 2018-02-10 NOTE — ASSESSMENT & PLAN NOTE
- home regimen basaglar 8/30, humalog 24/14/18  - Now NPH 4/15, Aspart 12/8/10. Sugars to be checked fasting and 2 hours post prandial. So far have been not following that pattern. Will begin trend today. Fasting this morning elevated in 170s. Patient asymptomatic.

## 2018-02-10 NOTE — PROGRESS NOTES
Pt states she takes 32 units of detemir nightly at home. Order states to give 15 units. RN called Dr. Joseph to verify and see if pt should receive 16 units or 15 units. MD states to give 15 units. No other orders given. Pt safety maintained. Will continue to monitor.

## 2018-02-10 NOTE — SUBJECTIVE & OBJECTIVE
Hospital course: 02/07/2018 Admit to L&D for IOL secondary to cHTN with increasing BPs over the last week.  02/10/2018 - Patient POD 1 s/p c/s due to non reassuring fetal heart tones and unchanging cervix. This morning patient is doing well. Currently on Determi 4/15 and Aspart 12/8/10. Patient's sugars have been mildly elevated. Asymptomatic    Interval History:   Patient is POD 1 s/p LTCS due to non reassuring fetal heart tones. Pregnancy complicated by difficult to control insulin dependent diabetes and pre eclampsia with severe features (BP). Patient's pressures normal overnight. Denies fatigue, sweats, shaking. Fasting blood sugar is elevated in 170s. Currently on NPH 4/15 and aspart 12/8/10.     Patient is doing well this morning. Tolerating diabetic diet. Martinez just removed this morning. Patient is status post mag. Reports no HA, vision changes, difficulty breathing, chest pain, RUQ pain or new leg edema. Denies syncope, lightheadedness, dizziness.     Objective:     Vital Signs (Most Recent):  Temp: 97.7 °F (36.5 °C) (02/10/18 0453)  Pulse: 75 (02/10/18 0612)  Resp: 18 (02/10/18 0453)  BP: 124/61 (02/10/18 0600)  SpO2: 96 % (02/10/18 0612) Vital Signs (24h Range):  Temp:  [96.6 °F (35.9 °C)-98.5 °F (36.9 °C)] 97.7 °F (36.5 °C)  Pulse:  [59-86] 75  Resp:  [16-19] 18  SpO2:  [89 %-100 %] 96 %  BP: (115-162)/(58-97) 124/61     Weight: 135 kg (297 lb 9.9 oz)  Body mass index is 56.24 kg/m².    Physical Exam  General: morbidly obese, resting comfortably, no distress  Cardiopulm: RRR, CTAB  Abdomen: Soft, non distended, incision pfannenstiel c/d/i        Intake/Output Summary (Last 24 hours) at 02/10/18 0640  Last data filed at 02/10/18 0612   Gross per 24 hour   Intake             3770 ml   Output             4109 ml   Net             -339 ml       Significant Labs:  Lab Results   Component Value Date    GROUPTRH O POS 02/07/2018    HEPBSAG Negative 01/10/2018    STREPBCULT  01/24/2018     STREPTOCOCCUS  AGALACTIAE (GROUP B)  Beta-hemolytic streptococci are routinely susceptible to   penicillins,cephalosporins and carbapenems.         Recent Labs  Lab 02/10/18  0400   HGB 9.1*   HCT 28.6*       I have personallly reviewed all pertinent lab results from the last 24 hours.

## 2018-02-10 NOTE — ASSESSMENT & PLAN NOTE
Postpartum care:  - Patient doing well. Continue routine management and advances.  - Continue PO pain meds. Pain well controlled.  - Encourage ambulation.   - Heme: Pre Delivery h/h 10.6/33 -> 9.1/28.  - Contraception - defer to Dr Valenzuela  - Lactation - The patient is both breast and bottle feeding. Lactation nurse following along PRN  - Rh Status - pos

## 2018-02-10 NOTE — PROGRESS NOTES
Pt due for scheduled tylenol. Pt will exceed recommended tylenol dose for a 24 hour period if she receives scheduled tylenol dose. Dr. Reynolds states to give pt percolone 5. RN notified MD that pt is now 24 hours post-op. MD states for RN to d/c magnesium drip. MD also notified pt's fasting BG = 170. No other orders given. Pt safety maintained. Will continue to monitor.

## 2018-02-10 NOTE — PROGRESS NOTES
Magnesium Assessment Note    Subjective:         Wang Warren is a 37 y.o. female POD 1 s/p  delivery for NRFHT. Currently IV MgSO4 for seizure prophylaxis in setting of Pre Eclampsia.     Patient denies headaches, denies blurry vision and denies right upper quadrant pain. Denies CP, denies SOB. Patient reports no nausea/no vomiting.     Objective:      Temp:  [96.6 °F (35.9 °C)-98.5 °F (36.9 °C)] 98.4 °F (36.9 °C)  Pulse:  [59-89] 69  Resp:  [16-19] 18  SpO2:  [91 %-100 %] 97 %  BP: (115-166)/(60-97) 130/60    I/O last 3 completed shifts:  In: 7515.4 [P.O.:620; I.V.:6095.4; IV Piggyback:800]  Out: 6584 [Urine:5664; Blood:920]  I/O this shift:  In: 300 [I.V.:300]  Out: 1030 [Urine:1030]    General:   alert, appears stated age and cooperative   Lungs:   clear to auscultation bilaterally   Heart::   regular rate and rhythm, S1, S2 normal, no murmur, click, rub or gallop   Extremities: no pedal edema noted   DTRs- 2+  bilaterally       Lab Review  Recent Results (from the past 24 hour(s))   POCT glucose    Collection Time: 18  1:09 AM   Result Value Ref Range    POCT Glucose 116 (H) 70 - 110 mg/dL   Magnesium    Collection Time: 18  2:49 AM   Result Value Ref Range    Magnesium 5.9 (HH) 1.6 - 2.6 mg/dL   CBC auto differential    Collection Time: 18  2:49 AM   Result Value Ref Range    WBC 9.79 3.90 - 12.70 K/uL    RBC 4.01 4.00 - 5.40 M/uL    Hemoglobin 11.4 (L) 12.0 - 16.0 g/dL    Hematocrit 34.9 (L) 37.0 - 48.5 %    MCV 87 82 - 98 fL    MCH 28.4 27.0 - 31.0 pg    MCHC 32.7 32.0 - 36.0 g/dL    RDW 13.7 11.5 - 14.5 %    Platelets 252 150 - 350 K/uL    MPV 10.7 9.2 - 12.9 fL    Gran # (ANC) 8.1 (H) 1.8 - 7.7 K/uL    Lymph # 0.9 (L) 1.0 - 4.8 K/uL    Mono # 0.7 0.3 - 1.0 K/uL    Eos # 0.0 0.0 - 0.5 K/uL    Baso # 0.02 0.00 - 0.20 K/uL    Gran% 83.1 (H) 38.0 - 73.0 %    Lymph% 9.3 (L) 18.0 - 48.0 %    Mono% 6.7 4.0 - 15.0 %    Eosinophil% 0.2 0.0 - 8.0 %    Basophil% 0.2 0.0 - 1.9 %     Differential Method Automated    Comprehensive metabolic panel    Collection Time: 02/09/18  2:49 AM   Result Value Ref Range    Sodium 133 (L) 136 - 145 mmol/L    Potassium 4.5 3.5 - 5.1 mmol/L    Chloride 104 95 - 110 mmol/L    CO2 19 (L) 23 - 29 mmol/L    Glucose 118 (H) 70 - 110 mg/dL    BUN, Bld 14 6 - 20 mg/dL    Creatinine 1.1 0.5 - 1.4 mg/dL    Calcium 8.0 (L) 8.7 - 10.5 mg/dL    Total Protein 7.1 6.0 - 8.4 g/dL    Albumin 2.6 (L) 3.5 - 5.2 g/dL    Total Bilirubin 0.6 0.1 - 1.0 mg/dL    Alkaline Phosphatase 178 (H) 55 - 135 U/L    AST 17 10 - 40 U/L    ALT 12 10 - 44 U/L    Anion Gap 10 8 - 16 mmol/L    eGFR if African American >60 >60 mL/min/1.73 m^2    eGFR if non African American >60 >60 mL/min/1.73 m^2   CK-MB    Collection Time: 02/09/18  2:49 AM   Result Value Ref Range    CPK 44 20 - 180 U/L    CPK MB 0.7 0.1 - 6.5 ng/mL    MB% 1.6 0.0 - 5.0 %   POCT glucose    Collection Time: 02/09/18  3:08 AM   Result Value Ref Range    POCT Glucose 135 (H) 70 - 110 mg/dL   POCT glucose    Collection Time: 02/09/18  4:58 AM   Result Value Ref Range    POCT Glucose 156 (H) 70 - 110 mg/dL   ISTAT PROCEDURE    Collection Time: 02/09/18  6:11 AM   Result Value Ref Range    POC PH 7.206 (L) 7.35 - 7.45    POC PCO2 60.4 (H) 35 - 45 mmHg    POC PO2 8 (L) 80 - 100 mmHg    POC HCO3 23.9 (L) 24 - 28 mmol/L    POC BE -4 -2 to 2 mmol/L    POC SATURATED O2 5 (L) 95 - 100 %    Sample CRD A     Site Other     Allens Test N/A    ISTAT PROCEDURE    Collection Time: 02/09/18  6:12 AM   Result Value Ref Range    POC PH 7.314 (L) 7.35 - 7.45    POC PCO2 44.6 35 - 45 mmHg    POC PO2 23 (L) 80 - 100 mmHg    POC HCO3 22.6 (L) 24 - 28 mmol/L    POC BE -4 -2 to 2 mmol/L    POC SATURATED O2 34 (L) 95 - 100 %    Sample CRD V     Site Other     Allens Test N/A    POCT glucose    Collection Time: 02/09/18  7:55 AM   Result Value Ref Range    POCT Glucose 174 (H) 70 - 110 mg/dL   POCT glucose    Collection Time: 02/09/18  1:21 PM   Result  Value Ref Range    POCT Glucose 158 (H) 70 - 110 mg/dL   POCT glucose    Collection Time: 18  5:17 PM   Result Value Ref Range    POCT Glucose 142 (H) 70 - 110 mg/dL   POCT glucose    Collection Time: 18  8:16 PM   Result Value Ref Range    POCT Glucose 173 (H) 70 - 110 mg/dL   POCT glucose    Collection Time: 18  9:43 PM   Result Value Ref Range    POCT Glucose 170 (H) 70 - 110 mg/dL        Assessment:     37 y.o.  female POD 0 with Pre E on IV magnesium sulfate.    Active Hospital Problems    Diagnosis  POA     delivery delivered [O82]  Unknown    Encounter for induction of labor [Z34.90]  Not Applicable    Diabetes mellitus affecting pregnancy in third trimester [O24.913]  Yes     Diagnosed in pregnancy but A1C indicates pre-existing condition.      BMI 50.0-59.9, adult [Z68.43]  Not Applicable    Chronic hypertension with superimposed preeclampsia [O11.9]  Yes     No medications      Advanced maternal age, primigravida in second trimester, antepartum [O09.512]  Yes      Resolved Hospital Problems    Diagnosis Date Resolved POA    *Encounter for induction of labor [Z34.90] 2018 Not Applicable        Plan:     - Continue magnesium sulfate, no signs of toxicity at this time   - Close maternal monitoring including UOP and BP  - Recheck in 2-4 hours or JEREMY Reynolds MD

## 2018-02-10 NOTE — PROGRESS NOTES
Magnesium Assessment Note    Subjective:         Wang Warren is a 37 y.o. female POD 0 s/p  delivery for NRFHT. Currently IV MgSO4 for seizure prophylaxis in setting of Pre Eclampsia.     Patient denies headaches, denies blurry vision and denies right upper quadrant pain. Denies CP, denies SOB. Patient reports no nausea/no vomiting.     Objective:      Temp:  [96.6 °F (35.9 °C)-99.5 °F (37.5 °C)] 98.5 °F (36.9 °C)  Pulse:  [59-89] 69  Resp:  [16-19] 18  SpO2:  [94 %-100 %] 98 %  BP: (115-166)/(60-97) 115/64    I/O last 3 completed shifts:  In: 7007.1 [P.O.:620; I.V.:5537.1; IV Piggyback:850]  Out: 5485 [Urine:4565; Blood:920]  I/O this shift:  In: 525 [I.V.:525]  Out: 1374 [Urine:1374]    General:   alert, appears stated age and cooperative   Lungs:   clear to auscultation bilaterally   Heart::   regular rate and rhythm, S1, S2 normal, no murmur, click, rub or gallop   Extremities: no pedal edema noted   DTRs- 2+  bilaterally       Lab Review  Recent Results (from the past 24 hour(s))   POCT glucose    Collection Time: 18  8:32 PM   Result Value Ref Range    POCT Glucose 78 70 - 110 mg/dL   POCT glucose    Collection Time: 18 11:11 PM   Result Value Ref Range    POCT Glucose 97 70 - 110 mg/dL   POCT glucose    Collection Time: 18  1:09 AM   Result Value Ref Range    POCT Glucose 116 (H) 70 - 110 mg/dL   Magnesium    Collection Time: 18  2:49 AM   Result Value Ref Range    Magnesium 5.9 (HH) 1.6 - 2.6 mg/dL   CBC auto differential    Collection Time: 18  2:49 AM   Result Value Ref Range    WBC 9.79 3.90 - 12.70 K/uL    RBC 4.01 4.00 - 5.40 M/uL    Hemoglobin 11.4 (L) 12.0 - 16.0 g/dL    Hematocrit 34.9 (L) 37.0 - 48.5 %    MCV 87 82 - 98 fL    MCH 28.4 27.0 - 31.0 pg    MCHC 32.7 32.0 - 36.0 g/dL    RDW 13.7 11.5 - 14.5 %    Platelets 252 150 - 350 K/uL    MPV 10.7 9.2 - 12.9 fL    Gran # (ANC) 8.1 (H) 1.8 - 7.7 K/uL    Lymph # 0.9 (L) 1.0 - 4.8 K/uL    Mono # 0.7  0.3 - 1.0 K/uL    Eos # 0.0 0.0 - 0.5 K/uL    Baso # 0.02 0.00 - 0.20 K/uL    Gran% 83.1 (H) 38.0 - 73.0 %    Lymph% 9.3 (L) 18.0 - 48.0 %    Mono% 6.7 4.0 - 15.0 %    Eosinophil% 0.2 0.0 - 8.0 %    Basophil% 0.2 0.0 - 1.9 %    Differential Method Automated    Comprehensive metabolic panel    Collection Time: 02/09/18  2:49 AM   Result Value Ref Range    Sodium 133 (L) 136 - 145 mmol/L    Potassium 4.5 3.5 - 5.1 mmol/L    Chloride 104 95 - 110 mmol/L    CO2 19 (L) 23 - 29 mmol/L    Glucose 118 (H) 70 - 110 mg/dL    BUN, Bld 14 6 - 20 mg/dL    Creatinine 1.1 0.5 - 1.4 mg/dL    Calcium 8.0 (L) 8.7 - 10.5 mg/dL    Total Protein 7.1 6.0 - 8.4 g/dL    Albumin 2.6 (L) 3.5 - 5.2 g/dL    Total Bilirubin 0.6 0.1 - 1.0 mg/dL    Alkaline Phosphatase 178 (H) 55 - 135 U/L    AST 17 10 - 40 U/L    ALT 12 10 - 44 U/L    Anion Gap 10 8 - 16 mmol/L    eGFR if African American >60 >60 mL/min/1.73 m^2    eGFR if non African American >60 >60 mL/min/1.73 m^2   CK-MB    Collection Time: 02/09/18  2:49 AM   Result Value Ref Range    CPK 44 20 - 180 U/L    CPK MB 0.7 0.1 - 6.5 ng/mL    MB% 1.6 0.0 - 5.0 %   POCT glucose    Collection Time: 02/09/18  3:08 AM   Result Value Ref Range    POCT Glucose 135 (H) 70 - 110 mg/dL   POCT glucose    Collection Time: 02/09/18  4:58 AM   Result Value Ref Range    POCT Glucose 156 (H) 70 - 110 mg/dL   ISTAT PROCEDURE    Collection Time: 02/09/18  6:11 AM   Result Value Ref Range    POC PH 7.206 (L) 7.35 - 7.45    POC PCO2 60.4 (H) 35 - 45 mmHg    POC PO2 8 (L) 80 - 100 mmHg    POC HCO3 23.9 (L) 24 - 28 mmol/L    POC BE -4 -2 to 2 mmol/L    POC SATURATED O2 5 (L) 95 - 100 %    Sample CRD A     Site Other     Allens Test N/A    ISTAT PROCEDURE    Collection Time: 02/09/18  6:12 AM   Result Value Ref Range    POC PH 7.314 (L) 7.35 - 7.45    POC PCO2 44.6 35 - 45 mmHg    POC PO2 23 (L) 80 - 100 mmHg    POC HCO3 22.6 (L) 24 - 28 mmol/L    POC BE -4 -2 to 2 mmol/L    POC SATURATED O2 34 (L) 95 - 100 %     Sample CRD V     Site Other     Allens Test N/A    POCT glucose    Collection Time: 18  7:55 AM   Result Value Ref Range    POCT Glucose 174 (H) 70 - 110 mg/dL   POCT glucose    Collection Time: 18  1:21 PM   Result Value Ref Range    POCT Glucose 158 (H) 70 - 110 mg/dL   POCT glucose    Collection Time: 18  5:17 PM   Result Value Ref Range    POCT Glucose 142 (H) 70 - 110 mg/dL        Assessment:     37 y.o.  female POD 0 with Pre E on IV magnesium sulfate.    Active Hospital Problems    Diagnosis  POA     delivery delivered [O82]  Unknown    Encounter for induction of labor [Z34.90]  Not Applicable    Diabetes mellitus affecting pregnancy in third trimester [O24.913]  Yes     Diagnosed in pregnancy but A1C indicates pre-existing condition.      BMI 50.0-59.9, adult [Z68.43]  Not Applicable    Chronic hypertension with superimposed preeclampsia [O11.9]  Yes     No medications      Advanced maternal age, primigravida in second trimester, antepartum [O09.512]  Yes      Resolved Hospital Problems    Diagnosis Date Resolved POA    *Encounter for induction of labor [Z34.90] 2018 Not Applicable        Plan:     - Continue magnesium sulfate, no signs of toxicity at this time   - Close maternal monitoring including UOP and BP  - Recheck in 2-4 hours or PRN    Nic Reynolds MD

## 2018-02-10 NOTE — PLAN OF CARE
Problem: Patient Care Overview  Goal: Plan of Care Review  Outcome: Ongoing (interventions implemented as appropriate)  Lactation note:  To room to assist with breastfeeding. Assisted with waking and positioning infant at the breast. Infant nursing with breast compression/stimulation. Encouraged nursing frequently, waking as needed to feed, 8 or more times in 24 hours on cue until content. LC phone number on board.

## 2018-02-10 NOTE — LACTATION NOTE
02/10/18 1340   Maternal Infant Assessment   Breast Shape Left:;angled;wide   Breast Density Left:;soft   Areola Left:;elastic   Nipple(s) Left:;everted   Infant Assessment   Sucking Reflex present   Rooting Reflex present   Swallow Reflex present   LATCH Score   Latch 1-->repeated attempts, holds nipple in mouth, stimulate to suck   Audible Swallowing 1-->a few with stimulation   Type Of Nipple 2-->everted (after stimulation)   Comfort (Breast/Nipple) 2-->soft/nontender   Hold (Positioning) 1-->minimal assist, teach one side: mother does other, staff holds   Score (less than 7 for 2/more consecutive times, consult Lactation Consultant) 7       Number Scale   Presence of Pain denies   Location - Side Left   Location nipple(s)   Maternal Infant Feeding   Infant Positioning cross-cradle   Time Spent (min) 15-30 min   Latch Assistance yes   Breastfeeding Education adequate infant intake;adequate milk volume;importance of skin-to-skin contact   Infant First Feeding   Skin-to-Skin Contact Initiated   Feeding Infant   Feeding Readiness Cues rooting   Feeding Tolerance/Success sleepy   Effective Latch During Feeding yes   Skin-to-Skin Contact During Feeding yes   Lactation Referrals   Lactation Consult Breastfeeding assessment;Follow up   Lactation Interventions   Attachment Promotion breastfeeding assistance provided;counseling provided   Breastfeeding Assistance assisted with positioning;infant latch-on verified;infant stimulated to wakeful state

## 2018-02-10 NOTE — ANESTHESIA POSTPROCEDURE EVALUATION
"Anesthesia Post Evaluation    Patient: Wang Warren    Procedure(s) Performed: Procedure(s) (LRB):  DELIVERY- SECTION (N/A)    Final Anesthesia Type: epidural  Patient location during evaluation: floor  Patient participation: Yes- Able to Participate  Level of consciousness: awake and alert  Post-procedure vital signs: reviewed and stable  Pain management: adequate  Airway patency: patent  PONV status at discharge: No PONV  Anesthetic complications: no      Cardiovascular status: blood pressure returned to baseline  Respiratory status: unassisted  Hydration status: euvolemic  Follow-up not needed.        Visit Vitals  /65 (Patient Position: Sitting)   Pulse 74   Temp 36.7 °C (98 °F) (Oral)   Resp 18   Ht 5' 1" (1.549 m)   Wt 135 kg (297 lb 9.9 oz)   LMP  (LMP Unknown)   SpO2 97%   Breastfeeding? Yes   BMI 56.24 kg/m²       Pain/Gil Score: Pain Rating Prior to Med Admin: 8 (2/10/2018  8:58 AM)      "

## 2018-02-10 NOTE — PROGRESS NOTES
[facility]  Obstetrics  Postpartum Progress Note    Patient Name: Wang Warren  MRN: [id number]  Admission Date: 2/7/2018  Hospital Length of Stay: 3 days  Attending Physician: Mariela Valenzuela MD  Primary Care Provider: Primary Doctor No    Subjective:     Principal Problem:Encounter for induction of labor    Hospital course: 02/07/2018 Admit to L&D for IOL secondary to cHTN with increasing BPs over the last week.  02/10/2018 - Patient POD 1 s/p c/s due to non reassuring fetal heart tones and unchanging cervix. This morning patient is doing well. Currently on Determi 4/15 and Aspart 12/8/10. Patient's sugars have been mildly elevated. Asymptomatic    Interval History:   Patient is POD 1 s/p LTCS due to non reassuring fetal heart tones. Pregnancy complicated by difficult to control insulin dependent diabetes and pre eclampsia with severe features (BP). Patient's pressures normal overnight. Denies fatigue, sweats, shaking. Fasting blood sugar is elevated in 170s. Currently on NPH 4/15 and aspart 12/8/10.     Patient is doing well this morning. Tolerating diabetic diet. Martinez just removed this morning. Patient is status post mag. Reports no HA, vision changes, difficulty breathing, chest pain, RUQ pain or new leg edema. Denies syncope, lightheadedness, dizziness.     Objective:     Vital Signs (Most Recent):  Temp: 97.7 °F (36.5 °C) (02/10/18 0453)  Pulse: 75 (02/10/18 0612)  Resp: 18 (02/10/18 0453)  BP: 124/61 (02/10/18 0600)  SpO2: 96 % (02/10/18 0612) Vital Signs (24h Range):  Temp:  [96.6 °F (35.9 °C)-98.5 °F (36.9 °C)] 97.7 °F (36.5 °C)  Pulse:  [59-86] 75  Resp:  [16-19] 18  SpO2:  [89 %-100 %] 96 %  BP: (115-162)/(58-97) 124/61     Weight: 135 kg (297 lb 9.9 oz)  Body mass index is 56.24 kg/m².    Physical Exam  General: morbidly obese, resting comfortably, no distress  Cardiopulm: RRR, CTAB  Abdomen: Soft, non distended, incision pfannenstiel c/d/i        Intake/Output Summary  (Last 24 hours) at 02/10/18 0640  Last data filed at 02/10/18 0612   Gross per 24 hour   Intake             3770 ml   Output             4109 ml   Net             -339 ml       Significant Labs:  Lab Results   Component Value Date    GROUPTRH O POS 2018    HEPBSAG Negative 01/10/2018    STREPBCULT  2018     STREPTOCOCCUS AGALACTIAE (GROUP B)  Beta-hemolytic streptococci are routinely susceptible to   penicillins,cephalosporins and carbapenems.         Recent Labs  Lab 02/10/18  0400   HGB 9.1*   HCT 28.6*       I have personallly reviewed all pertinent lab results from the last 24 hours.    Assessment/Plan:     37 y.o. female  for:    Insulin dependent diabetes mellitus    - home regimen basaglar , humalog   - Now NPH 4/15, Aspart 12/8/10. Sugars to be checked fasting and 2 hours post prandial. So far have been not following that pattern. Will begin trend today. Fasting this morning elevated in 170s. Patient asymptomatic.            delivery delivered    Postpartum care:  - Patient doing well. Continue routine management and advances.  - Continue PO pain meds. Pain well controlled.  - Encourage ambulation.   - Heme: Pre Delivery h/h 10.633 -> 9..  - Contraception - defer to Dr Valenzuela  - Lactation - The patient is both breast and bottle feeding. Lactation nurse following along PRN  - Rh Status - pos            Chronic hypertension with superimposed preeclampsia    - Temp:  [96.6 °F (35.9 °C)-98.5 °F (36.9 °C)] 97.7 °F (36.5 °C)  Pulse:  [59-86] 75  Resp:  [16-19] 18  SpO2:  [89 %-100 %] 96 %  BP: (115-162)/(58-97) 124/61   - most recent PreE labs 2/6: CBC 7/10.7/33.1/276  Cr 0.9   AST/ALT   PCR 0.13  - s/p mag. Continue vitals per floor protocol        BMI 50.0-59.9, adult    - BMI 56.2 on admission            Disposition: As patient meets milestones, will plan to discharge POD 4.    Nic Reynolds MD  Obstetrics  [facility]    I have reviewed the  resident's note and agree with the diagnosis and management plan

## 2018-02-10 NOTE — ASSESSMENT & PLAN NOTE
- Temp:  [96.6 °F (35.9 °C)-98.5 °F (36.9 °C)] 97.7 °F (36.5 °C)  Pulse:  [59-86] 75  Resp:  [16-19] 18  SpO2:  [89 %-100 %] 96 %  BP: (115-162)/(58-97) 124/61   - most recent PreE labs 2/6: CBC 7/10.7/33.1/276  Cr 0.9   AST/ALT 13/13  PCR 0.13  - s/p mag. Continue vitals per floor protocol

## 2018-02-11 LAB
POCT GLUCOSE: 103 MG/DL (ref 70–110)
POCT GLUCOSE: 109 MG/DL (ref 70–110)
POCT GLUCOSE: 134 MG/DL (ref 70–110)
POCT GLUCOSE: 136 MG/DL (ref 70–110)
POCT GLUCOSE: 98 MG/DL (ref 70–110)
POCT GLUCOSE: 98 MG/DL (ref 70–110)

## 2018-02-11 PROCEDURE — 11000001 HC ACUTE MED/SURG PRIVATE ROOM

## 2018-02-11 PROCEDURE — 63600175 PHARM REV CODE 636 W HCPCS: Performed by: STUDENT IN AN ORGANIZED HEALTH CARE EDUCATION/TRAINING PROGRAM

## 2018-02-11 PROCEDURE — 25000003 PHARM REV CODE 250: Performed by: STUDENT IN AN ORGANIZED HEALTH CARE EDUCATION/TRAINING PROGRAM

## 2018-02-11 PROCEDURE — 72100003 HC LABOR CARE, EA. ADDL. 8 HRS

## 2018-02-11 PROCEDURE — 72100002 HC LABOR CARE, 1ST 8 HOURS

## 2018-02-11 PROCEDURE — 99232 SBSQ HOSP IP/OBS MODERATE 35: CPT | Mod: ,,, | Performed by: OBSTETRICS & GYNECOLOGY

## 2018-02-11 RX ADMIN — IBUPROFEN 600 MG: 600 TABLET, FILM COATED ORAL at 06:02

## 2018-02-11 RX ADMIN — HYDROCODONE BITARTRATE AND ACETAMINOPHEN 1 TABLET: 10; 325 TABLET ORAL at 01:02

## 2018-02-11 RX ADMIN — HYDROCODONE BITARTRATE AND ACETAMINOPHEN 1 TABLET: 10; 325 TABLET ORAL at 09:02

## 2018-02-11 RX ADMIN — INSULIN ASPART 10 UNITS: 100 INJECTION, SOLUTION INTRAVENOUS; SUBCUTANEOUS at 06:02

## 2018-02-11 RX ADMIN — INSULIN ASPART 8 UNITS: 100 INJECTION, SOLUTION INTRAVENOUS; SUBCUTANEOUS at 01:02

## 2018-02-11 RX ADMIN — INSULIN DETEMIR 15 UNITS: 100 INJECTION, SOLUTION SUBCUTANEOUS at 09:02

## 2018-02-11 RX ADMIN — INSULIN ASPART 12 UNITS: 100 INJECTION, SOLUTION INTRAVENOUS; SUBCUTANEOUS at 08:02

## 2018-02-11 RX ADMIN — HYDROCODONE BITARTRATE AND ACETAMINOPHEN 1 TABLET: 10; 325 TABLET ORAL at 04:02

## 2018-02-11 RX ADMIN — INSULIN DETEMIR 4 UNITS: 100 INJECTION, SOLUTION SUBCUTANEOUS at 08:02

## 2018-02-11 RX ADMIN — IBUPROFEN 600 MG: 600 TABLET, FILM COATED ORAL at 12:02

## 2018-02-11 RX ADMIN — IBUPROFEN 600 MG: 600 TABLET, FILM COATED ORAL at 01:02

## 2018-02-11 RX ADMIN — HYDROCODONE BITARTRATE AND ACETAMINOPHEN 1 TABLET: 10; 325 TABLET ORAL at 06:02

## 2018-02-11 NOTE — PROGRESS NOTES
Dr. Reynolds states for RN to d/c chlorhexidine gluconate bath. No other orders given. Pt safety maintained. Will continue to monitor.

## 2018-02-11 NOTE — PROGRESS NOTES
Pt requesting breast pump. RN instructed patient on how to use pump and how to clean it. RN available as well as lactation for any further questions.

## 2018-02-11 NOTE — SUBJECTIVE & OBJECTIVE
Hospital course: 02/07/2018 Admit to L&D for IOL secondary to cHTN with increasing BPs over the last week.  02/10/2018 - Patient POD 1 s/p c/s due to non reassuring fetal heart tones and unchanging cervix. This morning patient is doing well. Currently on Determi 4/15 and Aspart 12/8/10. Patient's sugars have been mildly elevated. Asymptomatic. Is off magnesium sulfate, which she was on for seizure prophylaxis in setting of Pre E.   02/11/2018 - POD#2 s/p c/s due to non reassuring fetal heart tones in a pregnancy complicated by DM2, cHTN with SI Pre E. No acute events overnight. Pt feeling well this morning, asymptomatic with glucose 's throughout the day.     Interval History:   She is doing well this morning. She is tolerating a regular diet without nausea or vomiting. She is voiding spontaneously. She is ambulating. She has passed flatus, and has not a BM. Vaginal bleeding is mild. She denies fever or chills, HA, vision changes, CP, SOB, RUQ pain. Abdominal pain is mild and controlled with oral medications. She is breastfeeding.     Objective:     Vital Signs (Most Recent):  Temp: 98.4 °F (36.9 °C) (02/11/18 0400)  Pulse: 72 (02/11/18 0400)  Resp: 16 (02/11/18 0400)  BP: (!) 148/73 (02/11/18 0400)  SpO2: 98 % (02/11/18 0400) Vital Signs (24h Range):  Temp:  [97.5 °F (36.4 °C)-98.4 °F (36.9 °C)] 98.4 °F (36.9 °C)  Pulse:  [72-82] 72  Resp:  [16-18] 16  SpO2:  [96 %-98 %] 98 %  BP: (121-148)/(61-73) 148/73     Weight: 135 kg (297 lb 9.9 oz)  Body mass index is 56.24 kg/m².      Intake/Output Summary (Last 24 hours) at 02/11/18 0504  Last data filed at 02/11/18 0425   Gross per 24 hour   Intake              930 ml   Output             1925 ml   Net             -995 ml     Physical Exam   Constitutional: She is oriented to person, place, and time and well-developed, well-nourished, and in no distress. No distress.   HENT:   Head: Normocephalic and atraumatic.   Cardiovascular: Normal rate and regular rhythm.     Pulmonary/Chest: Effort normal and breath sounds normal.   Abdominal: Soft. She exhibits no distension. There is no tenderness. There is no rebound and no guarding.   Incision c/d/i   Musculoskeletal: Normal range of motion. She exhibits edema (1+). She exhibits no tenderness.   Neurological: She is alert and oriented to person, place, and time.   Skin: She is not diaphoretic.   Vitals reviewed.        Significant Labs:  Lab Results   Component Value Date    GROUPTRH O POS 02/07/2018    HEPBSAG Negative 01/10/2018    STREPBCULT  01/24/2018     STREPTOCOCCUS AGALACTIAE (GROUP B)  Beta-hemolytic streptococci are routinely susceptible to   penicillins,cephalosporins and carbapenems.       CBC:   Recent Labs  Lab 02/10/18  0400   WBC 8.18   RBC 3.24*   HGB 9.1*   HCT 28.6*      MCV 88   MCH 28.1   MCHC 31.8*     Recent Labs      02/08/18   0602  02/08/18   0803  02/08/18   1013  02/08/18   1309  02/08/18   1529  02/08/18   1832  02/08/18   2032  02/08/18   2311  02/09/18   0109  02/09/18   0308  02/09/18   0458  02/09/18   0755  02/09/18   1321  02/09/18   1717  02/09/18   2016  02/09/18   2143  02/10/18   0516  02/10/18   0812  02/10/18   1036  02/10/18   1245  02/10/18   1509  02/10/18   1820  02/10/18   2111  02/11/18   0533   POCTGLUCOSE  88  103  108  94  84  82  78  97  116*  135*  156*  174*  158*  142*  173*  170*  170*  167*  136*  109  94  129*  96  134*

## 2018-02-11 NOTE — ASSESSMENT & PLAN NOTE
- home regimen basaglar 8/30, humalog 24/14/18  - Now NPH 4/15, Aspart 12/8/10. Sugars to be checked fasting and 2 hours post prandial.  Glucose yesterday 170 fasting and  's PP. Patient asymptomatic.

## 2018-02-11 NOTE — ASSESSMENT & PLAN NOTE
- BP: (121-148)/(61-73) 148/73  - most recent PreE labs 2/6: CBC 7/10.7/33.1/276  Cr 0.9   AST/ALT 13/13  PCR 0.13  - s/p mag. Continue vitals per floor protocol

## 2018-02-11 NOTE — LACTATION NOTE
02/11/18 1335   Lactation Interventions   Attachment Promotion counseling provided;family involvement promoted;privacy provided;rooming-in promoted;skin-to-skin contact encouraged   Breastfeeding Assistance feeding cue recognition promoted;support offered;both breasts offered each feeding   Maternal Breastfeeding Support encouragement offered;lactation counseling provided    Praised patient for breastfeeding; requested she call lactation for assistance;

## 2018-02-11 NOTE — PROGRESS NOTES
[facility]  Obstetrics  Postpartum Progress Note    Patient Name: Wang Warren  MRN: [id number]  Admission Date: 2/7/2018  Hospital Length of Stay: 4 days  Attending Physician: Mariela Valenzuela MD  Primary Care Provider: Primary Doctor No    Subjective:     Principal Problem:Encounter for induction of labor    Hospital course: 02/07/2018 Admit to L&D for IOL secondary to cHTN with increasing BPs over the last week.  02/10/2018 - Patient POD 1 s/p c/s due to non reassuring fetal heart tones and unchanging cervix. This morning patient is doing well. Currently on Determi 4/15 and Aspart 12/8/10. Patient's sugars have been mildly elevated. Asymptomatic. Is off magnesium sulfate, which she was on for seizure prophylaxis in setting of Pre E.   02/11/2018 - POD#2 s/p c/s due to non reassuring fetal heart tones in a pregnancy complicated by DM2, cHTN with SI Pre E. No acute events overnight. Pt feeling well this morning, asymptomatic with glucose 's throughout the day.     Interval History:   She is doing well this morning. She is tolerating a regular diet without nausea or vomiting. She is voiding spontaneously. She is ambulating. She has passed flatus, and has not a BM. Vaginal bleeding is mild. She denies fever or chills, HA, vision changes, CP, SOB, RUQ pain. Abdominal pain is mild and controlled with oral medications. She is breastfeeding.     Objective:     Vital Signs (Most Recent):  Temp: 98.4 °F (36.9 °C) (02/11/18 0400)  Pulse: 72 (02/11/18 0400)  Resp: 16 (02/11/18 0400)  BP: (!) 148/73 (02/11/18 0400)  SpO2: 98 % (02/11/18 0400) Vital Signs (24h Range):  Temp:  [97.5 °F (36.4 °C)-98.4 °F (36.9 °C)] 98.4 °F (36.9 °C)  Pulse:  [72-82] 72  Resp:  [16-18] 16  SpO2:  [96 %-98 %] 98 %  BP: (121-148)/(61-73) 148/73     Weight: 135 kg (297 lb 9.9 oz)  Body mass index is 56.24 kg/m².      Intake/Output Summary (Last 24 hours) at 02/11/18 7182  Last data filed at 02/11/18 2067    Gross per 24 hour   Intake              930 ml   Output             1925 ml   Net             -995 ml     Physical Exam   Constitutional: She is oriented to person, place, and time and well-developed, well-nourished, and in no distress. No distress.   HENT:   Head: Normocephalic and atraumatic.   Cardiovascular: Normal rate and regular rhythm.    Pulmonary/Chest: Effort normal and breath sounds normal.   Abdominal: Soft. She exhibits no distension. There is no tenderness. There is no rebound and no guarding.   Incision c/d/i   Musculoskeletal: Normal range of motion. She exhibits edema (1+). She exhibits no tenderness.   Neurological: She is alert and oriented to person, place, and time.   Skin: She is not diaphoretic.   Vitals reviewed.        Significant Labs:  Lab Results   Component Value Date    GROUPTRH O POS 2018    HEPBSAG Negative 01/10/2018    STREPBCULT  2018     STREPTOCOCCUS AGALACTIAE (GROUP B)  Beta-hemolytic streptococci are routinely susceptible to   penicillins,cephalosporins and carbapenems.       CBC:   Recent Labs  Lab 02/10/18  0400   WBC 8.18   RBC 3.24*   HGB 9.1*   HCT 28.6*      MCV 88   MCH 28.1   MCHC 31.8*     Recent Labs      18   0602  18   0803  18   1013  18   1309  18   1529  18   1832  18   2032  18   2311  18   0109  18   0308  18   0458  18   0755  18   1321  18   1717  18   2016  18   2143  02/10/18   0516  02/10/18   0812  02/10/18   1036  02/10/18   1245  02/10/18   1509  02/10/18   1820  02/10/18   2111  18   0533   POCTGLUCOSE  88  103  108  94  84  82  78  97  116*  135*  156*  174*  158*  142*  173*  170*  170*  167*  136*  109  94  129*  96  134*         Assessment/Plan:     37 y.o. female  for:    Insulin dependent diabetes mellitus    - home regimen basaglar , humalog   - Now NPH 4/15, Aspart 12/8/10. Sugars to be checked fasting  and 2 hours post prandial.  Glucose yesterday 170 fasting and  's PP. Patient asymptomatic.          delivery delivered    Postpartum care:  - Patient doing well. Continue routine management and advances.  - Continue PO pain meds. Pain well controlled.  - Encourage ambulation.   - Heme: Pre Delivery h/h 10.6/33 -> 9.1/28.  - Contraception - defer to Dr Valenzuela  - Lactation - The patient is both breast and bottle feeding. Lactation nurse following along PRN  - Rh Status - pos            Chronic hypertension with superimposed preeclampsia    - BP: (121-148)/(61-73) 148/73  - most recent PreE labs : CBC 7/10.7/33.1/276  Cr 0.9   AST/ALT 13/13  PCR 0.13  - s/p mag. Continue vitals per floor protocol        BMI 50.0-59.9, adult    - BMI 56.2 on admission            Disposition: As patient meets milestones, will plan to discharge POD#3-4.    Kristin Leong MD  Obstetrics  [facility]

## 2018-02-11 NOTE — PROGRESS NOTES
Dr. Leong states ok to administer pt's 15 units detemir with a BG of 96. No other orders given. Pt safety maintained. Will continue to monitor.

## 2018-02-12 ENCOUNTER — TELEPHONE (OUTPATIENT)
Dept: OBSTETRICS AND GYNECOLOGY | Facility: CLINIC | Age: 38
End: 2018-02-12

## 2018-02-12 ENCOUNTER — RESEARCH ENCOUNTER (OUTPATIENT)
Dept: RESEARCH | Facility: HOSPITAL | Age: 38
End: 2018-02-12

## 2018-02-12 PROBLEM — Z34.90 ENCOUNTER FOR INDUCTION OF LABOR: Status: RESOLVED | Noted: 2018-02-09 | Resolved: 2018-02-12

## 2018-02-12 LAB
POCT GLUCOSE: 102 MG/DL (ref 70–110)
POCT GLUCOSE: 144 MG/DL (ref 70–110)
POCT GLUCOSE: 50 MG/DL (ref 70–110)
POCT GLUCOSE: 86 MG/DL (ref 70–110)
POCT GLUCOSE: 91 MG/DL (ref 70–110)

## 2018-02-12 PROCEDURE — 99024 POSTOP FOLLOW-UP VISIT: CPT | Mod: ,,, | Performed by: OBSTETRICS & GYNECOLOGY

## 2018-02-12 PROCEDURE — 25000003 PHARM REV CODE 250: Performed by: STUDENT IN AN ORGANIZED HEALTH CARE EDUCATION/TRAINING PROGRAM

## 2018-02-12 PROCEDURE — 11000001 HC ACUTE MED/SURG PRIVATE ROOM

## 2018-02-12 PROCEDURE — 63600175 PHARM REV CODE 636 W HCPCS: Performed by: STUDENT IN AN ORGANIZED HEALTH CARE EDUCATION/TRAINING PROGRAM

## 2018-02-12 RX ORDER — INSULIN ASPART 100 [IU]/ML
8 INJECTION, SOLUTION INTRAVENOUS; SUBCUTANEOUS
Qty: 7.2 ML | Refills: 3 | Status: SHIPPED | OUTPATIENT
Start: 2018-02-12 | End: 2018-02-14

## 2018-02-12 RX ORDER — HYDROCODONE BITARTRATE AND ACETAMINOPHEN 5; 325 MG/1; MG/1
1 TABLET ORAL EVERY 4 HOURS PRN
Qty: 25 TABLET | Refills: 0 | Status: SHIPPED | OUTPATIENT
Start: 2018-02-12 | End: 2018-03-12

## 2018-02-12 RX ORDER — INSULIN ASPART 100 [IU]/ML
10 INJECTION, SOLUTION INTRAVENOUS; SUBCUTANEOUS
Status: DISCONTINUED | OUTPATIENT
Start: 2018-02-13 | End: 2018-02-13

## 2018-02-12 RX ORDER — INSULIN ASPART 100 [IU]/ML
10 INJECTION, SOLUTION INTRAVENOUS; SUBCUTANEOUS
Qty: 9 ML | Refills: 3 | Status: SHIPPED | OUTPATIENT
Start: 2018-02-12 | End: 2019-02-12

## 2018-02-12 RX ORDER — INSULIN ASPART 100 [IU]/ML
12 INJECTION, SOLUTION INTRAVENOUS; SUBCUTANEOUS
Qty: 10.8 ML | Refills: 3 | Status: SHIPPED | OUTPATIENT
Start: 2018-02-12 | End: 2018-02-14

## 2018-02-12 RX ADMIN — HYDROCODONE BITARTRATE AND ACETAMINOPHEN 1 TABLET: 10; 325 TABLET ORAL at 10:02

## 2018-02-12 RX ADMIN — HYDROCODONE BITARTRATE AND ACETAMINOPHEN 1 TABLET: 10; 325 TABLET ORAL at 04:02

## 2018-02-12 RX ADMIN — INSULIN ASPART 8 UNITS: 100 INJECTION, SOLUTION INTRAVENOUS; SUBCUTANEOUS at 12:02

## 2018-02-12 RX ADMIN — IBUPROFEN 600 MG: 600 TABLET, FILM COATED ORAL at 11:02

## 2018-02-12 RX ADMIN — INSULIN ASPART 10 UNITS: 100 INJECTION, SOLUTION INTRAVENOUS; SUBCUTANEOUS at 07:02

## 2018-02-12 RX ADMIN — INSULIN DETEMIR 4 UNITS: 100 INJECTION, SOLUTION SUBCUTANEOUS at 09:02

## 2018-02-12 RX ADMIN — HYDROCODONE BITARTRATE AND ACETAMINOPHEN 1 TABLET: 10; 325 TABLET ORAL at 05:02

## 2018-02-12 RX ADMIN — IBUPROFEN 600 MG: 600 TABLET, FILM COATED ORAL at 06:02

## 2018-02-12 RX ADMIN — IBUPROFEN 600 MG: 600 TABLET, FILM COATED ORAL at 05:02

## 2018-02-12 RX ADMIN — INSULIN ASPART 12 UNITS: 100 INJECTION, SOLUTION INTRAVENOUS; SUBCUTANEOUS at 09:02

## 2018-02-12 RX ADMIN — IBUPROFEN 600 MG: 600 TABLET, FILM COATED ORAL at 12:02

## 2018-02-12 RX ADMIN — INSULIN DETEMIR 15 UNITS: 100 INJECTION, SOLUTION SUBCUTANEOUS at 10:02

## 2018-02-12 RX ADMIN — HYDROCODONE BITARTRATE AND ACETAMINOPHEN 1 TABLET: 10; 325 TABLET ORAL at 11:02

## 2018-02-12 NOTE — PROGRESS NOTES
Checked on Ms. Warren to see how she was doing and to ask her the EQ-5D health questionnaire. She stated she is doing very well. Pt was randomized to routine dressing.      Having no problems walking about or with self care. Having no problems with usual activities. No pain or discomfort. Not feeling anxious or depressed. She rated her health today at a 90. She was given her gift card. Told pt I'd be calling in about 30 days to ask her the same questions as I did today.     Pain level: 2  Satisfaction with dressing: 10

## 2018-02-12 NOTE — PROGRESS NOTES
Dr. Reynolds notified of patient's 2 hour post prandial blood sugar of 50. Patient is asymptomatic and is about to eat lunch. Give patient scheduled 8 units novolog as ordered.

## 2018-02-12 NOTE — PROGRESS NOTES
[facility]  Obstetrics  Postpartum Progress Note    Patient Name: Wang Warren  MRN: [id number]  Admission Date: 2/7/2018  Hospital Length of Stay: 5 days  Attending Physician: Mariela Valenzuela MD  Primary Care Provider: Primary Doctor No    Subjective:     Principal Problem:Encounter for induction of labor    Hospital course: 02/07/2018 Admit to L&D for IOL secondary to cHTN with increasing BPs over the last week.  02/10/2018 - Patient POD 1 s/p c/s due to non reassuring fetal heart tones and unchanging cervix. This morning patient is doing well. Currently on Detemir 4/15 and Aspart 12/8/10. Patient's sugars have been mildly elevated. Asymptomatic. Is off magnesium sulfate, which she was on for seizure prophylaxis in setting of Pre E.   02/11/2018 - POD#2 s/p c/s due to non reassuring fetal heart tones in a pregnancy complicated by DM2, cHTN with SI Pre E. No acute events overnight. Pt feeling well this morning, asymptomatic with glucose 's throughout the day.   02/12/2018 - POD#3 s/p 1LTCS for NRFHT. Doing well this morning, meeting all post-operative goals. Glucoses were between 98 and 134 yesterday. Patient stable for discharge home with 6 week postpartum follow up.    Interval History: POD#3 s/p 1LTCS    She is doing well this morning. She is tolerating a regular diet without nausea or vomiting. She is voiding spontaneously. She is ambulating. She has passed flatus, and has not a BM. Vaginal bleeding is mild. She denies fever or chills. Abdominal pain is mild and controlled with oral medications. She is breastfeeding. Denies lightheadedness or syncope.     Objective:     Vital Signs (Most Recent):  Temp: 98.4 °F (36.9 °C) (02/12/18 0800)  Pulse: 70 (02/12/18 0800)  Resp: 18 (02/12/18 0800)  BP: (!) 147/82 (02/12/18 0800)  SpO2: 99 % (02/12/18 0800) Vital Signs (24h Range):  Temp:  [97.8 °F (36.6 °C)-98.5 °F (36.9 °C)] 98.4 °F (36.9 °C)  Pulse:  [63-75] 70  Resp:  [18]  18  SpO2:  [97 %-99 %] 99 %  BP: (125-148)/(57-82) 147/82     Weight: 135 kg (297 lb 9.9 oz)  Body mass index is 56.24 kg/m².    No intake or output data in the 24 hours ending 18 0826    Significant Labs:  Lab Results   Component Value Date    GROUPTRH O POS 2018    HEPBSAG Negative 01/10/2018    STREPBCULT  2018     STREPTOCOCCUS AGALACTIAE (GROUP B)  Beta-hemolytic streptococci are routinely susceptible to   penicillins,cephalosporins and carbapenems.       No results for input(s): HGB, HCT in the last 48 hours.    I have personallly reviewed all pertinent lab results from the last 24 hours.     Physical Exam   Constitutional: She is oriented to person, place, and time. She appears well-developed and well-nourished. No distress.   HENT:   Head: Normocephalic and atraumatic.   Eyes: EOM are normal.   Neck: Normal range of motion.   Cardiovascular: Normal rate.    Pulmonary/Chest: Effort normal. No respiratory distress.   Abdominal: Soft. She exhibits no mass. There is no tenderness. There is no guarding.   Incision c/d/i, low transverse incision  Fundus firm below umbilicus   Neurological: She is alert and oriented to person, place, and time.   Skin: Skin is warm and dry. She is not diaphoretic.   Psychiatric: She has a normal mood and affect. Her behavior is normal. Judgment and thought content normal.   Vitals reviewed.        Assessment/Plan:     37 y.o. female  for:    Insulin dependent diabetes mellitus    - home regimen basaglar , humalog   - Now NPH 4/15, Aspart 12/8/10. Sugars to be checked fasting and 2 hours post prandial.  Glucose yesterday 134 fasting and   PP. Patient asymptomatic.          delivery delivered    Postpartum care:  - Patient doing well. Continue routine management and advances.  - Continue PO pain meds. Pain well controlled.  - Encourage ambulation.   - Heme: Pre Delivery h/h 10.6 -> 9..  - Contraception - defer to Dr Valenzuela  -  Lactation - The patient is both breast and bottle feeding. Lactation nurse following along PRN  - Rh Status - pos            Chronic hypertension with superimposed preeclampsia    - BP: (125-148)/(57-82) 147/82  - most recent PreE labs 2/6: CBC 7/10.7/33.1/276  Cr 0.9   AST/ALT 13/13  PCR 0.13  - s/p mag. Continue vitals per floor protocol        BMI 50.0-59.9, adult    - BMI 56.2 on admission            Disposition: As patient meets milestones, will plan to discharge today.    Sushma K Reddy, MD  Obstetrics  [facility]

## 2018-02-12 NOTE — SUBJECTIVE & OBJECTIVE
Hospital course: 02/07/2018 Admit to L&D for IOL secondary to cHTN with increasing BPs over the last week.  02/10/2018 - Patient POD 1 s/p c/s due to non reassuring fetal heart tones and unchanging cervix. This morning patient is doing well. Currently on Detemir 4/15 and Aspart 12/8/10. Patient's sugars have been mildly elevated. Asymptomatic. Is off magnesium sulfate, which she was on for seizure prophylaxis in setting of Pre E.   02/11/2018 - POD#2 s/p c/s due to non reassuring fetal heart tones in a pregnancy complicated by DM2, cHTN with SI Pre E. No acute events overnight. Pt feeling well this morning, asymptomatic with glucose 's throughout the day.   02/12/2018 - POD#3 s/p 1LTCS for NRFHT. Doing well this morning, meeting all post-operative goals. Glucoses were between 98 and 134 yesterday. Patient stable for discharge home with 6 week postpartum follow up.    Interval History: POD#3 s/p 1LTCS    She is doing well this morning. She is tolerating a regular diet without nausea or vomiting. She is voiding spontaneously. She is ambulating. She has passed flatus, and has not a BM. Vaginal bleeding is mild. She denies fever or chills. Abdominal pain is mild and controlled with oral medications. She is breastfeeding. Denies lightheadedness or syncope.     Objective:     Vital Signs (Most Recent):  Temp: 98.4 °F (36.9 °C) (02/12/18 0800)  Pulse: 70 (02/12/18 0800)  Resp: 18 (02/12/18 0800)  BP: (!) 147/82 (02/12/18 0800)  SpO2: 99 % (02/12/18 0800) Vital Signs (24h Range):  Temp:  [97.8 °F (36.6 °C)-98.5 °F (36.9 °C)] 98.4 °F (36.9 °C)  Pulse:  [63-75] 70  Resp:  [18] 18  SpO2:  [97 %-99 %] 99 %  BP: (125-148)/(57-82) 147/82     Weight: 135 kg (297 lb 9.9 oz)  Body mass index is 56.24 kg/m².    No intake or output data in the 24 hours ending 02/12/18 0826    Significant Labs:  Lab Results   Component Value Date    GROUPTRH O POS 02/07/2018    HEPBSAG Negative 01/10/2018    STREPBCULT  01/24/2018      STREPTOCOCCUS AGALACTIAE (GROUP B)  Beta-hemolytic streptococci are routinely susceptible to   penicillins,cephalosporins and carbapenems.       No results for input(s): HGB, HCT in the last 48 hours.    I have personallly reviewed all pertinent lab results from the last 24 hours.     Physical Exam   Constitutional: She is oriented to person, place, and time. She appears well-developed and well-nourished. No distress.   HENT:   Head: Normocephalic and atraumatic.   Eyes: EOM are normal.   Neck: Normal range of motion.   Cardiovascular: Normal rate.    Pulmonary/Chest: Effort normal. No respiratory distress.   Abdominal: Soft. She exhibits no mass. There is no tenderness. There is no guarding.   Incision c/d/i, low transverse incision  Fundus firm below umbilicus   Neurological: She is alert and oriented to person, place, and time.   Skin: Skin is warm and dry. She is not diaphoretic.   Psychiatric: She has a normal mood and affect. Her behavior is normal. Judgment and thought content normal.   Vitals reviewed.

## 2018-02-12 NOTE — LACTATION NOTE
Lactation rounds.  Pt was skin to skin with baby on pacifier.  Pt stated that baby stayed on breast for 3 minutes.  Baby on phototherapy.  Discussed what a good feeding is and reviewed the basics of breastfeeding.  Updated pt on weight loss of 10.6% on day 3.  Latch assist done baby suck 1-2x, irritated and pulls away from breast.  When assessed, offered the pacifier and baby suckled more.  Showed this respond to pt and discussed how it can affect breastfeeding.  Since baby was refusing to suckle on breast, trial use of nipple shield to train the baby on the breast was done.  Filled nipple shield with pt's breastmilk by hand expression and latch assist with shield done.  Colostrum was noted to come out easily.  Explained that nipple shield will be a temporary tool to engage baby to suckle.  Taught rhythmic breast compression and gentle massages to keep the baby awake to nurse.  With rhythmic breast compression and gentle massages baby was suckling more with some audible swallows.  Discussed possible need to use pump after nursing to sustain breast stimulation and milk production.  Lots of support and encouragement provided.  Reinforced education on benefits of breastfeeding.  Pt verbalized understanding and verbalized she will try to work with the lactation care plan.   number on the board for further assistance.       02/12/18 1030   Maternal Medical Surgical History   Medical Disorder diabetes type 2;chronic hypertension   Infertility Procedure other (see comments)  (conceived spontaneously)   Maternal Infant Assessment   Breast Shape pendulous   Breast Density soft   Areola dense   Nipple(s) graspable   Infant Assessment   Mouth Size small   Sucking Reflex present   Rooting Reflex present   Swallow Reflex present   LATCH Score   Latch 1-->repeated attempts, holds nipple in mouth, stimulate to suck   Audible Swallowing 1-->a few with stimulation   Type Of Nipple 2-->everted (after stimulation)   Comfort  "(Breast/Nipple) 2-->soft/nontender   Hold (Positioning) 1-->minimal assist, teach one side: mother does other, staff holds   Score (less than 7 for 2/more consecutive times, consult Lactation Consultant) 7       Number Scale   Presence of Pain denies   Maternal Infant Feeding   Maternal Emotional State assist needed   Infant Positioning cross-cradle;clutch/"football"   Signs of Milk Transfer audible swallow   Time Spent (min) 15-30 min   Latch Assistance yes   Breastfeeding Education adequate milk volume;adequate infant intake;importance of skin-to-skin contact;other (see comments)  (risk of paci use)   Feeding Infant   Feeding Tolerance/Success arousal required   Effective Latch During Feeding other (see comments)  (use of nipple shield)   Audible Swallow yes   Skin-to-Skin Contact During Feeding yes   Lactation Referrals   Lactation Consult Breastfeeding assessment   Lactation Interventions   Attachment Promotion counseling provided;breastfeeding assistance provided   Breastfeeding Assistance assisted with positioning;nipple shield utilized   Maternal Breastfeeding Support lactation counseling provided   Latch Promotion suck stimulated with colostrum drop     "

## 2018-02-12 NOTE — DISCHARGE SUMMARY
[facility]  Obstetrics  Discharge Summary      Patient Name: Wang Warren  MRN: [id number]  Admission Date: 2018  Hospital Length of Stay: 5 days  Discharge Date and Time:  2018 8:35 AM  Attending Physician: Mariela Valenzuela MD   Discharging Provider: Sushma K Reddy, MD  Primary Care Provider: Primary Doctor No    HPI: Wang Warren is a 37 y.o. F at 36w6d presents for scheduled IOL. She has no complaints today.  Patient denies contractions, denies vaginal bleeding, denies LOF.   Fetal Movement: normal.      This IUP is complicated by the following:  T2DM (basaglar , humalog )  cHTN controlled without medication, but recently elevated, prompting IOL to begin today    Recent Labs:   CBC /10.7/33.1/276  Cr 0.9   AST/ALT   PCR 0.13    Last U/S @ 36w5d  Presentation: cephalic.  Placenta: posterior.  Umbilical cord: 3 vessel cord.  Amniotic fluid: MVP 4.1 cm. RACHELLE 9.5 cm.   EFW 2,811 g 28% [place]    Procedure(s) (LRB):  DELIVERY- SECTION (N/A)     Hospital Course:   2018 Admit to L&D for IOL secondary to cHTN with increasing BPs over the last week.  02/10/2018 - Patient POD 1 s/p c/s due to non reassuring fetal heart tones and unchanging cervix. This morning patient is doing well. Currently on Detemir 4/15 and Aspart 12/8/10. Patient's sugars have been mildly elevated. Asymptomatic. Is off magnesium sulfate, which she was on for seizure prophylaxis in setting of Pre E.   2018 - POD#2 s/p c/s due to non reassuring fetal heart tones in a pregnancy complicated by DM2, cHTN with SI Pre E. No acute events overnight. Pt feeling well this morning, asymptomatic with glucose 's throughout the day.   2018 - POD#3 s/p 1LTCS for NRFHT. Doing well this morning, meeting all post-operative goals. Glucoses were between 98 and 134 yesterday. Patient stable for discharge home with 6 week postpartum follow up.    Consults          Status Ordering Provider     Consult to Lactation  Use PRN     Provider:  (Not yet assigned)    Acknowledged JOSEFINA RUSHING          Final Active Diagnoses:    Diagnosis Date Noted POA    Insulin dependent diabetes mellitus [E11.9, Z79.4] 02/10/2018 Not Applicable     delivery delivered [O82] 2018 Unknown    BMI 50.0-59.9, adult [Z68.43] 2017 Not Applicable    Chronic hypertension with superimposed preeclampsia [O11.9] 2017 Yes      Problems Resolved During this Admission:    Diagnosis Date Noted Date Resolved POA    PRINCIPAL PROBLEM:  Encounter for induction of labor [Z34.90] 2018 Not Applicable    Encounter for induction of labor [Z34.90] 2018 Not Applicable    Diabetes mellitus affecting pregnancy in third trimester [O24.913] 10/11/2017 02/10/2018 Yes    Advanced maternal age, primigravida in second trimester, antepartum [O09.512] 2017 02/10/2018 Yes        Labs: CBC No results for input(s): WBC, HGB, HCT, PLT in the last 48 hours.    Feeding Method: breast    Immunizations     Date Immunization Status Dose Route/Site Given by    18 MMR Incomplete 0.5 mL Subcutaneous/Left deltoid     1816 Tdap Incomplete 0.5 mL Intramuscular/Left deltoid           Delivery:    Episiotomy: None   Lacerations: None   Repair suture:     Repair # of packets: 8   Blood loss (ml): 920     Birth information:  YOB: 2019   Time of birth: 5:51 AM   Sex: female   Delivery type: , Low Transverse   Gestational Age: 37w1d    Delivery Clinician:      Other providers:       Additional  information:  Forceps:    Vacuum:    Breech:    Observed anomalies      Living?:           APGARS  One minute Five minutes Ten minutes   Skin color:         Heart rate:         Grimace:         Muscle tone:         Breathing:         Totals: 7  8        Placenta: Delivered:       appearance    Pending Diagnostic Studies:     None           Discharged Condition: good    Disposition: Home or Self Care    Follow Up:  Follow-up Information     Mariela Valenzuela MD. Schedule an appointment as soon as possible for a visit in 6 weeks.    Specialty:  Obstetrics and Gynecology  Why:  Post-partum appointment  Contact information:  [address] [facility] [address]  [phone]                 Patient Instructions:     Call MD for:  temperature >100.4     Call MD for:  persistent nausea and vomiting or diarrhea     Call MD for:  severe uncontrolled pain     Call MD for:  redness, tenderness, or signs of infection (pain, swelling, redness, odor or green/yellow discharge around incision site)     Call MD for:  difficulty breathing or increased cough     Call MD for:  severe persistent headache     Call MD for:  worsening rash     Call MD for:  persistent dizziness, light-headedness, or visual disturbances     Call MD for:  increased confusion or weakness     Call MD for:   Order Comments: Vaginal bleeding soaking 1-2 pads per hour for 2 or more hours     Other restrictions (specify):   Order Comments: Pelvic rest until follow up visit. Nothing in vagina -no sex, tampons, douching, etc.    No baths (showers only) for 6 weeks       Medications:  Current Discharge Medication List      START taking these medications    Details   hydrocodone-acetaminophen 5-325mg (NORCO) 5-325 mg per tablet Take 1 tablet by mouth every 4 (four) hours as needed.  Qty: 25 tablet, Refills: 0         CONTINUE these medications which have CHANGED    Details   !! insulin aspart (NOVOLOG) 100 unit/mL InPn pen Inject 8 Units into the skin with lunch.  Qty: 7.2 mL, Refills: 3      !! insulin aspart (NOVOLOG) 100 unit/mL InPn pen Inject 12 Units into the skin daily with breakfast.  Qty: 10.8 mL, Refills: 3      !! insulin aspart (NOVOLOG) 100 unit/mL InPn pen Inject 10 Units into the skin before dinner.  Qty: 9 mL, Refills: 3      !! insulin detemir (LEVEMIR FLEXTOUCH) 100 unit/mL  "(3 mL) SubQ InPn pen Inject 15 Units into the skin every evening.  Qty: 13.5 mL, Refills: 3      !! insulin detemir (LEVEMIR FLEXTOUCH) 100 unit/mL (3 mL) SubQ InPn pen Inject 4 Units into the skin once daily.  Qty: 3.6 mL, Refills: 3       !! - Potential duplicate medications found. Please discuss with provider.      CONTINUE these medications which have NOT CHANGED    Details   aspirin 81 MG Chew Take 81 mg by mouth once daily.      BASAGLAR KWIKPEN 100 unit/mL (3 mL) InPn pen       BD INSULIN PEN NEEDLE UF MINI 31 gauge x 3/16" Ndle       BD INSULIN SYRINGE ULTRA-FINE 1 mL 31 gauge x 5/16 Syrg       blood sugar diagnostic (ONETOUCH VERIO) Strp 1 strip by Misc.(Non-Drug; Combo Route) route 4 (four) times daily.  Qty: 200 each, Refills: 3      docusate sodium (COLACE) 100 MG capsule Take 1 capsule (100 mg total) by mouth 2 (two) times daily.  Qty: 60 capsule, Refills: 3      ferrous sulfate 325 mg (65 mg iron) Tab tablet TK 1 T PO daily  Refills: 1      folic acid (FOLVITE) 1 MG tablet Take 4 tablets (4 mg total) by mouth once daily.  Qty: 120 tablet, Refills: 4    Associated Diagnoses: Pre-existing type 2 diabetes mellitus during pregnancy in second trimester      glucagon (human recombinant) inj 1mg/mL kit Inject 1 mL (1 mg total) into the muscle as needed. Administer if blood glucose <50-60 and symptomatic  Qty: 1 kit, Refills: 1      !! lancets (ONETOUCH ULTRASOFT LANCETS) Misc 1 lancet by Misc.(Non-Drug; Combo Route) route 4 (four) times daily.  Qty: 200 each, Refills: 3      PRENATAL VIT CALC,IRON,FOLIC (PRENATAL VITAMIN ORAL) Take by mouth.      !! TRUEPLUS LANCETS 33 gauge Misc        !! - Potential duplicate medications found. Please discuss with provider.      STOP taking these medications       metFORMIN (GLUCOPHAGE) 500 MG tablet Comments:   Reason for Stopping:               Sushma K Reddy, MD  Obstetrics  [facility]  "

## 2018-02-12 NOTE — ASSESSMENT & PLAN NOTE
- BP: (125-148)/(57-82) 147/82  - most recent PreE labs 2/6: CBC 7/10.7/33.1/276  Cr 0.9   AST/ALT 13/13  PCR 0.13  - s/p mag. Continue vitals per floor protocol

## 2018-02-12 NOTE — PLAN OF CARE
Problem: Patient Care Overview  Goal: Plan of Care Review  Outcome: Ongoing (interventions implemented as appropriate)  To breastfeed baby 8x or more in 24 hours, feed on cue. To practice correct latch and positioning with breast compression during feeding and skin to skin during feeding.  To observe for signs of milk transfer during feeding.  To call for further breastfeeding assistance/ support if needed.  Temporary use of nipple shield if baby refuses to suckle without silicone texture (to address pacifier preference concerns during assessment).  To hand express or pump after nursing to augment with last few days of poor latch/ breast stimulation (this is to sustain supply).

## 2018-02-12 NOTE — ASSESSMENT & PLAN NOTE
- home regimen basaglar 8/30, humalog 24/14/18  - Now NPH 4/15, Aspart 12/8/10. Sugars to be checked fasting and 2 hours post prandial.  Glucose yesterday 134 fasting and   PP. Patient asymptomatic.

## 2018-02-13 LAB
POCT GLUCOSE: 106 MG/DL (ref 70–110)
POCT GLUCOSE: 107 MG/DL (ref 70–110)
POCT GLUCOSE: 150 MG/DL (ref 70–110)
POCT GLUCOSE: 44 MG/DL (ref 70–110)
POCT GLUCOSE: 64 MG/DL (ref 70–110)
POCT GLUCOSE: 87 MG/DL (ref 70–110)

## 2018-02-13 PROCEDURE — 99024 POSTOP FOLLOW-UP VISIT: CPT | Mod: ,,, | Performed by: OBSTETRICS & GYNECOLOGY

## 2018-02-13 PROCEDURE — 25000003 PHARM REV CODE 250: Performed by: STUDENT IN AN ORGANIZED HEALTH CARE EDUCATION/TRAINING PROGRAM

## 2018-02-13 PROCEDURE — 25000003 PHARM REV CODE 250: Performed by: OBSTETRICS & GYNECOLOGY

## 2018-02-13 PROCEDURE — 11000001 HC ACUTE MED/SURG PRIVATE ROOM

## 2018-02-13 RX ORDER — INSULIN ASPART 100 [IU]/ML
6 INJECTION, SOLUTION INTRAVENOUS; SUBCUTANEOUS
Status: DISCONTINUED | OUTPATIENT
Start: 2018-02-14 | End: 2018-02-14 | Stop reason: HOSPADM

## 2018-02-13 RX ORDER — NIFEDIPINE 30 MG/1
30 TABLET, EXTENDED RELEASE ORAL DAILY
Status: DISCONTINUED | OUTPATIENT
Start: 2018-02-13 | End: 2018-02-14

## 2018-02-13 RX ORDER — NIFEDIPINE 30 MG/1
30 TABLET, EXTENDED RELEASE ORAL DAILY
Qty: 30 TABLET | Refills: 11 | Status: SHIPPED | OUTPATIENT
Start: 2018-02-14 | End: 2018-02-14 | Stop reason: HOSPADM

## 2018-02-13 RX ORDER — INSULIN ASPART 100 [IU]/ML
8 INJECTION, SOLUTION INTRAVENOUS; SUBCUTANEOUS
Status: DISCONTINUED | OUTPATIENT
Start: 2018-02-14 | End: 2018-02-14 | Stop reason: HOSPADM

## 2018-02-13 RX ADMIN — INSULIN ASPART 10 UNITS: 100 INJECTION, SOLUTION INTRAVENOUS; SUBCUTANEOUS at 09:02

## 2018-02-13 RX ADMIN — INSULIN DETEMIR 15 UNITS: 100 INJECTION, SOLUTION SUBCUTANEOUS at 09:02

## 2018-02-13 RX ADMIN — NIFEDIPINE 30 MG: 30 TABLET, FILM COATED, EXTENDED RELEASE ORAL at 08:02

## 2018-02-13 RX ADMIN — IBUPROFEN 600 MG: 600 TABLET, FILM COATED ORAL at 12:02

## 2018-02-13 RX ADMIN — HYDROCODONE BITARTRATE AND ACETAMINOPHEN 1 TABLET: 10; 325 TABLET ORAL at 12:02

## 2018-02-13 RX ADMIN — HYDROCODONE BITARTRATE AND ACETAMINOPHEN 1 TABLET: 10; 325 TABLET ORAL at 05:02

## 2018-02-13 RX ADMIN — INSULIN ASPART 10 UNITS: 100 INJECTION, SOLUTION INTRAVENOUS; SUBCUTANEOUS at 05:02

## 2018-02-13 RX ADMIN — HYDROCODONE BITARTRATE AND ACETAMINOPHEN 1 TABLET: 5; 325 TABLET ORAL at 05:02

## 2018-02-13 RX ADMIN — IBUPROFEN 600 MG: 600 TABLET, FILM COATED ORAL at 05:02

## 2018-02-13 RX ADMIN — INSULIN DETEMIR 4 UNITS: 100 INJECTION, SOLUTION SUBCUTANEOUS at 09:02

## 2018-02-13 NOTE — PLAN OF CARE
Problem: Patient Care Overview  Goal: Plan of Care Review  Outcome: Ongoing (interventions implemented as appropriate)  To breastfeed baby 8x or more in 24 hours, feed on cue. To practice correct latch and positioning with breast compression during feeding and skin to skin during feeding.  To observe for signs of milk transfer during feeding.  To call for further breastfeeding assistance/ support if needed.  Temporarily use nipple shields to sustain baby's latch since baby was just on and off on mom's nipple without the shield (noted that baby have used paci during phototherapy, the shield was helping baby transition to breast). To pump 3-4x on top of nursing and give expressed milk to baby to help address weight loss.

## 2018-02-13 NOTE — ASSESSMENT & PLAN NOTE
BP: (146-190)/(70-89) 150/70   - most recent PreE labs 2/6: CBC 7/10.7/33.1/276  Cr 0.9   AST/ALT 13/13  PCR 0.13  - s/p mag. Continue vitals per floor protocol  - Start procardia today and monitor BPs

## 2018-02-13 NOTE — SUBJECTIVE & OBJECTIVE
Hospital course: 02/07/2018 Admit to L&D for IOL secondary to cHTN with increasing BPs over the last week.  02/10/2018 - Patient POD 1 s/p c/s due to non reassuring fetal heart tones and unchanging cervix. This morning patient is doing well. Currently on Detemir 4/15 and Aspart 12/8/10. Patient's sugars have been mildly elevated. Asymptomatic. Is off magnesium sulfate, which she was on for seizure prophylaxis in setting of Pre E.   02/11/2018 - POD#2 s/p c/s due to non reassuring fetal heart tones in a pregnancy complicated by DM2, cHTN with SI Pre E. No acute events overnight. Pt feeling well this morning, asymptomatic with glucose 's throughout the day.   02/12/2018 - POD#3 s/p 1LTCS for NRFHT. Doing well this morning, meeting all post-operative goals. Glucoses were between 98 and 134 yesterday. Patient stable for discharge home with 6 week postpartum follow up.  02/13/2018 - POD#4 s/p 1LTCS for NRFTHs. Doing well this morning. Meeting all post op milestones. BGs table. Ready for discharge    Interval History:   She is doing well this morning. She is tolerating a regular diet without nausea or vomiting. She is voiding spontaneously. She is ambulating. She has passed flatus, and has a BM. Vaginal bleeding is mild. She denies fever or chills. Abdominal pain is mild and controlled with oral medications.  Pt denies any HA, visual changes, SOB, CP, N/V, or RUQ pain at this time.      Objective:     Vital Signs (Most Recent):  Temp: 97.6 °F (36.4 °C) (02/13/18 0300)  Pulse: 63 (02/13/18 0500)  Resp: 18 (02/12/18 1600)  BP: (!) 150/70 (02/13/18 0500)  SpO2: 100 % (02/13/18 0500) Vital Signs (24h Range):  Temp:  [97.6 °F (36.4 °C)-99 °F (37.2 °C)] 97.6 °F (36.4 °C)  Pulse:  [63-75] 63  Resp:  [18] 18  SpO2:  [96 %-100 %] 100 %  BP: (146-190)/(70-89) 150/70     Weight: 135 kg (297 lb 9.9 oz)  Body mass index is 56.24 kg/m².    No intake or output data in the 24 hours ending 02/13/18 0707    Significant Labs:  Lab  Results   Component Value Date    GROUPTRH O POS 02/07/2018    HEPBSAG Negative 01/10/2018    STREPBCULT  01/24/2018     STREPTOCOCCUS AGALACTIAE (GROUP B)  Beta-hemolytic streptococci are routinely susceptible to   penicillins,cephalosporins and carbapenems.       No results for input(s): HGB, HCT in the last 48 hours.    Physical Exam   Constitutional: She is oriented to person, place, and time and well-developed, well-nourished, and in no distress.   Cardiovascular: Normal rate.    Pulmonary/Chest: Effort normal.   Abdominal: Soft. She exhibits no distension. There is no tenderness. There is no rebound and no guarding.   Incision c/d/i     Neurological: She is alert and oriented to person, place, and time.         CBC: No results for input(s): WBC, RBC, HGB, HCT, PLT, MCV, MCH, MCHC in the last 48 hours.

## 2018-02-13 NOTE — PROGRESS NOTES
[facility]  Obstetrics  Postpartum Progress Note    Patient Name: Wang Warren  MRN: [id number]  Admission Date: 2/7/2018  Hospital Length of Stay: 6 days  Attending Physician: Mariela Valenzuela MD  Primary Care Provider: Primary Doctor No    Subjective:     Principal Problem:Encounter for induction of labor    Hospital course: 02/07/2018 Admit to L&D for IOL secondary to cHTN with increasing BPs over the last week.  02/10/2018 - Patient POD 1 s/p c/s due to non reassuring fetal heart tones and unchanging cervix. This morning patient is doing well. Currently on Detemir 4/15 and Aspart 12/8/10. Patient's sugars have been mildly elevated. Asymptomatic. Is off magnesium sulfate, which she was on for seizure prophylaxis in setting of Pre E.   02/11/2018 - POD#2 s/p c/s due to non reassuring fetal heart tones in a pregnancy complicated by DM2, cHTN with SI Pre E. No acute events overnight. Pt feeling well this morning, asymptomatic with glucose 's throughout the day.   02/12/2018 - POD#3 s/p 1LTCS for NRFHT. Doing well this morning, meeting all post-operative goals. Glucoses were between 98 and 134 yesterday. Patient stable for discharge home with 6 week postpartum follow up.  02/13/2018 - POD#4 s/p 1LTCS for NRFTHs. Doing well this morning. Meeting all post op milestones. BGs table. Ready for discharge    Interval History:   She is doing well this morning. She is tolerating a regular diet without nausea or vomiting. She is voiding spontaneously. She is ambulating. She has passed flatus, and has a BM. Vaginal bleeding is mild. She denies fever or chills. Abdominal pain is mild and controlled with oral medications.  Pt denies any HA, visual changes, SOB, CP, N/V, or RUQ pain at this time.      Objective:     Vital Signs (Most Recent):  Temp: 97.6 °F (36.4 °C) (02/13/18 0300)  Pulse: 63 (02/13/18 0500)  Resp: 18 (02/12/18 1600)  BP: (!) 150/70 (02/13/18 0500)  SpO2: 100 % (02/13/18  0500) Vital Signs (24h Range):  Temp:  [97.6 °F (36.4 °C)-99 °F (37.2 °C)] 97.6 °F (36.4 °C)  Pulse:  [63-75] 63  Resp:  [18] 18  SpO2:  [96 %-100 %] 100 %  BP: (146-190)/(70-89) 150/70     Weight: 135 kg (297 lb 9.9 oz)  Body mass index is 56.24 kg/m².    No intake or output data in the 24 hours ending 18 0707    Significant Labs:  Lab Results   Component Value Date    GROUPTRH O POS 2018    HEPBSAG Negative 01/10/2018    STREPBCULT  2018     STREPTOCOCCUS AGALACTIAE (GROUP B)  Beta-hemolytic streptococci are routinely susceptible to   penicillins,cephalosporins and carbapenems.       No results for input(s): HGB, HCT in the last 48 hours.    Physical Exam   Constitutional: She is oriented to person, place, and time and well-developed, well-nourished, and in no distress.   Cardiovascular: Normal rate.    Pulmonary/Chest: Effort normal.   Abdominal: Soft. She exhibits no distension. There is no tenderness. There is no rebound and no guarding.   Incision c/d/i     Neurological: She is alert and oriented to person, place, and time.         CBC: No results for input(s): WBC, RBC, HGB, HCT, PLT, MCV, MCH, MCHC in the last 48 hours.    Assessment/Plan:     37 y.o. female  for:    Insulin dependent diabetes mellitus    - home regimen basaglar , humalog   - Now NPH 4/15, Aspart 10/8/10. Decreased yesterday after PP lunch was 50. Sugars to be checked fasting and 2 hours post prandial.    Blood Sugars (AccuCheck):  Recent Labs      18   1259  18   1816  18   2127  18   0619  18   0906  18   1232  18   1512  18   1910   POCTGLUCOSE  98  136*  98  91  102  50*  86  144*              delivery delivered    Postpartum care:  - Patient doing well. Continue routine management and advances.  - Continue PO pain meds. Pain well controlled.  - Encourage ambulation.   - Heme: Pre Delivery h/h 10.6 -> 9..  - Contraception - defer to   Nicolas  - Lactation - The patient is both breast and bottle feeding. Lactation nurse following along PRN  - Rh Status - pos            Chronic hypertension with superimposed preeclampsia    BP: (146-190)/(70-89) 150/70   - most recent PreE labs 2/6: CBC 7/10.7/33.1/276  Cr 0.9   AST/ALT 13/13  PCR 0.13  - s/p mag. Continue vitals per floor protocol  - Start procardia today and monitor BPs        BMI 50.0-59.9, adult    - BMI 56.2 on admission            Disposition: As patient meets milestones, will plan to discharge later today if BP stable.    Roxana Valera MD  Obstetrics  [facility]

## 2018-02-13 NOTE — ASSESSMENT & PLAN NOTE
- home regimen basaglar 8/30, humalog 24/14/18  - Now NPH 4/15, Aspart 10/8/10. Decreased yesterday after PP lunch was 50. Sugars to be checked fasting and 2 hours post prandial.    Blood Sugars (AccuCheck):  Recent Labs      02/11/18   1259  02/11/18   1816  02/11/18   2127  02/12/18   0619  02/12/18   0906  02/12/18   1232  02/12/18   1512  02/12/18   1910   POCTGLUCOSE  98  136*  98  91  102  50*  86  144*

## 2018-02-13 NOTE — PROGRESS NOTES
Informed by tech that patient's blood pressure was elevated this morning. Upon assessment, pt states  woke up worried about baby not being present in room. Pt stated this alarmed her before she remembered that baby was taken to nurses station. Requested her pressure be taken once she is more calm. Will reassess in 15-30 mins.

## 2018-02-13 NOTE — LACTATION NOTE
"Lactation rounds with pt.  Baby was given 20 mls of expressed milk.  Praised pt for pumping milk for baby.  Assessed baby's hunger cue and told pt we may need to put baby on breast to feed since baby was rooting.  Baby had difficult time to latch without nipple shield.  Assessed breast and taught hand expression, milk squirted on hand expression.  Latch assist done with nipple shield.  Baby sustained nursing for 15 minutes with audible swallows on left breast.  Baby satisfied and contented after nursing.  Reviewed basics and discharge education provided.  Pump teaching/ hand expression done.  Noted 10.8% weight loss at day 4, advise pt to express milk post nursing and give expressed milk to baby to help address weight loss.   Pt verbalized understanding.     number on the board.      02/13/18 0900   Maternal Medical Surgical History   Medical Disorder chronic hypertension;diabetes type 2   Maternal Infant Assessment   Breast Shape pendulous   Breast Density soft   Areola elastic   Nipple(s) graspable   Infant Assessment   Sucking Reflex present   Rooting Reflex present   Swallow Reflex present   LATCH Score   Latch 2-->grasps breast, tongue down, lips flanged, rhythmic sucking  (use of nipple shield)   Audible Swallowing 1-->a few with stimulation   Type Of Nipple 2-->everted (after stimulation)   Comfort (Breast/Nipple) 2-->soft/nontender   Hold (Positioning) 1-->minimal assist, teach one side: mother does other, staff holds   Score (less than 7 for 2/more consecutive times, consult Lactation Consultant) 8       Number Scale   Presence of Pain denies   Maternal Infant Feeding   Maternal Emotional State assist needed   Infant Positioning clutch/"football"   Signs of Milk Transfer audible swallow   Time Spent (min) 15-30 min   Latch Assistance yes   Breastfeeding Education adequate infant intake;adequate milk volume;importance of skin-to-skin contact;increasing milk supply;label/storage of breast milk;medication " effects;milk expression, hand;milk expression, electric pump   Feeding Infant   Feeding Readiness Cues eager   Effective Latch During Feeding other (see comments)  (use of nipple shield)   Audible Swallow yes   Skin-to-Skin Contact During Feeding yes   Equipment Type/Education   Pump Type Symphony;Other (Comment)  (hand held pump)   Breast Pump Type double electric, hospital grade;manual   Breast Pump Flange Type hard   Breast Pump Flange Size 24 mm   Pumping Frequency (times) 4 # of times pumped   Lactation Referrals   Lactation Consult Breastfeeding assessment;Pump teaching;Other (Comment)  (discharge plan)   Lactation Interventions   Attachment Promotion counseling provided;skin-to-skin contact encouraged   Breastfeeding Assistance nipple shield utilized   Maternal Breastfeeding Support lactation counseling provided   Latch Promotion suck stimulated with colostrum drop

## 2018-02-13 NOTE — PROGRESS NOTES
Patient's blood sugar is 44 prior to eating lunch. Insulin held at this time. Patient denies s/s of hypoglycemia. Patient instructed to eat lunch. Attempted to call lower level resident; resident in delivery. Attempted to call upper level resident; resident in ICU. Will try to contact MD again for further instruction.

## 2018-02-13 NOTE — PROGRESS NOTES
Dr. Jimenez notified of patient's 2 hours post prandial blood sugar is 64 . Patient asymptomatic. Orange juice given. Will recheck in one hour prior to lunch.

## 2018-02-14 VITALS
TEMPERATURE: 98 F | HEART RATE: 75 BPM | RESPIRATION RATE: 18 BRPM | OXYGEN SATURATION: 100 % | SYSTOLIC BLOOD PRESSURE: 149 MMHG | BODY MASS INDEX: 55.32 KG/M2 | WEIGHT: 293 LBS | HEIGHT: 61 IN | DIASTOLIC BLOOD PRESSURE: 76 MMHG

## 2018-02-14 LAB
POCT GLUCOSE: 56 MG/DL (ref 70–110)
POCT GLUCOSE: 73 MG/DL (ref 70–110)
POCT GLUCOSE: 79 MG/DL (ref 70–110)
POCT GLUCOSE: 94 MG/DL (ref 70–110)

## 2018-02-14 PROCEDURE — 63600175 PHARM REV CODE 636 W HCPCS: Performed by: STUDENT IN AN ORGANIZED HEALTH CARE EDUCATION/TRAINING PROGRAM

## 2018-02-14 PROCEDURE — 3E0234Z INTRODUCTION OF SERUM, TOXOID AND VACCINE INTO MUSCLE, PERCUTANEOUS APPROACH: ICD-10-PCS | Performed by: OBSTETRICS & GYNECOLOGY

## 2018-02-14 PROCEDURE — 90715 TDAP VACCINE 7 YRS/> IM: CPT | Performed by: STUDENT IN AN ORGANIZED HEALTH CARE EDUCATION/TRAINING PROGRAM

## 2018-02-14 PROCEDURE — 90471 IMMUNIZATION ADMIN: CPT | Performed by: STUDENT IN AN ORGANIZED HEALTH CARE EDUCATION/TRAINING PROGRAM

## 2018-02-14 PROCEDURE — 25000003 PHARM REV CODE 250: Performed by: STUDENT IN AN ORGANIZED HEALTH CARE EDUCATION/TRAINING PROGRAM

## 2018-02-14 PROCEDURE — 99024 POSTOP FOLLOW-UP VISIT: CPT | Mod: ,,, | Performed by: OBSTETRICS & GYNECOLOGY

## 2018-02-14 RX ORDER — NIFEDIPINE 60 MG/1
60 TABLET, EXTENDED RELEASE ORAL DAILY
Qty: 30 TABLET | Refills: 11 | Status: SHIPPED | OUTPATIENT
Start: 2018-02-15 | End: 2019-03-18 | Stop reason: SDUPTHER

## 2018-02-14 RX ORDER — INSULIN GLARGINE 100 [IU]/ML
15 INJECTION, SOLUTION SUBCUTANEOUS NIGHTLY
Qty: 15 ML | Refills: 2 | Status: SHIPPED | OUTPATIENT
Start: 2018-02-14 | End: 2018-11-23 | Stop reason: SDUPTHER

## 2018-02-14 RX ORDER — NIFEDIPINE 30 MG/1
60 TABLET, EXTENDED RELEASE ORAL DAILY
Status: DISCONTINUED | OUTPATIENT
Start: 2018-02-14 | End: 2018-02-14 | Stop reason: HOSPADM

## 2018-02-14 RX ORDER — INSULIN ASPART 100 [IU]/ML
8 INJECTION, SOLUTION INTRAVENOUS; SUBCUTANEOUS
Qty: 7.2 ML | Refills: 3 | Status: SHIPPED | OUTPATIENT
Start: 2018-02-14 | End: 2019-07-25

## 2018-02-14 RX ORDER — INSULIN ASPART 100 [IU]/ML
8 INJECTION, SOLUTION INTRAVENOUS; SUBCUTANEOUS
Qty: 3 ML | Refills: 2 | Status: SHIPPED | OUTPATIENT
Start: 2018-02-14 | End: 2018-02-14 | Stop reason: HOSPADM

## 2018-02-14 RX ORDER — INSULIN ASPART 100 [IU]/ML
6 INJECTION, SOLUTION INTRAVENOUS; SUBCUTANEOUS
Qty: 5.4 ML | Refills: 3 | Status: SHIPPED | OUTPATIENT
Start: 2018-02-14 | End: 2019-02-14

## 2018-02-14 RX ORDER — NIFEDIPINE 10 MG/1
10 CAPSULE ORAL ONCE
Status: COMPLETED | OUTPATIENT
Start: 2018-02-14 | End: 2018-02-14

## 2018-02-14 RX ADMIN — NIFEDIPINE 60 MG: 30 TABLET, FILM COATED, EXTENDED RELEASE ORAL at 08:02

## 2018-02-14 RX ADMIN — HYDROCODONE BITARTRATE AND ACETAMINOPHEN 1 TABLET: 10; 325 TABLET ORAL at 05:02

## 2018-02-14 RX ADMIN — HYDROCODONE BITARTRATE AND ACETAMINOPHEN 1 TABLET: 5; 325 TABLET ORAL at 01:02

## 2018-02-14 RX ADMIN — HYDROCODONE BITARTRATE AND ACETAMINOPHEN 1 TABLET: 10; 325 TABLET ORAL at 07:02

## 2018-02-14 RX ADMIN — CLOSTRIDIUM TETANI TOXOID ANTIGEN (FORMALDEHYDE INACTIVATED), CORYNEBACTERIUM DIPHTHERIAE TOXOID ANTIGEN (FORMALDEHYDE INACTIVATED), BORDETELLA PERTUSSIS TOXOID ANTIGEN (GLUTARALDEHYDE INACTIVATED), BORDETELLA PERTUSSIS FILAMENTOUS HEMAGGLUTININ ANTIGEN (FORMALDEHYDE INACTIVATED), BORDETELLA PERTUSSIS PERTACTIN ANTIGEN, AND BORDETELLA PERTUSSIS FIMBRIAE 2/3 ANTIGEN 0.5 ML: 5; 2; 2.5; 5; 3; 5 INJECTION, SUSPENSION INTRAMUSCULAR at 12:02

## 2018-02-14 RX ADMIN — NIFEDIPINE 10 MG: 10 CAPSULE, LIQUID FILLED ORAL at 01:02

## 2018-02-14 RX ADMIN — INSULIN ASPART 10 UNITS: 100 INJECTION, SOLUTION INTRAVENOUS; SUBCUTANEOUS at 04:02

## 2018-02-14 RX ADMIN — INSULIN ASPART 6 UNITS: 100 INJECTION, SOLUTION INTRAVENOUS; SUBCUTANEOUS at 01:02

## 2018-02-14 RX ADMIN — IBUPROFEN 600 MG: 600 TABLET, FILM COATED ORAL at 01:02

## 2018-02-14 RX ADMIN — IBUPROFEN 600 MG: 600 TABLET, FILM COATED ORAL at 12:02

## 2018-02-14 RX ADMIN — IBUPROFEN 600 MG: 600 TABLET, FILM COATED ORAL at 05:02

## 2018-02-14 RX ADMIN — IBUPROFEN 600 MG: 600 TABLET, FILM COATED ORAL at 07:02

## 2018-02-14 RX ADMIN — INSULIN ASPART 8 UNITS: 100 INJECTION, SOLUTION INTRAVENOUS; SUBCUTANEOUS at 08:02

## 2018-02-14 RX ADMIN — INSULIN DETEMIR 4 UNITS: 100 INJECTION, SOLUTION SUBCUTANEOUS at 08:02

## 2018-02-14 NOTE — PROGRESS NOTES
Notified on call resident in regards to patient's elevated pressures during midnight checks. No new orders received at this time. Will continue to monitor.

## 2018-02-14 NOTE — PROGRESS NOTES
"Dr. Sykes notified of stable BP's, also notified of pt stating she ate peanuts and a "honey bun" and blood sugar check = 79.  "

## 2018-02-14 NOTE — ASSESSMENT & PLAN NOTE
- BP: (138-165)/(73-97) 163/82  - most recent PreE labs 2/6: CBC 7/10.7/33.1/276  Cr 0.9   AST/ALT 13/13  PCR 0.13  - s/p mag. Continue vitals per floor protocol  - Started on procardia 30 XL yesterday, BP were elevated yesterday and patient was given 10mg immediate release procardia; dose increased to procardia 60XL.

## 2018-02-14 NOTE — SUBJECTIVE & OBJECTIVE
Hospital course: 02/07/2018 Admit to L&D for IOL secondary to cHTN with increasing BPs over the last week.  02/10/2018 - Patient POD 1 s/p c/s due to non reassuring fetal heart tones and unchanging cervix. This morning patient is doing well. Currently on Detemir 4/15 and Aspart 12/8/10. Patient's sugars have been mildly elevated. Asymptomatic. Is off magnesium sulfate, which she was on for seizure prophylaxis in setting of Pre E.   02/11/2018 - POD#2 s/p c/s due to non reassuring fetal heart tones in a pregnancy complicated by DM2, cHTN with SI Pre E. No acute events overnight. Pt feeling well this morning, asymptomatic with glucose 's throughout the day.   02/12/2018 - POD#3 s/p 1LTCS for NRFHT. Doing well this morning, meeting all post-operative goals. Glucoses were between 98 and 134 yesterday. Patient stable for discharge home with 6 week postpartum follow up.  02/13/2018 - POD#4 s/p 1LTCS for NRFHTs. Doing well this morning. Meeting all post op milestones. BG 44 pre-lunch, insulin held. Patient asymptomatic. Continued to watch blood pressures. Procardia 30XL given this morning. 10mg immediate release procardia given and started on Procardia 60XL to start tomorrow AM.  02/14/2018 - POD#5 s/p 1LTCS for NFHTs. Doing well this morning, meeting all post-operative milestones. Fasting BG this morning is 94. BP borderline elevated. First dose of procardia 60XL given this morning.    Interval History: POD#5 s/p 1LTCS. Here for diabetes and BP management.    She is doing well this morning. She is tolerating a regular diet without nausea or vomiting. She is voiding spontaneously. She is ambulating. She has passed flatus, and has a BM. Vaginal bleeding is mild. She denies fever or chills. Abdominal pain is mild and controlled with oral medications. She is breastfeeding. She denies headaches, chest pain, SOB, RUQ pain, or visual changes.    Objective:     Vital Signs (Most Recent):  Temp: 98.3 °F (36.8 °C) (02/14/18  0800)  Pulse: 66 (02/14/18 0800)  Resp: 18 (02/14/18 0800)  BP: (!) 163/82 (02/14/18 0800)  SpO2: 100 % (02/14/18 0800) Vital Signs (24h Range):  Temp:  [97.6 °F (36.4 °C)-98.4 °F (36.9 °C)] 98.3 °F (36.8 °C)  Pulse:  [64-78] 66  Resp:  [16-18] 18  SpO2:  [99 %-100 %] 100 %  BP: (138-165)/(73-97) 163/82     Weight: 135 kg (297 lb 9.9 oz)  Body mass index is 56.24 kg/m².    No intake or output data in the 24 hours ending 02/14/18 0905    Significant Labs:  Lab Results   Component Value Date    GROUPTRH O POS 02/07/2018    HEPBSAG Negative 01/10/2018    STREPBCULT  01/24/2018     STREPTOCOCCUS AGALACTIAE (GROUP B)  Beta-hemolytic streptococci are routinely susceptible to   penicillins,cephalosporins and carbapenems.       No results for input(s): HGB, HCT in the last 48 hours.    I have personallly reviewed all pertinent lab results from the last 24 hours.     Physical Exam   Constitutional: She is oriented to person, place, and time. She appears well-developed and well-nourished. No distress.   HENT:   Head: Normocephalic and atraumatic.   Eyes: EOM are normal.   Neck: Normal range of motion.   Cardiovascular: Normal rate.    Pulmonary/Chest: Effort normal. No respiratory distress.   Abdominal: Soft. She exhibits no mass. There is no tenderness. There is no guarding.   Low transverse incision, c/d/i  Fundus firm below umbilicus   Neurological: She is alert and oriented to person, place, and time.   Skin: Skin is warm and dry. She is not diaphoretic.   Psychiatric: She has a normal mood and affect. Her behavior is normal. Judgment and thought content normal.   Vitals reviewed.

## 2018-02-14 NOTE — PROGRESS NOTES
Dr. Sykes notified of 2 hour post breakfast blood sugar of 56, pt asymptomatic, pt eating few peanuts now, also notified Dr. Sykes of elevated BP this am prior to BP medication administration (Procardia), will recheck BP, pt denies ha, visual disturbance, or epigastric pain.

## 2018-02-14 NOTE — PROGRESS NOTES
[facility]  Obstetrics  Postpartum Progress Note    Patient Name: Wang Warren  MRN: [id number]  Admission Date: 2/7/2018  Hospital Length of Stay: 7 days  Attending Physician: Mariela Valenzuela MD  Primary Care Provider: Primary Doctor No    Subjective:     Principal Problem:Encounter for induction of labor    Hospital course: 02/07/2018 Admit to L&D for IOL secondary to cHTN with increasing BPs over the last week.  02/10/2018 - Patient POD 1 s/p c/s due to non reassuring fetal heart tones and unchanging cervix. This morning patient is doing well. Currently on Detemir 4/15 and Aspart 12/8/10. Patient's sugars have been mildly elevated. Asymptomatic. Is off magnesium sulfate, which she was on for seizure prophylaxis in setting of Pre E.   02/11/2018 - POD#2 s/p c/s due to non reassuring fetal heart tones in a pregnancy complicated by DM2, cHTN with SI Pre E. No acute events overnight. Pt feeling well this morning, asymptomatic with glucose 's throughout the day.   02/12/2018 - POD#3 s/p 1LTCS for NRFHT. Doing well this morning, meeting all post-operative goals. Glucoses were between 98 and 134 yesterday. Patient stable for discharge home with 6 week postpartum follow up.  02/13/2018 - POD#4 s/p 1LTCS for NRFHTs. Doing well this morning. Meeting all post op milestones. BG 44 pre-lunch, insulin held. Patient asymptomatic. Continued to watch blood pressures. Procardia 30XL given this morning. 10mg immediate release procardia given and started on Procardia 60XL to start tomorrow AM.  02/14/2018 - POD#5 s/p 1LTCS for NFHTs. Doing well this morning, meeting all post-operative milestones. Fasting BG this morning is 94. BP borderline elevated. First dose of procardia 60XL given this morning.    Interval History: POD#5 s/p 1LTCS. Here for diabetes and BP management.    She is doing well this morning. She is tolerating a regular diet without nausea or vomiting. She is voiding  spontaneously. She is ambulating. She has passed flatus, and has a BM. Vaginal bleeding is mild. She denies fever or chills. Abdominal pain is mild and controlled with oral medications. She is breastfeeding. She denies headaches, chest pain, SOB, RUQ pain, or visual changes.    Objective:     Vital Signs (Most Recent):  Temp: 98.3 °F (36.8 °C) (02/14/18 0800)  Pulse: 66 (02/14/18 0800)  Resp: 18 (02/14/18 0800)  BP: (!) 163/82 (02/14/18 0800)  SpO2: 100 % (02/14/18 0800) Vital Signs (24h Range):  Temp:  [97.6 °F (36.4 °C)-98.4 °F (36.9 °C)] 98.3 °F (36.8 °C)  Pulse:  [64-78] 66  Resp:  [16-18] 18  SpO2:  [99 %-100 %] 100 %  BP: (138-165)/(73-97) 163/82     Weight: 135 kg (297 lb 9.9 oz)  Body mass index is 56.24 kg/m².    No intake or output data in the 24 hours ending 02/14/18 0905    Significant Labs:  Lab Results   Component Value Date    GROUPTRH O POS 02/07/2018    HEPBSAG Negative 01/10/2018    STREPBCULT  01/24/2018     STREPTOCOCCUS AGALACTIAE (GROUP B)  Beta-hemolytic streptococci are routinely susceptible to   penicillins,cephalosporins and carbapenems.       No results for input(s): HGB, HCT in the last 48 hours.    I have personallly reviewed all pertinent lab results from the last 24 hours.     Physical Exam   Constitutional: She is oriented to person, place, and time. She appears well-developed and well-nourished. No distress.   HENT:   Head: Normocephalic and atraumatic.   Eyes: EOM are normal.   Neck: Normal range of motion.   Cardiovascular: Normal rate.    Pulmonary/Chest: Effort normal. No respiratory distress.   Abdominal: Soft. She exhibits no mass. There is no tenderness. There is no guarding.   Low transverse incision, c/d/i  Fundus firm below umbilicus   Neurological: She is alert and oriented to person, place, and time.   Skin: Skin is warm and dry. She is not diaphoretic.   Psychiatric: She has a normal mood and affect. Her behavior is normal. Judgment and thought content normal.   Vitals  reviewed.        Assessment/Plan:     37 y.o. female  for:    Insulin dependent diabetes mellitus    - home regimen basaglar , humalog   - Now NPH 4/15, Aspart 8/6/10. Decreased again after PP lunch was again low at 44 yesterday; patient was asymtpomatic. Sugars to be checked fasting and 2 hours post prandial.    Blood Sugars (AccuCheck):    Recent Labs      18   1910  18   0916  18   1116  18   1255  18   1409  18   1751  18   2036  18   0754   POCTGLUCOSE  144*  87  64*  44*  106  107  150*  94      fasting     B        L       D  2/10:    170       136     94     96  :    134       109      x      98       91         102     50     86        87          64      44    150       94           delivery delivered    Postpartum care:  - Patient doing well. Continue routine management and advances.  - Continue PO pain meds. Pain well controlled.  - Encourage ambulation.   - Heme: Pre Delivery h/h 10.6 -> 9.1.  - Contraception - defer to Dr Valenzuela  - Lactation - The patient is both breast and bottle feeding. Lactation nurse following along PRN  - Rh Status - pos            Chronic hypertension with superimposed preeclampsia    - BP: (138-165)/(73-97) 163/82  - most recent PreE labs : CBC 7/10.7/33.1/276  Cr 0.9   AST/ALT   PCR 0.13  - s/p mag. Continue vitals per floor protocol  - Started on procardia 30 XL yesterday, BP were elevated yesterday and patient was given 10mg immediate release procardia; dose increased to procardia 60XL.        BMI 50.0-59.9, adult    - BMI 56.2 on admission            Disposition: As patient meets milestones, will plan to discharge after blood sugars and BP are controlled.    Sushma K Reddy, MD  Obstetrics  [facility]

## 2018-02-14 NOTE — LACTATION NOTE
Patient presently pumping. States she is nursing the baby, pumping, and supplementing with formula. Encouraged to supplement with EBM first instead of formula. Discharge instructions given yesterday. Mother verbalized no questions. Offered assistance with nursing baby prior to discharge. Lactation number written on white board.

## 2018-02-14 NOTE — ASSESSMENT & PLAN NOTE
- home regimen basaglar 8/30, humalog 24/14/18  - Now NPH 4/15, Aspart 8/6/10. Decreased again after PP lunch was again low at 44 yesterday; patient was asymtpomatic. Sugars to be checked fasting and 2 hours post prandial.    Blood Sugars (AccuCheck):    Recent Labs      02/12/18   1910  02/13/18   0916  02/13/18   1116  02/13/18   1255  02/13/18   1409  02/13/18   1751  02/13/18   2036  02/14/18   0754   POCTGLUCOSE  144*  87  64*  44*  106  107  150*  94      fasting     B        L       D  2/10:    170       136     94     96  2/11:    134       109      x      98  2/12     91         102     50     86   2/13     87          64      44    150  2/14     94

## 2018-02-15 NOTE — DISCHARGE SUMMARY
[facility]  Obstetrics  Discharge Summary      Patient Name: Wang Warren  MRN: [id number]  Admission Date: 2018  Hospital Length of Stay: 7 days  Discharge Date and Time:  2018 6:33 PM  Attending Physician: Mariela Valenzuela MD   Discharging Provider: Ismael Sykes MD  Primary Care Provider: Primary Doctor No    HPI: Wang Warren is a 37 y.o. F at 36w6d presents for scheduled IOL. She has no complaints today.  Patient denies contractions, denies vaginal bleeding, denies LOF.   Fetal Movement: normal.      This IUP is complicated by the following:  T2DM (basaglar , humalog )  cHTN controlled without medication, but recently elevated, prompting IOL to begin today    Recent Labs:   CBC 7/10.7/33.1/276  Cr 0.9   AST/ALT   PCR 0.13    Last U/S @ 36w5d  Presentation: cephalic.  Placenta: posterior.  Umbilical cord: 3 vessel cord.  Amniotic fluid: MVP 4.1 cm. RACHELLE 9.5 cm.   EFW 2,811 g 28% [place]    Procedure(s) (LRB):  DELIVERY- SECTION (N/A)     Hospital Course:   2018 Admit to L&D for IOL secondary to cHTN with increasing BPs over the last week.  02/10/2018 - Patient POD 1 s/p c/s due to non reassuring fetal heart tones and unchanging cervix. This morning patient is doing well. Currently on Detemir 4/15 and Aspart 12/8/10. Patient's sugars have been mildly elevated. Asymptomatic. Is off magnesium sulfate, which she was on for seizure prophylaxis in setting of Pre E.   2018 - POD#2 s/p c/s due to non reassuring fetal heart tones in a pregnancy complicated by DM2, cHTN with SI Pre E. No acute events overnight. Pt feeling well this morning, asymptomatic with glucose 's throughout the day.   2018 - POD#3 s/p 1LTCS for NRFHT. Doing well this morning, meeting all post-operative goals. Glucoses were between 98 and 134 yesterday. Patient stable for discharge home with 6 week postpartum follow up.  2018 -  POD#4 s/p 1LTCS for NRFHTs. Doing well this morning. Meeting all post op milestones. BG 44 pre-lunch, insulin held. Patient asymptomatic. Continued to watch blood pressures. Procardia 30XL given this morning. 10mg immediate release procardia given and started on Procardia 60XL to start tomorrow AM.  2018 - POD#5 s/p 1LTCS for NFHTs. Doing well this morning, meeting all post-operative milestones. Fasting BG this morning is 94. BP borderline elevated. First dose of procardia 60XL given this morning.    Consults         Status Ordering Provider     Consult to Lactation  Use PRN     Provider:  (Not yet assigned)    Acknowledged JOSEFINA RUSHNIG          Final Active Diagnoses:    Diagnosis Date Noted POA    Insulin dependent diabetes mellitus [E11.9, Z79.4] 02/10/2018 Not Applicable     delivery delivered [O82] 2018 Unknown    BMI 50.0-59.9, adult [Z68.43] 2017 Not Applicable    Chronic hypertension with superimposed preeclampsia [O11.9] 2017 Yes      Problems Resolved During this Admission:    Diagnosis Date Noted Date Resolved POA    PRINCIPAL PROBLEM:  Encounter for induction of labor [Z34.90] 2018 Not Applicable    Encounter for induction of labor [Z34.90] 2018 Not Applicable    Diabetes mellitus affecting pregnancy in third trimester [O24.913] 10/11/2017 02/10/2018 Yes    Advanced maternal age, primigravida in second trimester, antepartum [O09.512] 2017 02/10/2018 Yes        Labs: All labs within the past 24 hours have been reviewed    Feeding Method: bottle    Immunizations     Date Immunization Status Dose Route/Site Given by    18 1447 MMR Deferred 0.5 mL Subcutaneous/Left deltoid Malini Rankin RN    18 1450 Tdap Deleted 0.5 mL Intramuscular/Left deltoid     18 1244 Tdap Given 0.5 mL Intramuscular/Left deltoid Malini Rankin RN          Delivery:    Episiotomy: None   Lacerations: None   Repair suture:     Repair # of  packets: 8   Blood loss (ml): 920     Birth information:  YOB: 2019   Time of birth: 5:51 AM   Sex: female   Delivery type: , Low Transverse   Gestational Age: 37w1d    Delivery Clinician:      Other providers:       Additional  information:  Forceps:    Vacuum:    Breech:    Observed anomalies      Living?:           APGARS  One minute Five minutes Ten minutes   Skin color:         Heart rate:         Grimace:         Muscle tone:         Breathing:         Totals: 7  8        Placenta: Delivered:       appearance    Pending Diagnostic Studies:     None          Discharged Condition: stable    Disposition: Home or Self Care    Follow Up:  Follow-up Information     Mariela Valenzuela MD. Schedule an appointment as soon as possible for a visit in 6 weeks.    Specialty:  Obstetrics and Gynecology  Why:  Post-partum appointment  Contact information:  [address] [id number] [phone]             Mariela Valenzuela MD. Schedule an appointment as soon as possible for a visit in 1 week.    Specialty:  Obstetrics and Gynecology  Why:  Diabetes check  Contact information:  [address] [facility] [address] [phone]                 Patient Instructions:     BREAST PUMP FOR HOME USE   Order Specific Question Answer Comments   Type of pump: Electric    Weight: 135 kg (297 lb 9.9 oz)    Length of need (1-99 months): 12      Call MD for:  temperature >100.4     Call MD for:  persistent nausea and vomiting or diarrhea     Call MD for:  severe uncontrolled pain     Call MD for:  redness, tenderness, or signs of infection (pain, swelling, redness, odor or green/yellow discharge around incision site)     Call MD for:  difficulty breathing or increased cough     Call MD for:  severe persistent headache     Call MD for:  worsening rash     Call MD for:  persistent dizziness, light-headedness, or visual disturbances     Call MD for:  increased confusion or weakness     Call MD for:    Order Comments: Vaginal bleeding soaking 1-2 pads per hour for 2 or more hours     Other restrictions (specify):   Order Comments: Pelvic rest until follow up visit. Nothing in vagina -no sex, tampons, douching, etc.    No baths (showers only) for 6 weeks     Activity as tolerated   Order Comments: Nothing in the vagina for 6 weeks - No douching, tampons, or sex.     Notify your health care provider if you experience any of the following:  temperature >100.4     Notify your health care provider if you experience any of the following:  persistent nausea and vomiting or diarrhea     Notify your health care provider if you experience any of the following:  severe uncontrolled pain     Notify your health care provider if you experience any of the following:  difficulty breathing or increased cough     Notify your health care provider if you experience any of the following:  severe persistent headache     Notify your health care provider if you experience any of the following:  worsening rash     Notify your health care provider if you experience any of the following:  persistent dizziness, light-headedness, or visual disturbances     Notify your health care provider if you experience any of the following:  increased confusion or weakness     Notify your health care provider if you experience any of the following:   Order Comments: Heavy vaginal bleeding, soaking though more than 1 heavy pad per hour.       Medications:  Current Discharge Medication List      START taking these medications    Details   hydrocodone-acetaminophen 5-325mg (NORCO) 5-325 mg per tablet Take 1 tablet by mouth every 4 (four) hours as needed.  Qty: 25 tablet, Refills: 0      !! insulin glargine (LANTUS SOLOSTAR) 100 unit/mL (3 mL) InPn pen Inject 15 Units into the skin every evening.  Qty: 3 Syringe, Refills: 2      !! NIFEdipine (PROCARDIA-XL) 30 MG (OSM) 24 hr tablet Take 1 tablet (30 mg total) by mouth once daily.  Qty: 30 tablet, Refills:  "11      !! NIFEdipine (PROCARDIA-XL) 60 MG (OSM) 24 hr tablet Take 1 tablet (60 mg total) by mouth once daily.  Qty: 30 tablet, Refills: 11       !! - Potential duplicate medications found. Please discuss with provider.      CONTINUE these medications which have CHANGED    Details   !! insulin aspart (NOVOLOG) 100 unit/mL InPn pen Inject 8 Units into the skin with lunch.  Qty: 7.2 mL, Refills: 3      !! insulin aspart (NOVOLOG) 100 unit/mL InPn pen Inject 12 Units into the skin daily with breakfast.  Qty: 10.8 mL, Refills: 3      !! insulin aspart (NOVOLOG) 100 unit/mL InPn pen Inject 10 Units into the skin before dinner.  Qty: 9 mL, Refills: 3      !! insulin aspart (NOVOLOG) 100 unit/mL InPn pen Inject 8 Units into the skin 3 (three) times daily with meals. Take 8 u with breakfast.  Take 6 u with lunch.   Take 10 u with dinner.  Qty: 3 mL, Refills: 2      !! insulin detemir (LEVEMIR FLEXTOUCH) 100 unit/mL (3 mL) SubQ InPn pen Inject 15 Units into the skin every evening.  Qty: 13.5 mL, Refills: 3      !! insulin detemir (LEVEMIR FLEXTOUCH) 100 unit/mL (3 mL) SubQ InPn pen Inject 4 Units into the skin once daily.  Qty: 3.6 mL, Refills: 3       !! - Potential duplicate medications found. Please discuss with provider.      CONTINUE these medications which have NOT CHANGED    Details   aspirin 81 MG Chew Take 81 mg by mouth once daily.      !! BASAGLAR KWIKPEN 100 unit/mL (3 mL) InPn pen       BD INSULIN PEN NEEDLE UF MINI 31 gauge x 3/16" Ndle       BD INSULIN SYRINGE ULTRA-FINE 1 mL 31 gauge x 5/16 Syrg       blood sugar diagnostic (ONETOUCH VERIO) Strp 1 strip by Misc.(Non-Drug; Combo Route) route 4 (four) times daily.  Qty: 200 each, Refills: 3      docusate sodium (COLACE) 100 MG capsule Take 1 capsule (100 mg total) by mouth 2 (two) times daily.  Qty: 60 capsule, Refills: 3      ferrous sulfate 325 mg (65 mg iron) Tab tablet TK 1 T PO daily  Refills: 1      folic acid (FOLVITE) 1 MG tablet Take 4 tablets (4 mg " total) by mouth once daily.  Qty: 120 tablet, Refills: 4    Associated Diagnoses: Pre-existing type 2 diabetes mellitus during pregnancy in second trimester      glucagon (human recombinant) inj 1mg/mL kit Inject 1 mL (1 mg total) into the muscle as needed. Administer if blood glucose <50-60 and symptomatic  Qty: 1 kit, Refills: 1      !! lancets (ONETOUCH ULTRASOFT LANCETS) Misc 1 lancet by Misc.(Non-Drug; Combo Route) route 4 (four) times daily.  Qty: 200 each, Refills: 3      PRENATAL VIT CALC,IRON,FOLIC (PRENATAL VITAMIN ORAL) Take by mouth.      !! TRUEPLUS LANCETS 33 gauge Misc        !! - Potential duplicate medications found. Please discuss with provider.      STOP taking these medications       metFORMIN (GLUCOPHAGE) 500 MG tablet Comments:   Reason for Stopping:               Ismael Sykes MD  Obstetrics  [facility]

## 2018-02-15 NOTE — PROGRESS NOTES
"Pt states that she had her "flu" vaccine at [facility] this flu season.  "

## 2018-02-15 NOTE — PROGRESS NOTES
Pt discharge paperwork and medication reviewed per charge nurse FARHANA Loera RN, pt reports complete understanding of insulin regimen, pt will f/u for blood sugar and BP check in office this week.

## 2018-02-15 NOTE — PROGRESS NOTES
Spoke with Dr. Vaughn and [place] regarding AVS and home medications. Insulin orders not clear. AVS updated and RN went over with patient. All questions answered. Escort request placed at [phone].

## 2018-02-19 ENCOUNTER — TELEPHONE (OUTPATIENT)
Dept: OBSTETRICS AND GYNECOLOGY | Facility: CLINIC | Age: 38
End: 2018-02-19

## 2018-02-19 ENCOUNTER — OFFICE VISIT (OUTPATIENT)
Dept: OBSTETRICS AND GYNECOLOGY | Facility: CLINIC | Age: 38
End: 2018-02-19
Payer: COMMERCIAL

## 2018-02-19 VITALS
WEIGHT: 277.69 LBS | DIASTOLIC BLOOD PRESSURE: 80 MMHG | HEIGHT: 61 IN | BODY MASS INDEX: 52.43 KG/M2 | SYSTOLIC BLOOD PRESSURE: 134 MMHG

## 2018-02-19 DIAGNOSIS — O11.9 CHRONIC HYPERTENSION WITH SUPERIMPOSED PREECLAMPSIA: ICD-10-CM

## 2018-02-19 PROCEDURE — 0503F POSTPARTUM CARE VISIT: CPT | Mod: S$GLB,,, | Performed by: OBSTETRICS & GYNECOLOGY

## 2018-02-19 RX ORDER — HYDROCODONE BITARTRATE AND ACETAMINOPHEN 5; 325 MG/1; MG/1
1 TABLET ORAL EVERY 6 HOURS PRN
Qty: 20 TABLET | Refills: 0 | Status: SHIPPED | OUTPATIENT
Start: 2018-02-19 | End: 2019-07-25

## 2018-02-19 RX ORDER — IBUPROFEN 600 MG/1
600 TABLET ORAL EVERY 6 HOURS PRN
Qty: 30 TABLET | Refills: 1 | Status: SHIPPED | OUTPATIENT
Start: 2018-02-19 | End: 2019-02-19

## 2018-02-19 NOTE — TELEPHONE ENCOUNTER
----- Message from Abigail Quan sent at 2/19/2018 10:56 AM CST -----  Contact: Patient   X _1st Request  _  2nd Request  _  3rd Request    Who:MATILDA MCINTOSH [[id number]]    Why:Patient was returning a missed call back from the staff     What Number to Call Back:[phone]    When to Expect a call back: (Before the end of the day)   -- if call after 3:00 call back will be tomorrow.

## 2018-02-19 NOTE — PROGRESS NOTES
"CC: Post-partum follow-up    HPI:  Wang Warren is a 37 y.o. female  presents for post-partum visit s/p a 1LTCS for NRFHTs.    Delivery Date: 2018  Delivery MD:Nicolas  Gender: female  Birth Weight: 6 pounds 3 ounces  Breast Feeding: YES  Depression: NO  Contraception: no method    Pregnancy was complicated by: CHTN, DMII and morbid obesity. Patient currently on procardia 60 mg XL. Denies pree symptoms. Reports BS are running 70s pp on current insulin regimen.     ROS:  GENERAL: No fever, chills, fatigability or weight loss.  VULVAR: No pain, no lesions and no itching.  VAGINAL: No relaxation, no itching, no discharge, no abnormal bleeding and no lesions.  ABDOMEN: No abdominal pain. Denies nausea. Denies vomiting. No diarrhea. No constipation  BREAST: Denies pain. No lumps. No discharge.  URINARY: No incontinence, no nocturia, no frequency and no dysuria.  CARDIOVASCULAR: No chest pain. No shortness of breath. No leg cramps.  NEUROLOGICAL: No headaches. No vision changes.    PHYSICAL EXAM:  /80   Ht 5' 1" (1.549 m)   Wt 126 kg (277 lb 10.7 oz)   LMP  (LMP Unknown)   Breastfeeding? Yes   BMI 52.47 kg/m²   INCISION: healing well, no erythema, induration or drainage    Diagnosis:  1. BMI 50.0-59.9, adult    2. Insulin dependent diabetes mellitus    3. Chronic hypertension with superimposed preeclampsia    4.  delivery delivered        Plan:   - Doing well postpartum. Incision healing well. Rx for norco replaced. Recommended ibuprofen as well.   - CHTN - /80 today. Continue procardia 60 mg daily. Pree precautions as well as hypotension precautions given. Referral to IM to establish care.   - DMII - BS well controlled per patient. Continue current regimen. Referral to IM to establish care.   - Contraception - patient had infertility prior to conception, but recommended interpregnancy interval of at least 18 months. Will discuss at next visit.   Orders Placed This " Encounter    Ambulatory Referral to Internal Medicine    hydrocodone-acetaminophen 5-325mg (NORCO) 5-325 mg per tablet    ibuprofen (ADVIL,MOTRIN) 600 MG tablet         FOLLOW UP: in 5 weeks for postpartum visit.     Mariela Valenzuela MD  OB/GYN

## 2018-02-19 NOTE — TELEPHONE ENCOUNTER
Called and spoke to pt. Informed her that we forgot to give her written rx for pain meds before she left from appt. Pt prefers for rx to be mailed to her. Confirmed home address, will mail out today. No further questions.

## 2018-03-08 NOTE — PROGRESS NOTES
Subjective:       Patient ID: Wang Warren is a 37 y.o. female with a PMH significant for Gestational T2D, Obesity, HTN (during pregnancy), and a recent Delivery by  2018 who presents today to establish care.  She and  also adopted a baby from family.    Chief Complaint: No chief complaint on file.    HPI Patient overall doing well.  Healing from recent .  Tolerating medications and adherent.  Breast-feeding.  Has not lost any weight yet.  Patient denies f/c, n/v/d.  No chest pain or SOB.  No abdominal pain or dysuria.  No headaches or change in vision.  No dizziness.  No significant  weight gain or weight loss.  Remaining ROS negative.    Review of Systems   Constitutional: Negative for activity change, appetite change, chills, diaphoresis, fatigue, fever and unexpected weight change.   HENT: Negative for ear pain, hearing loss, rhinorrhea, sinus pain, tinnitus, trouble swallowing and voice change.    Eyes: Negative for photophobia, pain, discharge and visual disturbance.   Respiratory: Negative for chest tightness, shortness of breath and wheezing.    Cardiovascular: Negative for chest pain, palpitations and leg swelling.   Gastrointestinal: Negative for abdominal pain, blood in stool, constipation, diarrhea, nausea and vomiting.   Endocrine: Negative for cold intolerance, heat intolerance, polydipsia, polyphagia and polyuria.   Genitourinary: Negative for decreased urine volume, difficulty urinating, dysuria, flank pain, hematuria, menstrual problem, pelvic pain, vaginal bleeding, vaginal discharge and vaginal pain.   Musculoskeletal: Negative for arthralgias, gait problem, joint swelling, myalgias and neck pain.   Skin: Negative for rash.   Neurological: Negative for dizziness, tremors, syncope, weakness, numbness and headaches.   Hematological: Does not bruise/bleed easily.   Psychiatric/Behavioral: Negative for agitation, confusion, dysphoric mood and sleep  disturbance. The patient is not nervous/anxious.        Objective:      Physical Exam   Constitutional: She is oriented to person, place, and time. She appears well-developed and well-nourished. No distress.   HENT:   Head: Normocephalic and atraumatic.   Nose: Nose normal.   Mouth/Throat: Oropharynx is clear and moist.   Eyes: Conjunctivae and EOM are normal. Pupils are equal, round, and reactive to light. No scleral icterus.   Neck: Normal range of motion. Neck supple. No JVD present. No thyromegaly present.   Cardiovascular: Normal rate, regular rhythm and intact distal pulses.  Exam reveals no gallop and no friction rub.    No murmur heard.  Pulmonary/Chest: Effort normal and breath sounds normal. No respiratory distress. She has no wheezes. She has no rales.   Abdominal: Soft. Bowel sounds are normal. She exhibits no distension. There is no tenderness. There is no rebound and no guarding.    wound healing without infection   Musculoskeletal: Normal range of motion. She exhibits no edema.   Lymphadenopathy:     She has no cervical adenopathy.   Neurological: She is alert and oriented to person, place, and time. No cranial nerve deficit or sensory deficit.   Skin: Skin is warm and dry. No rash noted. No erythema.   Psychiatric: She has a normal mood and affect. Her behavior is normal. Thought content normal.       Assessment:       1. BMI 50.0-59.9, adult    2. Controlled type 2 diabetes mellitus without complication, unspecified long term insulin use status    3. Encounter for wellness examination in adult        Plan:   -Adult Wellness Exam - blood pressure and exam were stable.  I will order routine fasting labs - had recent labs during delivery, but some abnormalities requiring follow up.  We discussed STD screening - HIV 1/2 negative, RPR NR in 2018.  -Cards - Pregnancy-Induced HTN - on Nifedipine.  On ASA.  Had TTE in 10/2017 and normal.  EKG in 10/2017 NSR.  BP at home similar in 120s  systolic.  -Endocrine - Gestational T2D - A1C 9.6 in 1/2018.  On Insulin - Lantus at night and Aspart with meals.  FS glucose running well.  TSH normal in 10/2017.  Repeat A1C.  Foot exam last in 10/2017.  Last Retinal exam 10/2017 and normal.  Will refer to Endocrinology for evaluation.  Will need to consider ACEI and statin.  -Heme - Anemia - hgb 9.1 with normal MCV in 2/2018.  Check Fe studies, B12/Folate.  On Fe replacement.  -OB/GYN - Delivered a healthy baby girl on 2/9/2018.  Last Pap was 12/2016.  Has follow up with OB/GYN.  -HCM - We discussed Flu (11/2017) and Tdap (2/14/2018) vaccinations.

## 2018-03-09 ENCOUNTER — TELEPHONE (OUTPATIENT)
Dept: RESEARCH | Facility: HOSPITAL | Age: 38
End: 2018-03-09

## 2018-03-12 ENCOUNTER — OFFICE VISIT (OUTPATIENT)
Dept: INTERNAL MEDICINE | Facility: CLINIC | Age: 38
End: 2018-03-12
Payer: COMMERCIAL

## 2018-03-12 ENCOUNTER — LAB VISIT (OUTPATIENT)
Dept: LAB | Facility: OTHER | Age: 38
End: 2018-03-12
Attending: INTERNAL MEDICINE
Payer: COMMERCIAL

## 2018-03-12 VITALS
WEIGHT: 279.75 LBS | HEART RATE: 93 BPM | SYSTOLIC BLOOD PRESSURE: 122 MMHG | BODY MASS INDEX: 52.82 KG/M2 | HEIGHT: 61 IN | OXYGEN SATURATION: 98 % | DIASTOLIC BLOOD PRESSURE: 66 MMHG

## 2018-03-12 DIAGNOSIS — D64.9 ANEMIA, UNSPECIFIED TYPE: ICD-10-CM

## 2018-03-12 DIAGNOSIS — Z00.00 ENCOUNTER FOR WELLNESS EXAMINATION IN ADULT: ICD-10-CM

## 2018-03-12 DIAGNOSIS — E11.9 CONTROLLED TYPE 2 DIABETES MELLITUS WITHOUT COMPLICATION, UNSPECIFIED LONG TERM INSULIN USE STATUS: ICD-10-CM

## 2018-03-12 LAB
25(OH)D3+25(OH)D2 SERPL-MCNC: 20 NG/ML
ALBUMIN SERPL BCP-MCNC: 3.8 G/DL
ALP SERPL-CCNC: 115 U/L
ALT SERPL W/O P-5'-P-CCNC: 17 U/L
ANION GAP SERPL CALC-SCNC: 4 MMOL/L
AST SERPL-CCNC: 15 U/L
BASOPHILS # BLD AUTO: 0.01 K/UL
BASOPHILS NFR BLD: 0.2 %
BILIRUB SERPL-MCNC: 0.4 MG/DL
BUN SERPL-MCNC: 17 MG/DL
CALCIUM SERPL-MCNC: 8.8 MG/DL
CHLORIDE SERPL-SCNC: 106 MMOL/L
CHOLEST SERPL-MCNC: 169 MG/DL
CHOLEST/HDLC SERPL: 3.3 {RATIO}
CO2 SERPL-SCNC: 30 MMOL/L
CREAT SERPL-MCNC: 0.9 MG/DL
DIFFERENTIAL METHOD: ABNORMAL
EOSINOPHIL # BLD AUTO: 0.1 K/UL
EOSINOPHIL NFR BLD: 2.5 %
ERYTHROCYTE [DISTWIDTH] IN BLOOD BY AUTOMATED COUNT: 13.3 %
EST. GFR  (AFRICAN AMERICAN): >60 ML/MIN/1.73 M^2
EST. GFR  (NON AFRICAN AMERICAN): >60 ML/MIN/1.73 M^2
ESTIMATED AVG GLUCOSE: 160 MG/DL
FERRITIN SERPL-MCNC: 39 NG/ML
FOLATE SERPL-MCNC: 12.6 NG/ML
GLUCOSE SERPL-MCNC: 65 MG/DL
HBA1C MFR BLD HPLC: 7.2 %
HCT VFR BLD AUTO: 36.5 %
HDLC SERPL-MCNC: 51 MG/DL
HDLC SERPL: 30.2 %
HGB BLD-MCNC: 11.1 G/DL
IRON SERPL-MCNC: 43 UG/DL
LDLC SERPL CALC-MCNC: 102.8 MG/DL
LYMPHOCYTES # BLD AUTO: 1.8 K/UL
LYMPHOCYTES NFR BLD: 34.4 %
MCH RBC QN AUTO: 26.6 PG
MCHC RBC AUTO-ENTMCNC: 30.4 G/DL
MCV RBC AUTO: 88 FL
MONOCYTES # BLD AUTO: 0.4 K/UL
MONOCYTES NFR BLD: 8 %
NEUTROPHILS # BLD AUTO: 2.8 K/UL
NEUTROPHILS NFR BLD: 54.5 %
NONHDLC SERPL-MCNC: 118 MG/DL
PLATELET # BLD AUTO: 358 K/UL
PMV BLD AUTO: 10.9 FL
POTASSIUM SERPL-SCNC: 3.8 MMOL/L
PROT SERPL-MCNC: 7.7 G/DL
RBC # BLD AUTO: 4.17 M/UL
SATURATED IRON: 12 %
SODIUM SERPL-SCNC: 140 MMOL/L
TOTAL IRON BINDING CAPACITY: 361 UG/DL
TRANSFERRIN SERPL-MCNC: 244 MG/DL
TRIGL SERPL-MCNC: 76 MG/DL
VIT B12 SERPL-MCNC: 334 PG/ML
WBC # BLD AUTO: 5.15 K/UL

## 2018-03-12 PROCEDURE — 83036 HEMOGLOBIN GLYCOSYLATED A1C: CPT

## 2018-03-12 PROCEDURE — 82607 VITAMIN B-12: CPT

## 2018-03-12 PROCEDURE — 85025 COMPLETE CBC W/AUTO DIFF WBC: CPT

## 2018-03-12 PROCEDURE — 82728 ASSAY OF FERRITIN: CPT

## 2018-03-12 PROCEDURE — 80053 COMPREHEN METABOLIC PANEL: CPT

## 2018-03-12 PROCEDURE — 99999 PR PBB SHADOW E&M-EST. PATIENT-LVL IV: CPT | Mod: PBBFAC,,, | Performed by: INTERNAL MEDICINE

## 2018-03-12 PROCEDURE — 36415 COLL VENOUS BLD VENIPUNCTURE: CPT

## 2018-03-12 PROCEDURE — 99202 OFFICE O/P NEW SF 15 MIN: CPT | Mod: S$GLB,,, | Performed by: INTERNAL MEDICINE

## 2018-03-12 PROCEDURE — 82306 VITAMIN D 25 HYDROXY: CPT

## 2018-03-12 PROCEDURE — 82746 ASSAY OF FOLIC ACID SERUM: CPT

## 2018-03-12 PROCEDURE — 83540 ASSAY OF IRON: CPT

## 2018-03-12 PROCEDURE — 80061 LIPID PANEL: CPT

## 2018-03-12 NOTE — PATIENT INSTRUCTIONS
Your weight and BMI were elevated.  Continue with diet modification and exercise.  Your blood pressure was good.  I will order routine fasting labs today - at least 6-8 hours of fasting.  I will refer you to Endocrine to help manage your gestational diabetes and probable eventual tapering off of insulin.    Return in 4 weeks - sooner if needed.  Please come at least 15-20 minutes before your scheduled appointment time.

## 2018-03-13 ENCOUNTER — PATIENT MESSAGE (OUTPATIENT)
Dept: INTERNAL MEDICINE | Facility: CLINIC | Age: 38
End: 2018-03-13

## 2018-03-16 DIAGNOSIS — Z13.5 DIABETIC RETINOPATHY SCREENING: ICD-10-CM

## 2018-03-23 ENCOUNTER — TELEPHONE (OUTPATIENT)
Dept: RESEARCH | Facility: HOSPITAL | Age: 38
End: 2018-03-23

## 2018-03-23 ENCOUNTER — PATIENT MESSAGE (OUTPATIENT)
Dept: INTERNAL MEDICINE | Facility: CLINIC | Age: 38
End: 2018-03-23

## 2018-03-26 ENCOUNTER — OFFICE VISIT (OUTPATIENT)
Dept: OBSTETRICS AND GYNECOLOGY | Facility: CLINIC | Age: 38
End: 2018-03-26
Payer: COMMERCIAL

## 2018-03-26 VITALS
BODY MASS INDEX: 54.88 KG/M2 | WEIGHT: 290.69 LBS | DIASTOLIC BLOOD PRESSURE: 84 MMHG | SYSTOLIC BLOOD PRESSURE: 124 MMHG | HEIGHT: 61 IN

## 2018-03-26 DIAGNOSIS — Z30.9 ENCOUNTER FOR CONTRACEPTIVE MANAGEMENT, UNSPECIFIED TYPE: Primary | ICD-10-CM

## 2018-03-26 PROCEDURE — 0503F POSTPARTUM CARE VISIT: CPT | Mod: S$GLB,,, | Performed by: OBSTETRICS & GYNECOLOGY

## 2018-03-26 RX ORDER — ERGOCALCIFEROL 1.25 MG/1
50000 CAPSULE ORAL
COMMUNITY
End: 2018-11-21 | Stop reason: SDUPTHER

## 2018-03-26 RX ORDER — NORGESTIMATE AND ETHINYL ESTRADIOL 0.25-0.035
1 KIT ORAL DAILY
Qty: 84 TABLET | Refills: 3 | Status: SHIPPED | OUTPATIENT
Start: 2018-03-26 | End: 2019-02-13

## 2018-03-26 NOTE — PROGRESS NOTES
"CC: Post-partum follow-up    HPI:  Wang Warren is a 37 y.o. female  presents for post-partum visit s/p a 1LTCS for NRFHTs. Doing well, saw a new PCP who also referred her to Endocrinology.     Delivery Date: 2018  Delivery MD:Nicolas  Gender: female  Birth Weight: 6 pounds 3 ounces  Breast Feeding: No  Depression: NO  Contraception: OCPs    Pregnancy was complicated by: CHTN, DMII and morbid obesity. Patient currently on procardia 60 mg XL. Still on same insulin regimen.    ROS:  GENERAL: No fever, chills, fatigability or weight loss.  VULVAR: No pain, no lesions and no itching.  VAGINAL: No relaxation, no itching, no discharge, no abnormal bleeding and no lesions.  ABDOMEN: No abdominal pain. Denies nausea. Denies vomiting. No diarrhea. No constipation  BREAST: Denies pain. No lumps. No discharge.  URINARY: No incontinence, no nocturia, no frequency and no dysuria.  CARDIOVASCULAR: No chest pain. No shortness of breath. No leg cramps.  NEUROLOGICAL: No headaches. No vision changes.    PHYSICAL EXAM:  /84   Ht 5' 1" (1.549 m)   Wt 131.8 kg (290 lb 10.8 oz)   Breastfeeding? No   BMI 54.92 kg/m²   INCISION: healing well, no erythema, induration or drainage  PELVIC: Normal external female genitalia; vagina rugated, normal; cervix normal, no masses; uterus difficult to palpate 2/2 body habitus; no adnexal masses palpated.    Diagnosis:  1. Encounter for contraceptive management, unspecified type        Plan:   - Doing well postpartum. Incision healing well.   - CHTN - /84 today. Continue procardia 60 mg daily.   - DMII - BS well controlled per patient. Continue current regimen.   - Contraception - UPT neg. OCP Rx given, to start with next cycle. Again counseled on interpregnancy interval of at least 18 months after C/S.     Orders Placed This Encounter    norgestimate-ethinyl estradiol (ORTHO-CYCLEN) 0.25-35 mg-mcg per tablet       FOLLOW UP: in 1 year for annual    Mariela " MD Nicolas  OB/GYN

## 2018-04-06 ENCOUNTER — PATIENT OUTREACH (OUTPATIENT)
Dept: INTERNAL MEDICINE | Facility: CLINIC | Age: 38
End: 2018-04-06

## 2018-04-06 NOTE — PROGRESS NOTES
[facility] is committed to your overall health.  To help you get the most out of each of your visits, we will review your information to make sure you are up to date on all of your recommended tests and/or procedures.       Your PCP  Todd Miller MD   found that you may be due for:       Health Maintenance Due   Topic Date Due    Eye Exam  12/13/1989    Pneumococcal PPSV23 (Medium Risk) (1) 12/13/1997     You will be scheduled for a eye cam retina scan on the same day of your scheduled visit with your Todd Miller MD.  NO eye drop dilation will take place. You can drive after the scan.  This will take no longer than ten minutes. This will take place in the same office area as your visit. This eye scan is NOT a visual exam. This exam is being used to scan for diseases of the eye such as Diabetic Retinopathy which is the leading cause for blindness in those that have Diabetes Mellitus.     If you feel you need a vision exam please contact the office to schedule an appointment with Opthalmology.     If you see an outside eye physician please be prepared to sign a release of information so that we may request your records to update your medical records. Thank you.           If you have had any of the above done at another facility, please bring the records or information with you so that your record at [facility] will be complete.  If you would like to schedule any of these, please contact me.     If you are currently taking medication, please bring it with you to your appointment for review.     Also, if you have any type of Advanced Directives, please bring them with you to your office visit so we may scan them into your chart.       Thank you for Choosing [facility] for your healthcare needs.        Additional Information  If you have questions, you can email [email or web address] or call [phone]  to talk to our [facility] staff. Remember, [facility] is NOT to be used for urgent needs. For medical emergencies,  dial 911.

## 2018-04-23 NOTE — PROGRESS NOTES
Subjective:       Patient ID: Wang Warren is a 37 y.o. female with a PMH significant for Gestational T2D, Obesity, HTN (during pregnancy), and a recent Delivery by  2018.  She and  also adopted a baby from family.  She was seen initially by me on 3/12/2018 and missed follow ups on  and 2018.      Chief Complaint: No chief complaint on file.    HPI  Patient states she missed a couple of appointments due to a busy life with her two babies.  Patient denies f/c, n/v/d.  No chest pain or SOB.  No abdominal pain or dysuria.  No headaches or change in vision.  No dizziness.  No significant  weight gain or weight loss.  Remaining ROS negative.    Review of Systems   Constitutional: Negative for activity change, appetite change, chills, diaphoresis, fatigue, fever and unexpected weight change.   HENT: Negative for ear pain, hearing loss, rhinorrhea, sinus pain, tinnitus, trouble swallowing and voice change.    Eyes: Negative for photophobia, pain, discharge and visual disturbance.   Respiratory: Negative for chest tightness, shortness of breath and wheezing.    Cardiovascular: Negative for chest pain, palpitations and leg swelling.   Gastrointestinal: Negative for abdominal pain, blood in stool, constipation, diarrhea, nausea and vomiting.   Endocrine: Negative for cold intolerance, heat intolerance, polydipsia, polyphagia and polyuria.   Genitourinary: Negative for decreased urine volume, difficulty urinating, dysuria, flank pain, hematuria, menstrual problem, pelvic pain, vaginal bleeding, vaginal discharge and vaginal pain.   Musculoskeletal: Negative for arthralgias, gait problem, joint swelling, myalgias and neck pain.   Skin: Negative for rash.   Neurological: Negative for dizziness, tremors, syncope, weakness, numbness and headaches.   Hematological: Does not bruise/bleed easily.   Psychiatric/Behavioral: Negative for agitation, confusion, dysphoric mood and sleep  disturbance. The patient is not nervous/anxious.        Objective:      Physical Exam   Constitutional: She is oriented to person, place, and time. She appears well-developed and well-nourished. No distress.   HENT:   Head: Normocephalic and atraumatic.   Nose: Nose normal.   Mouth/Throat: Oropharynx is clear and moist.   Eyes: Conjunctivae and EOM are normal. Pupils are equal, round, and reactive to light. No scleral icterus.   Neck: Normal range of motion. Neck supple. No JVD present. No thyromegaly present.   Cardiovascular: Normal rate, regular rhythm and intact distal pulses.  Exam reveals no gallop and no friction rub.    No murmur heard.  Pulmonary/Chest: Effort normal and breath sounds normal. No respiratory distress. She has no wheezes. She has no rales.   Abdominal: Soft. Bowel sounds are normal. She exhibits no distension. There is no tenderness. There is no rebound and no guarding.    wound healing without infection   Musculoskeletal: Normal range of motion. She exhibits no edema.   Lymphadenopathy:     She has no cervical adenopathy.   Neurological: She is alert and oriented to person, place, and time. No cranial nerve deficit or sensory deficit.   Skin: Skin is warm and dry. No rash noted. No erythema.   Psychiatric: She has a normal mood and affect. Her behavior is normal. Thought content normal.       Assessment:       1. Controlled type 2 diabetes mellitus without complication, unspecified whether long term insulin use    2. BMI 50.0-59.9, adult    3. Benign essential HTN    4. Anemia, unspecified type        Plan:   -Today's Visit  - patient in NAD today.  With her  and 2 newborns.  -Cards - Pregnancy-Induced HTN - on Nifedipine.  On ASA.  Had TTE in 10/2017 and normal.  EKG in 10/2017 NSR.   BP in 3/2018 was normal at 122/66.  BP today is 140/90, but she did not take her Nifedipine today - has been running in 120s/70s at home.  Lipids on 3/12/2018 showed , TG 76, HDL 51, LDL  102.8.  Given T2D, would start low-dose Lipitor, but patient still breastfeeding and it is contraindicated.  -Endocrine - Gestational T2D - A1C 9.6 in 1/2018.  On Insulin - Lantus at night and Aspart with meals.  FS glucose running well at home - 78 in the morning and 120s at bedtime.  Has Endocrine appointment on 7/23/2018.  Repeat A1C 7.2 on 3/12/2018.   Foot exam last in 10/2017.  Last Retinal exam 10/2017 and normal.   Will need to consider ACEI (she plans to have more children, so will need to avoid for now) and statin (starting Lipitor as above).  Will refer to Diabetic Education.  Would like her to check FS glucose qAc and HS for now.  TSH normal in 10/2017.  Vitamin D was 20 in 3/2018 - recommended D3 at 2000 units daily and she is taking.  -Heme - Anemia - hgb 9.1 with normal MCV in 2/2018.   Fe studies with Fe sat of 12 and Ferritin of 39 in 3/2018, B12/Folate normal in 3/2018.  On Fe replacement.  Repeat Hgb was 11.1 in 3/2018 with normal MCV.  -Nutrition - BMI today was 51.91 today compared with 52.86 last visit in 3/2018.  Walking more.    -OB/GYN - Delivered a healthy baby girl on 2/9/2018.  Last Pap was 12/2016.  Had follow up with OB/GYN on 3/26/2018.  STD screening - HIV 1/2 negative, RPR NR in 1/2018.  -HCM - We discussed Flu (11/2017) and Tdap (2/14/2018) vaccinations.

## 2018-04-25 ENCOUNTER — OFFICE VISIT (OUTPATIENT)
Dept: INTERNAL MEDICINE | Facility: CLINIC | Age: 38
End: 2018-04-25
Payer: COMMERCIAL

## 2018-04-25 VITALS
SYSTOLIC BLOOD PRESSURE: 140 MMHG | OXYGEN SATURATION: 100 % | TEMPERATURE: 99 F | HEART RATE: 73 BPM | BODY MASS INDEX: 53.92 KG/M2 | DIASTOLIC BLOOD PRESSURE: 90 MMHG | WEIGHT: 293 LBS | HEIGHT: 62 IN

## 2018-04-25 DIAGNOSIS — I10 BENIGN ESSENTIAL HTN: ICD-10-CM

## 2018-04-25 DIAGNOSIS — E11.9 CONTROLLED TYPE 2 DIABETES MELLITUS WITHOUT COMPLICATION, UNSPECIFIED WHETHER LONG TERM INSULIN USE: Primary | ICD-10-CM

## 2018-04-25 DIAGNOSIS — D64.9 ANEMIA, UNSPECIFIED TYPE: ICD-10-CM

## 2018-04-25 PROCEDURE — 99214 OFFICE O/P EST MOD 30 MIN: CPT | Mod: S$GLB,,, | Performed by: INTERNAL MEDICINE

## 2018-04-25 PROCEDURE — 99999 PR PBB SHADOW E&M-EST. PATIENT-LVL IV: CPT | Mod: PBBFAC,,, | Performed by: INTERNAL MEDICINE

## 2018-04-25 PROCEDURE — 3045F PR MOST RECENT HEMOGLOBIN A1C LEVEL 7.0-9.0%: CPT | Mod: CPTII,S$GLB,, | Performed by: INTERNAL MEDICINE

## 2018-04-25 RX ORDER — ATORVASTATIN CALCIUM 10 MG/1
10 TABLET, FILM COATED ORAL DAILY
Qty: 90 TABLET | Refills: 3 | Status: SHIPPED | OUTPATIENT
Start: 2018-04-25 | End: 2018-04-25 | Stop reason: CLARIF

## 2018-04-25 NOTE — PATIENT INSTRUCTIONS
Your exam was overall normal today.  Your blood pressure is a little high today.  Try not to miss doses of your medications.  Please monitor at home with a digital blood pressure cuff when sitting and rested for at least 15 minutes or longer. Target blood pressure is less than 130/80.    Check your Finger Stick Glucose before each meal and at bedtime and record these values.  I will refer you to Diabetic Education.  Continue with your Iron supplement and Vitamin D3.  Return in 4 weeks - sooner if needed.  Please come at least 15-20 minutes before your scheduled appointment time.

## 2018-04-26 ENCOUNTER — DOCUMENTATION ONLY (OUTPATIENT)
Dept: INTERNAL MEDICINE | Facility: CLINIC | Age: 38
End: 2018-04-26

## 2018-04-26 NOTE — PROGRESS NOTES
[facility] PEDRITO sent for records on 4/25/18.

## 2018-05-04 ENCOUNTER — PATIENT MESSAGE (OUTPATIENT)
Dept: DIABETES | Facility: OTHER | Age: 38
End: 2018-05-04

## 2018-05-24 ENCOUNTER — PATIENT MESSAGE (OUTPATIENT)
Dept: INTERNAL MEDICINE | Facility: CLINIC | Age: 38
End: 2018-05-24

## 2018-08-28 ENCOUNTER — TELEPHONE (OUTPATIENT)
Dept: PRIMARY CARE CLINIC | Facility: CLINIC | Age: 38
End: 2018-08-28

## 2018-08-28 NOTE — TELEPHONE ENCOUNTER
Spoke with patient to resched. Dr. Miller appt, but patient said she will reach out to us after Sep 1st when she will have an off day.

## 2018-09-21 DIAGNOSIS — E11.9 TYPE 2 DIABETES MELLITUS WITHOUT COMPLICATION: ICD-10-CM

## 2018-10-04 ENCOUNTER — HOSPITAL ENCOUNTER (EMERGENCY)
Facility: OTHER | Age: 38
Discharge: HOME | End: 2018-10-04
Attending: EMERGENCY MEDICINE
Payer: MEDICAID

## 2018-10-04 VITALS
OXYGEN SATURATION: 100 % | TEMPERATURE: 98 F | BODY MASS INDEX: 43.43 KG/M2 | HEART RATE: 82 BPM | HEIGHT: 62 IN | WEIGHT: 236 LBS | SYSTOLIC BLOOD PRESSURE: 207 MMHG | RESPIRATION RATE: 18 BRPM | DIASTOLIC BLOOD PRESSURE: 96 MMHG

## 2018-10-04 DIAGNOSIS — R03.0 ELEVATED BLOOD PRESSURE READING: ICD-10-CM

## 2018-10-04 DIAGNOSIS — L03.114 CELLULITIS OF LEFT UPPER EXTREMITY: ICD-10-CM

## 2018-10-04 DIAGNOSIS — E66.9 OBESITY, UNSPECIFIED CLASSIFICATION, UNSPECIFIED OBESITY TYPE, UNSPECIFIED WHETHER SERIOUS COMORBIDITY PRESENT: ICD-10-CM

## 2018-10-04 DIAGNOSIS — L02.419 ABSCESS OF AXILLA: Primary | ICD-10-CM

## 2018-10-04 LAB
B-HCG UR QL: NEGATIVE
CTP QC/QA: YES

## 2018-10-04 PROCEDURE — 10060 I&D ABSCESS SIMPLE/SINGLE: CPT

## 2018-10-04 PROCEDURE — 25000003 PHARM REV CODE 250: Performed by: EMERGENCY MEDICINE

## 2018-10-04 PROCEDURE — 81025 URINE PREGNANCY TEST: CPT | Performed by: EMERGENCY MEDICINE

## 2018-10-04 PROCEDURE — 10061 I&D ABSCESS COMP/MULTIPLE: CPT

## 2018-10-04 PROCEDURE — 99283 EMERGENCY DEPT VISIT LOW MDM: CPT | Mod: 25

## 2018-10-04 RX ORDER — HYDROCODONE BITARTRATE AND ACETAMINOPHEN 5; 325 MG/1; MG/1
1 TABLET ORAL
Status: COMPLETED | OUTPATIENT
Start: 2018-10-04 | End: 2018-10-04

## 2018-10-04 RX ORDER — LIDOCAINE HYDROCHLORIDE 10 MG/ML
10 INJECTION INFILTRATION; PERINEURAL
Status: COMPLETED | OUTPATIENT
Start: 2018-10-04 | End: 2018-10-04

## 2018-10-04 RX ORDER — SULFAMETHOXAZOLE AND TRIMETHOPRIM 800; 160 MG/1; MG/1
1 TABLET ORAL 2 TIMES DAILY
Qty: 14 TABLET | Refills: 0 | Status: SHIPPED | OUTPATIENT
Start: 2018-10-04 | End: 2018-10-12

## 2018-10-04 RX ORDER — SULFAMETHOXAZOLE AND TRIMETHOPRIM 800; 160 MG/1; MG/1
1 TABLET ORAL
Status: COMPLETED | OUTPATIENT
Start: 2018-10-04 | End: 2018-10-04

## 2018-10-04 RX ADMIN — SULFAMETHOXAZOLE AND TRIMETHOPRIM 1 TABLET: 800; 160 TABLET ORAL at 08:10

## 2018-10-04 RX ADMIN — LIDOCAINE HYDROCHLORIDE 10 ML: 10 INJECTION, SOLUTION INFILTRATION; PERINEURAL at 08:10

## 2018-10-04 RX ADMIN — HYDROCODONE BITARTRATE AND ACETAMINOPHEN 1 TABLET: 5; 325 TABLET ORAL at 08:10

## 2018-10-05 NOTE — ED PROVIDER NOTES
Encounter Date: 10/4/2018    SCRIBE #1 NOTE: I, Nirmal Gustafson, am scribing for, and in the presence of, Dr. Varma.       History     Chief Complaint   Patient presents with    Abscess     abscess under left arm  since Tuesday. no active drainage noted. pt denies fever, chills, n/v, sob, cp     Time seen by provider: 8:32 PM    This is a 37 y.o. female who presents with complaint of having an abscess for two days now. The patient says she noticed it two days ago, but the pain did not begin until yesterday. The patient reports that she is now using a new deodar ant and thinks that may be the reason for her abscess. The patient reports that pain is concentrated in the upper arm where the abscess is only. The patient does have a history of having abscesses. She denies having any allergies.      The history is provided by the patient.     Review of patient's allergies indicates:  No Known Allergies  Past Medical History:   Diagnosis Date    Chronic hypertension affecting pregnancy     Diabetes mellitus type II, controlled, with no complications 10/11/2017    Infertility, female     States she has been trying to conceive for four years. Was evaluated at feritility clinic and was given Clomid for three months two years ago.     Past Surgical History:   Procedure Laterality Date     SECTION      DELIVERY- SECTION N/A 2018    Performed by Mariela Valenzuela MD at [facility] L[facility]D    none       Family History   Problem Relation Age of Onset    Cancer Father         prostate    Diabetes Maternal Aunt     Breast cancer Neg Hx      Social History     Tobacco Use    Smoking status: Never Smoker    Smokeless tobacco: Never Used   Substance Use Topics    Alcohol use: No    Drug use: No     Review of Systems   Constitutional: Positive for chills and fatigue. Negative for fever.   HENT: Negative for sneezing.    Respiratory: Negative for cough and shortness of breath.    Genitourinary: Negative for  dysuria.   Musculoskeletal:        Swelling to axilla   Skin: Positive for rash.   Neurological: Negative for weakness and headaches.   Psychiatric/Behavioral: The patient is not nervous/anxious.        Physical Exam     Initial Vitals [10/04/18 1920]   BP Pulse Resp Temp SpO2   (!) 154/89 99 18 98.5 °F (36.9 °C) 99 %      MAP       --         Physical Exam    Nursing note and vitals reviewed.  Constitutional: She appears well-developed and well-nourished. She is not diaphoretic. No distress.   Morbidly obese. Non-toxic.   HENT:   Head: Normocephalic and atraumatic.   Neck: Normal range of motion. Neck supple.   Cardiovascular: Normal rate, regular rhythm and normal heart sounds. Exam reveals no gallop and no friction rub.    No murmur heard.  Pulmonary/Chest: Breath sounds normal. No respiratory distress. She has no wheezes. She has no rhonchi. She has no rales.   Abdominal: Soft. She exhibits no distension. There is no tenderness. There is no rebound.   Musculoskeletal: Normal range of motion. She exhibits edema and tenderness.   Neurological: She is alert and oriented to person, place, and time.   Skin: Skin is warm. Rash noted.   8 x 5 cm erythematous rash to left axilla. 2 x 2 cm region of fluctuance and focal tenderness.   Psychiatric: Her behavior is normal.         ED Course   I & D - Incision and Drainage  Date/Time: 10/4/2018 10:06 PM  Location procedure was performed: [facility], [place], operated by [facility] EMERGENCY DEPARTMENT  Performed by: Cindy Varma MD  Authorized by: Cindy Varma MD   Type: abscess  Body area: upper extremity  Anesthesia: local infiltration    Anesthesia:  Local Anesthetic: lidocaine 1% with epinephrine  Scalpel size: 11  Incision type: single straight  Complexity: complex  Drainage: pus (Purulent 5 ccs drained.)  Drainage amount: moderate  Wound treatment: incision,  wound left open and  deloculation  Packing material: none  Complications: No  Specimens: No  Implants: No  Patient tolerance: Patient  tolerated the procedure well with no immediate complications        Labs Reviewed   POCT URINE PREGNANCY          Imaging Results    None          Medical Decision Making:   Initial Assessment:   Urgent evaluation of obese 37-year-old female w htn here with complaints of pain and swelling to the L axilla. Reports similar abscess in past. Tetanus UTD. Exam w fluctuant abscess and overlying cellulitis. I+D performed w pus drained, dc home w oral abx, instructed to avoid shaving armpits, warm compresses, wound check and fu PCP.   Clinical Tests:   Lab Tests: Ordered and Reviewed            Scribe Attestation:   Scribe #1: I performed the above scribed service and the documentation accurately describes the services I performed. I attest to the accuracy of the note.    Attending Attestation:           Physician Attestation for Scribe:  Physician Attestation Statement for Scribe #1: I, Dr. Varma, reviewed documentation, as scribed by Nirmal Gustafson in my presence, and it is both accurate and complete.                    Clinical Impression:     1. Abscess of axilla    2. Cellulitis of left upper extremity    3. Obesity, unspecified classification, unspecified obesity type, unspecified whether serious comorbidity present    4. Elevated blood pressure reading        Disposition:   Disposition: Discharged  Condition: Fair                        Cindy Varma MD  10/04/18 2217       Cindy Varma MD  10/31/18 1432

## 2018-10-05 NOTE — ED NOTES
"Pt to ED reporting pain, swelling, warmth to L axilla, x several days. Pt reporting new use of deodorant x this week, stating "switched scents, same brand I normally use." Pt AAOx4 and appropriate at this time. Respirations even and unlabored. No acute distress noted.    "

## 2018-11-15 ENCOUNTER — TELEPHONE (OUTPATIENT)
Dept: PRIMARY CARE CLINIC | Facility: CLINIC | Age: 38
End: 2018-11-15

## 2018-11-15 DIAGNOSIS — G47.9 SLEEP DISORDER: Primary | ICD-10-CM

## 2018-11-15 NOTE — TELEPHONE ENCOUNTER
----- Message from Mary Akhtar sent at 11/15/2018  1:20 PM CST -----  Contact: pt  Name of Who is Calling: MATILDA MCINTOSH [[id number]]    What is the request in detail: Patient requested to schedule an annual exam. Please contact to further discuss and advise      Can the clinic reply by [facility]:     What Number to Call Back if not in [facility]: [phone]

## 2018-11-15 NOTE — TELEPHONE ENCOUNTER
Patient is seeing Dr. Miller tomorrow at 11am, and wanted to know if it would be possible to see Sleep same day. I tried checking the schedule, but it is not letting me book. Can you please contact patient to advise/schedule? Dr. Miller placed referral. Please let me know if you have any questions.

## 2018-11-15 NOTE — TELEPHONE ENCOUNTER
Spoke with pt. She is scheduled for annual visit tomorrow, 11/16. She is also requesting a referral to sleep medicine to test for sleep apnea. Can this referral be placed now so we can try to book both appointments same day? Please advise.

## 2018-11-16 PROBLEM — I10 BENIGN ESSENTIAL HTN: Status: ACTIVE | Noted: 2018-11-16

## 2018-11-16 PROBLEM — D50.9 IRON DEFICIENCY ANEMIA: Status: ACTIVE | Noted: 2018-11-16

## 2018-11-16 PROBLEM — E66.01 CLASS 3 SEVERE OBESITY WITH BODY MASS INDEX (BMI) OF 50.0 TO 59.9 IN ADULT: Status: ACTIVE | Noted: 2018-11-16

## 2018-11-16 PROBLEM — E66.813 CLASS 3 SEVERE OBESITY WITH BODY MASS INDEX (BMI) OF 50.0 TO 59.9 IN ADULT: Status: ACTIVE | Noted: 2018-11-16

## 2018-11-19 NOTE — PROGRESS NOTES
Subjective:       Patient ID: Wang Warren is a 37 y.o. female with a PMH significant for Gestational T2D, Obesity, HTN (during pregnancy), and a recent Delivery by  2018.  She and  also adopted a baby from family.  She was seen initially by me on 3/12/2018 and follow up in 2018.  She has missed several follow up visits since.    Chief Complaint: Annual Exam    HPI  She works for [employer] and they want her to have a Sleep Apnea work-up.  She denies snoring, daily headaches or fatigue, an  does not see apnea at night in her.  No new complaints.    Patient denies f/c, n/v/d.  No chest pain or SOB.  No abdominal pain or dysuria.  No headaches or change in vision.  No dizziness.  No significant  weight gain or weight loss.  Remaining ROS negative.    Review of Systems   Constitutional: Negative for activity change, appetite change, chills, diaphoresis, fatigue, fever and unexpected weight change.   HENT: Negative for ear pain, hearing loss, rhinorrhea, sinus pain, tinnitus, trouble swallowing and voice change.    Eyes: Negative for photophobia, pain, discharge and visual disturbance.   Respiratory: Negative for chest tightness, shortness of breath and wheezing.    Cardiovascular: Negative for chest pain, palpitations and leg swelling.   Gastrointestinal: Negative for abdominal pain, blood in stool, constipation, diarrhea, nausea and vomiting.   Endocrine: Negative for cold intolerance, heat intolerance, polydipsia, polyphagia and polyuria.   Genitourinary: Negative for decreased urine volume, difficulty urinating, dysuria, flank pain, hematuria, menstrual problem, pelvic pain, vaginal bleeding, vaginal discharge and vaginal pain.   Musculoskeletal: Negative for arthralgias, gait problem, joint swelling, myalgias and neck pain.   Skin: Negative for rash.   Neurological: Negative for dizziness, tremors, syncope, weakness, numbness and headaches.   Hematological: Does not  bruise/bleed easily.   Psychiatric/Behavioral: Negative for agitation, confusion, dysphoric mood and sleep disturbance. The patient is not nervous/anxious.        Objective:      Physical Exam   Constitutional: She is oriented to person, place, and time. She appears well-developed and well-nourished. No distress.   HENT:   Head: Normocephalic and atraumatic.   Nose: Nose normal.   Mouth/Throat: Oropharynx is clear and moist.   Eyes: Conjunctivae and EOM are normal. Pupils are equal, round, and reactive to light. No scleral icterus.   Neck: Normal range of motion. Neck supple. No JVD present. No thyromegaly present.   Cardiovascular: Normal rate, regular rhythm and intact distal pulses. Exam reveals no gallop and no friction rub.   No murmur heard.  Pulmonary/Chest: Effort normal and breath sounds normal. No respiratory distress. She has no wheezes. She has no rales.   Abdominal: Soft. Bowel sounds are normal. She exhibits no distension. There is no tenderness. There is no rebound and no guarding.   Musculoskeletal: Normal range of motion. She exhibits no edema.   Lymphadenopathy:     She has no cervical adenopathy.   Neurological: She is alert and oriented to person, place, and time. No cranial nerve deficit or sensory deficit.   Skin: Skin is warm and dry. No rash noted. No erythema.   Psychiatric: She has a normal mood and affect. Her behavior is normal. Thought content normal.       Assessment:       1. Controlled type 2 diabetes mellitus without complication, with long-term current use of insulin    2. BMI 50.0-59.9, adult    3. Benign essential HTN    4. Class 3 severe obesity with body mass index (BMI) of 50.0 to 59.9 in adult, unspecified obesity type, unspecified whether serious comorbidity present    5. Vitamin D insufficiency    6. Iron deficiency anemia, unspecified iron deficiency anemia type    7. Need for pneumococcal vaccination        Plan:   -Today's Visit  - patient in NAD today.      -Cards -  Pregnancy-Induced HTN and now Essential HTN - on Nifedipine (started during pregnancy).  On ASA. BP in 3/2018 was normal at 122/66.  BP in 4/2018 was 140/90, but she did not take her Nifedipine that day - had been running in 120s/70s at home.  BP today is good at 130/80.    TTE in 10/2017 normal with EF 55-60% and normal LVSF and LVDD.  Normal RVSF.  EKG in 10/2017 NSR.      Lipids on 3/12/2018 showed , TG 76, HDL 51, .8.  10 year CHD risk is 4%.  Repeat lipids today and consider statin given T2D.    -Endocrine - Gestational Diabetes and now T2D - A1C 9.6 in 1/2018.  On Insulin - Lantus at night and Aspart with meals.  FS glucose had been running well at home - 78 in the morning and 120s at bedtime.  Had Endocrine appointment on 7/23/2018 but missed.  Repeat A1C 7.2 on 3/12/2018.   Repeat A1C today.  Consider Metformin and reducing Aspart with meals.    Foot exam last in 10/2017 - has Podiatry appointment on 12/4/2018.  Last Retinal exam was 11/16/2018 with [facility].   Urine protein/creatinine normal in 2/2018 - check urine microalbumin.  Referred to Diabetic Education, but patient did not make appointment.       TSH normal in 10/2017.  Vitamin D was 20 in 3/2018 - recommended D3 at 2000 units daily and she is taking.    -Heme - Anemia - hgb 9.1 with normal MCV in 2/2018.   Fe studies with Fe sat of 12 and Ferritin of 39 in 3/2018, B12/Folate normal in 3/2018.  On Fe replacement.  Repeat Hgb was 11.1 in 3/2018 with normal MCV.  Repeat CBC.    -Nutrition - BMI today was 51.91 in 4/2018 compared with 52.86 last visit in 3/2018.  Walking more.  BMI today is 56.40.  She wants to see someone for weight loss.  Discussed options - will refer to Dr. Thomason.    -OB/GYN - Delivered a healthy baby girl on 2/9/2018.  Last Pap was 12/2016.  Had follow up with OB/GYN on 3/26/2018.  STD screening - HIV 1/2 negative, RPR NR in 1/2018.    -Pulmonary - Sleep Disorder - referred to Sleep medicine to assess  for DISHA.  She works for [employer] and they want her to have a Sleep Apnea work-up.  She denies snoring, daily headaches or fatigue, an  does not see apnea at night in her.    -HCM - We discussed Flu (9/30/2018 at her work) and Tdap (2/14/2018) vaccinations.  Discussed Pneumococcal (23 today) vaccination.    -Follow up in 4 months    Addendum:  Patient needs external referrals for Bariatrics and Sleep given she was a Medicaid patient.

## 2018-11-20 ENCOUNTER — OFFICE VISIT (OUTPATIENT)
Dept: PRIMARY CARE CLINIC | Facility: CLINIC | Age: 38
End: 2018-11-20
Payer: MEDICAID

## 2018-11-20 ENCOUNTER — LAB VISIT (OUTPATIENT)
Dept: LAB | Facility: HOSPITAL | Age: 38
End: 2018-11-20
Attending: INTERNAL MEDICINE
Payer: MEDICAID

## 2018-11-20 ENCOUNTER — TELEPHONE (OUTPATIENT)
Dept: INTERNAL MEDICINE | Facility: CLINIC | Age: 38
End: 2018-11-20

## 2018-11-20 VITALS
OXYGEN SATURATION: 100 % | TEMPERATURE: 98 F | WEIGHT: 288.81 LBS | BODY MASS INDEX: 56.7 KG/M2 | SYSTOLIC BLOOD PRESSURE: 130 MMHG | HEART RATE: 90 BPM | DIASTOLIC BLOOD PRESSURE: 80 MMHG | HEIGHT: 60 IN

## 2018-11-20 DIAGNOSIS — D50.9 IRON DEFICIENCY ANEMIA, UNSPECIFIED IRON DEFICIENCY ANEMIA TYPE: ICD-10-CM

## 2018-11-20 DIAGNOSIS — Z79.4 CONTROLLED TYPE 2 DIABETES MELLITUS WITHOUT COMPLICATION, WITH LONG-TERM CURRENT USE OF INSULIN: Primary | ICD-10-CM

## 2018-11-20 DIAGNOSIS — I10 BENIGN ESSENTIAL HTN: ICD-10-CM

## 2018-11-20 DIAGNOSIS — E66.01 CLASS 3 SEVERE OBESITY WITH BODY MASS INDEX (BMI) OF 50.0 TO 59.9 IN ADULT, UNSPECIFIED OBESITY TYPE, UNSPECIFIED WHETHER SERIOUS COMORBIDITY PRESENT: ICD-10-CM

## 2018-11-20 DIAGNOSIS — Z23 NEED FOR PNEUMOCOCCAL VACCINATION: ICD-10-CM

## 2018-11-20 DIAGNOSIS — G47.9 SLEEP DISORDER: ICD-10-CM

## 2018-11-20 DIAGNOSIS — E11.9 CONTROLLED TYPE 2 DIABETES MELLITUS WITHOUT COMPLICATION, WITH LONG-TERM CURRENT USE OF INSULIN: Primary | ICD-10-CM

## 2018-11-20 DIAGNOSIS — E11.9 TYPE 2 DIABETES MELLITUS WITHOUT COMPLICATION: ICD-10-CM

## 2018-11-20 DIAGNOSIS — E55.9 VITAMIN D INSUFFICIENCY: ICD-10-CM

## 2018-11-20 PROCEDURE — 99999 PR PBB SHADOW E&M-EST. PATIENT-LVL V: CPT | Mod: PBBFAC,,, | Performed by: INTERNAL MEDICINE

## 2018-11-20 PROCEDURE — 99215 OFFICE O/P EST HI 40 MIN: CPT | Mod: PBBFAC,PN,25 | Performed by: INTERNAL MEDICINE

## 2018-11-20 PROCEDURE — 99214 OFFICE O/P EST MOD 30 MIN: CPT | Mod: S$PBB,,, | Performed by: INTERNAL MEDICINE

## 2018-11-20 PROCEDURE — 90732 PPSV23 VACC 2 YRS+ SUBQ/IM: CPT | Mod: PBBFAC,PN

## 2018-11-20 NOTE — PATIENT INSTRUCTIONS
Your weight and BMI are elevated today.  Target BMI is less than 25.  Please start or countinue diet changes and exercise.     Your blood pressure was good.    I will order routine fasting labs today - at least 6-8 hours of fasting.    We will give you the Pneumococcal Vaccine today.    I will refer you to Dr. Thomason to discuss weight loss options.     I will refer you to Sleep Medicine as needed by your job.    Return in 4 months - sooner if needed.  Please come at least 15-20 minutes before your scheduled appointment time.

## 2018-11-20 NOTE — PROGRESS NOTES
Internal Medicine on-call:  Received lab call regarding panic blood sugar at 11:00 a.m. this morning of 46.  Contacted patient to discuss.  No answer.  Will try it again.

## 2018-11-21 ENCOUNTER — PATIENT MESSAGE (OUTPATIENT)
Dept: PRIMARY CARE CLINIC | Facility: CLINIC | Age: 38
End: 2018-11-21

## 2018-11-21 DIAGNOSIS — E87.6 HYPOKALEMIA: Primary | ICD-10-CM

## 2018-11-21 RX ORDER — POTASSIUM CHLORIDE 20 MEQ/1
20 TABLET, EXTENDED RELEASE ORAL DAILY
Qty: 3 TABLET | Refills: 0 | Status: SHIPPED | OUTPATIENT
Start: 2018-11-21 | End: 2018-12-02

## 2018-11-21 RX ORDER — ERGOCALCIFEROL 1.25 MG/1
50000 CAPSULE ORAL
Qty: 12 CAPSULE | Refills: 0 | Status: SHIPPED | OUTPATIENT
Start: 2018-11-21 | End: 2019-04-05

## 2018-11-21 NOTE — TELEPHONE ENCOUNTER
Internal Medicine on-call note:  Called by lab earlier with 11:00 a.m. nonfasting blood sugar 46.  I did speak with patient she feels fine with no issues.  She states her readings at home have not been low.  She ate breakfast earlier than usual.  States normally takes 8 units of her mealtime insulin with breakfast.  I advised her to take only 4 units tomorrow morning and contact her primary care doctor's office-Dr. Miller to discuss her diabetic management.  She understood and will do so.

## 2018-11-23 ENCOUNTER — TELEPHONE (OUTPATIENT)
Dept: INTERNAL MEDICINE | Facility: CLINIC | Age: 38
End: 2018-11-23

## 2018-11-23 RX ORDER — METFORMIN HYDROCHLORIDE 500 MG/1
500 TABLET ORAL 2 TIMES DAILY WITH MEALS
Qty: 60 TABLET | Refills: 3 | Status: SHIPPED | OUTPATIENT
Start: 2018-11-23 | End: 2019-07-25 | Stop reason: SDUPTHER

## 2018-11-23 RX ORDER — ATORVASTATIN CALCIUM 10 MG/1
10 TABLET, FILM COATED ORAL DAILY
Qty: 30 TABLET | Refills: 3 | Status: SHIPPED | OUTPATIENT
Start: 2018-11-23 | End: 2019-06-06

## 2018-11-23 RX ORDER — INSULIN GLARGINE 100 [IU]/ML
15 INJECTION, SOLUTION SUBCUTANEOUS NIGHTLY
Qty: 15 ML | Refills: 0 | Status: SHIPPED | OUTPATIENT
Start: 2018-11-23 | End: 2018-11-29

## 2018-11-23 NOTE — TELEPHONE ENCOUNTER
Informed pt of lab results.    Pt states that she is willing to start a cholesterol medication and metformin. Please send to [facility] pharmacy.

## 2018-11-23 NOTE — TELEPHONE ENCOUNTER
Informed pt of lab results. Pt verbalized understanding.    Pt states that she is willing to start a cholesterol medication and metformin. Please send to [facility] [place] pharmacy.    Pt is schedule for k+ lab on 11/30/18.    Pt is also requesting a refill on lantus. Pt states that she is out of medication.

## 2018-11-23 NOTE — TELEPHONE ENCOUNTER
Please let patient know that I sent in a prescription for Metformin 500mg to take twice a day and Atorvastatin 10mg to take once a day.

## 2018-11-26 ENCOUNTER — PATIENT MESSAGE (OUTPATIENT)
Dept: PRIMARY CARE CLINIC | Facility: CLINIC | Age: 38
End: 2018-11-26

## 2018-11-29 ENCOUNTER — TELEPHONE (OUTPATIENT)
Dept: PRIMARY CARE CLINIC | Facility: CLINIC | Age: 38
End: 2018-11-29

## 2018-11-29 RX ORDER — INSULIN GLARGINE 100 [IU]/ML
15 INJECTION, SOLUTION SUBCUTANEOUS NIGHTLY
Qty: 15 ML | Refills: 3 | Status: SHIPPED | OUTPATIENT
Start: 2018-11-29 | End: 2019-07-25 | Stop reason: ALTCHOICE

## 2018-11-29 NOTE — TELEPHONE ENCOUNTER
----- Message from Saniya Warren sent at 11/29/2018  2:52 PM CST -----  Contact: Tish   Name of Who is Calling: Tish Ruelas       What is the request in detail: Tish Ruelas requesting a call from staff in regards to a formulary medication for the patient, no further information given. Please contact to further discuss and advise      Can the clinic reply by [facility]: No       What [facility] Back [facility]: [phone]

## 2018-11-29 NOTE — TELEPHONE ENCOUNTER
Spoke with Tish with [employer] and she stated that a prescription for Basaglar need to be sent to pt's pharmacy due to lancet is no longer covered by her insurance. 11*29 jdp

## 2019-01-07 ENCOUNTER — TELEPHONE (OUTPATIENT)
Dept: PRIMARY CARE CLINIC | Facility: CLINIC | Age: 39
End: 2019-01-07

## 2019-01-07 NOTE — TELEPHONE ENCOUNTER
----- Message from Violeta Reyes sent at 1/7/2019  2:18 PM CST -----  Contact: pt  Name of Who is Calling:  MATILDA MCINTOSH [[id number]]        What is the request in detail: Pt states she would like to know if her referral was sent to [facility]. Please contact to further discuss and advise.           Can the clinic reply by [facility]: N        What Number to Call Back if not in [facility]: [phone]

## 2019-01-07 NOTE — TELEPHONE ENCOUNTER
Tried calling [facility] to get more information on what test is needed for this patient since patient wasn't too sure on what needs to be ordered, but the phone line was busy. I tried calling a few times, with a couple minutes in between each try with no success with getting in touch.

## 2019-01-07 NOTE — TELEPHONE ENCOUNTER
Left message needing fax number/where to send this referral.    ----- Message from Rubina Villagran sent at 1/7/2019 11:32 AM CST -----  Contact: Self            Name of Who is Calling:  MATILDA MCINTOSH [[id number]]      What is the request in detail: Pt states she would like to know if her referral was sent to [facility]. Please contact to further discuss and advise.        Can the clinic reply by [facility]: N      What Number to Call Back if not in [facility]: [phone]

## 2019-01-07 NOTE — TELEPHONE ENCOUNTER
Spoke w/ pt to see what sleep test she needed, but all she could really said is a sleep test. She didn't know what kind.    ----- Message from Paddy Cox sent at 1/7/2019  3:33 PM CST -----  Contact: MATILDA MCINTOSH [[id number]]  Name of Who is Calling: MATILDA MCINTOSH [[id number]]       What is the request in detail: Patient called in regards test that needs to be sent to another facility. Patient request an urgent call back. Please advise.  Thanks-       Can the clinic reply by [facility]: Yes       What Number to Call Back if not in [facility]:  [phone]

## 2019-01-08 ENCOUNTER — TELEPHONE (OUTPATIENT)
Dept: PRIMARY CARE CLINIC | Facility: CLINIC | Age: 39
End: 2019-01-08

## 2019-01-08 NOTE — TELEPHONE ENCOUNTER
----- Message from Rylee Krishna sent at 1/8/2019  8:53 AM CST -----  Contact: MATILDA MCINTOSH [[id number]]  Name of Who is Calling: MATILDA MCINTOSH [[id number]]      What is the request in detail: Patient called providing the phone number for [facility].  Thanks!      Can the clinic reply by [facility]:       What Number to Call Back:  [facility] / [phone]

## 2019-01-08 NOTE — TELEPHONE ENCOUNTER
Left message on patient's voicemail    ----- Message from Dena Saunders sent at 1/8/2019  8:21 AM CST -----  Name of Who is Calling: MATILDA QUESADA [[id number]]    What is the request in detail: Pt checking on the status of her paperwork      Can the clinic reply by [facility]:    No       What Number to Call Back [facility] not [facility]: #[phone]

## 2019-02-04 ENCOUNTER — DOCUMENTATION ONLY (OUTPATIENT)
Dept: PRIMARY CARE CLINIC | Facility: CLINIC | Age: 39
End: 2019-02-04

## 2019-02-04 NOTE — PROGRESS NOTES
Fax from [facility] stating [facility] will not approve sleep study w/ provided clinicals. No sleep symptoms. -- Dee valentino/ Parminder Miles.

## 2019-02-07 ENCOUNTER — HOSPITAL ENCOUNTER (EMERGENCY)
Facility: OTHER | Age: 39
Discharge: HOME | End: 2019-02-07
Attending: EMERGENCY MEDICINE
Payer: MEDICAID

## 2019-02-07 VITALS
SYSTOLIC BLOOD PRESSURE: 159 MMHG | OXYGEN SATURATION: 99 % | RESPIRATION RATE: 18 BRPM | HEART RATE: 98 BPM | WEIGHT: 293 LBS | HEIGHT: 60 IN | DIASTOLIC BLOOD PRESSURE: 90 MMHG | BODY MASS INDEX: 57.52 KG/M2 | TEMPERATURE: 99 F

## 2019-02-07 DIAGNOSIS — J06.9 VIRAL URI: Primary | ICD-10-CM

## 2019-02-07 DIAGNOSIS — H10.9 CONJUNCTIVITIS, UNSPECIFIED CONJUNCTIVITIS TYPE, UNSPECIFIED LATERALITY: ICD-10-CM

## 2019-02-07 PROCEDURE — 99283 EMERGENCY DEPT VISIT LOW MDM: CPT

## 2019-02-07 RX ORDER — ERYTHROMYCIN 5 MG/G
OINTMENT OPHTHALMIC
Qty: 3.5 G | Refills: 0 | Status: SHIPPED | OUTPATIENT
Start: 2019-02-07 | End: 2019-07-25

## 2019-02-07 RX ORDER — GUAIFENESIN 100 MG/5ML
100-200 SOLUTION ORAL EVERY 4 HOURS PRN
Qty: 60 ML | Refills: 0 | Status: SHIPPED | OUTPATIENT
Start: 2019-02-07 | End: 2019-02-17

## 2019-02-07 RX ORDER — FLUTICASONE PROPIONATE 50 MCG
1 SPRAY, SUSPENSION (ML) NASAL 2 TIMES DAILY PRN
Qty: 16 G | Refills: 0 | Status: SHIPPED | OUTPATIENT
Start: 2019-02-07

## 2019-02-07 NOTE — ED TRIAGE NOTES
Pt reports sinus pressure, nasal congestion, and pressure behind her eyes x 1 week. Pt is AAO x 3, answers questions appropriately

## 2019-02-07 NOTE — ED PROVIDER NOTES
Encounter Date: 2019    SCRIBE #1 NOTE: I, Medhat Becerra, am scribing for, and in the presence of, Mayra Gupta PA-C .       History     Chief Complaint   Patient presents with    URI     Pt c/o pressure H/A behind the eyes with rhinnorrhea X 1 week. Pt denies relief with OTC medications.     Time seen by provider: 12:29 PM    This is a 38 y.o. female, with history of DM type II and iron deficiency anemia, who presents with eye drainage for one day. Patient reports her daughter has pink eye, and she is concerned she may have contracted it. She states her eyes have been crusting and draining. She denies bilateral eye redness or pain. She denies blurry vision or double vision. She admits rhinorrhea, but denies fevers, chills, dizziness, congestion, sore throat, cough, SOB, chest pain, abdominal pain, N/V/D, dysuria, urinary frequency, or urinary urgency.       The history is provided by the patient.     Review of patient's allergies indicates:  No Known Allergies  Past Medical History:   Diagnosis Date    Chronic hypertension affecting pregnancy     Diabetes mellitus type II, controlled, with no complications 10/11/2017    Infertility, female     States she has been trying to conceive for four years. Was evaluated at feritility clinic and was given Clomid for three months two years ago.    Iron deficiency anemia 2018     Past Surgical History:   Procedure Laterality Date     SECTION      DELIVERY- SECTION N/A 2018    Performed [facility]D    none       Family History   Problem Relation Age of Onset    Cancer Father         prostate    Diabetes Maternal Aunt     Breast cancer Neg Hx      Social History     Tobacco Use    Smoking status: Never Smoker    Smokeless tobacco: Never Used   Substance Use Topics    Alcohol use: No    Drug use: No     Review of Systems   Constitutional: Negative for chills and fever.   HENT: Positive for  rhinorrhea. Negative for congestion and sore throat.    Eyes: Positive for discharge. Negative for pain, redness and visual disturbance.        Positive for pressure behind bilateral eyes.    Respiratory: Negative for cough and shortness of breath.    Cardiovascular: Negative for chest pain.   Gastrointestinal: Negative for abdominal pain, diarrhea, nausea and vomiting.   Genitourinary: Negative for dysuria, frequency and urgency.   Musculoskeletal: Negative for back pain.   Skin: Negative for rash.   Neurological: Negative for dizziness and headaches.   Psychiatric/Behavioral: Negative for confusion.       Physical Exam     Initial Vitals [02/07/19 1208]   BP Pulse Resp Temp SpO2   (!) 173/104 (!) 112 18 97.7 °F (36.5 °C) 97 %      MAP       --         Physical Exam    Nursing note and vitals reviewed.  Constitutional: She appears well-developed and well-nourished. She is cooperative.  Non-toxic appearance. She does not have a sickly appearance.   HENT:   Head: Normocephalic and atraumatic.   Right Ear: Tympanic membrane, external ear and ear canal normal.   Left Ear: Tympanic membrane and external ear normal.   Nose: Rhinorrhea present.   Mouth/Throat: Oropharynx is clear and moist.   Eyes: Conjunctivae, EOM and lids are normal. Pupils are equal, round, and reactive to light.   No injection. No discharge noted.    Neck: Normal range of motion and full passive range of motion without pain. Neck supple.   Cardiovascular: Normal rate, regular rhythm and normal heart sounds. Exam reveals no gallop and no friction rub.    No murmur heard.  Pulmonary/Chest: Breath sounds normal. She has no wheezes. She has no rhonchi. She has no rales.   Abdominal: Normal appearance.   Neurological: She is alert and oriented to person, place, and time.   Skin: Skin is warm, dry and intact. No rash noted.         ED Course   Procedures  Labs Reviewed - No data to display       Imaging Results    None          Medical Decision Making:    History:   Old Medical Records: I decided to obtain old medical records.       APC / Resident Notes:   Urgent evaluation of a 38-year-old female with complaint of rhinorrhea and eye discharge. Patient denied fever.  No abdominal pain, nausea or vomiting.  Vital signs are stable.  Patient is afebrile.  Abdomen is soft and nontender.  There is no rebound, rigidity or distention.  I doubt intra-abdominal process.  Bilateral TMs with no erythema, retraction or perforation.  There is no mastoid tenderness.  There is no movement tenderness to bilateral ears.  No tonsillar swelling or exudate noted.  Uvula is midline.  No concern for ludwigs angina. Breath sounds are clear and equal bilaterally. Doubt pneumonia. No injection to the conjunctiva. Normal EOM. Denied visual changes. PERRL. Suspect symptoms are secondary to viral illness.  Symptomatic treatment. Discussed results with patient. Return precautions given. Patient is to follow up with their primary care provider. All questions answered.          Scribe Attestation:   Scribe #1: I performed the above scribed service and the documentation accurately describes the services I performed. I attest to the accuracy of the note.    Attending Attestation:           Physician Attestation for Scribe:  Physician Attestation Statement for Scribe #1: I, Mayra Gupta PA-C , reviewed documentation, as scribed by Medhat Becerra  in my presence, and it is both accurate and complete.                    Clinical Impression:     1. Viral URI    2. Conjunctivitis, unspecified conjunctivitis type, unspecified laterality                                   Mayra Gupta PA-C  02/07/19 [id number]

## 2019-02-13 ENCOUNTER — HOSPITAL ENCOUNTER (EMERGENCY)
Facility: OTHER | Age: 39
Discharge: HOME | End: 2019-02-13
Attending: EMERGENCY MEDICINE
Payer: MEDICAID

## 2019-02-13 VITALS
SYSTOLIC BLOOD PRESSURE: 135 MMHG | HEART RATE: 80 BPM | WEIGHT: 292 LBS | BODY MASS INDEX: 53.73 KG/M2 | TEMPERATURE: 98 F | RESPIRATION RATE: 20 BRPM | DIASTOLIC BLOOD PRESSURE: 84 MMHG | HEIGHT: 62 IN | OXYGEN SATURATION: 99 %

## 2019-02-13 DIAGNOSIS — J01.90 ACUTE SINUSITIS, RECURRENCE NOT SPECIFIED, UNSPECIFIED LOCATION: Primary | ICD-10-CM

## 2019-02-13 DIAGNOSIS — E11.9 CONTROLLED TYPE 2 DIABETES MELLITUS WITHOUT COMPLICATION, WITH LONG-TERM CURRENT USE OF INSULIN: Primary | ICD-10-CM

## 2019-02-13 DIAGNOSIS — Z79.4 CONTROLLED TYPE 2 DIABETES MELLITUS WITHOUT COMPLICATION, WITH LONG-TERM CURRENT USE OF INSULIN: Primary | ICD-10-CM

## 2019-02-13 LAB
INFLUENZA A, MOLECULAR: NEGATIVE
INFLUENZA B, MOLECULAR: NEGATIVE
SPECIMEN SOURCE: NORMAL

## 2019-02-13 PROCEDURE — 25000003 PHARM REV CODE 250: Performed by: PHYSICIAN ASSISTANT

## 2019-02-13 PROCEDURE — 99283 EMERGENCY DEPT VISIT LOW MDM: CPT | Mod: 25

## 2019-02-13 PROCEDURE — 87502 INFLUENZA DNA AMP PROBE: CPT

## 2019-02-13 RX ORDER — ACETAMINOPHEN 500 MG
1000 TABLET ORAL
Status: COMPLETED | OUTPATIENT
Start: 2019-02-13 | End: 2019-02-13

## 2019-02-13 RX ORDER — AMOXICILLIN AND CLAVULANATE POTASSIUM 875; 125 MG/1; MG/1
1 TABLET, FILM COATED ORAL 2 TIMES DAILY
Qty: 10 TABLET | Refills: 0 | Status: SHIPPED | OUTPATIENT
Start: 2019-02-13 | End: 2019-02-18

## 2019-02-13 RX ORDER — PEN NEEDLE, DIABETIC 31 GX5/16"
100 NEEDLE, DISPOSABLE MISCELLANEOUS
Qty: 100 EACH | Refills: 3 | Status: SHIPPED | OUTPATIENT
Start: 2019-02-13 | End: 2019-07-25 | Stop reason: SDUPTHER

## 2019-02-13 RX ADMIN — ACETAMINOPHEN 1000 MG: 500 TABLET ORAL at 05:02

## 2019-02-13 NOTE — ED PROVIDER NOTES
Encounter Date: 2019       History     Chief Complaint   Patient presents with    Nasal Congestion     Pt came to the ED tonight c.o. sinus and nasal congestion, pt was being treated with pink eye and is still taking her ointment and states now she is feeling sinus pressure.      Patient is a 38-year-old female with hypertension and diabetes who presents to the ED with sinus pressure.  Patient reports nasal congestion, eye irritation, frontal sinus pressure and bilateral maxillary sinus pressure for the past week.  She reports taking Flonase and multiple over-the-counter medications without relief.  Patient also reports general malaise and decreased appetite.  She reports a mild, dry cough.  She states she may have had a fever 2 days ago but this is now resolved.      The history is provided by the patient.     Review of patient's allergies indicates:  No Known Allergies  Past Medical History:   Diagnosis Date    Chronic hypertension affecting pregnancy     Diabetes mellitus type II, controlled, with no complications 10/11/2017    Infertility, female     States she has been trying to conceive for four years. Was evaluated at feritility clinic and was given Clomid for three months two years ago.    Iron deficiency anemia 2018     Past Surgical History:   Procedure Laterality Date     SECTION      DELIVERY- SECTION N/A 2018    Performed [facility] at [facility] L[facility]D    none       Family History   Problem Relation Age of Onset    Cancer Father         prostate    Diabetes Maternal Aunt     Breast cancer Neg Hx      Social History     Tobacco Use    Smoking status: Never Smoker    Smokeless tobacco: Never Used   Substance Use Topics    Alcohol use: No    Drug use: No     Review of Systems   Constitutional: Positive for fatigue. Negative for chills and fever.   HENT: Positive for congestion, sinus pressure and sinus pain. Negative for sore throat.    Eyes: Positive for  discharge (Clear). Negative for pain.   Respiratory: Positive for cough. Negative for shortness of breath.    Cardiovascular: Negative for chest pain.   Gastrointestinal: Negative for abdominal pain, diarrhea, nausea and vomiting.   Genitourinary: Negative for dysuria.   Musculoskeletal: Negative for arthralgias.   Skin: Negative for rash.   Neurological: Negative for headaches.       Physical Exam     Initial Vitals [02/13/19 1606]   BP Pulse Resp Temp SpO2   (!) 181/90 99 15 97.9 °F (36.6 °C) 99 %      MAP       --         Physical Exam    Constitutional: Vital signs are normal. She is cooperative.   HENT:   Head: Normocephalic and atraumatic.   Mouth/Throat: Oropharynx is clear and moist.   Mild tenderness with percussion to the frontal and bilateral maxillary sinus.  Mildly inflamed nasal turbinates.   Eyes: Conjunctivae and EOM are normal. Pupils are equal, round, and reactive to light. Right eye exhibits no discharge. Left eye exhibits no discharge.   Neck: Normal range of motion. Neck supple.   Cardiovascular: Normal rate, regular rhythm and intact distal pulses.   Pulmonary/Chest: Breath sounds normal. She has no wheezes. She has no rhonchi. She has no rales.   Abdominal: Soft. Bowel sounds are normal. There is no tenderness.   Musculoskeletal: Normal range of motion. She exhibits no edema.   Neurological: She is alert and oriented to person, place, and time. GCS eye subscore is 4. GCS verbal subscore is 5. GCS motor subscore is 6.   Skin: Skin is warm and dry. No rash noted.         ED Course   Procedures  Labs Reviewed   INFLUENZA A & B BY MOLECULAR   POCT URINE PREGNANCY          Imaging Results    None          Medical Decision Making:   Initial Assessment:   Urgent evaluation of a 38 y.o. female presenting to the emergency department complaining of sinus pressure and some mild flu-like symptoms. Patient is afebrile, nontoxic appearing and hemodynamically stable.  Patient's vital signs normal aside from  elevated blood pressure of 181/90.  Patient states she is compliant with her hypertensive medication.  The patient's 2nd visit for similar symptoms that she states are worsening.  She is treating with over-the-counter medications.  Will obtain rapid flu swab the provide Tylenol for patient's headache.  ED Management:  Rapid flu swab is negative.  Patient likely has acute sinusitis.  Otherwise to have lower suspicion for bacterial etiology, given patient's history of diabetes, will treat with Augmentin.  She is advised on symptomatic care.  She has no other complaints this time is stable for discharge.  Other:   I have discussed this case with another health care provider.                      Clinical Impression:     1. Acute sinusitis, recurrence not specified, unspecified location                               Jani Abreu PA-C  02/13/19 [id number]

## 2019-02-13 NOTE — ED TRIAGE NOTES
Pt reports nasal congestion, sinus HA x 6 days. Reports dry cough. Was treated for pink eye to R eye x 6 days ago. Reports crustiness to both eyes in morning. No redness or drainage noted at this time. Denies fever.

## 2019-02-21 RX ORDER — INSULIN LISPRO 100 [IU]/ML
INJECTION, SOLUTION INTRAVENOUS; SUBCUTANEOUS
Qty: 10 ML | Refills: 3 | Status: SHIPPED | OUTPATIENT
Start: 2019-02-21 | End: 2019-07-25 | Stop reason: SDUPTHER

## 2019-03-15 ENCOUNTER — PATIENT MESSAGE (OUTPATIENT)
Dept: PRIMARY CARE CLINIC | Facility: CLINIC | Age: 39
End: 2019-03-15

## 2019-03-18 RX ORDER — NIFEDIPINE 60 MG/1
60 TABLET, EXTENDED RELEASE ORAL DAILY
Qty: 30 TABLET | Refills: 1 | Status: SHIPPED | OUTPATIENT
Start: 2019-03-18 | End: 2019-06-05

## 2019-03-22 DIAGNOSIS — E11.9 TYPE 2 DIABETES MELLITUS WITHOUT COMPLICATION: ICD-10-CM

## 2019-05-15 ENCOUNTER — TELEPHONE (OUTPATIENT)
Dept: OBSTETRICS AND GYNECOLOGY | Facility: CLINIC | Age: 39
End: 2019-05-15

## 2019-05-15 NOTE — TELEPHONE ENCOUNTER
----- Message from Latasha Gray sent at 5/15/2019  4:03 PM CDT -----  Contact: [phone]  Pt called to schedule an appt for annual well women .  No appt are available    Pt is available for 5/21 and 24,30,31    Thank you!

## 2019-05-24 ENCOUNTER — OFFICE VISIT (OUTPATIENT)
Dept: OBSTETRICS AND GYNECOLOGY | Facility: CLINIC | Age: 39
End: 2019-05-24
Payer: MEDICAID

## 2019-05-24 ENCOUNTER — PATIENT MESSAGE (OUTPATIENT)
Dept: OBSTETRICS AND GYNECOLOGY | Facility: CLINIC | Age: 39
End: 2019-05-24

## 2019-05-24 VITALS
WEIGHT: 290.13 LBS | BODY MASS INDEX: 56.96 KG/M2 | DIASTOLIC BLOOD PRESSURE: 90 MMHG | HEIGHT: 60 IN | SYSTOLIC BLOOD PRESSURE: 150 MMHG

## 2019-05-24 DIAGNOSIS — Z01.411 ENCOUNTER FOR WELL WOMAN EXAM WITH ABNORMAL FINDINGS: ICD-10-CM

## 2019-05-24 DIAGNOSIS — Z12.39 SCREENING FOR MALIGNANT NEOPLASM OF BREAST: ICD-10-CM

## 2019-05-24 DIAGNOSIS — Z01.419 WELL WOMAN EXAM WITH ROUTINE GYNECOLOGICAL EXAM: Primary | ICD-10-CM

## 2019-05-24 DIAGNOSIS — Z31.69 ENCOUNTER FOR PRECONCEPTION CONSULTATION: ICD-10-CM

## 2019-05-24 PROCEDURE — 87624 HPV HI-RISK TYP POOLED RSLT: CPT

## 2019-05-24 PROCEDURE — 99213 OFFICE O/P EST LOW 20 MIN: CPT | Mod: PBBFAC | Performed by: OBSTETRICS & GYNECOLOGY

## 2019-05-24 PROCEDURE — 87491 CHLMYD TRACH DNA AMP PROBE: CPT

## 2019-05-24 PROCEDURE — 99999 PR PBB SHADOW E&M-EST. PATIENT-LVL III: ICD-10-PCS | Mod: PBBFAC,,, | Performed by: OBSTETRICS & GYNECOLOGY

## 2019-05-24 PROCEDURE — 99395 PR PREVENTIVE VISIT,EST,18-39: ICD-10-PCS | Mod: S$PBB,,, | Performed by: OBSTETRICS & GYNECOLOGY

## 2019-05-24 PROCEDURE — 88175 CYTOPATH C/V AUTO FLUID REDO: CPT

## 2019-05-24 PROCEDURE — 99395 PREV VISIT EST AGE 18-39: CPT | Mod: S$PBB,,, | Performed by: OBSTETRICS & GYNECOLOGY

## 2019-05-24 PROCEDURE — 99999 PR PBB SHADOW E&M-EST. PATIENT-LVL III: CPT | Mod: PBBFAC,,, | Performed by: OBSTETRICS & GYNECOLOGY

## 2019-05-24 NOTE — PROGRESS NOTES
Pap    Subjective:       Patient ID: Wang Warren is a 38 y.o. female.    Chief Complaint:  Annual Exam    History of Present Illness:  HPI  SUBJECTIVE:   38 y.o. female  here for annual. She is also requesting medication to help her get pregnant again. She has an adopted 17 month old daughter and 16 month old daughter born via C/S. Pregnancy was complicated by morbid obesity, AMA, newly diagnosed HTN (superimposed preeclampsia) and DMII. She and her  would like one more child. States that she was given clomid in the past for 3 months, but eventually conceived naturally. She reports that she used OPKs during the last cycle, testing only on CD11 which was negative. She does not have her phone with her cycles tracked today.    She describes her periods as regular, lasting 5 days. normal flow. Unsure about duration of cycles.  denies break through bleeding.   denies vaginal itching or irritation.  denies vaginal discharge.    She is sexually active.  She uses no method for contraception.    History of abnormal pap: No  Last Pap: >3 years ago  Last MMG: N/A  Last Colonoscopy: N/A    FH: Denies family history of breast, GYN or colon cancer.    GYN & OB History  Patient's last menstrual period was 2019.   Date of Last Pap: No result found    OB History    Para Term  AB Living   1 1 1     1   SAB TAB Ectopic Multiple Live Births         0 1      # Outcome Date GA Lbr Conrado/2nd Weight Sex Delivery Anes PTL Lv   1 Term 18 37w1d  2.82 kg (6 lb 3.5 oz) F CS-LTranv EPI N ROBERTO      Complications: Fetal Intolerance       Review of Systems  Review of Systems   Constitutional: Negative for chills, diaphoresis, fatigue and fever.   Respiratory: Negative for cough and shortness of breath.    Cardiovascular: Negative for chest pain and palpitations.   Gastrointestinal: Negative for abdominal pain, constipation, diarrhea, nausea and vomiting.   Genitourinary: Negative for dysmenorrhea,  dyspareunia, dysuria, frequency, menorrhagia, menstrual problem, pelvic pain, vaginal bleeding, vaginal discharge and vaginal pain.   Musculoskeletal: Negative for back pain and myalgias.   Integumentary:  Negative for rash, acne, breast mass, nipple discharge and breast skin changes.   Neurological: Negative for headaches.   Psychiatric/Behavioral: Negative for depression. The patient is not nervous/anxious.    Breast: Negative for mass, mastodynia, nipple discharge and skin changes          Objective:    Physical Exam:   Constitutional: She is oriented to person, place, and time. She appears well-developed and well-nourished. No distress.    HENT:   Head: Normocephalic and atraumatic.    Eyes: EOM are normal.    Neck: Normal range of motion. No thyromegaly present.     Pulmonary/Chest: Effort normal. She exhibits no mass and no tenderness. Right breast exhibits no inverted nipple, no mass, no nipple discharge, no skin change, no tenderness and no swelling. Left breast exhibits no inverted nipple, no mass, no nipple discharge, no skin change, no tenderness and no swelling. Breasts are symmetrical.        Abdominal: Soft. She exhibits no distension and no mass. There is no tenderness. There is no rebound and no guarding. No hernia.   Obese     Genitourinary:   Genitourinary Comments: Normal external female genitalia; vagina rugated, normal; cervix normal, no masses; uterus small mobile nontender; no adnexal masses palpated.           Musculoskeletal: Normal range of motion and moves all extremeties.       Neurological: She is alert and oriented to person, place, and time.    Skin: Skin is warm. No rash noted.    Psychiatric: She has a normal mood and affect. Her behavior is normal. Judgment and thought content normal.          Assessment:        1. Well woman exam with routine gynecological exam    2. Screening for malignant neoplasm of breast    3. Encounter for well woman exam with abnormal findings    4.  Encounter for preconception consultation             Plan:      Wang was seen today for annual exam.    Diagnoses and all orders for this visit:    Well woman exam with routine gynecological exam  - Pap guidelines discussed. Pap/HPV collected.   - MMG age 39. Order placed.  - Contraception -  Patient declined. PNV daily while attempting pregnancy.  - STD screening - GCCT collected.     Preconception counseling  - Upon chart review, over one year ago in postpartum period A1C was 7.2. 6 months ago A1C was 13.3. She has not this repeated since then. Recommended that she have A1C drawn which has been ordered by her PCP. Patient counseled on proper blood glucose control prior to pregnancy to reduce risk of complications including specifically birth defects. She expressed understanding.   - Given BMI 56.6, counseled on weight loss, specifically losing 5% of current weight can increase chances for ovulation and conception. Counseled patient that would recommend healthy BMI prior to conception for optimal pregnancy health and delivery. Counseled that given her current comorbidities, would not recommend ovulation induction medications. Discussed [facility] Weightloss program here at [facility]. Brochure given.  Instructed on proper use of OPKs - may have been testing too early last cycle.   PNV daily.    Orders Placed This Encounter   Procedures    C. trachomatis/N. gonorrhoeae by AMP DNA    HPV High Risk Genotypes, PCR    Mammo Digital Screening Bilat w/ Melvin       Follow up in about 1 year (around 5/24/2020) for annual.

## 2019-05-26 LAB
C TRACH DNA SPEC QL NAA+PROBE: NOT DETECTED
N GONORRHOEA DNA SPEC QL NAA+PROBE: NOT DETECTED

## 2019-05-30 LAB
HPV HR 12 DNA CVX QL NAA+PROBE: POSITIVE
HPV16 AG SPEC QL: NEGATIVE
HPV18 DNA SPEC QL NAA+PROBE: NEGATIVE

## 2019-05-31 ENCOUNTER — TELEPHONE (OUTPATIENT)
Dept: PRIMARY CARE CLINIC | Facility: CLINIC | Age: 39
End: 2019-05-31

## 2019-05-31 NOTE — TELEPHONE ENCOUNTER
----- Message from Tiffanie Garcia sent at 5/30/2019  2:41 PM CDT -----  Contact: MATILDA MCINTOSH [[id number]]  Name of Who is Calling: MATILDA MCINTOSH [[id number]]      What is the request in detail: Pt is requesting a call back from clinical team in regard to getting schedule Annual Exam try to schedule but was unable.Please contact to further discuss and advise.          Can the clinic reply by [facility]:       What Number to Call Back if not in [facility]: [phone]

## 2019-06-03 ENCOUNTER — PATIENT MESSAGE (OUTPATIENT)
Dept: OBSTETRICS AND GYNECOLOGY | Facility: CLINIC | Age: 39
End: 2019-06-03

## 2019-06-05 ENCOUNTER — TELEPHONE (OUTPATIENT)
Dept: PRIMARY CARE CLINIC | Facility: CLINIC | Age: 39
End: 2019-06-05

## 2019-06-05 RX ORDER — NIFEDIPINE 60 MG/1
60 TABLET, EXTENDED RELEASE ORAL DAILY
Qty: 30 TABLET | Refills: 0 | Status: SHIPPED | OUTPATIENT
Start: 2019-06-05 | End: 2019-07-25 | Stop reason: SDUPTHER

## 2019-06-05 NOTE — TELEPHONE ENCOUNTER
----- Message from Rubina Villagran sent at 6/5/2019  8:07 AM CDT -----  Contact: Self            Name of Who is Calling: MATILDA MCINTOSH [[id number]]      What is the request in detail: Pt states she has been waiting for a call back to schedule her annual exam. Pt states she is out of her blood pressure and insulin medication. Due to pt's insurance no available appts. Please contact to further discuss and advise.        Can the clinic reply by [facility]: n      [facility]: [phone]

## 2019-06-05 NOTE — TELEPHONE ENCOUNTER
Call placed to pt regarding scheduling annual appointment with medicaid insurance. Left voice message giving pt phone number to medicaid escalation line, and for pt to call office with any questions or concerns.  Simran Warren LPN

## 2019-06-06 RX ORDER — ATORVASTATIN CALCIUM 10 MG/1
10 TABLET, FILM COATED ORAL DAILY
Qty: 30 TABLET | Refills: 0 | Status: SHIPPED | OUTPATIENT
Start: 2019-06-06 | End: 2019-07-25 | Stop reason: SDUPTHER

## 2019-07-09 DIAGNOSIS — E11.65 TYPE 2 DIABETES MELLITUS WITH HYPERGLYCEMIA, UNSPECIFIED WHETHER LONG TERM INSULIN USE: Primary | ICD-10-CM

## 2019-07-11 ENCOUNTER — PATIENT MESSAGE (OUTPATIENT)
Dept: ADMINISTRATIVE | Facility: HOSPITAL | Age: 39
End: 2019-07-11

## 2019-07-11 ENCOUNTER — PATIENT OUTREACH (OUTPATIENT)
Dept: ADMINISTRATIVE | Facility: HOSPITAL | Age: 39
End: 2019-07-11

## 2019-07-11 DIAGNOSIS — E11.9 ENCOUNTER FOR DIABETIC FOOT EXAM: Primary | ICD-10-CM

## 2019-07-20 ENCOUNTER — HOSPITAL ENCOUNTER (EMERGENCY)
Facility: OTHER | Age: 39
Discharge: HOME | End: 2019-07-20
Attending: EMERGENCY MEDICINE
Payer: MEDICAID

## 2019-07-20 VITALS
DIASTOLIC BLOOD PRESSURE: 71 MMHG | OXYGEN SATURATION: 99 % | WEIGHT: 177 LBS | HEIGHT: 61 IN | RESPIRATION RATE: 18 BRPM | SYSTOLIC BLOOD PRESSURE: 128 MMHG | TEMPERATURE: 98 F | HEART RATE: 82 BPM | BODY MASS INDEX: 33.42 KG/M2

## 2019-07-20 DIAGNOSIS — L03.011 PARONYCHIA OF RIGHT RING FINGER: Primary | ICD-10-CM

## 2019-07-20 DIAGNOSIS — M79.644 PAIN OF FINGER OF RIGHT HAND: ICD-10-CM

## 2019-07-20 PROCEDURE — 99284 EMERGENCY DEPT VISIT MOD MDM: CPT

## 2019-07-20 PROCEDURE — 25000003 PHARM REV CODE 250: Performed by: EMERGENCY MEDICINE

## 2019-07-20 RX ORDER — ACETAMINOPHEN 500 MG
1000 TABLET ORAL
Status: COMPLETED | OUTPATIENT
Start: 2019-07-20 | End: 2019-07-20

## 2019-07-20 RX ORDER — DOXYCYCLINE 100 MG/1
100 CAPSULE ORAL 2 TIMES DAILY
Qty: 14 CAPSULE | Refills: 0 | Status: SHIPPED | OUTPATIENT
Start: 2019-07-20 | End: 2019-07-25

## 2019-07-20 RX ORDER — MUPIROCIN CALCIUM 20 MG/G
CREAM TOPICAL 3 TIMES DAILY
Qty: 15 G | Refills: 0 | Status: SHIPPED | OUTPATIENT
Start: 2019-07-20 | End: 2019-07-25

## 2019-07-20 RX ADMIN — ACETAMINOPHEN 1000 MG: 500 TABLET ORAL at 08:07

## 2019-07-20 NOTE — DISCHARGE INSTRUCTIONS
Only take the antibiotics if your swelling does not improve and the area becomes more inflamed, red and painful.

## 2019-07-20 NOTE — ED PROVIDER NOTES
Encounter Date: 2019       History     Chief Complaint   Patient presents with    finger pain     Pt reports pain and swelling to the right third finger near the nail.      Mr. accident is a 38-year-old female here with complaints of pain to the right ring finger for the last 2 days.  She has noted increasing pain and swelling and discomfort to the base of the nail.  Patient denies known injury, did not recently have a manicure or any other intervention.  He denies history of appearing making the past.  Patient denies fevers or chills.  Patient has not tried squeezing her finger, nor any other interventions.        Review of patient's allergies indicates:  No Known Allergies  Past Medical History:   Diagnosis Date    Chronic hypertension affecting pregnancy     Diabetes mellitus type II, controlled, with no complications 10/11/2017    Infertility, female     States she has been trying to conceive for four years. Was evaluated at feritility clinic and was given Clomid for three months two years ago.    Iron deficiency anemia 2018     Past Surgical History:   Procedure Laterality Date     SECTION      DELIVERY- SECTION N/A 2018    Performed by Mariela Valenzuela [facility] at [facility] L&D    none       Family History   Problem Relation Age of Onset    Cancer Father         prostate    Diabetes Maternal Aunt     Breast cancer Neg Hx      Social History     Tobacco Use    Smoking status: Never Smoker    Smokeless tobacco: Never Used   Substance Use Topics    Alcohol use: No    Drug use: No     Review of Systems   Constitutional: Negative for activity change, appetite change, chills, diaphoresis and fever.   HENT: Negative for congestion, sore throat and trouble swallowing.    Eyes: Negative for photophobia and visual disturbance.   Respiratory: Negative for cough, chest tightness and shortness of breath.    Cardiovascular: Negative for chest pain.   Gastrointestinal: Negative for  abdominal pain, nausea and vomiting.   Endocrine: Negative for polydipsia and polyuria.   Genitourinary: Negative for difficulty urinating and flank pain.   Musculoskeletal: Negative for back pain and neck pain.   Skin: Negative for rash.        Pain and swelling to right ring fingernail   Neurological: Negative for weakness and headaches.   Psychiatric/Behavioral: Negative for confusion.       Physical Exam     Initial Vitals [07/20/19 0738]   BP Pulse Resp Temp SpO2   128/71 82 18 97.6 °F (36.4 °C) 99 %      MAP       --         Physical Exam    Nursing note and vitals reviewed.  Constitutional: She appears well-developed and well-nourished. She is cooperative.  Non-toxic appearance. No distress.   Obese   HENT:   Head: Normocephalic and atraumatic.   Mouth/Throat: Oropharynx is clear and moist.   Eyes: Conjunctivae and EOM are normal. Pupils are equal, round, and reactive to light.   Neck: Normal range of motion and full passive range of motion without pain. Neck supple.   Cardiovascular: Normal rate and normal pulses.   Pulmonary/Chest: Effort normal. No respiratory distress.   Abdominal: Normal appearance.   Musculoskeletal: She exhibits tenderness.   Mild tenderness to medial aspect of right ring finger proximal nail fold, without obvious fluctuance, no surrounding erythema, no obvious abscess.   Neurological: She is alert and oriented to person, place, and time. She has normal strength. No cranial nerve deficit or sensory deficit.   Skin: Skin is warm, dry and intact. No rash noted.   Psychiatric: She has a normal mood and affect. Her speech is normal and behavior is normal. Judgment and thought content normal.         ED Course   Procedures  Labs Reviewed - No data to display       Imaging Results    None          Medical Decision Making:   Initial Assessment:   Urgent evaluation of 38-year-old female here with pain to the nail fold of right ring finger.  Patient denies expression of pus, nor fevers.  On  exam patient likely has early paronychia without obvious fluctuance or abscess to drain.  Patient has no proximal streaking, is able to fully range her digit without issues.  Discussed with the patient plan for warm soaks, topical mupirocin, and wound check with her PCP within 3 days.  Antibiotics ordered, but only if erythema increases.                      Clinical Impression:     1. Paronychia of right ring finger    2. Pain of finger of right hand          Disposition:   Disposition: Discharged  Condition: Stable                        Cindy Varma MD  07/20/19 0893

## 2019-07-20 NOTE — ED TRIAGE NOTES
"Pt reports pain and swelling to R ring finger x 2 days. No obvious redness, swelling, or trauma noted. Pt denies hitting finger on anything. "I didn't know if I had an ingrown nail at the base." pt educated to remove ring from finger if she feels finger is swelling. Cap refill <2 sec.   "

## 2019-07-22 ENCOUNTER — TELEPHONE (OUTPATIENT)
Dept: PODIATRY | Facility: CLINIC | Age: 39
End: 2019-07-22

## 2019-07-23 PROBLEM — E55.9 VITAMIN D INSUFFICIENCY: Status: ACTIVE | Noted: 2019-07-23

## 2019-07-23 NOTE — PROGRESS NOTES
Subjective:       Patient ID: Wang Warren is a 38 y.o. female with a PMH significant for Gestational T2D, Obesity, HTN (during pregnancy), and a recent Delivery by  2018.  She and  also adopted a baby from family.  She was seen initially by me on 3/12/2018 and follow up in 2018 and 2018.      Chief Complaint: No chief complaint on file.    HPI  She works for [employer] and going well.  Admits to weight gain and plans to resume diet.  No new complaints.  Discussed better adherence with appointments.    Patient denies f/c, n/v/d.  No chest pain or SOB.  No abdominal pain or dysuria.  No headaches or change in vision.  No dizziness.  No significant  weight gain or weight loss.  Remaining ROS negative.    Review of Systems   Constitutional: Negative for activity change, appetite change, chills, diaphoresis, fatigue, fever and unexpected weight change.   HENT: Negative for ear pain, hearing loss, rhinorrhea, sinus pain, tinnitus, trouble swallowing and voice change.    Eyes: Negative for photophobia, pain, discharge and visual disturbance.   Respiratory: Negative for chest tightness, shortness of breath and wheezing.    Cardiovascular: Negative for chest pain, palpitations and leg swelling.   Gastrointestinal: Negative for abdominal pain, blood in stool, constipation, diarrhea, nausea and vomiting.   Endocrine: Negative for cold intolerance, heat intolerance, polydipsia, polyphagia and polyuria.   Genitourinary: Negative for decreased urine volume, difficulty urinating, dysuria, flank pain, hematuria, menstrual problem, pelvic pain, vaginal bleeding, vaginal discharge and vaginal pain.   Musculoskeletal: Negative for arthralgias, gait problem, joint swelling, myalgias and neck pain.   Skin: Negative for rash.   Neurological: Negative for dizziness, tremors, syncope, weakness, numbness and headaches.   Hematological: Does not bruise/bleed easily.    Psychiatric/Behavioral: Negative for agitation, confusion, dysphoric mood and sleep disturbance. The patient is not nervous/anxious.        Objective:      Physical Exam   Constitutional: She is oriented to person, place, and time. She appears well-developed and well-nourished. No distress.   HENT:   Head: Normocephalic and atraumatic.   Nose: Nose normal.   Mouth/Throat: Oropharynx is clear and moist.   Eyes: Pupils are equal, round, and reactive to light. Conjunctivae and EOM are normal. No scleral icterus.   Neck: Normal range of motion. Neck supple. No JVD present. No thyromegaly present.   Cardiovascular: Normal rate, regular rhythm and intact distal pulses. Exam reveals no gallop and no friction rub.   No murmur heard.  Pulmonary/Chest: Effort normal and breath sounds normal. No respiratory distress. She has no wheezes. She has no rales.   Abdominal: Soft. Bowel sounds are normal. She exhibits no distension. There is no tenderness. There is no rebound and no guarding.   Musculoskeletal: Normal range of motion. She exhibits no edema.   Lymphadenopathy:     She has no cervical adenopathy.   Neurological: She is alert and oriented to person, place, and time. No cranial nerve deficit or sensory deficit.   Skin: Skin is warm and dry. No rash noted. No erythema.   Psychiatric: She has a normal mood and affect. Her behavior is normal. Thought content normal.       Assessment:       1. Benign essential HTN    2. Encounter for diabetic foot exam    3. Iron deficiency anemia, unspecified iron deficiency anemia type    4. Controlled type 2 diabetes mellitus without complication, with long-term current use of insulin    5. Class 3 severe obesity with body mass index (BMI) of 50.0 to 59.9 in adult, unspecified obesity type, unspecified whether serious comorbidity present    6. Vitamin D insufficiency    7. Hyperlipidemia, unspecified hyperlipidemia type        Plan:   -Today's Visit  - patient in NAD today.       -Nutrition - BMI today was 51.91 in 4/2018 compared with 52.86 in 3/2018.  Walking more.  BMI in 11/2018 was 56.40 and 56.21 today.  She wants to see someone for weight loss.  Will refer to Bariatrics.    -Cards - Pregnancy-Induced HTN and now Essential HTN - on Nifedipine (started during pregnancy).  On ASA. BP in 3/2018 was normal at 122/66.  BP in 4/2018 was 140/90, but she did not take her Nifedipine that day - had been running in 120s/70s at home.  BP in 11/2018 was good at 130/80.  BP at home around 127/68 - ran out of medication on Monday and BP high today.  Will restart and have her return in 2 weeks for blood pressure check.    TTE in 10/2017 normal with EF 55-60% and normal LVSF and LVDD.  Normal RVSF.    EKG in 10/2017 NSR.      Lipids on 3/12/2018 showed , TG 76, HDL 51, .8.  10 year CHD risk is 4%.  Repeat lipids in 11/2018 with , , HDL 41, .4.  Given LDL >70, discussed statin - started Atorvastatin 10mg once a day.  Repeat lipids and consider increasing dose.    -Endocrine - Gestational Diabetes and now T2D - A1C 9.6 in 1/2018.  On Insulin - Lantus at night and Aspart with meals.  FS glucose had been running well at home - 78 in the morning and 120s at bedtime.  Had Endocrine appointment on 7/23/2018 but missed.  Repeat A1C 7.2 on 3/12/2018.   Repeat A1C in 11/2018 was 13.3.  Metformin added 11/2018.  Repeat A1C.    Foot exam last in 10/2017 - has Podiatry appointment on 7/31/2019.   Last Retinal exam was 11/16/2018 with [facility].     Urine microalbumin ratio - normal in 11/2018.  Referred to Diabetic Education, but patient did not make appointment.       TSH normal in 10/2017.    Vitamin D was 20 in 3/2018 - recommended D3 at 2000 units daily and she is taking.  Repeat level.    -Heme - Anemia - hgb 9.1 with normal MCV in 2/2018.   Fe studies with Fe sat of 12 and Ferritin of 39 in 3/2018, B12/Folate normal in 3/2018.  On Fe replacement.  Repeat Hgb was  11.1 in 3/2018 with normal MCV.  Repeat CBC in 11/2018 with Hgb 13.6 and Plts 390.    -OB/GYN - Delivered a healthy baby girl on 2/9/2018.  Last Pap was negative in 5/2019.   STD screening - HIV 1/2 negative, RPR NR in 1/2018.    -Pulmonary - Sleep Disorder - referred to Sleep medicine to assess for DISHA.  She works for [employer] and they want her to have a Sleep Apnea work-up.  She denies snoring, daily headaches or fatigue, an  does not see apnea at night in her.  Was seen by outside Sleep in 1/2019 and negative for DISHA.    -HCM - We discussed Flu (2/14/2018) and Tdap (2/14/2018) vaccinations.  Discussed Pneumococcal (23 given 11/20/2018) vaccination.    -Follow up in 6 months

## 2019-07-25 ENCOUNTER — OFFICE VISIT (OUTPATIENT)
Dept: PRIMARY CARE CLINIC | Facility: CLINIC | Age: 39
End: 2019-07-25
Payer: MEDICAID

## 2019-07-25 VITALS
SYSTOLIC BLOOD PRESSURE: 150 MMHG | DIASTOLIC BLOOD PRESSURE: 70 MMHG | HEART RATE: 78 BPM | WEIGHT: 293 LBS | OXYGEN SATURATION: 98 % | BODY MASS INDEX: 55.32 KG/M2 | HEIGHT: 61 IN

## 2019-07-25 DIAGNOSIS — Z79.4 CONTROLLED TYPE 2 DIABETES MELLITUS WITHOUT COMPLICATION, WITH LONG-TERM CURRENT USE OF INSULIN: ICD-10-CM

## 2019-07-25 DIAGNOSIS — E11.9 ENCOUNTER FOR DIABETIC FOOT EXAM: ICD-10-CM

## 2019-07-25 DIAGNOSIS — E11.9 CONTROLLED TYPE 2 DIABETES MELLITUS WITHOUT COMPLICATION, WITH LONG-TERM CURRENT USE OF INSULIN: ICD-10-CM

## 2019-07-25 DIAGNOSIS — E78.5 HYPERLIPIDEMIA, UNSPECIFIED HYPERLIPIDEMIA TYPE: ICD-10-CM

## 2019-07-25 DIAGNOSIS — E55.9 VITAMIN D INSUFFICIENCY: ICD-10-CM

## 2019-07-25 DIAGNOSIS — D50.9 IRON DEFICIENCY ANEMIA, UNSPECIFIED IRON DEFICIENCY ANEMIA TYPE: ICD-10-CM

## 2019-07-25 DIAGNOSIS — I10 BENIGN ESSENTIAL HTN: Primary | ICD-10-CM

## 2019-07-25 DIAGNOSIS — E66.01 CLASS 3 SEVERE OBESITY WITH BODY MASS INDEX (BMI) OF 50.0 TO 59.9 IN ADULT, UNSPECIFIED OBESITY TYPE, UNSPECIFIED WHETHER SERIOUS COMORBIDITY PRESENT: ICD-10-CM

## 2019-07-25 PROCEDURE — 99214 OFFICE O/P EST MOD 30 MIN: CPT | Mod: S$PBB,,, | Performed by: INTERNAL MEDICINE

## 2019-07-25 PROCEDURE — 99999 PR PBB SHADOW E&M-EST. PATIENT-LVL III: CPT | Mod: PBBFAC,,, | Performed by: INTERNAL MEDICINE

## 2019-07-25 PROCEDURE — 99213 OFFICE O/P EST LOW 20 MIN: CPT | Mod: PBBFAC,PN | Performed by: INTERNAL MEDICINE

## 2019-07-25 PROCEDURE — 99999 PR PBB SHADOW E&M-EST. PATIENT-LVL III: ICD-10-PCS | Mod: PBBFAC,,, | Performed by: INTERNAL MEDICINE

## 2019-07-25 PROCEDURE — 99214 PR OFFICE/OUTPT VISIT, EST, LEVL IV, 30-39 MIN: ICD-10-PCS | Mod: S$PBB,,, | Performed by: INTERNAL MEDICINE

## 2019-07-25 RX ORDER — PEN NEEDLE, DIABETIC 31 GX5/16"
100 NEEDLE, DISPOSABLE MISCELLANEOUS
Qty: 100 EACH | Refills: 3 | Status: SHIPPED | OUTPATIENT
Start: 2019-07-25 | End: 2019-11-04 | Stop reason: SDUPTHER

## 2019-07-25 RX ORDER — INSULIN LISPRO 100 [IU]/ML
INJECTION, SOLUTION INTRAVENOUS; SUBCUTANEOUS
Qty: 10 ML | Refills: 3 | Status: SHIPPED | OUTPATIENT
Start: 2019-07-25 | End: 2019-12-12 | Stop reason: SDUPTHER

## 2019-07-25 RX ORDER — ATORVASTATIN CALCIUM 10 MG/1
10 TABLET, FILM COATED ORAL DAILY
Qty: 90 TABLET | Refills: 1 | Status: SHIPPED | OUTPATIENT
Start: 2019-07-25 | End: 2019-10-31

## 2019-07-25 RX ORDER — INSULIN GLARGINE 100 [IU]/ML
15 INJECTION, SOLUTION SUBCUTANEOUS NIGHTLY
Qty: 15 ML | Refills: 3 | Status: ON HOLD | OUTPATIENT
Start: 2019-07-25 | End: 2020-01-17 | Stop reason: SDUPTHER

## 2019-07-25 RX ORDER — NIFEDIPINE 60 MG/1
60 TABLET, EXTENDED RELEASE ORAL DAILY
Qty: 90 TABLET | Refills: 1 | Status: SHIPPED | OUTPATIENT
Start: 2019-07-25 | End: 2020-07-24

## 2019-07-25 RX ORDER — METFORMIN HYDROCHLORIDE 500 MG/1
500 TABLET ORAL 2 TIMES DAILY WITH MEALS
Qty: 180 TABLET | Refills: 1 | Status: ON HOLD | OUTPATIENT
Start: 2019-07-25 | End: 2020-01-17 | Stop reason: SDUPTHER

## 2019-07-25 RX ORDER — INSULIN GLARGINE 100 [IU]/ML
INJECTION, SOLUTION SUBCUTANEOUS NIGHTLY
Status: CANCELLED | OUTPATIENT
Start: 2019-07-25 | End: 2020-07-24

## 2019-07-25 NOTE — PATIENT INSTRUCTIONS
Your weight and BMI are elevated today.  Target BMI is less than 25.  Please start or countinue diet changes and exercise.  I will refer you to Bariatric Surgery to discuss weight loss options.    Your blood pressure was high today.  Restart your Nifedipine and return in 2 weeks for repeat blood pressure.    I will order routine fasting labs in 2 weeks - at least 9 hours of fasting.    Return in 3 months - sooner if needed.  Please come at least 15-20 minutes before your scheduled appointment time.

## 2019-07-29 ENCOUNTER — PATIENT OUTREACH (OUTPATIENT)
Dept: ADMINISTRATIVE | Facility: OTHER | Age: 39
End: 2019-07-29

## 2019-07-31 ENCOUNTER — OFFICE VISIT (OUTPATIENT)
Dept: PODIATRY | Facility: CLINIC | Age: 39
End: 2019-07-31
Payer: MEDICAID

## 2019-07-31 VITALS — HEIGHT: 61 IN | WEIGHT: 293 LBS | BODY MASS INDEX: 55.32 KG/M2

## 2019-07-31 DIAGNOSIS — M21.619 BUNION: Primary | ICD-10-CM

## 2019-07-31 DIAGNOSIS — E11.9 CONTROLLED TYPE 2 DIABETES MELLITUS WITHOUT COMPLICATION, WITHOUT LONG-TERM CURRENT USE OF INSULIN: Primary | ICD-10-CM

## 2019-07-31 DIAGNOSIS — E11.9 ENCOUNTER FOR DIABETIC FOOT EXAM: ICD-10-CM

## 2019-07-31 DIAGNOSIS — M79.671 RIGHT FOOT PAIN: ICD-10-CM

## 2019-07-31 PROCEDURE — 99214 PR OFFICE/OUTPT VISIT, EST, LEVL IV, 30-39 MIN: ICD-10-PCS | Mod: S$PBB,,, | Performed by: PODIATRIST

## 2019-07-31 PROCEDURE — 99214 OFFICE O/P EST MOD 30 MIN: CPT | Mod: S$PBB,,, | Performed by: PODIATRIST

## 2019-07-31 PROCEDURE — 99999 PR PBB SHADOW E&M-EST. PATIENT-LVL III: ICD-10-PCS | Mod: PBBFAC,,, | Performed by: PODIATRIST

## 2019-07-31 PROCEDURE — 99499 NO LOS: ICD-10-PCS | Mod: S$PBB,,,

## 2019-07-31 PROCEDURE — 99213 OFFICE O/P EST LOW 20 MIN: CPT | Mod: PBBFAC,PN | Performed by: PODIATRIST

## 2019-07-31 PROCEDURE — 99499 UNLISTED E&M SERVICE: CPT | Mod: S$PBB,,,

## 2019-07-31 PROCEDURE — 99999 PR PBB SHADOW E&M-EST. PATIENT-LVL III: CPT | Mod: PBBFAC,,, | Performed by: PODIATRIST

## 2019-07-31 PROCEDURE — 99213 OFFICE O/P EST LOW 20 MIN: CPT | Mod: PBBFAC,27,PN

## 2019-07-31 PROCEDURE — 99999 PR PBB SHADOW E&M-EST. PATIENT-LVL III: CPT | Mod: PBBFAC,,,

## 2019-07-31 PROCEDURE — 99999 PR PBB SHADOW E&M-EST. PATIENT-LVL III: ICD-10-PCS | Mod: PBBFAC,,,

## 2019-07-31 NOTE — PATIENT INSTRUCTIONS
How to Check Your Feet    Below are tips to help you look for foot problems. Try to check your feet at the same time each day, such as when you get out of bed in the morning.    · Check the top of each foot. The tops of toes, back of the heel, and outer edge of the foot can get a lot of rubbing from poor-fitting shoes.    · Check the bottom of each foot. Daily wear and tear often leads to problems at pressure spots.    · Check the toes and nails. Fungal infections often occur between toes. Toenail problems can also be a sign of fungal infections or lead to breaks in the skin.    · Check your shoes, too. Loose objects inside a shoe can injure the foot. Use your hand to feel inside your shoes for things like maury, loose stitching, or rough areas that could irritate your skin.        Diabetic Foot Care    Diabetes can lead to a number of different foot complications. Fortunately, most of these complications can be prevented with a little extra foot care. If diabetes is not well controlled, the high blood sugar can cause damage to blood vessels and result in poor circulation to the foot. When the skin does not get enough blood flow, it becomes prone to pressure sores and ulcers, which heal slowly.  High blood sugar can also damage nerves, interfering with the ability to feel pain and pressure. When you cant feel your foot normally, it is easy to injure your skin, bones and joints without knowing it. For these reasons diabetes increases the risk of fungal infections, bunions and ulcers. Deep ulcers can lead to bone infection. Gangrene is the most serious foot complication of diabetes. It usually occurs on the tips of the toes as blacked areas of skin. The black area is dead tissue. In severe cases, gangrene spreads to involve the entire toe, other toes and the entire foot. Foot or toe amputation may be required. Good foot care and blood sugar control can prevent this.    Home Care  1. Wear comfortable, proper fitting  shoes.  2. Wash your feet daily with warm water and mild soap.  3. After drying, apply a moisturizing cream or lotion.  4. Check your feet daily for skin breaks, blisters, swelling, or redness. Look between your toes also.  5. Wear cotton socks and change them every day.  6. Trim toe nails carefully and do not cut your cuticles.  7. Strive to keep your blood sugar under control with a combination of medicines, diet and activity.  8. If you smoke and have diabetes, it is very important that you stop. Smoking reduces blood flow to your foot.  9. Avoid activities that increase your risk of foot injury:  · Do not walk barefoot.  · Do not use heating pads or hot water bottles on your feet.  · Do not put your foot in a hot tub without first checking the temperature with your hand.  10) Schedule yearly foot exams.    Follow Up  with your doctor or as advised by our staff. Report any cut, puncture, scrape, other injury, blister, ingrown toenail or ulcer on your foot.    Get Prompt Medical Attention  if any of the following occur:  -- Open ulcer with pus draining from the wound  -- Increasing foot or leg pain  -- New areas of redness or swelling or tender areas of the foot    © 3461-7230 [employer]. [address] [place] [address], [place] [address]. All rights reserved. This information is not intended as a substitute for professional medical care. Always follow your healthcare professional's instructions.      Bunion    You have a bunion. This is a bony bump at the base of your big toe, along the inside edge of your foot. As the bump gets bigger, it can become red, swollen, and painful with shoe wear.  Bunions may occur if you wear shoes that are too tight and pinch your toes together. High heels may make this worse. In some cases a bunion is due to poor alignment of the foot and ankle. This puts extra weight on the instep of each foot.  Once a bunion forms, it changes the way weight is spread all across your foot.  This causes the bunion to get worse over time. The big toe will bend more and more toward the other toes.  A minor bunion can be treated by:  · Wearing properly fitting shoes  · Using bunion pads  · Wearing shoe inserts, called orthotics, to better align the foot and ankle  Physical therapy with ultrasound or whirlpool baths can ease pain, redness, and swelling. Severe cases may require surgery. If you dont treat what is causing the bunion, it may get larger and more painful.  Home care  · Limit high heels. These shoes force your foot forward, crowding the toes together.  · Switch to comfortable shoes with a wide toe area. Or have your existing shoes stretched by a shoe repair shop.  · Avoid shoes that are tight, narrow, or pointed.  · If you are flat-footed, using arch supports may help prevent further deformity. The best shoe inserts are the ones custom made by a foot specialist, called a podiatrist, or other healthcare provider.  · Put a bunion pad over the bunion to ease pressure of your shoe against the bunion. You can buy these pads at most pharmacies without a prescription  · To reduce pain and swelling, apply an ice pack over the injured area for 15 to 20 minutes. Do this every 1 to 2 hours the first day. Keep using ice 3 to 4 times a day until the pain and swelling goes away.  · To make an ice pack, put ice cubes in a sealed zip-lock plastic bag. Wrap the bag in a clean, thin towel or cloth. Never put ice or an ice pack directly on the skin.  · You may use over-the-counter pain medicine to control pain, unless another medicine was prescribed. Talk with your provider before using these medicines if you have chronic liver or kidney disease, or ever had a stomach ulcer or GI (gastrointestinal) bleeding.  Follow-up care  Follow up with a podiatrist or foot doctor, or as advised.  If X-rays were taken, you will be notified of any new findings that may affect your care.  When to seek medical care  Contact your  healthcare provider if any of the following occur:  · Increasing pain or redness around the base of the big toe  · Painful ingrown toenail, with redness and swelling or pus around the nail  Date Last Reviewed: 11/21/2015 © 2000-2017 [employer]. [address] [place] [address]. All rights reserved. This information is not intended as a substitute for professional medical care. Always follow your healthcare professional's instructions.

## 2019-07-31 NOTE — LETTER
August 5, 2019      Todd Miller MD  [address]  [address]  [facility] [address]           [facility]  [address] [facility] [address]  [facility] [address]  Phone: [phone]  Fax: [phone]          Patient: Wang Warren   MR Number: [id number]   YOB: 1980   Date of Visit: 7/31/2019       Dear Dr. Todd Miller:    Thank you for referring Wang Warren to me for evaluation. Attached you will find relevant portions of my assessment and plan of care.    If you have questions, please do not hesitate to call me. I look forward to following Wang Warren along with you.    Sincerely,    Balwinder Constantino, EZ    Enclosure  CC:  No Recipients    If you would like to receive this communication electronically, please contact [email or web address] or [phone] to request more information on [employer] access.    For providers and/or their staff who would like to refer a patient to [facility], please contact us through our one-stop-shop provider referral line, [facility], at [phone].    If you feel you have received this communication in error or would no longer like to receive these types of communications, please e-mail [email or web address]

## 2019-07-31 NOTE — PROGRESS NOTES
Subjective:      Patient ID: Wang Warren is a 38 y.o. female.    Chief Complaint: Diabetes Mellitus (A1C 18 13.3   PCP last seen on 19 )    Wang is a 38 y.o. female who presents to the clinic for evaluation of bilateral feet.   She has history of gestational diabetes.  She has a left > right foot bunion present for a while, minimally tender, depending on shoes.  She does not like to walk barefoot.   She reports her toddler bumped into her right 2nd toe with her walker but notes no bruising or mal-alignment.   The area is minimally tender, with direct palpation.     Current shoe gear: sandals    Patient Active Problem List   Diagnosis    BMI 50.0-59.9, adult    Chronic hypertension with superimposed preeclampsia     delivery delivered    Insulin dependent diabetes mellitus    Diabetes mellitus type II, controlled, with no complications    Benign essential HTN    Class 3 severe obesity with body mass index (BMI) of 50.0 to 59.9 in adult    Iron deficiency anemia    Diabetes mellitus    Vitamin D insufficiency             Hemoglobin A1C   Date Value Ref Range Status   2018 13.3 (H) 4.0 - 5.6 % Final     Comment:     ADA Screening Guidelines:  5.7-6.4%  Consistent with prediabetes  >or=6.5%  Consistent with diabetes  High levels of fetal hemoglobin interfere with the HbA1C  assay. Heterozygous hemoglobin variants (HbS, HgC, etc)do  not significantly interfere with this assay.   However, presence of multiple variants may affect accuracy.     2018 7.2 (H) 4.0 - 5.6 % Final     Comment:     According to ADA guidelines, hemoglobin A1c <7.0% represents  optimal control in non-pregnant diabetic patients. Different  metrics may apply to specific patient populations.   Standards of Medical Care in Diabetes-2016.  For the purpose of screening for the presence of diabetes:  <5.7%     Consistent with the absence of diabetes  5.7-6.4%  Consistent with increasing risk for  diabetes   (prediabetes)  >or=6.5%  Consistent with diabetes  Currently, no consensus exists for use of hemoglobin A1c  for diagnosis of diabetes for children.  This Hemoglobin A1c assay has significant interference with fetal   hemoglobin   (HbF). The results are invalid for patients with abnormal amounts of   HbF,   including those with known Hereditary Persistence   of Fetal Hemoglobin. Heterozygous hemoglobin variants (HbAS, HbAC,   HbAD, HbAE, HbA2) do not significantly interfere with this assay;   however, presence of multiple variants in a sample may impact the %   interference.     01/10/2018 9.6 (H) 4.0 - 5.6 % Final     Comment:     According to ADA guidelines, hemoglobin A1c <7.0% represents  optimal control in non-pregnant diabetic patients. Different  metrics may apply to specific patient populations.   Standards of Medical Care in Diabetes-2016.  For the purpose of screening for the presence of diabetes:  <5.7%     Consistent with the absence of diabetes  5.7-6.4%  Consistent with increasing risk for diabetes   (prediabetes)  >or=6.5%  Consistent with diabetes  Currently, no consensus exists for use of hemoglobin A1c  for diagnosis of diabetes for children.  This Hemoglobin A1c assay has significant interference with fetal   hemoglobin   (HbF). The results are invalid for patients with abnormal amounts of   HbF,   including those with known Hereditary Persistence   of Fetal Hemoglobin. Heterozygous hemoglobin variants (HbAS, HbAC,   HbAD, HbAE, HbA2) do not significantly interfere with this assay;   however, presence of multiple variants in a sample may impact the %   interference.             Past Surgical History:   Procedure Laterality Date     SECTION      DELIVERY- SECTION N/A 2018    Performed by Mariela Valenzuela MD at [facility]    none         Family History   Problem Relation Age of Onset    Cancer Father         prostate    Diabetes Maternal Aunt     Breast cancer  "Neg Hx        Social History     Socioeconomic History    Marital status:      Spouse name: Husam    Number of children: 0    Years of education: Not on file    Highest education level: Not on file   Occupational History    Occupation:     Occupation: Drives rigs   Social Needs    Financial resource strain: Not on file    Food insecurity:     Worry: Not on file     Inability: Not on file    Transportation needs:     Medical: Not on file     Non-medical: Not on file   Tobacco Use    Smoking status: Never Smoker    Smokeless tobacco: Never Used   Substance and Sexual Activity    Alcohol use: No    Drug use: No    Sexual activity: Yes     Partners: Male     Birth control/protection: None   Lifestyle    Physical activity:     Days per week: Not on file     Minutes per session: Not on file    Stress: Not on file   Relationships    Social connections:     Talks on phone: Not on file     Gets together: Not on file     Attends Gnosticism service: Not on file     Active member of club or organization: Not on file     Attends meetings of clubs or organizations: Not on file     Relationship status: Not on file   Other Topics Concern    Not on file   Social History Narrative    Walks for exercise.     to Husam for five years.       Current Outpatient Medications   Medication Sig Dispense Refill    atorvastatin (LIPITOR) 10 MG tablet Take 1 tablet (10 mg total) by mouth once daily. 90 tablet 1    BD INSULIN SYRINGE ULTRA-FINE 1 mL 31 gauge x 5/16 Syrg       BD ULTRA-FINE MINI PEN NEEDLE 31 gauge x 3/16" Ndle Use 3 (three) times daily with meals. 100 each 3    blood sugar diagnostic (ONETOUCH VERIO) Strp use to test blood sugar 4 times daily 100 each 3    ferrous sulfate 325 mg (65 mg iron) Tab tablet TK 1 T PO daily  1    fluticasone (FLONASE) 50 mcg/actuation nasal spray 1 spray (50 mcg total) by Each Nare route 2 (two) times daily as needed for Rhinitis. (Patient taking " differently: 1 spray (50 mcg total) by Each Nare route 2 (two) times daily as needed for Rhinitis.) 16 g 0    insulin (LANTUS SOLOSTAR U-100 INSULIN) glargine 100 units/mL (3mL) SubQ pen Inject 15 Units into the skin every evening. 15 mL 3    insulin lispro (HUMALOG U-100 INSULIN) 100 unit/mL injection Inject 12 units under the skin with breakfast, 8 units with lunch and 10 units before dinner. 10 mL 3    lancets (ONETOUCH ULTRASOFT LANCETS) Misc 1 lancet by Misc.(Non-Drug; Combo Route) route 4 (four) times daily. 100 each 3    metFORMIN (GLUCOPHAGE) 500 MG tablet Take 1 tablet (500 mg total) by mouth 2 (two) times daily with meals. 180 tablet 1    NIFEdipine (PROCARDIA-XL) 60 MG (OSM) 24 hr tablet Take 1 tablet (60 mg total) by mouth once daily. 90 tablet 1    PRENATAL VIT CALC,IRON,FOLIC (PRENATAL VITAMIN ORAL) Take by mouth.       No current facility-administered medications for this visit.        Review of patient's allergies indicates:  No Known Allergies      ROS  ROS:  Constitution: Negative for chills, fever, weakness and malaise/fatigue.   HEENT: Negative for headaches.   Cardiovascular: Negative for chest pain and claudication.   Respiratory: Negative for cough and shortness of breath.   Musculoskeletal: Negative for foot pain.  Negative for muscle cramps and muscle weakness.   Gastrointestinal: Negative for nausea and vomiting.   Neurological: Negative for numbness and paresthesias.   Dermatological: Negative for wound.          Objective:      Physical Exam  Constitutional:  Patient is oriented to person, place, and time. Vital signs are normal.  Appears well-developed and well-nourished.     Vascular:  Dorsalis pedis pulses are 2/4 on the right side, and 2/4 on the left side.   Posterior tibial pulses are 2/4 on the right side, and 2/4 on the left side.   - digital hair growth, capillary fill time to all toes <3 seconds, warm toes,  trace pedal swelling    Skin/Dermatological:  Skin is warm and  intact.  No cyanosis or clubbing.  No rashes noted.  No open wounds.  All ten toenails short without paronychia.   (--) keratosis     Musculoskeletal:      Pes planus, mild hallux abducto valgus bilaterally.  Pedal rom within normal limits.  Ankle joint DF no restriction with both knee flexed and extened.    Neurological:  (--) deficits to sharp/dull, light touch or vibratory sensation.   Muscle strength to tibialis anterior, extensor hallucis longus, extensor digitorum longus, peroneal muscles, flexor hallucis/digotorum longus, posterior tibial and gastrosoleal complex is 5/5, normal tone without assymmetry   Patellar reflexes are 2+ on the right side and 2+ on the left side.  Achilles reflexes are 2+ on the right side and 2+ on the left side.        Assessment:       Encounter Diagnoses   Name Primary?    Bunion - Right Foot Yes    Right foot pain     Encounter for diabetic foot exam          Plan:       Wang was seen today for diabetes mellitus.    Diagnoses and all orders for this visit:    Bunion - Right Foot    Right foot pain    Encounter for diabetic foot exam      I counseled the patient on her conditions, their implications and medical management.    Shoe inspection. Diabetic Foot Education. Patient reminded of the importance of good nutrition and blood sugar control to help prevent podiatric complications of diabetes. Patient instructed on proper foot hygeine.     We discussed wearing proper shoe gear, daily foot inspections, never walking without protective shoe gear, need for arch supports , never putting sharp instruments to feet.      We also discussed padding and shoes with high toe boxes for foot deformities.   Consider bunion night splint.         Annual diabetes foot exam, or sooner if any concerns.         Azucena Randolph DPM

## 2019-08-08 ENCOUNTER — CLINICAL SUPPORT (OUTPATIENT)
Dept: INTERNAL MEDICINE | Facility: CLINIC | Age: 39
End: 2019-08-08
Payer: MEDICAID

## 2019-08-08 ENCOUNTER — PATIENT MESSAGE (OUTPATIENT)
Dept: PRIMARY CARE CLINIC | Facility: CLINIC | Age: 39
End: 2019-08-08

## 2019-08-08 ENCOUNTER — LAB VISIT (OUTPATIENT)
Dept: LAB | Facility: HOSPITAL | Age: 39
End: 2019-08-08
Attending: INTERNAL MEDICINE
Payer: MEDICAID

## 2019-08-08 VITALS — DIASTOLIC BLOOD PRESSURE: 80 MMHG | HEART RATE: 83 BPM | SYSTOLIC BLOOD PRESSURE: 138 MMHG | OXYGEN SATURATION: 98 %

## 2019-08-08 DIAGNOSIS — D50.9 IRON DEFICIENCY ANEMIA, UNSPECIFIED IRON DEFICIENCY ANEMIA TYPE: ICD-10-CM

## 2019-08-08 DIAGNOSIS — E11.65 TYPE 2 DIABETES MELLITUS WITH HYPERGLYCEMIA, WITH LONG-TERM CURRENT USE OF INSULIN: Primary | ICD-10-CM

## 2019-08-08 DIAGNOSIS — E55.9 VITAMIN D INSUFFICIENCY: ICD-10-CM

## 2019-08-08 DIAGNOSIS — E78.5 HYPERLIPIDEMIA, UNSPECIFIED HYPERLIPIDEMIA TYPE: ICD-10-CM

## 2019-08-08 DIAGNOSIS — I10 BENIGN ESSENTIAL HTN: ICD-10-CM

## 2019-08-08 DIAGNOSIS — E11.9 CONTROLLED TYPE 2 DIABETES MELLITUS WITHOUT COMPLICATION, WITH LONG-TERM CURRENT USE OF INSULIN: ICD-10-CM

## 2019-08-08 DIAGNOSIS — Z79.4 CONTROLLED TYPE 2 DIABETES MELLITUS WITHOUT COMPLICATION, WITH LONG-TERM CURRENT USE OF INSULIN: ICD-10-CM

## 2019-08-08 DIAGNOSIS — Z79.4 TYPE 2 DIABETES MELLITUS WITH HYPERGLYCEMIA, WITH LONG-TERM CURRENT USE OF INSULIN: Primary | ICD-10-CM

## 2019-08-08 LAB
25(OH)D3+25(OH)D2 SERPL-MCNC: 12 NG/ML (ref 30–96)
ALBUMIN SERPL BCP-MCNC: 3.6 G/DL (ref 3.5–5.2)
ALP SERPL-CCNC: 86 U/L (ref 55–135)
ALT SERPL W/O P-5'-P-CCNC: 12 U/L (ref 10–44)
ANION GAP SERPL CALC-SCNC: 11 MMOL/L (ref 8–16)
AST SERPL-CCNC: 11 U/L (ref 10–40)
BASOPHILS # BLD AUTO: 0.05 K/UL (ref 0–0.2)
BASOPHILS NFR BLD: 0.8 % (ref 0–1.9)
BILIRUB SERPL-MCNC: 0.6 MG/DL (ref 0.1–1)
BUN SERPL-MCNC: 12 MG/DL (ref 6–20)
CALCIUM SERPL-MCNC: 9.5 MG/DL (ref 8.7–10.5)
CHLORIDE SERPL-SCNC: 105 MMOL/L (ref 95–110)
CHOLEST SERPL-MCNC: 88 MG/DL (ref 120–199)
CHOLEST/HDLC SERPL: 2.1 {RATIO} (ref 2–5)
CO2 SERPL-SCNC: 21 MMOL/L (ref 23–29)
CREAT SERPL-MCNC: 1 MG/DL (ref 0.5–1.4)
DIFFERENTIAL METHOD: ABNORMAL
EOSINOPHIL # BLD AUTO: 0.1 K/UL (ref 0–0.5)
EOSINOPHIL NFR BLD: 1.4 % (ref 0–8)
ERYTHROCYTE [DISTWIDTH] IN BLOOD BY AUTOMATED COUNT: 13.5 % (ref 11.5–14.5)
EST. GFR  (AFRICAN AMERICAN): >60 ML/MIN/1.73 M^2
EST. GFR  (NON AFRICAN AMERICAN): >60 ML/MIN/1.73 M^2
ESTIMATED AVG GLUCOSE: 312 MG/DL (ref 68–131)
GLUCOSE SERPL-MCNC: 173 MG/DL (ref 70–110)
HBA1C MFR BLD HPLC: 12.5 % (ref 4–5.6)
HCT VFR BLD AUTO: 37 % (ref 37–48.5)
HDLC SERPL-MCNC: 41 MG/DL (ref 40–75)
HDLC SERPL: 46.6 % (ref 20–50)
HGB BLD-MCNC: 11.7 G/DL (ref 12–16)
IMM GRANULOCYTES # BLD AUTO: 0.03 K/UL (ref 0–0.04)
IMM GRANULOCYTES NFR BLD AUTO: 0.5 % (ref 0–0.5)
LDLC SERPL CALC-MCNC: 25.2 MG/DL (ref 63–159)
LYMPHOCYTES # BLD AUTO: 2.3 K/UL (ref 1–4.8)
LYMPHOCYTES NFR BLD: 36.2 % (ref 18–48)
MCH RBC QN AUTO: 26.6 PG (ref 27–31)
MCHC RBC AUTO-ENTMCNC: 31.6 G/DL (ref 32–36)
MCV RBC AUTO: 84 FL (ref 82–98)
MONOCYTES # BLD AUTO: 0.6 K/UL (ref 0.3–1)
MONOCYTES NFR BLD: 8.8 % (ref 4–15)
NEUTROPHILS # BLD AUTO: 3.4 K/UL (ref 1.8–7.7)
NEUTROPHILS NFR BLD: 52.3 % (ref 38–73)
NONHDLC SERPL-MCNC: 47 MG/DL
NRBC BLD-RTO: 0 /100 WBC
PLATELET # BLD AUTO: 306 K/UL (ref 150–350)
PMV BLD AUTO: 11.9 FL (ref 9.2–12.9)
POTASSIUM SERPL-SCNC: 3.8 MMOL/L (ref 3.5–5.1)
PROT SERPL-MCNC: 7.8 G/DL (ref 6–8.4)
RBC # BLD AUTO: 4.4 M/UL (ref 4–5.4)
SODIUM SERPL-SCNC: 137 MMOL/L (ref 136–145)
T4 FREE SERPL-MCNC: 0.8 NG/DL (ref 0.71–1.51)
TRIGL SERPL-MCNC: 109 MG/DL (ref 30–150)
TSH SERPL DL<=0.005 MIU/L-ACNC: 0.08 UIU/ML (ref 0.4–4)
WBC # BLD AUTO: 6.39 K/UL (ref 3.9–12.7)

## 2019-08-08 PROCEDURE — 82306 VITAMIN D 25 HYDROXY: CPT

## 2019-08-08 PROCEDURE — 36415 COLL VENOUS BLD VENIPUNCTURE: CPT | Mod: PN

## 2019-08-08 PROCEDURE — 80053 COMPREHEN METABOLIC PANEL: CPT

## 2019-08-08 PROCEDURE — 99999 PR PBB SHADOW E&M-EST. PATIENT-LVL III: CPT | Mod: PBBFAC,,,

## 2019-08-08 PROCEDURE — 99999 PR PBB SHADOW E&M-EST. PATIENT-LVL III: ICD-10-PCS | Mod: PBBFAC,,,

## 2019-08-08 PROCEDURE — 84439 ASSAY OF FREE THYROXINE: CPT

## 2019-08-08 PROCEDURE — 83036 HEMOGLOBIN GLYCOSYLATED A1C: CPT

## 2019-08-08 PROCEDURE — 80061 LIPID PANEL: CPT

## 2019-08-08 PROCEDURE — 85025 COMPLETE CBC W/AUTO DIFF WBC: CPT

## 2019-08-08 PROCEDURE — 84443 ASSAY THYROID STIM HORMONE: CPT

## 2019-08-08 PROCEDURE — 99213 OFFICE O/P EST LOW 20 MIN: CPT | Mod: PBBFAC

## 2019-08-08 NOTE — Clinical Note
Does patient have record of home blood pressure readings no. Readings have been averaging n/a. Last dose of blood pressure medication was taken at 9:00 am.Patient is asymptomatic. Denies headaches, dizziness, CP, SOB, blurred vision, numbness/tingling to extremities.BP: 138/80 , Pulse: 83. Dr. Dunn notified.

## 2019-08-08 NOTE — PROGRESS NOTES
"Wang Warren 38 y.o. female is here today for Blood Pressure check.   History of HTN yes.    Review of patient's allergies indicates:  No Known Allergies  Creatinine   Date Value Ref Range Status   11/20/2018 0.9 0.5 - 1.4 mg/dL Final     Sodium   Date Value Ref Range Status   11/20/2018 140 136 - 145 mmol/L Final     Potassium   Date Value Ref Range Status   11/20/2018 3.3 (L) 3.5 - 5.1 mmol/L Final   ]  Patient verifies taking blood pressure medications on a regular bases at the same time of the day.     Current Outpatient Medications:     NIFEdipine (PROCARDIA-XL) 60 MG (OSM) 24 hr tablet, Take 1 tablet (60 mg total) by mouth once daily., Disp: 90 tablet, Rfl: 1    atorvastatin (LIPITOR) 10 MG tablet, Take 1 tablet (10 mg total) by mouth once daily., Disp: 90 tablet, Rfl: 1    BD INSULIN SYRINGE ULTRA-FINE 1 mL 31 gauge x 5/16 Syrg, , Disp: , Rfl:     BD ULTRA-FINE MINI PEN NEEDLE 31 gauge x 3/16" Ndle, Use 3 (three) times daily with meals., Disp: 100 each, Rfl: 3    blood sugar diagnostic (ONETOUCH VERIO) Strp, use to test blood sugar 4 times daily, Disp: 100 each, Rfl: 3    ferrous sulfate 325 mg (65 mg iron) Tab tablet, TK 1 T PO daily, Disp: , Rfl: 1    fluticasone (FLONASE) 50 mcg/actuation nasal spray, 1 spray (50 mcg total) by Each Nare route 2 (two) times daily as needed for Rhinitis. (Patient taking differently: 1 spray (50 mcg total) by Each Nare route 2 (two) times daily as needed for Rhinitis.), Disp: 16 g, Rfl: 0    insulin (LANTUS SOLOSTAR U-100 INSULIN) glargine 100 units/mL (3mL) SubQ pen, Inject 15 Units into the skin every evening., Disp: 15 mL, Rfl: 3    insulin lispro (HUMALOG U-100 INSULIN) 100 unit/mL injection, Inject 12 units under the skin with breakfast, 8 units with lunch and 10 units before dinner., Disp: 10 mL, Rfl: 3    lancets (ONETOUCH ULTRASOFT LANCETS) Misc, 1 lancet by Misc.(Non-Drug; Combo Route) route 4 (four) times daily., Disp: 100 each, Rfl: 3    " metFORMIN (GLUCOPHAGE) 500 MG tablet, Take 1 tablet (500 mg total) by mouth 2 (two) times daily with meals., Disp: 180 tablet, Rfl: 1    PRENATAL VIT CALC,IRON,FOLIC (PRENATAL VITAMIN ORAL), Take by mouth., Disp: , Rfl:   Does patient have record of home blood pressure readings no. Readings have been averaging n/a.   Last dose of blood pressure medication was taken at 9:00 am.  Patient is asymptomatic.   Denies headaches, dizziness, CP, SOB, blurred vision, numbness/tingling to extremities.    BP: 138/80 , Pulse: 83.     Dr. Dunn notified.

## 2019-08-09 ENCOUNTER — TELEPHONE (OUTPATIENT)
Dept: PRIMARY CARE CLINIC | Facility: CLINIC | Age: 39
End: 2019-08-09

## 2019-08-09 NOTE — TELEPHONE ENCOUNTER
Scheduled diabetes appt next thurs 3pm  Can't schedule endocrine probably due to insurance   Will send msg to endocrine dept to ask for a new pt appt

## 2019-08-09 NOTE — TELEPHONE ENCOUNTER
----- Message from Todd Miller MD sent at 8/8/2019  6:24 PM CDT -----  Regarding: Endocrine and diabetic education  Please help patient schedule these appointments

## 2019-08-16 ENCOUNTER — TELEPHONE (OUTPATIENT)
Dept: INTERNAL MEDICINE | Facility: CLINIC | Age: 39
End: 2019-08-16

## 2019-08-16 NOTE — TELEPHONE ENCOUNTER
msg pt on portal asking if she would like a external referral for endocrine  Endocrine appt can't be scheduled due to insurance

## 2019-10-04 ENCOUNTER — TELEPHONE (OUTPATIENT)
Dept: INTERNAL MEDICINE | Facility: CLINIC | Age: 39
End: 2019-10-04

## 2019-10-27 ENCOUNTER — HOSPITAL ENCOUNTER (EMERGENCY)
Facility: OTHER | Age: 39
Discharge: HOME | End: 2019-10-27
Attending: EMERGENCY MEDICINE
Payer: MEDICAID

## 2019-10-27 VITALS
BODY MASS INDEX: 52.61 KG/M2 | TEMPERATURE: 98 F | WEIGHT: 268 LBS | HEART RATE: 99 BPM | DIASTOLIC BLOOD PRESSURE: 75 MMHG | HEIGHT: 60 IN | SYSTOLIC BLOOD PRESSURE: 162 MMHG | OXYGEN SATURATION: 100 % | RESPIRATION RATE: 18 BRPM

## 2019-10-27 DIAGNOSIS — B37.31 VAGINAL CANDIDIASIS: ICD-10-CM

## 2019-10-27 DIAGNOSIS — R73.9 HYPERGLYCEMIA: ICD-10-CM

## 2019-10-27 DIAGNOSIS — Z34.90 PREGNANCY, UNSPECIFIED GESTATIONAL AGE: Primary | ICD-10-CM

## 2019-10-27 DIAGNOSIS — B37.31 YEAST VAGINITIS: ICD-10-CM

## 2019-10-27 LAB
B-HCG UR QL: POSITIVE
BACTERIA #/AREA URNS HPF: ABNORMAL /HPF
BACTERIA GENITAL QL WET PREP: ABNORMAL
BILIRUB UR QL STRIP: NEGATIVE
CLARITY UR: ABNORMAL
CLUE CELLS VAG QL WET PREP: ABNORMAL
COLOR UR: YELLOW
CTP QC/QA: YES
FILAMENT FUNGI VAG WET PREP-#/AREA: ABNORMAL
GLUCOSE SERPL-MCNC: 301 MG/DL (ref 70–110)
GLUCOSE UR QL STRIP: ABNORMAL
HGB UR QL STRIP: ABNORMAL
HYALINE CASTS #/AREA URNS LPF: 0 /LPF
KETONES UR QL STRIP: ABNORMAL
LEUKOCYTE ESTERASE UR QL STRIP: ABNORMAL
MICROSCOPIC COMMENT: ABNORMAL
NITRITE UR QL STRIP: NEGATIVE
PH UR STRIP: 6 [PH] (ref 5–8)
POCT GLUCOSE: 301 MG/DL (ref 70–110)
PROT UR QL STRIP: ABNORMAL
RBC #/AREA URNS HPF: 2 /HPF (ref 0–4)
SP GR UR STRIP: 1.02 (ref 1–1.03)
SPECIMEN SOURCE: ABNORMAL
SQUAMOUS #/AREA URNS HPF: 24 /HPF
T VAGINALIS GENITAL QL WET PREP: ABNORMAL
URN SPEC COLLECT METH UR: ABNORMAL
UROBILINOGEN UR STRIP-ACNC: 1 EU/DL
WBC #/AREA URNS HPF: 3 /HPF (ref 0–5)
WBC #/AREA VAG WET PREP: ABNORMAL
WBC CLUMPS URNS QL MICRO: ABNORMAL
YEAST GENITAL QL WET PREP: ABNORMAL
YEAST URNS QL MICRO: ABNORMAL

## 2019-10-27 PROCEDURE — 25000003 PHARM REV CODE 250: Performed by: EMERGENCY MEDICINE

## 2019-10-27 PROCEDURE — 87491 CHLMYD TRACH DNA AMP PROBE: CPT

## 2019-10-27 PROCEDURE — 81025 URINE PREGNANCY TEST: CPT | Performed by: EMERGENCY MEDICINE

## 2019-10-27 PROCEDURE — 99284 EMERGENCY DEPT VISIT MOD MDM: CPT | Mod: 25

## 2019-10-27 PROCEDURE — 81000 URINALYSIS NONAUTO W/SCOPE: CPT

## 2019-10-27 PROCEDURE — 82962 GLUCOSE BLOOD TEST: CPT

## 2019-10-27 PROCEDURE — 87210 SMEAR WET MOUNT SALINE/INK: CPT

## 2019-10-27 RX ORDER — FLUCONAZOLE 200 MG/1
200 TABLET ORAL DAILY
Qty: 1 TABLET | Refills: 0 | Status: SHIPPED | OUTPATIENT
Start: 2019-10-27 | End: 2019-10-29

## 2019-10-27 RX ORDER — FLUCONAZOLE 100 MG/1
200 TABLET ORAL
Status: COMPLETED | OUTPATIENT
Start: 2019-10-27 | End: 2019-10-27

## 2019-10-27 RX ADMIN — FLUCONAZOLE 200 MG: 100 TABLET ORAL at 11:10

## 2019-10-28 ENCOUNTER — TELEPHONE (OUTPATIENT)
Dept: OBSTETRICS AND GYNECOLOGY | Facility: CLINIC | Age: 39
End: 2019-10-28

## 2019-10-28 LAB
C TRACH DNA SPEC QL NAA+PROBE: NOT DETECTED
N GONORRHOEA DNA SPEC QL NAA+PROBE: NOT DETECTED

## 2019-10-28 NOTE — ED TRIAGE NOTES
"Pt to ED for vaginal itching Xs 2 days. Pt also reports her LMP was on 7/10/19, and has had 8 positive UPTs. Pt denies any bleeding or abd pain. PT states "Its just really itchy, and irritated"  "

## 2019-10-28 NOTE — TELEPHONE ENCOUNTER
----- Message from Robina Rodríguez sent at 10/28/2019 10:07 AM CDT -----  Contact: Who Called : MATILDA MCINTOSH [[id number]]      ----- Message -----  From: Tiffanie Garcia  Sent: 10/28/2019  10:06 AM CDT  To: Robina Rodríguez    Who Called : MATILDA MCINTOSH [[id number]]     Date of Positive Preg Test : 10-27-19     1st day of Last Menstrual Cycle : 07-01-19     List Any Difficulties : hospital follow up / diabetes     What Number to Call Back : [phone]     Can the clinic reply in [facility] :  Yes

## 2019-10-28 NOTE — TELEPHONE ENCOUNTER
Returned pt call and scheduled initial ob appointment. Pt had no other question pt verbalized understanding.

## 2019-10-28 NOTE — TELEPHONE ENCOUNTER
----- Message from Jelly Valenzuela sent at 10/28/2019 11:17 AM CDT -----  Contact: MATILDA MCINTOSH [[id number]]  Type:  Patient Returning Call    Who Called: MATILDA MCINTOSH [[id number]]    Who Left Message for Patient: Sil LILIA Tarango    Does the patient know what this is regarding?:N     Best Call Back Number:[phone]    Additional Information:

## 2019-10-28 NOTE — TELEPHONE ENCOUNTER
----- Message from Robina Rodríguez sent at 10/28/2019  8:14 AM CDT -----  Contact: MATILDA MCINTOSH [[id number]]  Based off of LMP:07/01 she is roughly about 17 weeks.   ----- Message -----  From: Isabel Howard  Sent: 10/28/2019   8:05 AM CDT  To: Robina Rodríguez    Who Called : MATILDA MCINTOSH [[id number]]    Date of Positive Preg Test : 10-27-19    1st day of Last Menstrual Cycle : 07-01-19    List Any Difficulties : hospital follow up / diabetes    What Number to Call Back : [phone]    Can the clinic reply in [facility] :  Yes                              ## PATIENT WANTS TO SEE DR GLEASON##

## 2019-10-28 NOTE — DISCHARGE INSTRUCTIONS
Call your OBGYN 1st thing in the morning to schedule close follow-up.  You need to better control your blood sugar, as your pregnancy is high risk due to your diabetes and elevated blood sugars.  Return to the emergency room for any new or worsening symptoms.

## 2019-10-29 ENCOUNTER — PATIENT OUTREACH (OUTPATIENT)
Dept: ADMINISTRATIVE | Facility: OTHER | Age: 39
End: 2019-10-29

## 2019-10-31 ENCOUNTER — INITIAL PRENATAL (OUTPATIENT)
Dept: OBSTETRICS AND GYNECOLOGY | Facility: CLINIC | Age: 39
End: 2019-10-31
Payer: MEDICAID

## 2019-10-31 ENCOUNTER — LAB VISIT (OUTPATIENT)
Dept: LAB | Facility: OTHER | Age: 39
End: 2019-10-31
Attending: OBSTETRICS & GYNECOLOGY
Payer: MEDICAID

## 2019-10-31 ENCOUNTER — TELEPHONE (OUTPATIENT)
Dept: MATERNAL FETAL MEDICINE | Facility: CLINIC | Age: 39
End: 2019-10-31

## 2019-10-31 VITALS
WEIGHT: 280.88 LBS | DIASTOLIC BLOOD PRESSURE: 74 MMHG | SYSTOLIC BLOOD PRESSURE: 126 MMHG | BODY MASS INDEX: 54.85 KG/M2

## 2019-10-31 DIAGNOSIS — O24.119 PRE-EXISTING TYPE 2 DIABETES MELLITUS DURING PREGNANCY, ANTEPARTUM: Primary | ICD-10-CM

## 2019-10-31 DIAGNOSIS — O09.90 SUPERVISION OF HIGH RISK PREGNANCY, ANTEPARTUM: ICD-10-CM

## 2019-10-31 DIAGNOSIS — E11.9 CONTROLLED TYPE 2 DIABETES MELLITUS WITHOUT COMPLICATION, WITHOUT LONG-TERM CURRENT USE OF INSULIN: ICD-10-CM

## 2019-10-31 DIAGNOSIS — O09.92 SUPERVISION OF HIGH RISK PREGNANCY IN SECOND TRIMESTER: ICD-10-CM

## 2019-10-31 DIAGNOSIS — O24.119 PRE-EXISTING TYPE 2 DIABETES MELLITUS DURING PREGNANCY, ANTEPARTUM: ICD-10-CM

## 2019-10-31 DIAGNOSIS — E66.01 CLASS 3 SEVERE OBESITY DUE TO EXCESS CALORIES WITH SERIOUS COMORBIDITY AND BODY MASS INDEX (BMI) OF 50.0 TO 59.9 IN ADULT: ICD-10-CM

## 2019-10-31 PROBLEM — O11.9 CHRONIC HYPERTENSION WITH SUPERIMPOSED PREECLAMPSIA: Status: RESOLVED | Noted: 2017-09-19 | Resolved: 2019-10-31

## 2019-10-31 LAB
ANION GAP SERPL CALC-SCNC: 11 MMOL/L (ref 8–16)
BASOPHILS # BLD AUTO: 0.04 K/UL (ref 0–0.2)
BASOPHILS NFR BLD: 0.5 % (ref 0–1.9)
BUN SERPL-MCNC: 12 MG/DL (ref 6–20)
CALCIUM SERPL-MCNC: 9.5 MG/DL (ref 8.7–10.5)
CHLORIDE SERPL-SCNC: 105 MMOL/L (ref 95–110)
CO2 SERPL-SCNC: 22 MMOL/L (ref 23–29)
CREAT SERPL-MCNC: 0.8 MG/DL (ref 0.5–1.4)
CREAT UR-MCNC: 331 MG/DL (ref 15–325)
DIFFERENTIAL METHOD: ABNORMAL
EOSINOPHIL # BLD AUTO: 0.1 K/UL (ref 0–0.5)
EOSINOPHIL NFR BLD: 0.7 % (ref 0–8)
ERYTHROCYTE [DISTWIDTH] IN BLOOD BY AUTOMATED COUNT: 12.6 % (ref 11.5–14.5)
EST. GFR  (AFRICAN AMERICAN): >60 ML/MIN/1.73 M^2
EST. GFR  (NON AFRICAN AMERICAN): >60 ML/MIN/1.73 M^2
ESTIMATED AVG GLUCOSE: 280 MG/DL (ref 68–131)
GLUCOSE SERPL-MCNC: 98 MG/DL (ref 70–110)
HBA1C MFR BLD HPLC: 11.4 % (ref 4–5.6)
HCT VFR BLD AUTO: 36.4 % (ref 37–48.5)
HGB BLD-MCNC: 11.2 G/DL (ref 12–16)
IMM GRANULOCYTES # BLD AUTO: 0.09 K/UL (ref 0–0.04)
IMM GRANULOCYTES NFR BLD AUTO: 1.1 % (ref 0–0.5)
LYMPHOCYTES # BLD AUTO: 1.9 K/UL (ref 1–4.8)
LYMPHOCYTES NFR BLD: 22.6 % (ref 18–48)
MCH RBC QN AUTO: 26.9 PG (ref 27–31)
MCHC RBC AUTO-ENTMCNC: 30.8 G/DL (ref 32–36)
MCV RBC AUTO: 88 FL (ref 82–98)
MONOCYTES # BLD AUTO: 0.4 K/UL (ref 0.3–1)
MONOCYTES NFR BLD: 5.3 % (ref 4–15)
NEUTROPHILS # BLD AUTO: 5.8 K/UL (ref 1.8–7.7)
NEUTROPHILS NFR BLD: 69.8 % (ref 38–73)
NRBC BLD-RTO: 0 /100 WBC
PLATELET # BLD AUTO: 349 K/UL (ref 150–350)
PMV BLD AUTO: 11.1 FL (ref 9.2–12.9)
POTASSIUM SERPL-SCNC: 3.6 MMOL/L (ref 3.5–5.1)
PROT UR-MCNC: 30 MG/DL (ref 0–15)
PROT/CREAT UR: 0.09 MG/G{CREAT} (ref 0–0.2)
RBC # BLD AUTO: 4.16 M/UL (ref 4–5.4)
SODIUM SERPL-SCNC: 138 MMOL/L (ref 136–145)
WBC # BLD AUTO: 8.27 K/UL (ref 3.9–12.7)

## 2019-10-31 PROCEDURE — 80048 BASIC METABOLIC PNL TOTAL CA: CPT

## 2019-10-31 PROCEDURE — 85025 COMPLETE CBC W/AUTO DIFF WBC: CPT

## 2019-10-31 PROCEDURE — 87491 CHLMYD TRACH DNA AMP PROBE: CPT

## 2019-10-31 PROCEDURE — 87086 URINE CULTURE/COLONY COUNT: CPT

## 2019-10-31 PROCEDURE — 99999 PR PBB SHADOW E&M-EST. PATIENT-LVL III: ICD-10-PCS | Mod: PBBFAC,,, | Performed by: OBSTETRICS & GYNECOLOGY

## 2019-10-31 PROCEDURE — 87340 HEPATITIS B SURFACE AG IA: CPT

## 2019-10-31 PROCEDURE — 99999 PR PBB SHADOW E&M-EST. PATIENT-LVL III: CPT | Mod: PBBFAC,,, | Performed by: OBSTETRICS & GYNECOLOGY

## 2019-10-31 PROCEDURE — 86762 RUBELLA ANTIBODY: CPT

## 2019-10-31 PROCEDURE — 86592 SYPHILIS TEST NON-TREP QUAL: CPT

## 2019-10-31 PROCEDURE — 99213 OFFICE O/P EST LOW 20 MIN: CPT | Mod: PBBFAC,TH | Performed by: OBSTETRICS & GYNECOLOGY

## 2019-10-31 PROCEDURE — 82570 ASSAY OF URINE CREATININE: CPT

## 2019-10-31 PROCEDURE — 83036 HEMOGLOBIN GLYCOSYLATED A1C: CPT

## 2019-10-31 PROCEDURE — 86703 HIV-1/HIV-2 1 RESULT ANTBDY: CPT

## 2019-10-31 PROCEDURE — 36415 COLL VENOUS BLD VENIPUNCTURE: CPT

## 2019-10-31 PROCEDURE — 99213 PR OFFICE/OUTPT VISIT, EST, LEVL III, 20-29 MIN: ICD-10-PCS | Mod: TH,S$PBB,, | Performed by: OBSTETRICS & GYNECOLOGY

## 2019-10-31 PROCEDURE — 99213 OFFICE O/P EST LOW 20 MIN: CPT | Mod: TH,S$PBB,, | Performed by: OBSTETRICS & GYNECOLOGY

## 2019-10-31 NOTE — TELEPHONE ENCOUNTER
Spoke with patient and r/s appt sooner. Pt verbalizes understanding of time, date and location of appt. States she is checking her blood sugars and will bring her log with her to appt.

## 2019-10-31 NOTE — PROGRESS NOTES
Wang Warren is a 38 y.o. , presents today for initial OB visit. She was seen in the ED recently, bedside US noted IUP in second trimester.     HPI: Reports amenorrhea since July. Prior to LMP, menses were regular. She is not currently on any contraception. + UPT last week. Denies nausea or vomiting. Has noticed breast tenderness. Denies vaginal bleeding since LMP. Admits that she had not been following her diabetic diet prior to finding out that she was pregnant.     SOCIAL HISTORY: Denies emotional/mental/physical/sexual violence or abuse. Feels safe at home. Accompanied today by her .     PAP HISTORY: Last pap 2019, result NILM with pos HPV (other HR types).    Review of patient's allergies indicates:  No Known Allergies  Past Medical History:   Diagnosis Date    Chronic hypertension affecting pregnancy     Diabetes mellitus type II, controlled, with no complications 10/11/2017    Infertility, female     States she has been trying to conceive for four years. Was evaluated at feritility clinic and was given Clomid for three months two years ago.    Iron deficiency anemia 2018     Past Surgical History:   Procedure Laterality Date     SECTION      none       Past Surgical History:   Procedure Laterality Date     SECTION      none       OB History    Para Term  AB Living   2 1 1     1   SAB TAB Ectopic Multiple Live Births         0 1      # Outcome Date GA Lbr Conrado/2nd Weight Sex Delivery Anes PTL Lv   2 Current            1 Term 18 37w1d  2.82 kg (6 lb 3.5 oz) F CS-LTranv EPI N ROBERTO      Complications: Fetal Intolerance     OB History        2    Para   1    Term   1            AB        Living   1       SAB        TAB        Ectopic        Multiple   0    Live Births   1               Social History     Socioeconomic History    Marital status:      Spouse name: Husam    Number of children: 0    Years of  education: Not on file    Highest education level: Not on file   Occupational History    Occupation:     Occupation: [employer]   Social Needs    Financial resource strain: Not on file    Food insecurity:     Worry: Not on file     Inability: Not on file    Transportation needs:     Medical: Not on file     Non-medical: Not on file   Tobacco Use    Smoking status: Never Smoker    Smokeless tobacco: Never Used   Substance and Sexual Activity    Alcohol use: No    Drug use: No    Sexual activity: Yes     Partners: Male     Birth control/protection: None   Lifestyle    Physical activity:     Days per week: Not on file     Minutes per session: Not on file    Stress: Not on file   Relationships    Social connections:     Talks on phone: Not on file     Gets together: Not on file     Attends [employer] service: Not on file     Active member of club or organization: Not on file     Attends meetings of clubs or organizations: Not on file     Relationship status: Not on file   Other Topics Concern    Not on file   Social History Narrative    Walks for exercise.     to Husam for five years.     Family History   Problem Relation Age of Onset    Cancer Father         prostate    Diabetes Maternal Aunt     Breast cancer Neg Hx      Social History     Substance and Sexual Activity   Sexual Activity Yes    Partners: Male    Birth control/protection: None       GENETIC SCREENING   Patient's age 35 years or older as of estimated date of delivery? yes  Nural tube defect (meningomyelocele, spina bifida, or anencephaly)? no  Down syndrome? no  Wing-Sachs (Ashkenazi Restorationism, Cajun, [place]ovan [place])? no  Canavan disease (Ashkenazi Restorationism)? no  Familial dysautonomia (Ashkenazi Restorationism)? no  Sickle cell disease or trait ()? no  Hemophilia or other blood disorders? no  Cystic fibrosis? no  Muscular dystrophy? no  [place]'s chorea? no  Thalassemia (Italian, Greek, Mediterranean, or   background) MCV less than 80? no  Congenital heart defect? no  Mental retardation/autism? no   If Yes, was person tested for Fragile X? no  Other inherited genetic or chromosomal disorder? no  Maternal metabolic disorder (e.g. type 1 diabetes, PKU)? no  Patient or baby's father had a child with birth defects not listed above? no  Recurrent pregnancy loss or a stillbirth: no  Medications (including supplements, vitamins, herbs or OTC drugs)/illicit/recreational drugs/alcohol since last menstrual period? no   If yes, agent(s) and strength/dose: no  List any other genetic risks: no  Comments/counseling: no    INFECTION HISTORY  Live with someone with TB or exposed to TB: no  Patient or partner has history of genital herpes: no  Rash or viral illness since last menstrual period: no  Patient or partner has hepatitis B or C: no  History of STD, gonorrhea, chlamydia, HPV, HIV, syphilis (list all that apply): no  List other infections: no  Additional comments: no    Todd Miller MD     ROS:    Constitutional/Gen: Denies fevers, chills, malaise, or weight loss. Pos fatigue   Psych: Denies depression, anxiety  Eyes: Denies changes in vision or scotomata  Ears, nose, mouth, throat: Denies sinus tenderness, swelling, or dentition problems  CV/vasc: Denies heart palpitations or edema  Resp: Denies SOB or dyspnea  Breasts: Denies mass, nipple discharge, or trauma. Pos breast tenderness.  GI: Denies constipation, diarrhea, or vomiting. Denies nausea.  : Denies vaginal discharge, dysuria or pelvic pain. Pos urinary frequency  MS: Denies weakness, soreness, or changes in ROM    OBJECTIVE:  /74   Wt 127.4 kg (280 lb 13.9 oz)   LMP 05/07/2019   BMI 54.85 kg/m²   Constitutional/Gen: NAD, appears stated age, well groomed  Neck: supple, no masses or enlargement  Head: normocephalic  Skin: warm and dry w/o rash  Lung: normal resp effort, CTAB  Heart: normal HR, RRR   Back: negative CVAT  Breasts: bilaterally--no masses,  tenderness, skin changes, or nipple discharge noted  Abdomen: soft, nontender, no masses, and bowel sounds normal, no enlargement  External genitalia: no lesions or discharge, normal hair distribution  Urethral meatus: normal size and location, no lesions or prolapse  Vagina: normal appearance, no lesions, no discharge, no evidence cystocele or rectocele.  Cervix: normal appearance, no discharge, no lesions, negative CMT  Uterus: nontender, mobile, approx 16-18 week size, contour, and position. +++ FHTs via doppler  Adnexa: no masses or tenderness  Anus/Perineum: normal appearance, with no lesions or discharge. Internal exam deferred.  Extremities: FROM, with no edema or tenderness.  Neurologic: A&O x 4, non-focal, cranial nerves 2-12 grossly intact  Psych: affect appropriate and without signs of mood, thought or memory difficulty appreciated    UPT pos in office    ASSESSMENT:  38 y.o. female  with IUP at unknown gestational age.    Body mass index is 54.85 kg/m².  Patient Active Problem List   Diagnosis    BMI 50.0-59.9, adult     delivery delivered    Insulin dependent diabetes mellitus    Diabetes mellitus type II, controlled, with no complications    Benign essential HTN    Class 3 severe obesity with body mass index (BMI) of 50.0 to 59.9 in adult    Iron deficiency anemia    Diabetes mellitus    Vitamin D insufficiency    Supervision of high risk pregnancy in second trimester       PLAN:  1. Amenorrhea  -- + UPT in office, Patient's last menstrual period was 2019. --> Estimated Date of Delivery: None noted.  -- Dating US scheduled with MFM  -- Anatomical US scheduled with MFM  -- Routine serum and urine prenatal labs today    2. Physical exam today  -- Discussed ASCCP pap guidelines. Up to date.     3. BMI 54  -- Discussed IOM recommended weight gain of:   Underweight Less than 18.5 28-40    Normal Weight 18.5-24.9  25-35    Overweight 25-29.9  15-25    Obese   30 and  greater  11-20   -- Discussed criteria for delivery at [facility] r/t excessive pre-preg weight or excessive weight gain:   Pre-pregnancy BMI over 40 or excess pregnancy weight gain defined as:   Pre-preg BMI < 18.5; Excess weight gain = > 60 pound   Pre-preg BMI 18.5-24.9;  Excess weight gain = > 53 pounds   Pre-preg BMI 25-29.9;  Excess weight gain = > 38 pounds   Pre-preg BMI > 30;  Excess weight gain = > 30 pounds    4. Discussed nausea and vomiting in pregnancy  -- Education regarding lifestyle and dietary modifications  -- Reviewed use of B6/Unisom prn. Pt will notify us if no relief/worsening symptoms, will consider alternative therapies prn    5. Pregnancy education and couseling; handouts and booklet provided  -- Oriented to practice and anticipated prenatal course of care and how to contact us  -- Precautions/warning signs reviewed  -- Common complaints of pregnancy  -- Routine prenatal labs including HIV  -- Ultrasounds  -- Nutrition, prepregnant BMI, and recommended weight gain  -- Toxoplasmosis precautions (Cats/Raw Meat)  -- Sexual activity and exercise  -- Environmental/Work hazards  -- Travel  -- Tobacco (Ask, Advise, Assess, Assist, and Arrange), as well as alcohol and drug use  -- Use of any medications (Including supplements, Vitamins, Herbs, or OTC Drugs)  -- Domestic violence screen neg    6. Reviewed genetic testing options. Reviewed available first trimester and/or second  trimester screening options. Reviewed risk of false positive/negative results and recommendation of referral to [facility] in event of a positive result, for NIPT, US, and/or amniocentesis. Reviewed the possible estimated 1 in 300/500 risk of miscarriage with amniocentesis, even with a healthy fetus. Patient desires NIPT. Will order once dating established.    7. AMA  -- Discussed aneuploidy testing - patient desires NIPT. Will order once dating established.    8. DMII  - Last A1C 3 months ag 12. Will repeat today with fasting BS.   -  "Reinforced importance of tight BS control and monitoring fasting and 2 hours PP levels. She will start keeping log so that adjustments can be made with insulin regimen if needed.   - Patient declines diabetes education.   - Metformin 500 BID  - Lantus 15 units qhs  - Lispro 12/8/10 with meals  - MFM consult    9. CHTN  - Start baby ASA  - Procardia 60 mg daily (patient stating she is taking her BP pill twice daily - she will verify).  - Baseline P/C collected.   - Stop lipitor.   - MFM consult    RTC x 2 weeks, call or present sooner prn.     Updated Medication List:  Current Outpatient Medications   Medication Sig Dispense Refill    BD INSULIN SYRINGE ULTRA-FINE 1 mL 31 gauge x 5/16 Syrg       BD ULTRA-FINE MINI PEN NEEDLE 31 gauge x 3/16" Ndle Use 3 (three) times daily with meals. 100 each 3    blood sugar diagnostic (ONETOUCH VERIO) Strp use to test blood sugar 4 times daily 100 each 3    ferrous sulfate 325 mg (65 mg iron) Tab tablet TK 1 T PO daily  1    fluticasone (FLONASE) 50 mcg/actuation nasal spray 1 spray (50 mcg total) by Each Nare route 2 (two) times daily as needed for Rhinitis. (Patient taking differently: 1 spray (50 mcg total) by Each Nare route 2 (two) times daily as needed for Rhinitis.) 16 g 0    insulin (LANTUS SOLOSTAR U-100 INSULIN) glargine 100 units/mL (3mL) SubQ pen Inject 15 Units into the skin every evening. 15 mL 3    insulin lispro (HUMALOG U-100 INSULIN) 100 unit/mL injection Inject 12 units under the skin with breakfast, 8 units with lunch and 10 units before dinner. 10 mL 3    lancets (ONETOUCH ULTRASOFT LANCETS) Misc 1 lancet by Misc.(Non-Drug; Combo Route) route 4 (four) times daily. 100 each 3    metFORMIN (GLUCOPHAGE) 500 MG tablet Take 1 tablet (500 mg total) by mouth 2 (two) times daily with meals. 180 tablet 1    NIFEdipine (PROCARDIA-XL) 60 MG (OSM) 24 hr tablet Take 1 tablet (60 mg total) by mouth once daily. 90 tablet 1    PRENATAL VIT CALC,IRON,FOLIC (PRENATAL " VITAMIN ORAL) Take by mouth.       No current facility-administered medications for this visit.          Mariela Valenzuela MD  10/31/2019 11:12 AM

## 2019-11-01 ENCOUNTER — PATIENT MESSAGE (OUTPATIENT)
Dept: OBSTETRICS AND GYNECOLOGY | Facility: CLINIC | Age: 39
End: 2019-11-01

## 2019-11-01 ENCOUNTER — PATIENT MESSAGE (OUTPATIENT)
Dept: ADMINISTRATIVE | Facility: OTHER | Age: 39
End: 2019-11-01

## 2019-11-01 LAB
BACTERIA UR CULT: NORMAL
BACTERIA UR CULT: NORMAL
C TRACH DNA SPEC QL NAA+PROBE: NOT DETECTED
HBV SURFACE AG SERPL QL IA: NEGATIVE
HIV 1+2 AB+HIV1 P24 AG SERPL QL IA: NEGATIVE
N GONORRHOEA DNA SPEC QL NAA+PROBE: NOT DETECTED
RPR SER QL: NORMAL
RUBV IGG SER-ACNC: 20.8 IU/ML
RUBV IGG SER-IMP: REACTIVE

## 2019-11-04 ENCOUNTER — PATIENT MESSAGE (OUTPATIENT)
Dept: OBSTETRICS AND GYNECOLOGY | Facility: CLINIC | Age: 39
End: 2019-11-04

## 2019-11-04 ENCOUNTER — PATIENT OUTREACH (OUTPATIENT)
Dept: ADMINISTRATIVE | Facility: OTHER | Age: 39
End: 2019-11-04

## 2019-11-04 DIAGNOSIS — Z79.4 CONTROLLED TYPE 2 DIABETES MELLITUS WITHOUT COMPLICATION, WITH LONG-TERM CURRENT USE OF INSULIN: ICD-10-CM

## 2019-11-04 DIAGNOSIS — E11.9 CONTROLLED TYPE 2 DIABETES MELLITUS WITHOUT COMPLICATION, WITH LONG-TERM CURRENT USE OF INSULIN: ICD-10-CM

## 2019-11-04 RX ORDER — PEN NEEDLE, DIABETIC 31 GX5/16"
100 NEEDLE, DISPOSABLE MISCELLANEOUS
Qty: 100 EACH | Refills: 11 | Status: ON HOLD | OUTPATIENT
Start: 2019-11-04 | End: 2020-01-17 | Stop reason: SDUPTHER

## 2019-11-04 NOTE — LETTER
AUTHORIZATION FOR RELEASE OF   CONFIDENTIAL INFORMATION    Dear Ambar Wilhelm Jr.,    We are seeing Wang Warren, date of birth 1980, in the clinic at [facility]. Todd Miller MD is the patient's PCP. Wang Warren has an outstanding lab/procedure at the time we reviewed her chart. In order to help keep her health information updated, she has authorized us to request the following medical record(s):        (  )  MAMMOGRAM                                      (  )  COLONOSCOPY      (  )  PAP SMEAR                                          (  )  OUTSIDE LAB RESULTS     (  )  DEXA SCAN                                          ( x )  EYE EXAM            (  )  FOOT EXAM                                          (  )  ENTIRE RECORD     (  )  OUTSIDE IMMUNIZATIONS                 (  )  _______________         Please fax records to [facility], Bruce W Stewart, MD, [phone]     If you have any questions, please contact Radha Iqbal at [phone].           Patient Name: Wang Warren  : 1980  Patient Phone #: [phone]

## 2019-11-04 NOTE — PROGRESS NOTES
Release form sent to Dr. Ambar Wilhelm Jr office via [employer] to obtain Ms. Warren's eye exam report.

## 2019-11-05 ENCOUNTER — PATIENT MESSAGE (OUTPATIENT)
Dept: INTERNAL MEDICINE | Facility: CLINIC | Age: 39
End: 2019-11-05

## 2019-11-05 ENCOUNTER — TELEPHONE (OUTPATIENT)
Dept: INTERNAL MEDICINE | Facility: CLINIC | Age: 39
End: 2019-11-05

## 2019-11-06 ENCOUNTER — PROCEDURE VISIT (OUTPATIENT)
Dept: MATERNAL FETAL MEDICINE | Facility: CLINIC | Age: 39
End: 2019-11-06
Payer: MEDICAID

## 2019-11-06 ENCOUNTER — CLINICAL SUPPORT (OUTPATIENT)
Dept: OBSTETRICS AND GYNECOLOGY | Facility: CLINIC | Age: 39
End: 2019-11-06
Payer: MEDICAID

## 2019-11-06 ENCOUNTER — INITIAL PRENATAL (OUTPATIENT)
Dept: OBSTETRICS AND GYNECOLOGY | Facility: CLINIC | Age: 39
End: 2019-11-06
Payer: MEDICAID

## 2019-11-06 ENCOUNTER — INITIAL CONSULT (OUTPATIENT)
Dept: MATERNAL FETAL MEDICINE | Facility: CLINIC | Age: 39
End: 2019-11-06
Payer: MEDICAID

## 2019-11-06 VITALS
SYSTOLIC BLOOD PRESSURE: 116 MMHG | DIASTOLIC BLOOD PRESSURE: 76 MMHG | BODY MASS INDEX: 54.64 KG/M2 | WEIGHT: 279.75 LBS

## 2019-11-06 VITALS — WEIGHT: 280 LBS | BODY MASS INDEX: 54.68 KG/M2 | DIASTOLIC BLOOD PRESSURE: 74 MMHG | SYSTOLIC BLOOD PRESSURE: 126 MMHG

## 2019-11-06 DIAGNOSIS — O24.119 PRE-EXISTING TYPE 2 DIABETES MELLITUS DURING PREGNANCY, ANTEPARTUM: ICD-10-CM

## 2019-11-06 DIAGNOSIS — Z36.89 ENCOUNTER FOR ULTRASOUND TO ASSESS FETAL GROWTH: ICD-10-CM

## 2019-11-06 DIAGNOSIS — I10 BENIGN ESSENTIAL HTN: ICD-10-CM

## 2019-11-06 DIAGNOSIS — O09.90 SUPERVISION OF HIGH RISK PREGNANCY, ANTEPARTUM: ICD-10-CM

## 2019-11-06 DIAGNOSIS — Z23 NEED FOR VACCINATION: ICD-10-CM

## 2019-11-06 DIAGNOSIS — O09.92 SUPERVISION OF HIGH RISK PREGNANCY IN SECOND TRIMESTER: Primary | ICD-10-CM

## 2019-11-06 PROCEDURE — 90471 IMMUNIZATION ADMIN: CPT | Mod: PBBFAC

## 2019-11-06 PROCEDURE — 99213 OFFICE O/P EST LOW 20 MIN: CPT | Mod: TH,S$PBB,, | Performed by: OBSTETRICS & GYNECOLOGY

## 2019-11-06 PROCEDURE — 99999 PR PBB SHADOW E&M-EST. PATIENT-LVL III: CPT | Mod: PBBFAC,,, | Performed by: PEDIATRICS

## 2019-11-06 PROCEDURE — 99211 OFF/OP EST MAY X REQ PHY/QHP: CPT | Mod: PBBFAC,25

## 2019-11-06 PROCEDURE — 99999 PR PBB SHADOW E&M-EST. PATIENT-LVL II: ICD-10-PCS | Mod: PBBFAC,,, | Performed by: OBSTETRICS & GYNECOLOGY

## 2019-11-06 PROCEDURE — 99999 PR PBB SHADOW E&M-EST. PATIENT-LVL II: CPT | Mod: PBBFAC,,, | Performed by: OBSTETRICS & GYNECOLOGY

## 2019-11-06 PROCEDURE — 99213 PR OFFICE/OUTPT VISIT, EST, LEVL III, 20-29 MIN: ICD-10-PCS | Mod: TH,S$PBB,, | Performed by: OBSTETRICS & GYNECOLOGY

## 2019-11-06 PROCEDURE — 76811 OB US DETAILED SNGL FETUS: CPT | Mod: PBBFAC | Performed by: PEDIATRICS

## 2019-11-06 PROCEDURE — 99214 OFFICE O/P EST MOD 30 MIN: CPT | Mod: 25,S$PBB,TH, | Performed by: PEDIATRICS

## 2019-11-06 PROCEDURE — 99212 OFFICE O/P EST SF 10 MIN: CPT | Mod: PBBFAC,27,TH | Performed by: OBSTETRICS & GYNECOLOGY

## 2019-11-06 PROCEDURE — 99999 PR PBB SHADOW E&M-EST. PATIENT-LVL I: CPT | Mod: PBBFAC,,,

## 2019-11-06 PROCEDURE — 99999 PR PBB SHADOW E&M-EST. PATIENT-LVL I: ICD-10-PCS | Mod: PBBFAC,,,

## 2019-11-06 PROCEDURE — 76811 PR US, OB FETAL EVAL & EXAM, TRANSABDOM,FIRST GESTATION: ICD-10-PCS | Mod: 26,S$PBB,, | Performed by: PEDIATRICS

## 2019-11-06 PROCEDURE — 99999 PR PBB SHADOW E&M-EST. PATIENT-LVL III: ICD-10-PCS | Mod: PBBFAC,,, | Performed by: PEDIATRICS

## 2019-11-06 PROCEDURE — 99214 PR OFFICE/OUTPT VISIT, EST, LEVL IV, 30-39 MIN: ICD-10-PCS | Mod: 25,S$PBB,TH, | Performed by: PEDIATRICS

## 2019-11-06 PROCEDURE — 76811 OB US DETAILED SNGL FETUS: CPT | Mod: 26,S$PBB,, | Performed by: PEDIATRICS

## 2019-11-06 NOTE — PROGRESS NOTES
Patient here with newly diagnosed pregnancy she is a diabeted    Patient is taking insulin as stated:     Lantus;     15 units at bedtime          Humalo   8   10  X

## 2019-11-06 NOTE — LETTER
November 12, 2019      Mariela Valenzuela MD  [address] [facility] [address]           [facility]  [address]  [facility] [address]  Phone: [phone]          Patient: Wang Warren   MR Number: [id number]   YOB: 1980   Date of Visit: 11/6/2019       Dear Dr. Mariela Valenzuela:    Thank you for referring Wang Warren to me for evaluation. Attached you will find relevant portions of my assessment and plan of care.    If you have questions, please do not hesitate to call me. I look forward to following Wang Warren along with you.    Sincerely,    Ayaka Mclaughlin MD    Enclosure  CC:  No Recipients    If you would like to receive this communication electronically, please contact [email or web address] or [phone] to request more information on internetstores Link access.    For providers and/or their staff who would like to refer a patient to [facility], please contact us through our one-stop-shop provider referral line, [facility], at [phone].    If you feel you have received this communication in error or would no longer like to receive these types of communications, please e-mail [email or web address]

## 2019-11-06 NOTE — PROGRESS NOTES
Here for Influenza - Quadrivalent - PF (ADULT) vaccine . Vaccine Information sheet given to patient. Patient without complaint of pain prior to or after injection. Immunization tolerated well and patient advised to wait in lobby 15 minutes. Report any adverse reactions.    Site: LD

## 2019-11-07 NOTE — PROGRESS NOTES
Patient seen by MFM today for consult and dating scan - was further along than anticipated (24 weeks). Found out it's a boy! Fetal echo scheduled due to maternal diabetes. Flu shot done today. She has follow up with MFM next week. F/U with me in 4 weeks.

## 2019-11-08 ENCOUNTER — PATIENT MESSAGE (OUTPATIENT)
Dept: OBSTETRICS AND GYNECOLOGY | Facility: CLINIC | Age: 39
End: 2019-11-08

## 2019-11-13 ENCOUNTER — CLINICAL SUPPORT (OUTPATIENT)
Dept: PEDIATRIC CARDIOLOGY | Facility: CLINIC | Age: 39
End: 2019-11-13
Attending: PEDIATRICS
Payer: MEDICAID

## 2019-11-13 VITALS
HEIGHT: 60 IN | BODY MASS INDEX: 54.89 KG/M2 | OXYGEN SATURATION: 99 % | DIASTOLIC BLOOD PRESSURE: 78 MMHG | WEIGHT: 279.56 LBS | HEART RATE: 70 BPM | SYSTOLIC BLOOD PRESSURE: 122 MMHG

## 2019-11-13 DIAGNOSIS — E11.9 CONTROLLED TYPE 2 DIABETES MELLITUS WITHOUT COMPLICATION, WITHOUT LONG-TERM CURRENT USE OF INSULIN: ICD-10-CM

## 2019-11-13 DIAGNOSIS — I10 BENIGN ESSENTIAL HTN: Primary | ICD-10-CM

## 2019-11-13 DIAGNOSIS — O24.119 PRE-EXISTING TYPE 2 DIABETES MELLITUS DURING PREGNANCY, ANTEPARTUM: ICD-10-CM

## 2019-11-13 PROCEDURE — 99213 OFFICE O/P EST LOW 20 MIN: CPT | Mod: PBBFAC,27,25 | Performed by: PEDIATRICS

## 2019-11-13 PROCEDURE — 99999 PR PBB SHADOW E&M-EST. PATIENT-LVL I: CPT | Mod: PBBFAC,,,

## 2019-11-13 PROCEDURE — 93325 DOPPLER ECHO COLOR FLOW MAPG: CPT | Mod: PBBFAC | Performed by: PEDIATRICS

## 2019-11-13 PROCEDURE — 76825 ECHO EXAM OF FETAL HEART: CPT | Mod: 26,S$PBB,, | Performed by: PEDIATRICS

## 2019-11-13 PROCEDURE — 76827 ECHO EXAM OF FETAL HEART: CPT | Mod: PBBFAC | Performed by: PEDIATRICS

## 2019-11-13 PROCEDURE — 76825 ECHO EXAM OF FETAL HEART: CPT | Mod: PBBFAC | Performed by: PEDIATRICS

## 2019-11-13 PROCEDURE — 93325 DOPPLER ECHO COLOR FLOW MAPG: CPT | Mod: 26,S$PBB,, | Performed by: PEDIATRICS

## 2019-11-13 PROCEDURE — 99999 PR PBB SHADOW E&M-EST. PATIENT-LVL III: ICD-10-PCS | Mod: PBBFAC,,, | Performed by: PEDIATRICS

## 2019-11-13 PROCEDURE — 76827 PR  SO2 FETAL HEART DOPPLER: ICD-10-PCS | Mod: 26,S$PBB,, | Performed by: PEDIATRICS

## 2019-11-13 PROCEDURE — 99999 PR PBB SHADOW E&M-EST. PATIENT-LVL I: ICD-10-PCS | Mod: PBBFAC,,,

## 2019-11-13 PROCEDURE — 99211 OFF/OP EST MAY X REQ PHY/QHP: CPT | Mod: PBBFAC

## 2019-11-13 PROCEDURE — 99213 OFFICE O/P EST LOW 20 MIN: CPT | Mod: 25,S$PBB,, | Performed by: PEDIATRICS

## 2019-11-13 PROCEDURE — 76825 PR  SO2 FETAL HEART: ICD-10-PCS | Mod: 26,S$PBB,, | Performed by: PEDIATRICS

## 2019-11-13 PROCEDURE — 99213 PR OFFICE/OUTPT VISIT, EST, LEVL III, 20-29 MIN: ICD-10-PCS | Mod: 25,S$PBB,, | Performed by: PEDIATRICS

## 2019-11-13 PROCEDURE — 76827 ECHO EXAM OF FETAL HEART: CPT | Mod: 26,S$PBB,, | Performed by: PEDIATRICS

## 2019-11-13 PROCEDURE — 93325 PR DOPPLER COLOR FLOW VELOCITY MAP: ICD-10-PCS | Mod: 26,S$PBB,, | Performed by: PEDIATRICS

## 2019-11-13 PROCEDURE — 99999 PR PBB SHADOW E&M-EST. PATIENT-LVL III: CPT | Mod: PBBFAC,,, | Performed by: PEDIATRICS

## 2019-11-14 ENCOUNTER — INITIAL CONSULT (OUTPATIENT)
Dept: MATERNAL FETAL MEDICINE | Facility: CLINIC | Age: 39
End: 2019-11-14
Payer: MEDICAID

## 2019-11-14 ENCOUNTER — PROCEDURE VISIT (OUTPATIENT)
Dept: MATERNAL FETAL MEDICINE | Facility: CLINIC | Age: 39
End: 2019-11-14
Payer: MEDICAID

## 2019-11-14 VITALS — DIASTOLIC BLOOD PRESSURE: 70 MMHG | SYSTOLIC BLOOD PRESSURE: 118 MMHG | BODY MASS INDEX: 54.98 KG/M2 | WEIGHT: 281.5 LBS

## 2019-11-14 DIAGNOSIS — O09.512 ADVANCED MATERNAL AGE, PRIMIGRAVIDA IN SECOND TRIMESTER, ANTEPARTUM: ICD-10-CM

## 2019-11-14 DIAGNOSIS — E66.01 CLASS 3 SEVERE OBESITY DUE TO EXCESS CALORIES WITH SERIOUS COMORBIDITY AND BODY MASS INDEX (BMI) OF 50.0 TO 59.9 IN ADULT: Primary | ICD-10-CM

## 2019-11-14 DIAGNOSIS — E11.9 CONTROLLED TYPE 2 DIABETES MELLITUS WITHOUT COMPLICATION, UNSPECIFIED WHETHER LONG TERM INSULIN USE: ICD-10-CM

## 2019-11-14 DIAGNOSIS — O11.9 CHRONIC HYPERTENSION WITH SUPERIMPOSED PREECLAMPSIA: ICD-10-CM

## 2019-11-14 PROCEDURE — 99213 OFFICE O/P EST LOW 20 MIN: CPT | Mod: PBBFAC,TH,25 | Performed by: PEDIATRICS

## 2019-11-14 PROCEDURE — 76816 PR  US,PREGNANT UTERUS,F/U,TRANSABD APP: ICD-10-PCS | Mod: 26,S$PBB,, | Performed by: PEDIATRICS

## 2019-11-14 PROCEDURE — 99999 PR PBB SHADOW E&M-EST. PATIENT-LVL III: ICD-10-PCS | Mod: PBBFAC,,, | Performed by: PEDIATRICS

## 2019-11-14 PROCEDURE — 99214 PR OFFICE/OUTPT VISIT, EST, LEVL IV, 30-39 MIN: ICD-10-PCS | Mod: S$PBB,TH,25, | Performed by: PEDIATRICS

## 2019-11-14 PROCEDURE — 76816 OB US FOLLOW-UP PER FETUS: CPT | Mod: 26,S$PBB,, | Performed by: PEDIATRICS

## 2019-11-14 PROCEDURE — 99214 OFFICE O/P EST MOD 30 MIN: CPT | Mod: S$PBB,TH,25, | Performed by: PEDIATRICS

## 2019-11-14 PROCEDURE — 76816 OB US FOLLOW-UP PER FETUS: CPT | Mod: PBBFAC | Performed by: PEDIATRICS

## 2019-11-14 PROCEDURE — 99999 PR PBB SHADOW E&M-EST. PATIENT-LVL III: CPT | Mod: PBBFAC,,, | Performed by: PEDIATRICS

## 2019-11-14 NOTE — PROGRESS NOTES
Patient to [facility] for her follow up consult for Type 2 Diabetes.     Patient's current insulin management includes:     Metformin 500 mg    Lantus: /x/15    Humalo/8/10/x       Copy of blood sugar log made for Dr. Mclaughlin for review.     Changes following consult include:     No changes made to insulin regimen.

## 2019-11-14 NOTE — PROGRESS NOTES
"Ms. Warren  is a 38 y.o. year old  , referred by Dr. Mclaughlin because of the history of diabetes mellitus.    The patient presented at approximately 25 1/7 weeks gestation (Patient's last menstrual period was 2019.).  The patient denied any complaints.    Past medical history: Significant for type II DM, hypertension (currently off medication) and obesity.  Past surgical history: S/P C/S x 1.  Past gestational history: The patient has a healthy daughter (2 y/o).    Family history: Negative for congenital heart disease, and sudden death during childhood.    Medications:   Outpatient Encounter Medications as of 2019   Medication Sig Dispense Refill    BD INSULIN SYRINGE ULTRA-FINE 1 mL 31 gauge x 5/16 Syrg       BD ULTRA-FINE MINI PEN NEEDLE 31 gauge x 3/16" Ndle Use 3 (three) times daily with meals. 100 each 11    blood sugar diagnostic (ONETOUCH VERIO) Strp use to test blood sugar 4 times daily 100 each 3    insulin (LANTUS SOLOSTAR U-100 INSULIN) glargine 100 units/mL (3mL) SubQ pen Inject 15 Units into the skin every evening. 15 mL 3    insulin lispro (HUMALOG U-100 INSULIN) 100 unit/mL injection Inject 12 units under the skin with breakfast, 8 units with lunch and 10 units before dinner. 10 mL 3    insulin syringe-needle U-100 1 mL 31 gauge x 5/16 Syrg use for daily insulin injections; up to three times daily 100 each 1    metFORMIN (GLUCOPHAGE) 500 MG tablet Take 1 tablet (500 mg total) by mouth 2 (two) times daily with meals. 180 tablet 1    NIFEdipine (PROCARDIA-XL) 60 MG (OSM) 24 hr tablet Take 1 tablet (60 mg total) by mouth once daily. 90 tablet 1    PRENATAL VIT CALC,IRON,FOLIC (PRENATAL VITAMIN ORAL) Take by mouth.      ferrous sulfate 325 mg (65 mg iron) Tab tablet TK 1 T PO daily  1    fluticasone (FLONASE) 50 mcg/actuation nasal spray 1 spray (50 mcg total) by Each Nare route 2 (two) times daily as needed for Rhinitis. (Patient not taking: Reported on 2019) 16 g 0 "    lancets (ONETOUCH ULTRASOFT LANCETS) Misc 1 lancet by Misc.(Non-Drug; Combo Route) route 4 (four) times daily. 100 each 3     No facility-administered encounter medications on file as of 11/13/2019.        Allergies: Patient has no known allergies.    Blood pressure 122/78, pulse 70, height 5' (1.524 m), weight 126.8 kg (279 lb 8.7 oz), last menstrual period 05/07/2019, SpO2 99 %, not currently breastfeeding.    Fetal echocardiogram was technically difficult due to patient size and fetal position.  A four chamber fetal heart with situs solitus was seen.  The ventricles appeared to be equal in size.  The contractility of both ventricles was good.  The fetal heart rate was within the normal range, and regular.  The interventricular septum appeared to be intact.  There were normally related great arteries seen.  The ductal and aortic arch were visualized, and appeared to be widely patent.  There was no pleural or pericardial effusion seen.    Doppler analysis revealed a three vessel umbilical cord, with normal flow patterns, and velocities by Doppler.  There was a normal flow pattern seen in the ductus venosus.  There was evidence of normal systemic, and pulmonary venous return seen.  There were normal inflow patterns seen across the AV-valves, without significant insufficiency.  There was no ventricular level shunt seen.  The right and left ventricular outflow tract, and ductal and aortic arch appeared to be unobstructed.    Impression:  Although this was a technically difficult study, it is our impression that Ms. Warren had a normal fetal echocardiogram.  As you know, small defects, and coarctation of the aorta cannot always be ruled out on fetal echocardiogram.  We discussed our findings with the patient and her partner, reviewed our images, and answered their questions. We also discussed the limitations of fetal echocardiography, but emphasized that her child appears to have normal cardiac anatomy.  No  further follow up is scheduled in our clinic, but, of course, we will always be available to reevaluate this patient, if needed.    The above information was discussed in detail including the use of diagrams, with 30 minutes of total face to face time, with greater than 50% with counseling and coordination of care.  The discussion of the diagnosis and treatment options is as described above.      Time spent: 30 minutes, 50% dedicated to counseling.

## 2019-11-18 ENCOUNTER — PATIENT MESSAGE (OUTPATIENT)
Dept: OBSTETRICS AND GYNECOLOGY | Facility: CLINIC | Age: 39
End: 2019-11-18

## 2019-11-18 DIAGNOSIS — R94.6 ABNORMAL THYROID FUNCTION TEST: ICD-10-CM

## 2019-11-18 DIAGNOSIS — D50.9 IRON DEFICIENCY ANEMIA, UNSPECIFIED IRON DEFICIENCY ANEMIA TYPE: ICD-10-CM

## 2019-11-18 DIAGNOSIS — E55.9 VITAMIN D INSUFFICIENCY: ICD-10-CM

## 2019-11-18 RX ORDER — ERGOCALCIFEROL 1.25 MG/1
50000 CAPSULE ORAL
Qty: 12 CAPSULE | Refills: 0 | Status: SHIPPED | OUTPATIENT
Start: 2019-11-18 | End: 2019-12-12 | Stop reason: SDUPTHER

## 2019-11-18 NOTE — PROGRESS NOTES
Indication  ========    Follow-up evaluation of anatomy    History  ======    General History  Height 157 cm  Height (ft) 5 ft  Height (in) 2 in  Other: OB: MAHENDRA GLEASON  Previous Outcomes   1  Para 0  Preg. no. 1  Outcome: live birth  Gest. age 38 w + 0 d  Gender: female  Details: c/s, 6lbs 3 oz, fetal intolerance  Risk Factors  History risk factors: Obesity  History risk factors: AMA  History risk factors: Diabetes mellitus  History risk factors: Personal history of hypertension  Details: iron defficiency    Maternal Assessment  =================    Height 157 cm  Height (ft) 5 ft  Height (in) 2 in    Method  ======    Voluson E10, Transabdominal ultrasound examination. View: Suboptimal view: limited by maternal body habitus    Pregnancy  =========    Peres pregnancy. Number of fetuses: 1    Dating  ======    Assigned: based on ultrasound (AC, BPD, Femur, HC), selected on 2019  Assigned GA 25 w + 2 d  Assigned JUAN MIGUEL: 2020  Pregnancy length 280 d    General Evaluation  ==============    Cardiac activity present.  bpm.  Fetal movements visualized.  Presentation breech.  Placenta posterior.  Amniotic fluid normal amount, MVP 6.1 cm.    Fetal Biometry  ============    Standard  Cerebellum tr 27.8 mm  25w 6d        Mario    EFW by: Hadlock (BPD-HC-AC-FL)  Extended   2.6 mm  CM 5.3 mm    Other Structures   bpm    Fetal Anatomy  ============    Cranium: normal  Lateral ventricles: normal  Midline falx: suboptimal  Cavum septi pellucidi: normal  Cerebellum: normal  Cisterna magna: normal  Lips: suboptimal  Nose: suboptimal  4-chamber view: visualized  RVOT view: visualized  LVOT view: visualized  Heart / Thorax  4-chamber view: septum suboptimal  Aortic arch view: visualized  Ductal arch view: visualized  SVC: normal  IVC: normal  3-vessel view: visualized  3-vessel-trachea view: suboptimal  Diaphragm: normal  Stomach: normal  Kidneys: normal  Bladder: normal  Abdomen  Rt renal  artery: suboptimal  Lt renal artery: suboptimal  Sacral spine: suboptimal  Gender: male  Wants to know gender: yes  Other: A full anatomic survey has been previously performed.              Consultation  ==========    Ms. Warren is seen in follow-up for completion of anatomy and review blood sugars. She also has a fetal echo scheduled for today. She is  currently 25 weeks and 2 days.    Weight: 127.7 kg B/P: 118/70 FHR: 138 beats per minute    Blood sugar logs are reviewed. Blood sugars are trending low. Patient denies any signs or symptoms of hypoglycemia or insulin reactions. We  discussed meal time injections and testing. She has been testing 2 hr after the end of the meal; I reiterated that the testing is done 2 hr from  the very start of each meal in addition to the fasting blood sugar done after at least 8 hr no food or drink.    Current insulin doses:    Lantus/Humalog ( units):    X/ 12, X/ 8, X/ 10, 15/X      no changes are made today.      In the initial consult, we discussed her type 2 diabetes, AMA, and chronic hypertension. I also noted vitamin-D deficiency, a diminished TSH,  and mild iron deficiency anemia. (see prior note and recommendations) I did not mention her obesity. She has a BMI of 55 kilograms/meter  squared. Most of the recommendations for that issue are covered in the recommendations for her chronic hypertension and diabetes and AMA.  I would note that the combination of these for pregnancy complications would indicate that she is delivered by 38 weeks' or earlier if  indicated.      RECOMMENDATIONS:    1. No changes in insulin therapy  2. Recommend hemoglobin A1c, free T3, free T4, 24 hr urine. please order at OB office.  3. Fetal echo today  4. Patient needs replacement ergocalciferol  5. Recommend repeat iron studies if indicated  6. Delivery by 38 weeks'    See prior recommendations        I spent 25 min in patient care management and consultation with greater than 50%  face-to-face.      Impression  =========    The fetal anatomic survey was not completed today. Cardiac views are not well seen, nor are facial views. Fetal echo today. No fetal structural  abnormalities were noted.    Interval fetal growth has been normal, and the AFV is normal.        Recommendation  ==============    See Above

## 2019-11-19 ENCOUNTER — LAB VISIT (OUTPATIENT)
Dept: LAB | Facility: OTHER | Age: 39
End: 2019-11-19
Attending: OBSTETRICS & GYNECOLOGY
Payer: MEDICAID

## 2019-11-19 ENCOUNTER — PATIENT MESSAGE (OUTPATIENT)
Dept: OBSTETRICS AND GYNECOLOGY | Facility: CLINIC | Age: 39
End: 2019-11-19

## 2019-11-19 ENCOUNTER — CLINICAL SUPPORT (OUTPATIENT)
Dept: OBSTETRICS AND GYNECOLOGY | Facility: CLINIC | Age: 39
End: 2019-11-19
Payer: MEDICAID

## 2019-11-19 DIAGNOSIS — R94.6 ABNORMAL THYROID FUNCTION TEST: ICD-10-CM

## 2019-11-19 DIAGNOSIS — D50.9 IRON DEFICIENCY ANEMIA, UNSPECIFIED IRON DEFICIENCY ANEMIA TYPE: ICD-10-CM

## 2019-11-19 DIAGNOSIS — Z23 NEED FOR VACCINATION: ICD-10-CM

## 2019-11-19 LAB
T4 FREE SERPL-MCNC: 0.71 NG/DL (ref 0.71–1.51)
TSH SERPL DL<=0.005 MIU/L-ACNC: 0.77 UIU/ML (ref 0.4–4)

## 2019-11-19 PROCEDURE — 83540 ASSAY OF IRON: CPT

## 2019-11-19 PROCEDURE — 84439 ASSAY OF FREE THYROXINE: CPT

## 2019-11-19 PROCEDURE — 36415 COLL VENOUS BLD VENIPUNCTURE: CPT

## 2019-11-19 PROCEDURE — 84443 ASSAY THYROID STIM HORMONE: CPT

## 2019-11-19 PROCEDURE — 83036 HEMOGLOBIN GLYCOSYLATED A1C: CPT

## 2019-11-19 PROCEDURE — 99999 PR PBB SHADOW E&M-EST. PATIENT-LVL I: CPT | Mod: PBBFAC,,,

## 2019-11-19 PROCEDURE — 99999 PR PBB SHADOW E&M-EST. PATIENT-LVL I: ICD-10-PCS | Mod: PBBFAC,,,

## 2019-11-19 PROCEDURE — 82728 ASSAY OF FERRITIN: CPT

## 2019-11-19 PROCEDURE — 90471 IMMUNIZATION ADMIN: CPT | Mod: PBBFAC

## 2019-11-19 PROCEDURE — 99211 OFF/OP EST MAY X REQ PHY/QHP: CPT | Mod: PBBFAC,25

## 2019-11-19 PROCEDURE — 84481 FREE ASSAY (FT-3): CPT

## 2019-11-19 NOTE — PROGRESS NOTES
Here for TDAP injection. Patient without complaint of pain at this time ,Injection given. Tolerated well. No pain noted after injection.  Advised to wait in lobby 15 minutes and report any adverse reactions.     Site: [facility]    Baby Friendly Handout Given to Patient

## 2019-11-20 ENCOUNTER — TELEPHONE (OUTPATIENT)
Dept: PRIMARY CARE CLINIC | Facility: CLINIC | Age: 39
End: 2019-11-20

## 2019-11-20 DIAGNOSIS — Z79.4 TYPE 2 DIABETES MELLITUS WITH HYPERGLYCEMIA, WITH LONG-TERM CURRENT USE OF INSULIN: Primary | ICD-10-CM

## 2019-11-20 DIAGNOSIS — E11.65 TYPE 2 DIABETES MELLITUS WITH HYPERGLYCEMIA, WITH LONG-TERM CURRENT USE OF INSULIN: Primary | ICD-10-CM

## 2019-11-20 LAB
ESTIMATED AVG GLUCOSE: 220 MG/DL (ref 68–131)
FERRITIN SERPL-MCNC: 67 NG/ML (ref 20–300)
HBA1C MFR BLD HPLC: 9.3 % (ref 4–5.6)
IRON SERPL-MCNC: 36 UG/DL (ref 30–160)
SATURATED IRON: 9 % (ref 20–50)
T3FREE SERPL-MCNC: 2.7 PG/ML (ref 2.3–4.2)
TOTAL IRON BINDING CAPACITY: 394 UG/DL (ref 250–450)
TRANSFERRIN SERPL-MCNC: 266 MG/DL (ref 200–375)

## 2019-11-20 NOTE — TELEPHONE ENCOUNTER
----- Message from Ambar Pool sent at 2019 11:52 AM CST -----  Pt referral for diabetes management . Need another referral to link order to schedule appointment.

## 2019-12-04 ENCOUNTER — PATIENT MESSAGE (OUTPATIENT)
Dept: OBSTETRICS AND GYNECOLOGY | Facility: CLINIC | Age: 39
End: 2019-12-04

## 2019-12-10 ENCOUNTER — PATIENT OUTREACH (OUTPATIENT)
Dept: ADMINISTRATIVE | Facility: OTHER | Age: 39
End: 2019-12-10

## 2019-12-12 ENCOUNTER — ROUTINE PRENATAL (OUTPATIENT)
Dept: OBSTETRICS AND GYNECOLOGY | Facility: CLINIC | Age: 39
End: 2019-12-12
Payer: MEDICAID

## 2019-12-12 ENCOUNTER — INITIAL CONSULT (OUTPATIENT)
Dept: MATERNAL FETAL MEDICINE | Facility: CLINIC | Age: 39
End: 2019-12-12
Payer: MEDICAID

## 2019-12-12 VITALS — WEIGHT: 293 LBS | DIASTOLIC BLOOD PRESSURE: 86 MMHG | SYSTOLIC BLOOD PRESSURE: 128 MMHG | BODY MASS INDEX: 57.57 KG/M2

## 2019-12-12 VITALS
WEIGHT: 291.88 LBS | BODY MASS INDEX: 57.01 KG/M2 | DIASTOLIC BLOOD PRESSURE: 82 MMHG | SYSTOLIC BLOOD PRESSURE: 132 MMHG

## 2019-12-12 DIAGNOSIS — E55.9 VITAMIN D INSUFFICIENCY: ICD-10-CM

## 2019-12-12 DIAGNOSIS — O24.113 PREGNANCY WITH TYPE 2 DIABETES MELLITUS IN THIRD TRIMESTER: Primary | ICD-10-CM

## 2019-12-12 DIAGNOSIS — E55.9 VITAMIN D DEFICIENCY: ICD-10-CM

## 2019-12-12 DIAGNOSIS — Z36.89 ENCOUNTER FOR ULTRASOUND TO ASSESS FETAL GROWTH: ICD-10-CM

## 2019-12-12 DIAGNOSIS — O09.93 SUPERVISION OF HIGH RISK PREGNANCY IN THIRD TRIMESTER: Primary | ICD-10-CM

## 2019-12-12 PROCEDURE — 76816 OB US FOLLOW-UP PER FETUS: CPT | Mod: 26,S$PBB,, | Performed by: PEDIATRICS

## 2019-12-12 PROCEDURE — 99213 PR OFFICE/OUTPT VISIT, EST, LEVL III, 20-29 MIN: ICD-10-PCS | Mod: TH,S$PBB,, | Performed by: OBSTETRICS & GYNECOLOGY

## 2019-12-12 PROCEDURE — 76816 OB US FOLLOW-UP PER FETUS: CPT | Mod: PBBFAC | Performed by: PEDIATRICS

## 2019-12-12 PROCEDURE — 99999 PR PBB SHADOW E&M-EST. PATIENT-LVL II: CPT | Mod: PBBFAC,,, | Performed by: OBSTETRICS & GYNECOLOGY

## 2019-12-12 PROCEDURE — 99213 OFFICE O/P EST LOW 20 MIN: CPT | Mod: TH,S$PBB,, | Performed by: OBSTETRICS & GYNECOLOGY

## 2019-12-12 PROCEDURE — 99999 PR PBB SHADOW E&M-EST. PATIENT-LVL III: ICD-10-PCS | Mod: PBBFAC,,, | Performed by: PEDIATRICS

## 2019-12-12 PROCEDURE — 99213 OFFICE O/P EST LOW 20 MIN: CPT | Mod: PBBFAC,27,TH,25 | Performed by: PEDIATRICS

## 2019-12-12 PROCEDURE — 99999 PR PBB SHADOW E&M-EST. PATIENT-LVL II: ICD-10-PCS | Mod: PBBFAC,,, | Performed by: OBSTETRICS & GYNECOLOGY

## 2019-12-12 PROCEDURE — 99214 PR OFFICE/OUTPT VISIT, EST, LEVL IV, 30-39 MIN: ICD-10-PCS | Mod: 25,S$PBB,TH, | Performed by: PEDIATRICS

## 2019-12-12 PROCEDURE — 99212 OFFICE O/P EST SF 10 MIN: CPT | Mod: PBBFAC,TH | Performed by: OBSTETRICS & GYNECOLOGY

## 2019-12-12 PROCEDURE — 99999 PR PBB SHADOW E&M-EST. PATIENT-LVL III: CPT | Mod: PBBFAC,,, | Performed by: PEDIATRICS

## 2019-12-12 PROCEDURE — 99214 OFFICE O/P EST MOD 30 MIN: CPT | Mod: 25,S$PBB,TH, | Performed by: PEDIATRICS

## 2019-12-12 PROCEDURE — 76816 PR  US,PREGNANT UTERUS,F/U,TRANSABD APP: ICD-10-PCS | Mod: 26,S$PBB,, | Performed by: PEDIATRICS

## 2019-12-12 RX ORDER — INSULIN LISPRO 100 [IU]/ML
INJECTION, SOLUTION INTRAVENOUS; SUBCUTANEOUS
Qty: 10 ML | Refills: 3 | Status: ON HOLD | OUTPATIENT
Start: 2019-12-12 | End: 2020-01-17 | Stop reason: SDUPTHER

## 2019-12-12 RX ORDER — ERGOCALCIFEROL 1.25 MG/1
50000 CAPSULE ORAL
Qty: 12 CAPSULE | Refills: 0 | Status: SHIPPED | OUTPATIENT
Start: 2019-12-12 | End: 2020-03-05

## 2019-12-12 RX ORDER — INSULIN LISPRO 100 [IU]/ML
INJECTION, SOLUTION INTRAVENOUS; SUBCUTANEOUS
Qty: 10 ML | Refills: 3 | Status: CANCELLED | OUTPATIENT
Start: 2019-12-12 | End: 2020-12-11

## 2019-12-12 NOTE — LETTER
December 18, 2019      Mariela Valenzuela MD  [address] [facility] [address]           [facility] [address]  [address]  [facility] [address]  Phone: [phone]          Patient: Wang Warren   MR Number: [id number]   YOB: 1980   Date of Visit: 12/12/2019       Dear Dr. Mariela Valenzuela:    Thank you for referring Wang Warren to me for evaluation. Attached you will find relevant portions of my assessment and plan of care.    If you have questions, please do not hesitate to call me. I look forward to following Wang Warren along with you.    Sincerely,    Ayaka Mclaughlin MD    Enclosure  CC:  No Recipients    If you would like to receive this communication electronically, please contact [email or web address] or [phone] to request more information on [employer] Link access.    For providers and/or their staff who would like to refer a patient to [facility], please contact us through our one-stop-shop provider referral line, [facility], at [phone].    If you feel you have received this communication in error or would no longer like to receive these types of communications, please e-mail [email or web address]

## 2019-12-16 ENCOUNTER — OFFICE VISIT (OUTPATIENT)
Dept: OPTOMETRY | Facility: CLINIC | Age: 39
End: 2019-12-16
Payer: MEDICAID

## 2019-12-16 ENCOUNTER — PES CALL (OUTPATIENT)
Dept: ADMINISTRATIVE | Facility: CLINIC | Age: 39
End: 2019-12-16

## 2019-12-16 ENCOUNTER — PATIENT OUTREACH (OUTPATIENT)
Dept: ADMINISTRATIVE | Facility: OTHER | Age: 39
End: 2019-12-16

## 2019-12-16 DIAGNOSIS — H52.13 MYOPIA WITH ASTIGMATISM, BILATERAL: ICD-10-CM

## 2019-12-16 DIAGNOSIS — H52.203 MYOPIA WITH ASTIGMATISM, BILATERAL: ICD-10-CM

## 2019-12-16 DIAGNOSIS — H53.9 VISUAL DISTURBANCES: Primary | ICD-10-CM

## 2019-12-16 DIAGNOSIS — H53.022 REFRACTIVE AMBLYOPIA OF LEFT EYE: ICD-10-CM

## 2019-12-16 PROCEDURE — 99999 PR PBB SHADOW E&M-EST. PATIENT-LVL III: ICD-10-PCS | Mod: PBBFAC,,, | Performed by: OPTOMETRIST

## 2019-12-16 PROCEDURE — 99213 OFFICE O/P EST LOW 20 MIN: CPT | Mod: PBBFAC | Performed by: OPTOMETRIST

## 2019-12-16 PROCEDURE — 92015 PR REFRACTION: ICD-10-PCS | Mod: ,,, | Performed by: OPTOMETRIST

## 2019-12-16 PROCEDURE — 92004 COMPRE OPH EXAM NEW PT 1/>: CPT | Mod: S$PBB,,, | Performed by: OPTOMETRIST

## 2019-12-16 PROCEDURE — 99999 PR PBB SHADOW E&M-EST. PATIENT-LVL III: CPT | Mod: PBBFAC,,, | Performed by: OPTOMETRIST

## 2019-12-16 PROCEDURE — 92015 DETERMINE REFRACTIVE STATE: CPT | Mod: ,,, | Performed by: OPTOMETRIST

## 2019-12-16 PROCEDURE — 92004 PR EYE EXAM, NEW PATIENT,COMPREHESV: ICD-10-PCS | Mod: S$PBB,,, | Performed by: OPTOMETRIST

## 2019-12-16 NOTE — PROGRESS NOTES
HPI     ADONIS:1 yr  Glasses? Yes   Contacts? no  H/o eye surgery, injections or laser: no  H/o eye injury: no  Known eye conditions? no  Family h/o eye conditions? no  Eye gtts?no    (-) Flashes (-) Floaters (-) Mucous   (-) Tearing (-) Itching (-) Burning   (-) Headaches (-) Eye Pain/discomfort (-) Irritation   (-) Redness (-) Double vision (-) Blurry vision    Diabetic? (+)  A1c?  (Hemoglobin A1C       Date                     Value               Ref Range             Status                12/12/2019               8.1 (H)             4.0 - 5.6 %           Final              Comment:    ADA Screening Guidelines:  5.7-6.4%  Consistent with   prediabetes  >or=6.5%  Consistent with diabetes  High levels of fetal   hemoglobin interfere with the HbA1C  assay. Heterozygous hemoglobin   variants (HbS, HgC, etc)do  not significantly interfere with this assay.     However, presence of multiple variants may affect accuracy.         11/19/2019               9.3 (H)             4.0 - 5.6 %           Final              Comment:    ADA Screening Guidelines:  5.7-6.4%  Consistent with   prediabetes  >or=6.5%  Consistent with diabetes  High levels of fetal   hemoglobin interfere with the HbA1C  assay. Heterozygous hemoglobin   variants (HbS, HgC, etc)do  not significantly interfere with this assay.     However, presence of multiple variants may affect accuracy.         10/31/2019               11.4 (H)            4.0 - 5.6 %           Final              Comment:    ADA Screening Guidelines:  5.7-6.4%  Consistent with   prediabetes  >or=6.5%  Consistent with diabetes  High levels of fetal   hemoglobin interfere with the HbA1C  assay. Heterozygous hemoglobin   variants (HbS, HgC, etc)do  not significantly interfere with this assay.     However, presence of multiple variants may affect accuracy.    ----------)        Last edited by Marycarmen Johnson on 12/16/2019  2:35 PM. (History)            Assessment /Plan     For exam results, see  Encounter Report.    Visual disturbances    Myopia with astigmatism, bilateral    Refractive amblyopia of left eye      SRx released to patient. Patient educated on lens options. No DFE today due to pregnancy. Rec annual DFe. Will resume post-delivery. RTC 1 year for routine exam.

## 2019-12-16 NOTE — LETTER
December 16, 2019      Mariela Valenzuela MD  [address] [facility] [address]           [facility] [address]""[place] [facility] [address]  Phone: [phone]  Fax: [phone]          Patient: Wang Warren   MR Number: [id number]   YOB: 1980   Date of Visit: 12/16/2019       Dear Dr. Mariela Valenzuela:    Thank you for referring Wang Warren to me for evaluation. Attached you will find relevant portions of my assessment and plan of care.    If you have questions, please do not hesitate to call me. I look forward to following Wang Warren along with you.    Sincerely,    Latoya Saunders, OD    Enclosure  CC:  No Recipients    If you would like to receive this communication electronically, please contact [email or web address] or [phone] to request more information on [employer] Link access.    For providers and/or their staff who would like to refer a patient to [facility], please contact us through our one-stop-shop provider referral line, [facility], at [phone].    If you feel you have received this communication in error or would no longer like to receive these types of communications, please e-mail [email or web address]

## 2019-12-19 NOTE — PROGRESS NOTES
Indication  ========    F/U Consultation: Follow-up evaluation of anatomy. Evaluation of fetal growth and BS check    History  ======    General History  Height 157 cm  Height (ft) 5 ft  Height (in) 2 in  Other: OB: MAHENDRA GLEASON  Previous Outcomes   1  Para 0  Preg. no. 1  Outcome: live birth  Gest. age 38 w + 0 d  Gender: female  Details: c/s, 6lbs 3 oz, fetal intolerance  Risk Factors  History risk factors: Obesity  History risk factors: AMA  History risk factors: Diabetes mellitus  History risk factors: Personal history of hypertension  Details: iron defficiency    Maternal Assessment  =================    Height 157 cm  Height (ft) 5 ft  Height (in) 2 in  Weight 132 kg  Weight (lb) 291 lb  BMI 53.23 kg/m²  BP syst 132 mmHg  BP diast 82 mmHg    Fetal Growth Overview  =================    Exam date        GA              BPD (mm)         HC (mm)        AC (mm)        FL (mm)         HL (mm)        EFW (g)  2019          24w 1d        59.0                  226.5             189.1             43.1              39.7               645    38%  2019        29w 2d        69.5                  262.3             240.2             54.4                                   1,230    27%      Method  ======    2D Color Doppler, Voluson E10, Transabdominal ultrasound examination. View: Suboptimal view: limited by maternal body habitus    Pregnancy  =========    Peres pregnancy. Number of fetuses: 1    Dating  ======    Ultrasound examination on: 2019  GA by U/S based upon: AC, BPD, Femur, HC  GA by U/S 28 w + 3 d  JUAN MIGUEL by U/S: 3/2/2020  Assigned: based on ultrasound (AC, BPD, Femur, HC), selected on 2019  Assigned GA 29 w + 2 d  Assigned JUAN MIGUEL: 2020  Pregnancy length 280 d    General Evaluation  ==============    Cardiac activity present.  bpm.  Fetal movements visualized.  Presentation cephalic.  Placenta Fundal.  Umbilical cord 3 vessel cord.  Amniotic fluid Amount of AF: normal  amount. MVP 5.5 cm.    Fetal Biometry  ============    Standard  BPD 69.5 mm  28w 0d                Hadlock    OFD 93.5 mm  30w 1d                Joseph    .3 mm  28w 4d                Hadlock    .2 mm  28w 2d                Hadlock    Femur 54.4 mm  28w 5d                Hadlock    HC / AC 1.09    EFW 1,230 g          27%        [place]    EFW (lb) 2 lb  EFW (oz) 11 oz  EFW by: Hadlock (BPD-HC-AC-FL)  Head / Face / Neck  Cephalic index 0.74    Extremities / Bony Struc  FL / BPD 0.78    FL / AC 0.23    Other Structures   bpm    Fetal Anatomy  ============    Cranium: normal  Midline falx: suboptimal  Lips: normal  Nose: normal  4-chamber view: visualized  RVOT view: visualized  LVOT view: visualized  Heart / Thorax  4-chamber view: septum suboptimal  Aortic arch view: visualized  Ductal arch view: visualized  SVC: documented previously  IVC: documented previously  3-vessel view: suboptimal  3-vessel-trachea view: suboptimal  Heart / Thorax: seen by pediatric cardiology  Stomach: normal  Kidneys: normal  Bladder: normal  Abdomen  Rt renal artery: visualized  Lt renal artery: visualized  Sacral spine: normal  Gender: male  Wants to know gender: yes            Consultation  ==========      Consultation  ==========    Ms. Warren is seen in follow-up for completion of anatomy and to review blood sugars. She is 29 weeks and 2 days today.    Weight: 132.4 kg B/P: 132/82 FHR: 140 beats per minute    Blood sugar logs are not available. She has been out of Humalog for a few days. She is picking it up again today. Patient denies any signs or  symptoms of hypoglycemia or insulin reactions. Her last HgbA1c was 9.3%. She remains on Procardia for hypertension in addition to insulin  and Metformin 500 mg po daily.    Fetal Echo was performed on 11/13 and was read as normal.        Current insulin doses:    Lantus/Humalog (units):    X/ 12, X/ 8, X/ 10, 15/X      No changes are made today.      See prior note  and recommendations. We have discussed her type 2 diabetes, AMA, chronic hypertension, vitamin-D deficiency, a diminished  TSH, mild iron deficiency anemia, and obesity.        RECOMMENDATIONS:    1. Repeat hemoglobin A1c  2. return in 3 weeks for initiation of twice weekly testing -BPP and NST  3. growth ultrasound in 4 weeks  4. no change in insulin dosage  5. she will send blood sugars in portal        I spent 25 min in patient care management and consultation with greater than 50% face-to-face.          Impression  =========    Fetal size is AGA with the EFW at the 27th percentile.  Normal repeat limited fetal anatomic survey; targeted is still incomplete.    AFV is normal.      Recommendation  ==============    See Above.

## 2019-12-28 ENCOUNTER — PATIENT OUTREACH (OUTPATIENT)
Dept: ADMINISTRATIVE | Facility: OTHER | Age: 39
End: 2019-12-28

## 2020-01-02 ENCOUNTER — HOSPITAL ENCOUNTER (OUTPATIENT)
Dept: DIABETES | Facility: OTHER | Age: 40
Discharge: HOME | End: 2020-01-02
Attending: INTERNAL MEDICINE
Payer: MEDICAID

## 2020-01-02 ENCOUNTER — OFFICE VISIT (OUTPATIENT)
Dept: MATERNAL FETAL MEDICINE | Facility: CLINIC | Age: 40
End: 2020-01-02
Attending: OBSTETRICS & GYNECOLOGY
Payer: MEDICAID

## 2020-01-02 ENCOUNTER — ROUTINE PRENATAL (OUTPATIENT)
Dept: OBSTETRICS AND GYNECOLOGY | Facility: CLINIC | Age: 40
End: 2020-01-02
Payer: MEDICAID

## 2020-01-02 ENCOUNTER — TELEPHONE (OUTPATIENT)
Dept: OBSTETRICS AND GYNECOLOGY | Facility: CLINIC | Age: 40
End: 2020-01-02

## 2020-01-02 ENCOUNTER — PROCEDURE VISIT (OUTPATIENT)
Dept: MATERNAL FETAL MEDICINE | Facility: CLINIC | Age: 40
End: 2020-01-02
Payer: MEDICAID

## 2020-01-02 VITALS — SYSTOLIC BLOOD PRESSURE: 118 MMHG | DIASTOLIC BLOOD PRESSURE: 76 MMHG | WEIGHT: 293 LBS | BODY MASS INDEX: 55.24 KG/M2

## 2020-01-02 VITALS — BODY MASS INDEX: 53.92 KG/M2 | WEIGHT: 293 LBS | HEIGHT: 62 IN

## 2020-01-02 VITALS — DIASTOLIC BLOOD PRESSURE: 84 MMHG | WEIGHT: 293 LBS | SYSTOLIC BLOOD PRESSURE: 122 MMHG | BODY MASS INDEX: 58.99 KG/M2

## 2020-01-02 DIAGNOSIS — Z36.89 ENCOUNTER FOR ULTRASOUND TO ASSESS FETAL GROWTH: ICD-10-CM

## 2020-01-02 DIAGNOSIS — O24.113 PREGNANCY WITH TYPE 2 DIABETES MELLITUS IN THIRD TRIMESTER: ICD-10-CM

## 2020-01-02 DIAGNOSIS — E11.65 TYPE 2 DIABETES MELLITUS WITH HYPERGLYCEMIA, WITH LONG-TERM CURRENT USE OF INSULIN: ICD-10-CM

## 2020-01-02 DIAGNOSIS — O09.92 SUPERVISION OF HIGH RISK PREGNANCY IN SECOND TRIMESTER: ICD-10-CM

## 2020-01-02 DIAGNOSIS — O09.93 SUPERVISION OF HIGH RISK PREGNANCY IN THIRD TRIMESTER: Primary | ICD-10-CM

## 2020-01-02 DIAGNOSIS — E11.9 CONTROLLED TYPE 2 DIABETES MELLITUS WITHOUT COMPLICATION, UNSPECIFIED WHETHER LONG TERM INSULIN USE: Primary | ICD-10-CM

## 2020-01-02 DIAGNOSIS — Z79.4 TYPE 2 DIABETES MELLITUS WITH HYPERGLYCEMIA, WITH LONG-TERM CURRENT USE OF INSULIN: ICD-10-CM

## 2020-01-02 DIAGNOSIS — O24.113 PREGNANCY WITH TYPE 2 DIABETES MELLITUS IN THIRD TRIMESTER: Primary | ICD-10-CM

## 2020-01-02 PROCEDURE — 76819 FETAL BIOPHYS PROFIL W/O NST: CPT | Mod: PBBFAC | Performed by: OBSTETRICS & GYNECOLOGY

## 2020-01-02 PROCEDURE — G0108 DIAB MANAGE TRN  PER INDIV: HCPCS

## 2020-01-02 PROCEDURE — 99999 PR PBB SHADOW E&M-EST. PATIENT-LVL II: ICD-10-PCS | Mod: PBBFAC,,, | Performed by: OBSTETRICS & GYNECOLOGY

## 2020-01-02 PROCEDURE — 76819 FETAL BIOPHYS PROFIL W/O NST: CPT | Mod: 26,S$PBB,, | Performed by: OBSTETRICS & GYNECOLOGY

## 2020-01-02 PROCEDURE — 99213 OFFICE O/P EST LOW 20 MIN: CPT | Mod: TH,S$PBB,, | Performed by: OBSTETRICS & GYNECOLOGY

## 2020-01-02 PROCEDURE — 99999 PR PBB SHADOW E&M-EST. PATIENT-LVL II: CPT | Mod: PBBFAC,,, | Performed by: OBSTETRICS & GYNECOLOGY

## 2020-01-02 PROCEDURE — 76819 PR US, OB, FETAL BIOPHYSICAL, W/O NST: ICD-10-PCS | Mod: 26,S$PBB,, | Performed by: OBSTETRICS & GYNECOLOGY

## 2020-01-02 PROCEDURE — 99213 PR OFFICE/OUTPT VISIT, EST, LEVL III, 20-29 MIN: ICD-10-PCS | Mod: TH,S$PBB,, | Performed by: OBSTETRICS & GYNECOLOGY

## 2020-01-02 PROCEDURE — 99212 OFFICE O/P EST SF 10 MIN: CPT | Mod: PBBFAC,TH | Performed by: OBSTETRICS & GYNECOLOGY

## 2020-01-02 PROCEDURE — 99212 OFFICE O/P EST SF 10 MIN: CPT | Mod: PBBFAC,27,TH,25 | Performed by: OBSTETRICS & GYNECOLOGY

## 2020-01-02 PROCEDURE — 99213 PR OFFICE/OUTPT VISIT, EST, LEVL III, 20-29 MIN: ICD-10-PCS | Mod: S$PBB,TH,25, | Performed by: OBSTETRICS & GYNECOLOGY

## 2020-01-02 PROCEDURE — 99213 OFFICE O/P EST LOW 20 MIN: CPT | Mod: S$PBB,TH,25, | Performed by: OBSTETRICS & GYNECOLOGY

## 2020-01-02 NOTE — PROGRESS NOTES
Good FM. Denies VB, LOF, CTX. Labor, ROM and bleeding precautions. BS well controlled. PNT scheduled. F/U 2 weeks.

## 2020-01-02 NOTE — LETTER
January 15, 2020      Mariela Valenzuela MD  [address] [facility] [address]           [facility] [address]  [address] [address]  Phone: [phone]          Patient: Wang Warren   MR Number: [id number]   YOB: 1980   Date of Visit: 1/2/2020       Dear Dr. Mariela Valeznuela:    Thank you for referring Wang Warren to me for evaluation. Attached you will find relevant portions of my assessment and plan of care.    If you have questions, please do not hesitate to call me. I look forward to following Wang Warren along with you.    Sincerely,    Bruno Sabillon MD    Enclosure  CC:  No Recipients    If you would like to receive this communication electronically, please contact [email or web address] or [phone] to request more information on [email or web address] Link access.    For providers and/or their staff who would like to refer a patient to [facility], please contact us through our one-stop-shop provider referral line, [facility], at [phone].    If you feel you have received this communication in error or would no longer like to receive these types of communications, please e-mail [email or web address]

## 2020-01-02 NOTE — TELEPHONE ENCOUNTER
----- Message from Beck So sent at 1/2/2020  3:57 PM CST -----  Contact: vipin  Pt asked can you fit her on the schedule on 1/16/19.  for 2 week ob check     Pt call back [phone]

## 2020-01-02 NOTE — PROGRESS NOTES
Follow up consultation regarding diabetes in pregnancy provided today.  Risks of uncontrolled diabetes in pregnancy reviewed once again.  Blood glucose measurements assessed. She is under excellent control.   I did not adjust her medications today.      Results of today's ultrasound discussed with patient.  I spent 15 minutes with patient today over half of which was in consultation separate of her ultrasound examination.     Referring physician to receive copy of today's consultation via electronic medical record.

## 2020-01-03 NOTE — PROGRESS NOTES
Diabetes Education  Author: Cayla Delgadillo RN  Date: 1/3/2020    Diabetes Care Management Summary  Diabetes Education Record Assessment/Progress: Initial         Diabetes Type  Diabetes Type : Type II(while pregnant)    Diabetes History  Current Treatment: Oral Medication, Insulin  Reviewed Problem List with Patient: Yes    Health Maintenance was reviewed today with patient. Discussed with patient importance of routine eye exams, foot exams/foot care, blood work (i.e.: A1c, microalbumin, and lipid), dental visits, yearly flu vaccine, and pneumonia vaccine as indicated by PCP. Patient verbalized understanding.     Health Maintenance Topics with due status: Not Due       Topic Last Completion Date    Pap Smear with HPV Cotest 05/24/2019    Foot Exam 07/31/2019    Lipid Panel 08/08/2019    Urine Microalbumin 10/31/2019    TETANUS VACCINE 11/19/2019    Hemoglobin A1c 12/12/2019    Eye Exam 12/16/2019     There are no preventive care reminders to display for this patient.    Nutrition  What type of beverages do you drink?: water  Meal Plan 24 Hour Recall - Breakfast: oatmeal, boiled egg - water   Meal Plan 24 Hour Recall - Lunch: turkey sandwich on wheat bread, fruit bowl - water   Meal Plan 24 Hour Recall - Dinner: cabbage, smoked sausage - water   Meal Plan 24 Hour Recall - Snack: no snacks     Monitoring   Self Monitoring : brought logs to [facility] - none higher than 115   Blood Glucose Logs: Yes  Do you use a personal continuous glucose monitor?: No  In the last month, how often have you had a low blood sugar reaction?: never    Exercise   Frequency: Never    Current Diabetes Treatment   Current Treatment: Oral Medication, Insulin    Social History  Preferred Learning Method: Web Based, Reading Materials  Primary Support: Spouse(signifiant)  Educational Level: Some College  Occupation: drive touring buses   Smoking Status: Never a Smoker  Alcohol Use: Never                                Barriers to Change  Barriers to  Change: None  Learning Challenges : None    Readiness to Learn   Readiness to Learn : Eager    Cultural Influences  Cultural Influences: No    Diabetes Education Assessment/Progress  Diabetes Disease Process (diabetes disease process and treatment options): Discussion, Requires Assistance Family/SO, Individual Session, Written Materials Provided(reviewed increased insulin resistance issues w/ DM in pregnancy and importance of lifestyle changes + meds to control BG)  Nutrition (Incorporating nutritional management into one's lifestyle): Discussion, Requires Assistance Family/SO, Individual Session, Written Materials Provided(reviewed the plate method, importance of increasing veggies, choosing lean proteins + healthy fats to keep BG well controlled)  Physical Activity (incorporating physical activity into one's lifestyle): Discussion, Requires Assistance Family/SO, Individual Session(reviewed how activity reduces insulin resistance and can help reduce insulin needs - reviewed ADA recs for physical activity)  Medications (states correct name, dose, onset, peak, duration, side effects & timing of meds): Discussion, Individual Session, Comprehends Key Points(pt compliant w/ insuli - monitoring by MFM - no adjustments today, BG well controlled)  Monitoring (monitoring blood glucose/other parameters & using results): Discussion, Comprehends Key Points, Individual Session, Written Materials Provided(pt SMBG 4x/day and bringing logs to MFM to monitor)  Acute Complications (preventing, detecting, and treating acute complications): Discussion, Requires Assistance Family/SO, Individual Session(reviewed s/s of low BG and how to prevent and treat in pregnancy)  Chronic Complications (preventing, detecting, and treating chronic complications): Discussion, Requires Assistance Family/SO, Individual Session, Written Materials Provided(reviewed A1C history and importance of keeping A1C well controlled to reduce risk of complications  r/t DM)  Clinical (diabetes, other pertinent medical history, and relevant comorbidities reviewed during visit): Not Covered/Deferred  Cognitive (knowledge of self-management skills, functional health literacy): Not Covered/Deferred  Psychosocial (emotional response to diabetes): Not Covered/Deferred  Diabetes Distress and Support Systems: Discussion, Requires Assistance Family/SO, Individual Session(pt significant other present during appointment, also has DM and wants to work on lifestyle changes together)  Behavioral (readiness for change, lifestyle practices, self-care behaviors): Discussion, Requires Assistance Family/SO, Individual Session(pt very motivated to maintain progress w/ BG control)    Goals  Patient has selected/evaluated goals during today's session: Yes, selected  Physical Activity: Set(pt will increase activity as tolerated to 30min 4 days per week)  Start Date: 01/02/20         Diabetes Care Plan/Intervention  Education Plan/Intervention: Individual Follow-Up DSMT    Diabetes Meal Plan  Restrictions: Restricted Carbohydrate  Carbohydrate Per Meal: 30-45g    Today's Self-Management Care Plan was developed with the patient's input and is based on barriers identified during today's assessment.    The long and short-term goals in the care plan were written with the patient/caregiver's input. The patient has agreed to work toward these goals to improve her overall diabetes control.      The patient received a copy of today's self-management plan and verbalized understanding of the care plan, goals, and all of today's instructions.      The patient was encouraged to communicate with her physician and care team regarding her condition(s) and treatment.  I provided the patient with my contact information today and encouraged her to contact me via phone or patient portal as needed.     Education Units of Time   Time Spent: 60 min

## 2020-01-06 ENCOUNTER — HOSPITAL ENCOUNTER (OUTPATIENT)
Dept: PERINATAL CARE | Facility: OTHER | Age: 40
Discharge: HOME | End: 2020-01-06
Attending: OBSTETRICS & GYNECOLOGY
Payer: MEDICAID

## 2020-01-06 DIAGNOSIS — O24.113 PREGNANCY WITH TYPE 2 DIABETES MELLITUS IN THIRD TRIMESTER: ICD-10-CM

## 2020-01-06 PROCEDURE — 76818 FETAL BIOPHYS PROFILE W/NST: CPT

## 2020-01-06 PROCEDURE — 76818 PR US, OB, FETAL BIOPHYSICAL, W/NST: ICD-10-PCS | Mod: 26,,, | Performed by: OBSTETRICS & GYNECOLOGY

## 2020-01-06 PROCEDURE — 76818 FETAL BIOPHYS PROFILE W/NST: CPT | Mod: 26,,, | Performed by: OBSTETRICS & GYNECOLOGY

## 2020-01-09 ENCOUNTER — HOSPITAL ENCOUNTER (EMERGENCY)
Facility: OTHER | Age: 40
Discharge: HOME | End: 2020-01-09
Attending: OBSTETRICS & GYNECOLOGY
Payer: MEDICAID

## 2020-01-09 ENCOUNTER — INITIAL CONSULT (OUTPATIENT)
Dept: MATERNAL FETAL MEDICINE | Facility: CLINIC | Age: 40
End: 2020-01-09
Payer: MEDICAID

## 2020-01-09 ENCOUNTER — PROCEDURE VISIT (OUTPATIENT)
Dept: MATERNAL FETAL MEDICINE | Facility: CLINIC | Age: 40
End: 2020-01-09
Payer: MEDICAID

## 2020-01-09 VITALS
DIASTOLIC BLOOD PRESSURE: 70 MMHG | TEMPERATURE: 98 F | OXYGEN SATURATION: 100 % | RESPIRATION RATE: 16 BRPM | SYSTOLIC BLOOD PRESSURE: 129 MMHG | HEART RATE: 66 BPM

## 2020-01-09 VITALS — BODY MASS INDEX: 54.27 KG/M2 | SYSTOLIC BLOOD PRESSURE: 138 MMHG | DIASTOLIC BLOOD PRESSURE: 88 MMHG | WEIGHT: 293 LBS

## 2020-01-09 DIAGNOSIS — Z3A.33 33 WEEKS GESTATION OF PREGNANCY: Primary | ICD-10-CM

## 2020-01-09 DIAGNOSIS — I10 BENIGN ESSENTIAL HTN: ICD-10-CM

## 2020-01-09 DIAGNOSIS — O10.919 CHRONIC HYPERTENSION AFFECTING PREGNANCY: ICD-10-CM

## 2020-01-09 DIAGNOSIS — O24.113 PREGNANCY WITH TYPE 2 DIABETES MELLITUS IN THIRD TRIMESTER: ICD-10-CM

## 2020-01-09 DIAGNOSIS — I10 HYPERTENSION, UNSPECIFIED TYPE: ICD-10-CM

## 2020-01-09 DIAGNOSIS — O24.913 DIABETES MELLITUS DURING PREGNANCY IN THIRD TRIMESTER, UNSPECIFIED DIABETES MELLITUS TYPE: ICD-10-CM

## 2020-01-09 LAB
ALBUMIN SERPL BCP-MCNC: 3 G/DL (ref 3.5–5.2)
ALP SERPL-CCNC: 135 U/L (ref 55–135)
ALT SERPL W/O P-5'-P-CCNC: 14 U/L (ref 10–44)
ANION GAP SERPL CALC-SCNC: 9 MMOL/L (ref 8–16)
AST SERPL-CCNC: 14 U/L (ref 10–40)
BASOPHILS # BLD AUTO: 0.04 K/UL (ref 0–0.2)
BASOPHILS NFR BLD: 0.6 % (ref 0–1.9)
BILIRUB SERPL-MCNC: 0.8 MG/DL (ref 0.1–1)
BUN SERPL-MCNC: 12 MG/DL (ref 6–20)
CALCIUM SERPL-MCNC: 8.9 MG/DL (ref 8.7–10.5)
CHLORIDE SERPL-SCNC: 105 MMOL/L (ref 95–110)
CO2 SERPL-SCNC: 21 MMOL/L (ref 23–29)
CREAT SERPL-MCNC: 0.8 MG/DL (ref 0.5–1.4)
CREAT UR-MCNC: 189.1 MG/DL (ref 15–325)
DIFFERENTIAL METHOD: ABNORMAL
EOSINOPHIL # BLD AUTO: 0.1 K/UL (ref 0–0.5)
EOSINOPHIL NFR BLD: 1.4 % (ref 0–8)
ERYTHROCYTE [DISTWIDTH] IN BLOOD BY AUTOMATED COUNT: 13.2 % (ref 11.5–14.5)
EST. GFR  (AFRICAN AMERICAN): >60 ML/MIN/1.73 M^2
EST. GFR  (NON AFRICAN AMERICAN): >60 ML/MIN/1.73 M^2
GLUCOSE SERPL-MCNC: 51 MG/DL (ref 70–110)
HCT VFR BLD AUTO: 32.9 % (ref 37–48.5)
HGB BLD-MCNC: 10.1 G/DL (ref 12–16)
IMM GRANULOCYTES # BLD AUTO: 0.04 K/UL (ref 0–0.04)
IMM GRANULOCYTES NFR BLD AUTO: 0.6 % (ref 0–0.5)
LYMPHOCYTES # BLD AUTO: 1.5 K/UL (ref 1–4.8)
LYMPHOCYTES NFR BLD: 23.9 % (ref 18–48)
MCH RBC QN AUTO: 26.5 PG (ref 27–31)
MCHC RBC AUTO-ENTMCNC: 30.7 G/DL (ref 32–36)
MCV RBC AUTO: 86 FL (ref 82–98)
MONOCYTES # BLD AUTO: 0.5 K/UL (ref 0.3–1)
MONOCYTES NFR BLD: 7.8 % (ref 4–15)
NEUTROPHILS # BLD AUTO: 4.1 K/UL (ref 1.8–7.7)
NEUTROPHILS NFR BLD: 65.7 % (ref 38–73)
NRBC BLD-RTO: 0 /100 WBC
PLATELET # BLD AUTO: 277 K/UL (ref 150–350)
PMV BLD AUTO: 11.6 FL (ref 9.2–12.9)
POTASSIUM SERPL-SCNC: 3.9 MMOL/L (ref 3.5–5.1)
PROT SERPL-MCNC: 7.5 G/DL (ref 6–8.4)
PROT UR-MCNC: 23 MG/DL (ref 0–15)
PROT/CREAT UR: 0.12 MG/G{CREAT} (ref 0–0.2)
RBC # BLD AUTO: 3.81 M/UL (ref 4–5.4)
SODIUM SERPL-SCNC: 135 MMOL/L (ref 136–145)
WBC # BLD AUTO: 6.28 K/UL (ref 3.9–12.7)

## 2020-01-09 PROCEDURE — 99999 PR PBB SHADOW E&M-EST. PATIENT-LVL III: CPT | Mod: PBBFAC,,, | Performed by: OBSTETRICS & GYNECOLOGY

## 2020-01-09 PROCEDURE — 76819 PR US, OB, FETAL BIOPHYSICAL, W/O NST: ICD-10-PCS | Mod: 26,S$PBB,, | Performed by: OBSTETRICS & GYNECOLOGY

## 2020-01-09 PROCEDURE — 84156 ASSAY OF PROTEIN URINE: CPT

## 2020-01-09 PROCEDURE — 99284 EMERGENCY DEPT VISIT MOD MDM: CPT | Mod: 25,27

## 2020-01-09 PROCEDURE — 63600175 PHARM REV CODE 636 W HCPCS: Performed by: STUDENT IN AN ORGANIZED HEALTH CARE EDUCATION/TRAINING PROGRAM

## 2020-01-09 PROCEDURE — 76819 FETAL BIOPHYS PROFIL W/O NST: CPT | Mod: 26,S$PBB,, | Performed by: OBSTETRICS & GYNECOLOGY

## 2020-01-09 PROCEDURE — 76816 PR  US,PREGNANT UTERUS,F/U,TRANSABD APP: ICD-10-PCS | Mod: 26,S$PBB,, | Performed by: OBSTETRICS & GYNECOLOGY

## 2020-01-09 PROCEDURE — 59025 PR FETAL 2N-STRESS TEST: ICD-10-PCS | Mod: 26,,, | Performed by: OBSTETRICS & GYNECOLOGY

## 2020-01-09 PROCEDURE — 99999 PR PBB SHADOW E&M-EST. PATIENT-LVL III: ICD-10-PCS | Mod: PBBFAC,,, | Performed by: OBSTETRICS & GYNECOLOGY

## 2020-01-09 PROCEDURE — 59025 FETAL NON-STRESS TEST: CPT | Mod: 26,,, | Performed by: OBSTETRICS & GYNECOLOGY

## 2020-01-09 PROCEDURE — 99284 EMERGENCY DEPT VISIT MOD MDM: CPT | Mod: 25,,, | Performed by: OBSTETRICS & GYNECOLOGY

## 2020-01-09 PROCEDURE — 80053 COMPREHEN METABOLIC PANEL: CPT

## 2020-01-09 PROCEDURE — 76816 OB US FOLLOW-UP PER FETUS: CPT | Mod: 26,S$PBB,, | Performed by: OBSTETRICS & GYNECOLOGY

## 2020-01-09 PROCEDURE — 59025 FETAL NON-STRESS TEST: CPT

## 2020-01-09 PROCEDURE — 99214 PR OFFICE/OUTPT VISIT, EST, LEVL IV, 30-39 MIN: ICD-10-PCS | Mod: 25,S$PBB,TH, | Performed by: OBSTETRICS & GYNECOLOGY

## 2020-01-09 PROCEDURE — 99214 OFFICE O/P EST MOD 30 MIN: CPT | Mod: 25,S$PBB,TH, | Performed by: OBSTETRICS & GYNECOLOGY

## 2020-01-09 PROCEDURE — 76816 OB US FOLLOW-UP PER FETUS: CPT | Mod: PBBFAC | Performed by: OBSTETRICS & GYNECOLOGY

## 2020-01-09 PROCEDURE — 76819 FETAL BIOPHYS PROFIL W/O NST: CPT | Mod: PBBFAC | Performed by: OBSTETRICS & GYNECOLOGY

## 2020-01-09 PROCEDURE — 96360 HYDRATION IV INFUSION INIT: CPT

## 2020-01-09 PROCEDURE — 85025 COMPLETE CBC W/AUTO DIFF WBC: CPT

## 2020-01-09 PROCEDURE — 99284 PR EMERGENCY DEPT VISIT,LEVEL IV: ICD-10-PCS | Mod: 25,,, | Performed by: OBSTETRICS & GYNECOLOGY

## 2020-01-09 PROCEDURE — 99213 OFFICE O/P EST LOW 20 MIN: CPT | Mod: PBBFAC,TH,25 | Performed by: OBSTETRICS & GYNECOLOGY

## 2020-01-09 RX ADMIN — SODIUM CHLORIDE, SODIUM LACTATE, POTASSIUM CHLORIDE, AND CALCIUM CHLORIDE 1000 ML: .6; .31; .03; .02 INJECTION, SOLUTION INTRAVENOUS at 11:01

## 2020-01-09 NOTE — DISCHARGE INSTRUCTIONS
Call clinic [phone] or L & D after hours at [phone] for vaginal bleeding, leakage of fluids, contractions 4-5 in one hour, decreased fetal movements ( 10 kicks in 2 hours), headache not relieved by Tylenol, blurry vision, or temp of 100.4 or greater.  Begin doing fetal kick counts, at least 10 movements in 2 hours starting at 28 weeks gestation.  Keep next clinic appointment

## 2020-01-09 NOTE — ED NOTES
Pt presents from [facility] clinic with elevated B/P. Pt placed in assessment 2 and dr chisholm notified of arrival

## 2020-01-09 NOTE — ED PROVIDER NOTES
Encounter Date: 2020       History     Chief Complaint   Patient presents with    Hypertension     Wang Warren is a 39 y.o. [id number] at 33w2d presents from [facility] clinic for mild range blood pressures.  She has chronic HTN, currently on Procardia 60XL. She takes a baby ASA daily. Patient also has Type II diabetes for which she takes Lantus and Humalog. She is seen weekly in prenatal testing. Patient denies contractions, denies vaginal bleeding, denies LOF.   Fetal Movement: normal.    Patient denies CP/SOB/RUQ pain/spots in vision.             Review of patient's allergies indicates:  No Known Allergies  Past Medical History:   Diagnosis Date    Chronic hypertension affecting pregnancy     Diabetes mellitus type II, controlled, with no complications 10/11/2017    Infertility, female     States she has been trying to conceive for four years. Was evaluated at feritility clinic and was given Clomid for three months two years ago.    Iron deficiency anemia 2018     Past Surgical History:   Procedure Laterality Date     SECTION      none       Family History   Problem Relation Age of Onset    Cancer Father         prostate    Diabetes Maternal Aunt     Breast cancer Neg Hx     Congenital heart disease Neg Hx      Social History     Tobacco Use    Smoking status: Never Smoker    Smokeless tobacco: Never Used   Substance Use Topics    Alcohol use: No    Drug use: No     Review of Systems   Constitutional: Negative for chills and fever.   HENT: Negative for facial swelling.    Eyes: Negative for photophobia and visual disturbance.   Respiratory: Negative for chest tightness and shortness of breath.    Cardiovascular: Negative for chest pain.   Gastrointestinal: Negative for abdominal pain, nausea and vomiting.   Genitourinary: Negative for dysuria, flank pain, vaginal bleeding and vaginal discharge.   Musculoskeletal: Negative for back pain.   Skin: Negative.  Negative for rash.    Neurological: Negative for dizziness and headaches.   Psychiatric/Behavioral: Negative.        Physical Exam     Initial Vitals [01/09/20 1115]   BP Pulse Resp Temp SpO2   (!) 119/58 81 16 98 °F (36.7 °C) 100 %      MAP       --         Physical Exam    Vitals reviewed.  Constitutional: She appears well-developed and well-nourished.   HENT:   Head: Normocephalic and atraumatic.   Eyes: EOM are normal.   Neck: Normal range of motion. Neck supple.   Cardiovascular: Normal rate and regular rhythm.   Pulmonary/Chest: No respiratory distress.   Abdominal: Soft. She exhibits no distension. There is no tenderness. There is no rebound and no guarding.   Genitourinary: Uterus normal.   Musculoskeletal: Normal range of motion. She exhibits no edema or tenderness.   Neurological: She is alert and oriented to person, place, and time.   Skin: Skin is warm and dry.   Psychiatric: She has a normal mood and affect.         ED Course   Obtain Fetal nonstress test (NST)  Date/Time: 1/9/2020 11:18 AM  Performed by: Chika Estevez MD  Authorized by: Chika Estevez MD     Nonstress Test:     Variability:  6-25 BPM    Decelerations:  None    Accelerations:  15 bpm    Baseline:  130    Contractions:  Not present  Biophysical Profile:     Nonstress Test Interpretation: reactive      Overall Impression:  Reassuring  Post-procedure:     Patient tolerance:  Patient tolerated the procedure well with no immediate complications      Labs Reviewed   CBC W/ AUTO DIFFERENTIAL - Abnormal; Notable for the following components:       Result Value    RBC 3.81 (*)     Hemoglobin 10.1 (*)     Hematocrit 32.9 (*)     Mean Corpuscular Hemoglobin 26.5 (*)     Mean Corpuscular Hemoglobin Conc 30.7 (*)     Immature Granulocytes 0.6 (*)     All other components within normal limits   COMPREHENSIVE METABOLIC PANEL - Abnormal; Notable for the following components:    Sodium 135 (*)     CO2 21 (*)     Glucose 51 (*)     Albumin 3.0 (*)     All other  components within normal limits   PROTEIN / CREATININE RATIO, URINE - Abnormal; Notable for the following components:    Protein, Urine Random 23 (*)     All other components within normal limits          Imaging Results    None          Medical Decision Making:   Differential Diagnosis:   CHTN  W/ or w/o Eh  ED Management:  NST x 20 minutes  [place]  Serial blood pressure monitoring with all normal BP readings  CBC, CMP, P/C ratio ordered  CBC and CMP wnl  PCR 0.12  Mild hyponatremia noted- patient counseled to eat salty foods  2+ ketones on urine dip > will give 1L LR bolus  Discharged home in stable condition with 1 week BP follow up.   Other:   I have discussed this case with another health care provider.       <> Summary of the Discussion: Dr. Moore              Attending Attestation:   Physician Attestation Statement for Resident:  As the supervising MD   Physician Attestation Statement: I have personally seen and examined this patient.   I agree with the above history. -:   As the supervising MD I agree with the above PE.    As the supervising MD I agree with the above treatment, course, plan, and disposition.   -: Patient evaluated and found to be stable, agree with resident's assessment and plan.  I was personally present during the critical portions of the procedure(s) performed by the resident and was immediately available in the ED to provide services and assistance as needed during the entire procedure.  I have reviewed the following: old records at this facility.                    ED Course as of Jan 09 1245   Thu Jan 09, 2020   1132 Sent from [facility] clinic for pre-e evaluation.  BPP 8/8 today in clinic . NST reactive/reassuring. BP normal here, CHTN hx on procardia    [AR]      ED Course User Index  [AR] Sehlby Moore MD                Clinical Impression:       ICD-10-CM ICD-9-CM   1. 33 weeks gestation of pregnancy Z3A.33 V22.2   2. Hypertension, unspecified type I10 401.9   3. Chronic  hypertension affecting pregnancy O10.919 642.00   4. Insulin dependent diabetes mellitus E11.9 250.00    Z79.4 V58.67     Disposition: discharged  Condition: stable    Kimberli Moeller M.D.   OB/GYN  PGY-1         Kimberli Moeller MD  Resident  01/09/20 1247       Shelby Moore MD  01/09/20 1418

## 2020-01-09 NOTE — PROGRESS NOTES
Indication  ========    Evaluation of fetal growth/BPP    History  ======    General History  Height 157 cm  Height (ft) 5 ft  Height (in) 2 in  Other: OB: MAHENDRA GLEASON  Previous Outcomes   1  Para 0  Preg. no. 1  Outcome: live birth  Gest. age 38 w + 0 d  Gender: female  Details: c/s, 6lbs 3 oz, fetal intolerance  Risk Factors  History risk factors: Obesity  History risk factors: AMA  History risk factors: Diabetes mellitus  History risk factors: Personal history of hypertension  Details: iron defficiency    Maternal Assessment  =================    Height 157 cm  Height (ft) 5 ft  Height (in) 2 in  Weight 135 kg  Weight (lb) 298 lb  BMI 54.44 kg/m²  BP syst 140 mmHg  BP diast 84 mmHg  Maternal assessment other: repeat 13888    Fetal Growth Overview  =================    Exam date        GA              BPD (mm)         HC (mm)        AC (mm)        FL (mm)         HL (mm)        EFW (g)  2019          24w 1d        59.0                  226.5             189.1             43.1              39.7               645    38%  2019        29w 2d        69.5                  262.3             240.2             54.4                                   1,230    27%  2020          33w 2d        76.9                 294.0              277.8             64.0                                   1,920    15%      Method  ======    Transabdominal ultrasound examination. View: Suboptimal view: limited by maternal body habitus    Pregnancy  =========    Peres pregnancy. Number of fetuses: 1    Dating  ======    Cycle: regular cycle  Ultrasound examination on: 2020  GA by U/S based upon: AC, BPD, Femur, HC  GA by U/S 32 w + 0 d  JUAN MIGUEL by U/S: 3/5/2020  Assigned: based on ultrasound (AC, BPD, Femur, HC), selected on 2019  Assigned GA 33 w + 2 d  Assigned JUAN MIGUEL: 2020  Pregnancy length 280 d    General Evaluation  ==============    Cardiac activity present.  bpm.  Fetal movements  visualized.  Presentation cephalic.  Placenta Placental site: anterior, fundal.  Amniotic fluid Amount of AF: normal amount. MVP 4.6 cm.    Biophysical Profile  ==============    2: Fetal breathing movements  2: Gross body movements  2: Fetal tone  2: Amniotic fluid volume  8/8 Biophysical profile score    Fetal Biometry  ============    Standard  BPD 76.9 mm  30w 6d                [place]    .0 mm  34w 0d                Joseph    .0 mm  32w 3d                Hadlock    .8 mm  31w 6d                Hadlock    Femur 64.0 mm  33w 0d                [place]    HC / AC 1.06    EFW 1,920 g          15%        [place]    EFW (lb) 4 lb  EFW (oz) 4 oz  EFW by: [place]lock (BPD-HC-AC-FL)  Head / Face / Neck  Cephalic index 0.74    Extremities / Bony Struc  FL / BPD 0.83    FL / HC 0.22    FL / AC 0.23    Other Structures   bpm    Fetal Anatomy  ============    Cranium: normal  Cavum septi pellucidi: suboptimal  4-chamber view: normal  Heart / Thorax: Seen by Peds Card  Stomach: normal  Kidneys: normal  Bladder: normal  Gender: male  Wants to know gender: yes    Consultation  ==========    Type: BS check  The patient presents for a blood sugar check. She is currently taking Lantus 15 units at bedtime. She takes lispro 12 units with breakfast, 8  units with lunch, and 10 units with dinner. She is taking metformin 500 mg b.i.d.. She takes baby aspirin. She also takes Procardia XL 60 mg  daily. She is also taking vitamin-D, iron, and Flonase as needed. She did take her blood pressure medication this morning. She denies any  preeclampsia symptoms. She did not report any overnight low blood sugars.    Blood pressure today is 140/84 with repeat 138/88. These blood pressures have been higher than she has previously had    since 01/02:  Fastings: 76, 81, 76, 83, 69, 73, 73, 73  2 hr post breakfast: 81, 83, 75, 80, 73, 66, 76, 103  2 hr post lunch: 72, 74, 82, 73, 75, 78, 81  2 hr post dinner: 80, 78, 85, 70,  71, 81, 70    I recommended that she adjust her Lantus to 13 units at bedtime. I also recommend she change her lispro to 10 units with breakfast, 6 units  with lunch, and 10 units with dinner. I discussed with her that at times there can be more hypoglycemia as pregnancy progresses or at times  the placenta may not be functioning as well. It is more concerning that her blood pressure was slightly more elevated than typical today, as  well as the fetal growth at the lower normal range, as well as her blood sugars dropping somewhat. She is at high risk for preeclampsia. Given  these findings, I recommend she go to the OB Emergency Department for serial blood pressures and a preeclampsia evaluation. Delivery  timing may be altered in the future depending on the results. She should continue twice weekly  fetal surveillance scheduled by her  primary OB. She has an NST scheduled for next Monday and a visit with MFM and Dr. Valenzuela on Thursday next week. She is having a BPP  done at her MFM visit next week. Recommend Dr. Valenzuela check a hemoglobin A1c at her visit next week. Patient was given precautions and  expressed her understanding. 25 min was spent face-to-face time with greater than half that time spent in counseling and coordination of care.  I spoke with Dr. Moore in the OB emergency department regarding the patient.    Impression  =========    Peres live intrauterine pregnancy.  EFW is at the 15th percentile with normal AC. While this is a normal percentile, there has been a fall off in growth since the last ultrasound.  There was 690g of interval growth.  Normal amniotic fluid volume.  Limited fetal anatomy appears normal.  BPP 8/8.    Recommendation  ==============    Continue twice weekly  fetal surveillance (has NST on Monday and Follow up with MFM for BS check and BPP next Thursday).  Keep appointment with Dr. Valenzuela next week-recommend primary ob check hgba1c at visit next  week.  Insulin adjusted as above.  Delivery timing may need to be altered depending on growth, bp etc.  Growth in 3 weeks.  To [facility] for preeclampsia evaluation. I spoke to Dr. Moore.

## 2020-01-13 ENCOUNTER — HOSPITAL ENCOUNTER (OUTPATIENT)
Dept: PERINATAL CARE | Facility: OTHER | Age: 40
Discharge: HOME | End: 2020-01-13
Attending: OBSTETRICS & GYNECOLOGY
Payer: MEDICAID

## 2020-01-13 ENCOUNTER — ANESTHESIA (OUTPATIENT)
Dept: OBSTETRICS AND GYNECOLOGY | Facility: OTHER | Age: 40
End: 2020-01-13
Payer: MEDICAID

## 2020-01-13 ENCOUNTER — ANESTHESIA EVENT (OUTPATIENT)
Dept: OBSTETRICS AND GYNECOLOGY | Facility: OTHER | Age: 40
End: 2020-01-13
Payer: MEDICAID

## 2020-01-13 ENCOUNTER — HOSPITAL ENCOUNTER (INPATIENT)
Facility: OTHER | Age: 40
LOS: 4 days | Discharge: HOME | End: 2020-01-17
Attending: OBSTETRICS & GYNECOLOGY | Admitting: OBSTETRICS & GYNECOLOGY
Payer: MEDICAID

## 2020-01-13 DIAGNOSIS — E11.9 CONTROLLED TYPE 2 DIABETES MELLITUS WITHOUT COMPLICATION, WITH LONG-TERM CURRENT USE OF INSULIN: ICD-10-CM

## 2020-01-13 DIAGNOSIS — O36.8390 NON-REASSURING FETAL HEART RATE WITH LATE DECELERATION: ICD-10-CM

## 2020-01-13 DIAGNOSIS — Z3A.33 33 WEEKS GESTATION OF PREGNANCY: ICD-10-CM

## 2020-01-13 DIAGNOSIS — O24.113 PREGNANCY WITH TYPE 2 DIABETES MELLITUS IN THIRD TRIMESTER: ICD-10-CM

## 2020-01-13 DIAGNOSIS — Z79.4 CONTROLLED TYPE 2 DIABETES MELLITUS WITHOUT COMPLICATION, WITH LONG-TERM CURRENT USE OF INSULIN: ICD-10-CM

## 2020-01-13 DIAGNOSIS — O16.3 HYPERTENSION AFFECTING PREGNANCY IN THIRD TRIMESTER: ICD-10-CM

## 2020-01-13 DIAGNOSIS — Z98.891 S/P REPEAT LOW TRANSVERSE C-SECTION: Primary | ICD-10-CM

## 2020-01-13 LAB
ABO + RH BLD: NORMAL
ALBUMIN SERPL BCP-MCNC: 2.8 G/DL (ref 3.5–5.2)
ALBUMIN SERPL BCP-MCNC: 2.9 G/DL (ref 3.5–5.2)
ALP SERPL-CCNC: 140 U/L (ref 55–135)
ALP SERPL-CCNC: 146 U/L (ref 55–135)
ALT SERPL W/O P-5'-P-CCNC: 14 U/L (ref 10–44)
ALT SERPL W/O P-5'-P-CCNC: 15 U/L (ref 10–44)
ANION GAP SERPL CALC-SCNC: 10 MMOL/L (ref 8–16)
ANION GAP SERPL CALC-SCNC: 8 MMOL/L (ref 8–16)
AST SERPL-CCNC: 7 U/L (ref 10–40)
AST SERPL-CCNC: 9 U/L (ref 10–40)
B-OH-BUTYR BLD STRIP-SCNC: 0 MMOL/L (ref 0–0.5)
BASOPHILS # BLD AUTO: 0.04 K/UL (ref 0–0.2)
BASOPHILS # BLD AUTO: 0.04 K/UL (ref 0–0.2)
BASOPHILS NFR BLD: 0.4 % (ref 0–1.9)
BASOPHILS NFR BLD: 0.6 % (ref 0–1.9)
BILIRUB SERPL-MCNC: 0.2 MG/DL (ref 0.1–1)
BILIRUB SERPL-MCNC: 0.3 MG/DL (ref 0.1–1)
BLD GP AB SCN CELLS X3 SERPL QL: NORMAL
BUN SERPL-MCNC: 12 MG/DL (ref 6–20)
BUN SERPL-MCNC: 12 MG/DL (ref 6–20)
CALCIUM SERPL-MCNC: 9.4 MG/DL (ref 8.7–10.5)
CALCIUM SERPL-MCNC: 9.8 MG/DL (ref 8.7–10.5)
CHLORIDE SERPL-SCNC: 105 MMOL/L (ref 95–110)
CHLORIDE SERPL-SCNC: 107 MMOL/L (ref 95–110)
CO2 SERPL-SCNC: 21 MMOL/L (ref 23–29)
CO2 SERPL-SCNC: 24 MMOL/L (ref 23–29)
CREAT SERPL-MCNC: 0.8 MG/DL (ref 0.5–1.4)
CREAT SERPL-MCNC: 0.8 MG/DL (ref 0.5–1.4)
CREAT UR-MCNC: 102.7 MG/DL (ref 15–325)
DIFFERENTIAL METHOD: ABNORMAL
DIFFERENTIAL METHOD: ABNORMAL
EOSINOPHIL # BLD AUTO: 0.1 K/UL (ref 0–0.5)
EOSINOPHIL # BLD AUTO: 0.2 K/UL (ref 0–0.5)
EOSINOPHIL NFR BLD: 1 % (ref 0–8)
EOSINOPHIL NFR BLD: 2.1 % (ref 0–8)
ERYTHROCYTE [DISTWIDTH] IN BLOOD BY AUTOMATED COUNT: 13.2 % (ref 11.5–14.5)
ERYTHROCYTE [DISTWIDTH] IN BLOOD BY AUTOMATED COUNT: 13.3 % (ref 11.5–14.5)
EST. GFR  (AFRICAN AMERICAN): >60 ML/MIN/1.73 M^2
EST. GFR  (AFRICAN AMERICAN): >60 ML/MIN/1.73 M^2
EST. GFR  (NON AFRICAN AMERICAN): >60 ML/MIN/1.73 M^2
EST. GFR  (NON AFRICAN AMERICAN): >60 ML/MIN/1.73 M^2
GLUCOSE SERPL-MCNC: 180 MG/DL (ref 70–110)
GLUCOSE SERPL-MCNC: 212 MG/DL (ref 70–110)
HCT VFR BLD AUTO: 32 % (ref 37–48.5)
HCT VFR BLD AUTO: 33.9 % (ref 37–48.5)
HGB BLD-MCNC: 10.2 G/DL (ref 12–16)
HGB BLD-MCNC: 10.5 G/DL (ref 12–16)
HIV1+2 IGG SERPL QL IA.RAPID: NEGATIVE
IMM GRANULOCYTES # BLD AUTO: 0.06 K/UL (ref 0–0.04)
IMM GRANULOCYTES # BLD AUTO: 0.06 K/UL (ref 0–0.04)
IMM GRANULOCYTES NFR BLD AUTO: 0.7 % (ref 0–0.5)
IMM GRANULOCYTES NFR BLD AUTO: 0.8 % (ref 0–0.5)
LYMPHOCYTES # BLD AUTO: 1.2 K/UL (ref 1–4.8)
LYMPHOCYTES # BLD AUTO: 1.5 K/UL (ref 1–4.8)
LYMPHOCYTES NFR BLD: 12.5 % (ref 18–48)
LYMPHOCYTES NFR BLD: 20.7 % (ref 18–48)
MCH RBC QN AUTO: 26.6 PG (ref 27–31)
MCH RBC QN AUTO: 27 PG (ref 27–31)
MCHC RBC AUTO-ENTMCNC: 31 G/DL (ref 32–36)
MCHC RBC AUTO-ENTMCNC: 31.9 G/DL (ref 32–36)
MCV RBC AUTO: 85 FL (ref 82–98)
MCV RBC AUTO: 86 FL (ref 82–98)
MONOCYTES # BLD AUTO: 0.5 K/UL (ref 0.3–1)
MONOCYTES # BLD AUTO: 0.6 K/UL (ref 0.3–1)
MONOCYTES NFR BLD: 5.1 % (ref 4–15)
MONOCYTES NFR BLD: 8.5 % (ref 4–15)
NEUTROPHILS # BLD AUTO: 4.9 K/UL (ref 1.8–7.7)
NEUTROPHILS # BLD AUTO: 7.4 K/UL (ref 1.8–7.7)
NEUTROPHILS NFR BLD: 67.3 % (ref 38–73)
NEUTROPHILS NFR BLD: 80.3 % (ref 38–73)
NRBC BLD-RTO: 0 /100 WBC
NRBC BLD-RTO: 0 /100 WBC
PLATELET # BLD AUTO: 312 K/UL (ref 150–350)
PLATELET # BLD AUTO: 312 K/UL (ref 150–350)
PMV BLD AUTO: 10.9 FL (ref 9.2–12.9)
PMV BLD AUTO: 11 FL (ref 9.2–12.9)
POCT GLUCOSE: 174 MG/DL (ref 70–110)
POCT GLUCOSE: 177 MG/DL (ref 70–110)
POCT GLUCOSE: 180 MG/DL (ref 70–110)
POCT GLUCOSE: 188 MG/DL (ref 70–110)
POCT GLUCOSE: 211 MG/DL (ref 70–110)
POCT GLUCOSE: 244 MG/DL (ref 70–110)
POTASSIUM SERPL-SCNC: 3.9 MMOL/L (ref 3.5–5.1)
POTASSIUM SERPL-SCNC: 4.1 MMOL/L (ref 3.5–5.1)
PROT SERPL-MCNC: 6.9 G/DL (ref 6–8.4)
PROT SERPL-MCNC: 7.2 G/DL (ref 6–8.4)
PROT UR-MCNC: 22 MG/DL (ref 0–15)
PROT/CREAT UR: 0.21 MG/G{CREAT} (ref 0–0.2)
RBC # BLD AUTO: 3.78 M/UL (ref 4–5.4)
RBC # BLD AUTO: 3.94 M/UL (ref 4–5.4)
SODIUM SERPL-SCNC: 136 MMOL/L (ref 136–145)
SODIUM SERPL-SCNC: 139 MMOL/L (ref 136–145)
WBC # BLD AUTO: 7.21 K/UL (ref 3.9–12.7)
WBC # BLD AUTO: 9.2 K/UL (ref 3.9–12.7)

## 2020-01-13 PROCEDURE — 37000009 HC ANESTHESIA EA ADD 15 MINS: Performed by: STUDENT IN AN ORGANIZED HEALTH CARE EDUCATION/TRAINING PROGRAM

## 2020-01-13 PROCEDURE — 63600175 PHARM REV CODE 636 W HCPCS: Performed by: STUDENT IN AN ORGANIZED HEALTH CARE EDUCATION/TRAINING PROGRAM

## 2020-01-13 PROCEDURE — 99222 1ST HOSP IP/OBS MODERATE 55: CPT | Mod: 25,,, | Performed by: OBSTETRICS & GYNECOLOGY

## 2020-01-13 PROCEDURE — 59514 CESAREAN DELIVERY ONLY: CPT | Mod: ,,, | Performed by: ANESTHESIOLOGY

## 2020-01-13 PROCEDURE — 86850 RBC ANTIBODY SCREEN: CPT

## 2020-01-13 PROCEDURE — 86703 HIV-1/HIV-2 1 RESULT ANTBDY: CPT

## 2020-01-13 PROCEDURE — 59025 FETAL NON-STRESS TEST: CPT

## 2020-01-13 PROCEDURE — 82010 KETONE BODYS QUAN: CPT

## 2020-01-13 PROCEDURE — 25000003 PHARM REV CODE 250: Performed by: UROLOGY

## 2020-01-13 PROCEDURE — 71000039 HC RECOVERY, EACH ADD'L HOUR: Performed by: STUDENT IN AN ORGANIZED HEALTH CARE EDUCATION/TRAINING PROGRAM

## 2020-01-13 PROCEDURE — 59514 CESAREAN DELIVERY ONLY: CPT | Mod: AT,,, | Performed by: OBSTETRICS & GYNECOLOGY

## 2020-01-13 PROCEDURE — 27200710 HC EPIDURAL INFUSION PUMP SET: Performed by: ANESTHESIOLOGY

## 2020-01-13 PROCEDURE — 59025 FETAL NON-STRESS TEST: CPT | Mod: 26,,, | Performed by: OBSTETRICS & GYNECOLOGY

## 2020-01-13 PROCEDURE — 59514 PRA REAN DELIVERY ONLY: ICD-10-PCS | Mod: ,,, | Performed by: ANESTHESIOLOGY

## 2020-01-13 PROCEDURE — 85025 COMPLETE CBC W/AUTO DIFF WBC: CPT

## 2020-01-13 PROCEDURE — 87184 SC STD DISK METHOD PER PLATE: CPT

## 2020-01-13 PROCEDURE — 63600175 PHARM REV CODE 636 W HCPCS: Performed by: UROLOGY

## 2020-01-13 PROCEDURE — 99222 PR INITIAL HOSPITAL CARE,LEVL II: ICD-10-PCS | Mod: 25,,, | Performed by: OBSTETRICS & GYNECOLOGY

## 2020-01-13 PROCEDURE — 80053 COMPREHEN METABOLIC PANEL: CPT | Mod: 91

## 2020-01-13 PROCEDURE — 71000033 HC RECOVERY, INTIAL HOUR: Performed by: STUDENT IN AN ORGANIZED HEALTH CARE EDUCATION/TRAINING PROGRAM

## 2020-01-13 PROCEDURE — 36004724 HC OB OR TIME LEV III - 1ST 15 MIN: Performed by: STUDENT IN AN ORGANIZED HEALTH CARE EDUCATION/TRAINING PROGRAM

## 2020-01-13 PROCEDURE — 37000008 HC ANESTHESIA 1ST 15 MINUTES: Performed by: STUDENT IN AN ORGANIZED HEALTH CARE EDUCATION/TRAINING PROGRAM

## 2020-01-13 PROCEDURE — 86703 HIV-1/HIV-2 1 RESULT ANTBDY: CPT | Mod: 91

## 2020-01-13 PROCEDURE — 87081 CULTURE SCREEN ONLY: CPT

## 2020-01-13 PROCEDURE — C1751 CATH, INF, PER/CENT/MIDLINE: HCPCS | Performed by: ANESTHESIOLOGY

## 2020-01-13 PROCEDURE — 59514 PR CESAREAN DELIVERY ONLY: ICD-10-PCS | Mod: AT,,, | Performed by: OBSTETRICS & GYNECOLOGY

## 2020-01-13 PROCEDURE — S0028 INJECTION, FAMOTIDINE, 20 MG: HCPCS | Performed by: STUDENT IN AN ORGANIZED HEALTH CARE EDUCATION/TRAINING PROGRAM

## 2020-01-13 PROCEDURE — 86592 SYPHILIS TEST NON-TREP QUAL: CPT

## 2020-01-13 PROCEDURE — 99284 EMERGENCY DEPT VISIT MOD MDM: CPT | Mod: 25,,, | Performed by: OBSTETRICS & GYNECOLOGY

## 2020-01-13 PROCEDURE — 11000001 HC ACUTE MED/SURG PRIVATE ROOM

## 2020-01-13 PROCEDURE — 96372 THER/PROPH/DIAG INJ SC/IM: CPT | Mod: 59

## 2020-01-13 PROCEDURE — 82962 GLUCOSE BLOOD TEST: CPT

## 2020-01-13 PROCEDURE — 36004725 HC OB OR TIME LEV III - EA ADD 15 MIN: Performed by: STUDENT IN AN ORGANIZED HEALTH CARE EDUCATION/TRAINING PROGRAM

## 2020-01-13 PROCEDURE — 84156 ASSAY OF PROTEIN URINE: CPT

## 2020-01-13 PROCEDURE — 87147 CULTURE TYPE IMMUNOLOGIC: CPT

## 2020-01-13 PROCEDURE — 36415 COLL VENOUS BLD VENIPUNCTURE: CPT

## 2020-01-13 PROCEDURE — 25000003 PHARM REV CODE 250: Performed by: STUDENT IN AN ORGANIZED HEALTH CARE EDUCATION/TRAINING PROGRAM

## 2020-01-13 PROCEDURE — 59025 PR FETAL 2N-STRESS TEST: ICD-10-PCS | Mod: 26,,, | Performed by: OBSTETRICS & GYNECOLOGY

## 2020-01-13 PROCEDURE — 99284 PR EMERGENCY DEPT VISIT,LEVEL IV: ICD-10-PCS | Mod: 25,,, | Performed by: OBSTETRICS & GYNECOLOGY

## 2020-01-13 PROCEDURE — 99285 EMERGENCY DEPT VISIT HI MDM: CPT | Mod: 25

## 2020-01-13 PROCEDURE — 59025 OBTAIN FETAL NONSTRESS TEST (NST): ICD-10-PCS | Mod: 26,,, | Performed by: OBSTETRICS & GYNECOLOGY

## 2020-01-13 RX ORDER — SODIUM CHLORIDE, SODIUM LACTATE, POTASSIUM CHLORIDE, CALCIUM CHLORIDE 600; 310; 30; 20 MG/100ML; MG/100ML; MG/100ML; MG/100ML
INJECTION, SOLUTION INTRAVENOUS CONTINUOUS PRN
Status: DISCONTINUED | OUTPATIENT
Start: 2020-01-13 | End: 2020-01-13

## 2020-01-13 RX ORDER — OXYTOCIN/RINGER'S LACTATE 30/500 ML
PLASTIC BAG, INJECTION (ML) INTRAVENOUS
Status: DISCONTINUED
Start: 2020-01-13 | End: 2020-01-14 | Stop reason: WASHOUT

## 2020-01-13 RX ORDER — NIFEDIPINE 30 MG/1
60 TABLET, EXTENDED RELEASE ORAL DAILY
Status: DISCONTINUED | OUTPATIENT
Start: 2020-01-14 | End: 2020-01-14

## 2020-01-13 RX ORDER — MUPIROCIN 20 MG/G
OINTMENT TOPICAL
Status: CANCELLED | OUTPATIENT
Start: 2020-01-13

## 2020-01-13 RX ORDER — ONDANSETRON 8 MG/1
8 TABLET, ORALLY DISINTEGRATING ORAL EVERY 8 HOURS PRN
Status: DISCONTINUED | OUTPATIENT
Start: 2020-01-13 | End: 2020-01-14

## 2020-01-13 RX ORDER — DIPHENHYDRAMINE HYDROCHLORIDE 50 MG/ML
25 INJECTION INTRAMUSCULAR; INTRAVENOUS EVERY 4 HOURS PRN
Status: DISCONTINUED | OUTPATIENT
Start: 2020-01-13 | End: 2020-01-14

## 2020-01-13 RX ORDER — BETAMETHASONE SODIUM PHOSPHATE AND BETAMETHASONE ACETATE 3; 3 MG/ML; MG/ML
12 INJECTION, SUSPENSION INTRA-ARTICULAR; INTRALESIONAL; INTRAMUSCULAR; SOFT TISSUE
Status: DISCONTINUED | OUTPATIENT
Start: 2020-01-13 | End: 2020-01-14

## 2020-01-13 RX ORDER — DIPHENHYDRAMINE HCL 25 MG
25 CAPSULE ORAL EVERY 4 HOURS PRN
Status: DISCONTINUED | OUTPATIENT
Start: 2020-01-13 | End: 2020-01-14

## 2020-01-13 RX ORDER — IBUPROFEN 200 MG
24 TABLET ORAL
Status: DISCONTINUED | OUTPATIENT
Start: 2020-01-13 | End: 2020-01-14 | Stop reason: SDUPTHER

## 2020-01-13 RX ORDER — ACETAMINOPHEN 325 MG/1
650 TABLET ORAL EVERY 6 HOURS PRN
Status: DISCONTINUED | OUTPATIENT
Start: 2020-01-13 | End: 2020-01-14

## 2020-01-13 RX ORDER — FENTANYL CITRATE 50 UG/ML
INJECTION, SOLUTION INTRAMUSCULAR; INTRAVENOUS
Status: DISCONTINUED | OUTPATIENT
Start: 2020-01-13 | End: 2020-01-13

## 2020-01-13 RX ORDER — INSULIN ASPART 100 [IU]/ML
6 INJECTION, SOLUTION INTRAVENOUS; SUBCUTANEOUS
Status: DISCONTINUED | OUTPATIENT
Start: 2020-01-13 | End: 2020-01-14

## 2020-01-13 RX ORDER — METFORMIN HYDROCHLORIDE 500 MG/1
500 TABLET ORAL 2 TIMES DAILY WITH MEALS
Status: DISCONTINUED | OUTPATIENT
Start: 2020-01-13 | End: 2020-01-14

## 2020-01-13 RX ORDER — FAMOTIDINE 10 MG/ML
20 INJECTION INTRAVENOUS ONCE
Status: COMPLETED | OUTPATIENT
Start: 2020-01-13 | End: 2020-01-13

## 2020-01-13 RX ORDER — AMOXICILLIN 250 MG
1 CAPSULE ORAL NIGHTLY PRN
Status: DISCONTINUED | OUTPATIENT
Start: 2020-01-13 | End: 2020-01-14

## 2020-01-13 RX ORDER — CEFAZOLIN SODIUM 2 G/50ML
2 SOLUTION INTRAVENOUS
Status: CANCELLED | OUTPATIENT
Start: 2020-01-13

## 2020-01-13 RX ORDER — INSULIN ASPART 100 [IU]/ML
12 INJECTION, SOLUTION INTRAVENOUS; SUBCUTANEOUS
Status: DISCONTINUED | OUTPATIENT
Start: 2020-01-14 | End: 2020-01-13

## 2020-01-13 RX ORDER — INSULIN ASPART 100 [IU]/ML
1 INJECTION, SOLUTION INTRAVENOUS; SUBCUTANEOUS
Status: DISCONTINUED | OUTPATIENT
Start: 2020-01-13 | End: 2020-01-13

## 2020-01-13 RX ORDER — MORPHINE SULFATE 0.5 MG/ML
INJECTION, SOLUTION EPIDURAL; INTRATHECAL; INTRAVENOUS
Status: DISCONTINUED | OUTPATIENT
Start: 2020-01-13 | End: 2020-01-13

## 2020-01-13 RX ORDER — INSULIN ASPART 100 [IU]/ML
8 INJECTION, SOLUTION INTRAVENOUS; SUBCUTANEOUS
Status: DISCONTINUED | OUTPATIENT
Start: 2020-01-13 | End: 2020-01-13

## 2020-01-13 RX ORDER — OXYTOCIN 10 [USP'U]/ML
INJECTION, SOLUTION INTRAMUSCULAR; INTRAVENOUS
Status: DISCONTINUED | OUTPATIENT
Start: 2020-01-13 | End: 2020-01-13

## 2020-01-13 RX ORDER — BETAMETHASONE SODIUM PHOSPHATE AND BETAMETHASONE ACETATE 3; 3 MG/ML; MG/ML
12 INJECTION, SUSPENSION INTRA-ARTICULAR; INTRALESIONAL; INTRAMUSCULAR; SOFT TISSUE
Status: DISCONTINUED | OUTPATIENT
Start: 2020-01-13 | End: 2020-01-13

## 2020-01-13 RX ORDER — SIMETHICONE 80 MG
1 TABLET,CHEWABLE ORAL EVERY 6 HOURS PRN
Status: DISCONTINUED | OUTPATIENT
Start: 2020-01-13 | End: 2020-01-14

## 2020-01-13 RX ORDER — DEXAMETHASONE SODIUM PHOSPHATE 4 MG/ML
INJECTION, SOLUTION INTRA-ARTICULAR; INTRALESIONAL; INTRAMUSCULAR; INTRAVENOUS; SOFT TISSUE
Status: DISCONTINUED | OUTPATIENT
Start: 2020-01-13 | End: 2020-01-13

## 2020-01-13 RX ORDER — PRENATAL WITH FERROUS FUM AND FOLIC ACID 3080; 920; 120; 400; 22; 1.84; 3; 20; 10; 1; 12; 200; 27; 25; 2 [IU]/1; [IU]/1; MG/1; [IU]/1; MG/1; MG/1; MG/1; MG/1; MG/1; MG/1; UG/1; MG/1; MG/1; MG/1; MG/1
1 TABLET ORAL DAILY
Status: DISCONTINUED | OUTPATIENT
Start: 2020-01-13 | End: 2020-01-14

## 2020-01-13 RX ORDER — DOCUSATE SODIUM 100 MG/1
200 CAPSULE, LIQUID FILLED ORAL 2 TIMES DAILY
Status: DISCONTINUED | OUTPATIENT
Start: 2020-01-13 | End: 2020-01-14

## 2020-01-13 RX ORDER — INSULIN ASPART 100 [IU]/ML
10 INJECTION, SOLUTION INTRAVENOUS; SUBCUTANEOUS
Status: DISCONTINUED | OUTPATIENT
Start: 2020-01-14 | End: 2020-01-14

## 2020-01-13 RX ORDER — IBUPROFEN 200 MG
16 TABLET ORAL
Status: DISCONTINUED | OUTPATIENT
Start: 2020-01-13 | End: 2020-01-14 | Stop reason: SDUPTHER

## 2020-01-13 RX ORDER — FERROUS SULFATE 325(65) MG
325 TABLET, DELAYED RELEASE (ENTERIC COATED) ORAL DAILY
Status: DISCONTINUED | OUTPATIENT
Start: 2020-01-13 | End: 2020-01-14

## 2020-01-13 RX ORDER — INSULIN ASPART 100 [IU]/ML
1 INJECTION, SOLUTION INTRAVENOUS; SUBCUTANEOUS
Status: DISCONTINUED | OUTPATIENT
Start: 2020-01-13 | End: 2020-01-14

## 2020-01-13 RX ORDER — SODIUM CHLORIDE 9 MG/ML
INJECTION, SOLUTION INTRAVENOUS CONTINUOUS
Status: CANCELLED | OUTPATIENT
Start: 2020-01-13

## 2020-01-13 RX ORDER — FLUTICASONE PROPIONATE 50 MCG
1 SPRAY, SUSPENSION (ML) NASAL 2 TIMES DAILY PRN
Status: DISCONTINUED | OUTPATIENT
Start: 2020-01-13 | End: 2020-01-14

## 2020-01-13 RX ORDER — GLUCAGON 1 MG
1 KIT INJECTION
Status: DISCONTINUED | OUTPATIENT
Start: 2020-01-13 | End: 2020-01-14

## 2020-01-13 RX ORDER — HYDROMORPHONE HYDROCHLORIDE 2 MG/ML
0.2 INJECTION, SOLUTION INTRAMUSCULAR; INTRAVENOUS; SUBCUTANEOUS EVERY 5 MIN PRN
Status: CANCELLED | OUTPATIENT
Start: 2020-01-13

## 2020-01-13 RX ORDER — ACETAMINOPHEN 10 MG/ML
INJECTION, SOLUTION INTRAVENOUS
Status: DISCONTINUED | OUTPATIENT
Start: 2020-01-13 | End: 2020-01-13

## 2020-01-13 RX ORDER — INSULIN ASPART 100 [IU]/ML
10 INJECTION, SOLUTION INTRAVENOUS; SUBCUTANEOUS
Status: DISCONTINUED | OUTPATIENT
Start: 2020-01-13 | End: 2020-01-14

## 2020-01-13 RX ORDER — ONDANSETRON HYDROCHLORIDE 2 MG/ML
INJECTION, SOLUTION INTRAMUSCULAR; INTRAVENOUS
Status: DISCONTINUED | OUTPATIENT
Start: 2020-01-13 | End: 2020-01-13

## 2020-01-13 RX ORDER — BUPIVACAINE HYDROCHLORIDE 7.5 MG/ML
INJECTION, SOLUTION INTRASPINAL
Status: DISCONTINUED | OUTPATIENT
Start: 2020-01-13 | End: 2020-01-13

## 2020-01-13 RX ORDER — INSULIN ASPART 100 [IU]/ML
2 INJECTION, SOLUTION INTRAVENOUS; SUBCUTANEOUS ONCE
Status: COMPLETED | OUTPATIENT
Start: 2020-01-13 | End: 2020-01-13

## 2020-01-13 RX ORDER — SODIUM CHLORIDE 9 MG/ML
INJECTION, SOLUTION INTRAVENOUS CONTINUOUS
Status: DISCONTINUED | OUTPATIENT
Start: 2020-01-13 | End: 2020-01-14

## 2020-01-13 RX ORDER — CEFAZOLIN SODIUM 1 G/3ML
INJECTION, POWDER, FOR SOLUTION INTRAMUSCULAR; INTRAVENOUS
Status: DISCONTINUED | OUTPATIENT
Start: 2020-01-13 | End: 2020-01-13

## 2020-01-13 RX ORDER — MORPHINE SULFATE 0.5 MG/ML
INJECTION, SOLUTION EPIDURAL; INTRATHECAL; INTRAVENOUS
Status: COMPLETED
Start: 2020-01-13 | End: 2020-01-13

## 2020-01-13 RX ORDER — SODIUM CITRATE AND CITRIC ACID MONOHYDRATE 334; 500 MG/5ML; MG/5ML
30 SOLUTION ORAL ONCE
Status: COMPLETED | OUTPATIENT
Start: 2020-01-13 | End: 2020-01-13

## 2020-01-13 RX ADMIN — OXYTOCIN 25 UNITS: 10 INJECTION, SOLUTION INTRAMUSCULAR; INTRAVENOUS at 10:01

## 2020-01-13 RX ADMIN — BUPIVACAINE HYDROCHLORIDE IN DEXTROSE 1.6 ML: 7.5 INJECTION, SOLUTION SUBARACHNOID at 10:01

## 2020-01-13 RX ADMIN — BETAMETHASONE SODIUM PHOSPHATE AND BETAMETHASONE ACETATE 12 MG: 3; 3 INJECTION, SUSPENSION INTRA-ARTICULAR; INTRALESIONAL; INTRAMUSCULAR at 02:01

## 2020-01-13 RX ADMIN — CEFAZOLIN 2 G: 330 INJECTION, POWDER, FOR SOLUTION INTRAMUSCULAR; INTRAVENOUS at 10:01

## 2020-01-13 RX ADMIN — FENTANYL CITRATE 10 MCG: 50 INJECTION, SOLUTION INTRAMUSCULAR; INTRAVENOUS at 10:01

## 2020-01-13 RX ADMIN — ONDANSETRON 8 MG: 2 INJECTION, SOLUTION INTRAMUSCULAR; INTRAVENOUS at 10:01

## 2020-01-13 RX ADMIN — INSULIN ASPART 2 UNITS: 100 INJECTION, SOLUTION INTRAVENOUS; SUBCUTANEOUS at 06:01

## 2020-01-13 RX ADMIN — DEXAMETHASONE SODIUM PHOSPHATE 4 MG: 4 INJECTION, SOLUTION INTRAMUSCULAR; INTRAVENOUS at 10:01

## 2020-01-13 RX ADMIN — INSULIN ASPART 2 UNITS: 100 INJECTION, SOLUTION INTRAVENOUS; SUBCUTANEOUS at 08:01

## 2020-01-13 RX ADMIN — SODIUM CITRATE AND CITRIC ACID MONOHYDRATE 30 ML: 500; 334 SOLUTION ORAL at 09:01

## 2020-01-13 RX ADMIN — PHENYLEPHRINE HYDROCHLORIDE 50 MCG/MIN: 10 INJECTION INTRAVENOUS at 10:01

## 2020-01-13 RX ADMIN — FAMOTIDINE 20 MG: 10 INJECTION, SOLUTION INTRAVENOUS at 09:01

## 2020-01-13 RX ADMIN — ACETAMINOPHEN 1000 MG: 10 INJECTION, SOLUTION INTRAVENOUS at 10:01

## 2020-01-13 RX ADMIN — INSULIN ASPART 2 UNITS: 100 INJECTION, SOLUTION INTRAVENOUS; SUBCUTANEOUS at 11:01

## 2020-01-13 RX ADMIN — SODIUM CHLORIDE, SODIUM LACTATE, POTASSIUM CHLORIDE, AND CALCIUM CHLORIDE 1000 ML: .6; .31; .03; .02 INJECTION, SOLUTION INTRAVENOUS at 09:01

## 2020-01-13 RX ADMIN — SODIUM CHLORIDE, SODIUM LACTATE, POTASSIUM CHLORIDE, AND CALCIUM CHLORIDE: 600; 310; 30; 20 INJECTION, SOLUTION INTRAVENOUS at 09:01

## 2020-01-13 RX ADMIN — Medication 0.1 MG: at 10:01

## 2020-01-13 RX ADMIN — INSULIN ASPART 6 UNITS: 100 INJECTION, SOLUTION INTRAVENOUS; SUBCUTANEOUS at 01:01

## 2020-01-13 NOTE — CARE UPDATE
Patient noted to have two large late decelerations with minimal variability. Now improved with no further decels and moderate variability, however, in light of comorbidities and non-reassuring FHT, will plan for delivery at this time. All teams and patient notified. Will move to OR shortly for CS delivery.    Sushma K. Reddy, MD  PGY-3, OBGYN

## 2020-01-13 NOTE — ANESTHESIA PREPROCEDURE EVALUATION
[facility]  Anesthesia Pre-Operative Evaluation       Patient Name: Wang Warren  YOB: 1980  MRN: [id number]    SUBJECTIVE:     Wang Warren is a 39 y.o. female  33w6d presenting for fetal bradycardia in prenatal testing.    Current pregnancy complicated by cHTN, DM2, morbid obesity, AMA, prev csx x1 for fetal intolerance.    Denies previous issues with neuraxial anesthesia.  Denies previous general anesthesia.  Denies family history of issues with anesthesia.    Denies hx of seizures, strokes, heart failure, smoking, asthma, COPD, acid reflux, liver disease, kidney disease, bleeding disorders, taking blood thinners, back surgery.  Lab Results   Component Value Date     2020       OB History    Para Term  AB Living   2 1 1     1   SAB TAB Ectopic Multiple Live Births         0 1      # Outcome Date GA Lbr Conrado/2nd Weight Sex Delivery Anes PTL Lv   2 Current            1 Term 18 37w1d  2.82 kg (6 lb 3.5 oz) F CS-LTranv EPI N ROBERTO      Complications: Fetal Intolerance       Past Surgical History:   Procedure Laterality Date     SECTION      none          Patient Active Problem List   Diagnosis    BMI 50.0-59.9, adult     delivery delivered    Insulin dependent diabetes mellitus    Diabetes mellitus type II, controlled, with no complications    Benign essential HTN    Class 3 severe obesity with body mass index (BMI) of 50.0 to 59.9 in adult    Iron deficiency anemia    Diabetes mellitus    Vitamin D insufficiency    Supervision of high risk pregnancy in second trimester    Pregnancy with type 2 diabetes mellitus in third trimester    33 weeks gestation of pregnancy       Review of patient's allergies indicates:  No Known Allergies    Social History     Socioeconomic History    Marital status:      Spouse name: Husam    Number of children: 0    Years of education: Not on file    Highest education  "level: Not on file   Occupational History    Occupation:     Occupation: Drives [employer]   Social Needs    Financial resource strain: Not on file    Food insecurity:     Worry: Not on file     Inability: Not on file    Transportation needs:     Medical: Not on file     Non-medical: Not on file   Tobacco Use    Smoking status: Never Smoker    Smokeless tobacco: Never Used   Substance and Sexual Activity    Alcohol use: No    Drug use: No    Sexual activity: Yes     Partners: Male     Birth control/protection: None   Lifestyle    Physical activity:     Days per week: Not on file     Minutes per session: Not on file    Stress: Not on file   Relationships    Social connections:     Talks on phone: Not on file     Gets together: Not on file     Attends [employer] service: Not on file     Active member of club or organization: Not on file     Attends meetings of clubs or organizations: Not on file     Relationship status: Not on file   Other Topics Concern    Not on file   Social History Narrative    Walks for exercise.     to Husam for five years.       OBJECTIVE:     Outpatient Medications:  No current facility-administered medications on file prior to encounter.      Current Outpatient Medications on File Prior to Encounter   Medication Sig Dispense Refill    BD INSULIN SYRINGE ULTRA-FINE 1 mL 31 gauge x 5/16 Syrg       BD ULTRA-FINE MINI PEN NEEDLE 31 gauge x 3/16" Ndle Use 3 (three) times daily with meals. 100 each 11    blood sugar diagnostic (ONETOUCH VERIO) Strp use to test blood sugar 4 times daily 100 each 3    ergocalciferol (VITAMIN D2) 50,000 unit Cap Take 1 capsule (50,000 Units total) by mouth every 7 days. 12 capsule 0    ferrous sulfate 325 mg (65 mg iron) Tab tablet TK 1 T PO daily  1    fluticasone (FLONASE) 50 mcg/actuation nasal spray 1 spray (50 mcg total) by Each Nare route 2 (two) times daily as needed for Rhinitis. 16 g 0    insulin (LANTUS SOLOSTAR U-100 INSULIN) " glargine 100 units/mL (3mL) SubQ pen Inject 15 Units into the skin every evening. 15 mL 3    insulin lispro (HUMALOG U-100 INSULIN) 100 unit/mL injection Inject 12 units under the skin with breakfast, 8 units with lunch and 10 units before dinner. 10 mL 3    insulin syringe-needle U-100 1 mL 31 gauge x 5/16 Syrg use for daily insulin injections; up to three times daily 100 each 1    lancets (ONETOUCH ULTRASOFT LANCETS) Misc 1 lancet by Misc.(Non-Drug; Combo Route) route 4 (four) times daily. 100 each 3    metFORMIN (GLUCOPHAGE) 500 MG tablet Take 1 tablet (500 mg total) by mouth 2 (two) times daily with meals. 180 tablet 1    NIFEdipine (PROCARDIA-XL) 60 MG (OSM) 24 hr tablet Take 1 tablet (60 mg total) by mouth once daily. 90 tablet 1    PRENATAL VIT CALC,IRON,FOLIC (PRENATAL VITAMIN ORAL) Take by mouth.          Current Inpatient Medications:   ferrous sulfate  325 mg Oral Daily    insulin aspart U-100  10 Units Subcutaneous with dinner    [START ON 1/14/2020] insulin aspart U-100  10 Units Subcutaneous with breakfast    insulin aspart U-100  6 Units Subcutaneous with lunch    [START ON 1/14/2020] insulin detemir U-100  13 Units Subcutaneous Daily    metFORMIN  500 mg Oral BID WM    [START ON 1/14/2020] NIFEdipine  60 mg Oral Daily    prenatal vitamin  1 tablet Oral Daily       Wt Readings from Last 1 Encounters:   01/09/20 0915 134.6 kg (296 lb 11.8 oz)       BP Readings from Last 3 Encounters:   01/13/20 132/75   01/09/20 129/70   01/09/20 138/88         Chemistry        Component Value Date/Time     01/13/2020 1131    K 3.9 01/13/2020 1131     01/13/2020 1131    CO2 24 01/13/2020 1131    BUN 12 01/13/2020 1131    CREATININE 0.8 01/13/2020 1131     (H) 01/13/2020 1131        Component Value Date/Time    CALCIUM 9.8 01/13/2020 1131    ALKPHOS 146 (H) 01/13/2020 1131    AST 9 (L) 01/13/2020 1131    ALT 15 01/13/2020 1131    BILITOT 0.3 01/13/2020 1131    ESTGFRAFRICA >60 01/13/2020  1131    EGFRNONAA >60 01/13/2020 1131            Lab Results   Component Value Date    WBC 7.21 01/13/2020    HGB 10.5 (L) 01/13/2020    HCT 33.9 (L) 01/13/2020    MCV 86 01/13/2020     01/13/2020       No results for input(s): PT, INR, PROTIME, APTT in the last 72 hours.      Anesthesia Evaluation    I have reviewed the Patient Summary Reports.    I have reviewed the Nursing Notes.   I have reviewed the Medications.     Review of Systems  Anesthesia Hx:  No problems with previous Anesthesia Denies Hx of Anesthetic complications  History of prior surgery of interest to airway management or planning: Denies Family Hx of Anesthesia complications.   Denies Personal Hx of Anesthesia complications.   Social:  Non-Smoker, No Alcohol Use    Hematology/Oncology:  Hematology Normal        EENT/Dental:EENT/Dental Normal   Cardiovascular:   Hypertension    Pulmonary:  Pulmonary Normal    Renal/:  Renal/ Normal     Hepatic/GI:  Hepatic/GI Normal    Neurological:  Neurology Normal    Endocrine:   Diabetes        Physical Exam  General:  Well nourished, Obesity, Morbid Obesity    Airway/Jaw/Neck:  Airway Findings: Mouth Opening: Normal Tongue: Normal  General Airway Assessment: Adult  Mallampati: III  TM Distance: Normal, at least 6 cm  Jaw/Neck Findings:  Neck ROM: Normal ROM  Neck Findings: Normal    Eyes/Ears/Nose:  EYES/EARS/NOSE FINDINGS: Normal   Dental:  Dental Findings: In tact   Chest/Lungs:  Chest/Lungs Findings: Clear to auscultation, Normal Respiratory Rate     Heart/Vascular:  Heart Findings: Rate: Normal  Rhythm: Regular Rhythm  Sounds: Normal  Heart murmur: negative       Mental Status:  Mental Status Findings:  Cooperative, Alert and Oriented         Anesthesia Plan  Type of Anesthesia, risks & benefits discussed:  Anesthesia Type:  epidural, CSE, general, spinal  Patient's Preference:   Intra-op Monitoring Plan: standard ASA monitors  Intra-op Monitoring Plan Comments:   Post Op Pain Control Plan:  multimodal analgesia, IV/PO Opioids PRN, epidural analgesia, per primary service following discharge from PACU and intrathecal opioid  Post Op Pain Control Plan Comments:   Induction:   IV  Beta Blocker:  Patient is not currently on a Beta-Blocker (No further documentation required).       Informed Consent: Patient understands risks and agrees with Anesthesia plan.  Questions answered. Anesthesia consent signed with patient.  ASA Score: 3     Day of Surgery Review of History & Physical:  There are no significant changes.  H&P update referred to the provider.         Ready For Surgery From Anesthesia Perspective.

## 2020-01-13 NOTE — ASSESSMENT & PLAN NOTE
-  in [facility]. Given 2u of insulin, bg now 214  - Home long and short acting insulin started:   - Aspart 10/6/10   - Detemir 13 QHS (takes glargine at home)  - Diabetic diet  - BG trending fasting, 2h pp, and QHS with BMZ administration  - Most recent growth at 33w2d at 15%, with prior growth at 27%.    - AC measured at 31w6d

## 2020-01-13 NOTE — ASSESSMENT & PLAN NOTE
- cHTN with elevated BP in the 150s systolic at 33w6d   - Took procardia 60XL as prescribed this morning  - Will admit for further BP and fetal monitoring, and BMZ administration  - Admit to antepartum   - Consents signed for both CS and  delivery and blood transfusion  - Pre-E Labs - P/C: 0.21, AST/ALT: /15, Plt: 312, Cr: 0.8  - 24 hour urine initiated  - Continuous monitoring  - Bps: (114-152)/(67-78) 114/69 . Will continue to monitor BPs.

## 2020-01-13 NOTE — ASSESSMENT & PLAN NOTE
- two large decelerations noted in prenatal testing with subsequent reactive/reassuring NST in [facility]  - continuous monitoring

## 2020-01-13 NOTE — H&P
[facility]  Obstetrics  History & Physical    Patient Name: Matilda Warren  MRN: [id number]  Admission Date: 2020  Primary Care Provider: Todd Miller MD    Subjective:     Principal Problem:Hypertension affecting pregnancy in third trimester    History of Present Illness:  Matilda Warren is a 39 y.o. [id number] at 33w6d who presents from prenatal testing due to NRFHT; she had two large decelerations in PNT and was immediately taken to the KUN for further evaluation. In the KUN, prolonged monitoring was initiated and NST was noted to be reactive and reassuring. She has no labor complaints. She was found to have blood pressures in the 150s systolic, prompting a pre-eclampsia workup. She had normal labs except for P/C ratio of 0.21. She has no symptoms of pre-eclampsia. The patient also has a h/o DM2 and most recent A1c in 2019 was 8.1.  Patient denies contractions, denies vaginal bleeding, denies LOF.   Fetal Movement: normal.   This IUP is complicated by DM2, CHTN, morbid obesity, iron deficiency anemia, and h/o CS x 1 for NRFHT.      OB History    Para Term  AB Living   2 1 1 0 0 1   SAB TAB Ectopic Multiple Live Births   0 0 0 0 1      # Outcome Date GA Lbr Conrado/2nd Weight Sex Delivery Anes PTL Lv   2 Current            1 Term 18 37w1d  2.82 kg (6 lb 3.5 oz) F CS-LTranv EPI N ROBERTO      Complications: Fetal Intolerance      Name: DAYNA GIRL MATILDA      Apgar1: 7  Apgar5: 8     Past Medical History:   Diagnosis Date    Chronic hypertension affecting pregnancy     Diabetes mellitus type II, controlled, with no complications 10/11/2017    Infertility, female     States she has been trying to conceive for four years. Was evaluated [facility] and was given Clomid for three months two years ago.    Iron deficiency anemia 2018     Past Surgical History:   Procedure Laterality Date     SECTION      none         PTA  "Medications   Medication Sig    BD INSULIN SYRINGE ULTRA-FINE 1 mL 31 gauge x 5/16 Syrg     BD ULTRA-FINE MINI PEN NEEDLE 31 gauge x 3/16" Ndle Use 3 (three) times daily with meals.    blood sugar diagnostic (ONETOUCH VERIO) Strp use to test blood sugar 4 times daily    ergocalciferol (VITAMIN D2) 50,000 unit Cap Take 1 capsule (50,000 Units total) by mouth every 7 days.    ferrous sulfate 325 mg (65 mg iron) Tab tablet TK 1 T PO daily    fluticasone (FLONASE) 50 mcg/actuation nasal spray 1 spray (50 mcg total) by Each Nare route 2 (two) times daily as needed for Rhinitis.    insulin (LANTUS SOLOSTAR U-100 INSULIN) glargine 100 units/mL (3mL) SubQ pen Inject 15 Units into the skin every evening.    insulin lispro (HUMALOG U-100 INSULIN) 100 unit/mL injection Inject 12 units under the skin with breakfast, 8 units with lunch and 10 units before dinner.    insulin syringe-needle U-100 1 mL 31 gauge x 5/16 Syrg use for daily insulin injections; up to three times daily    lancets (ONETOUCH ULTRASOFT LANCETS) Misc 1 lancet by Misc.(Non-Drug; Combo Route) route 4 (four) times daily.    metFORMIN (GLUCOPHAGE) 500 MG tablet Take 1 tablet (500 mg total) by mouth 2 (two) times daily with meals.    NIFEdipine (PROCARDIA-XL) 60 MG (OSM) 24 hr tablet Take 1 tablet (60 mg total) by mouth once daily.    PRENATAL VIT CALC,IRON,FOLIC (PRENATAL VITAMIN ORAL) Take by mouth.       Review of patient's allergies indicates:  No Known Allergies     Family History     Problem Relation (Age of Onset)    Cancer Father    Diabetes Maternal Aunt        Tobacco Use    Smoking status: Never Smoker    Smokeless tobacco: Never Used   Substance and Sexual Activity    Alcohol use: No    Drug use: No    Sexual activity: Yes     Partners: Male     Birth control/protection: None     Review of Systems   Constitutional: Negative for appetite change, chills and fever.   Eyes: Negative for visual disturbance.   Respiratory: Negative for " shortness of breath.    Cardiovascular: Negative for chest pain.   Gastrointestinal: Negative for abdominal pain, constipation, diarrhea, nausea and vomiting.   Genitourinary: Negative for dysuria, hematuria, pelvic pain, vaginal bleeding and vaginal discharge.   Musculoskeletal: Negative for back pain.   Neurological: Negative for headaches.   Psychiatric/Behavioral: Negative for depression.      Objective:     Vital Signs (Most Recent):  Temp: 97.3 °F (36.3 °C) (01/13/20 1340)  Pulse: 79 (01/13/20 1406)  Resp: 16 (01/13/20 1340)  BP: 114/69 (01/13/20 1341)  SpO2: 100 % (01/13/20 1403) Vital Signs (24h Range):  Temp:  [97.3 °F (36.3 °C)-98 °F (36.7 °C)] 97.3 °F (36.3 °C)  Pulse:  [73-83] 79  Resp:  [16-18] 16  SpO2:  [97 %-100 %] 100 %  BP: (114-152)/(67-78) 114/69        BMI: 54.27 kg/m^2    FHT: 130, moderate variability, + accels, - decels. Cat 1 (reassuring)  TOCO: None visible    Physical Exam:   Constitutional: She is oriented to person, place, and time. She appears well-developed and well-nourished. No distress.    HENT:   Head: Normocephalic and atraumatic.    Eyes: EOM are normal.    Neck: Normal range of motion.    Cardiovascular: Normal rate.     Pulmonary/Chest: Effort normal. No respiratory distress.        Abdominal: Soft. There is no tenderness. There is no rebound and no guarding.                 Neurological: She is alert and oriented to person, place, and time.    Skin: Skin is warm and dry. She is not diaphoretic.    Psychiatric: She has a normal mood and affect.       Cervix:  Deferred  Presentation:  Transverse, head somewhat down but not fully engaged in pelvis.     Significant Labs:  Lab Results   Component Value Date    GROUPTRH O POS 02/07/2018    HEPBSAG Negative 10/31/2019    STREPBCULT  01/24/2018     STREPTOCOCCUS AGALACTIAE (GROUP B)  Beta-hemolytic streptococci are routinely susceptible to   penicillins,cephalosporins and carbapenems.         CBC:   Recent Labs   Lab 01/13/20  1131    WBC 7.21   RBC 3.94*   HGB 10.5*   HCT 33.9*      MCV 86   MCH 26.6*   MCHC 31.0*     CMP:   Recent Labs   Lab 20  1131   *   CALCIUM 9.8   ALBUMIN 2.9*   PROT 7.2      K 3.9   CO2 24      BUN 12   CREATININE 0.8   ALKPHOS 146*   ALT 15   AST 9*   BILITOT 0.3     PCR: 0.21    Assessment/Plan:     39 y.o. female  at 33w6d for:    * Hypertension affecting pregnancy in third trimester  - cHTN with elevated BP in the 150s systolic at 33w6d   - Took procardia 60XL as prescribed this morning  - Will admit for further BP and fetal monitoring, and BMZ administration  - Admit to antepartum   - Consents signed for both CS and  delivery and blood transfusion  - Pre-E Labs - P/C: 0.21, AST/ALT: 9/15, Plt: 312, Cr: 0.8  - 24 hour urine initiated  - Continuous monitoring  - Bps: (114-152)/(67-78) 114/69 . Will continue to monitor BPs.     Non-reassuring fetal heart tones complicating pregnancy, antepartum  - two large decelerations noted in prenatal testing with subsequent reactive/reassuring NST in KUN  - continuous monitoring     Iron deficiency anemia  - h/h 10/34  - continue home iron    Class 3 severe obesity with body mass index (BMI) of 50.0 to 59.9 in adult  - Teds/SCDs for DVT PPX    Insulin dependent diabetes mellitus  -  in KUN. Given 2u of insulin, bg now 214  - Home long and short acting insulin started:   - Aspart 10/6/10   - Detemir 13 QHS (takes glargine at home)  - Diabetic diet  - BG trending fasting, 2h pp, and QHS with BMZ administration  - Most recent growth at 33w2d at 15%, with prior growth at 27%.    - AC measured at 31w6d        Sushma K Reddy, MD  Obstetrics  [facility]

## 2020-01-13 NOTE — ED PROVIDER NOTES
Encounter Date: 2020       History     Chief Complaint   Patient presents with    Non-stress Test     Wang Warren is a 39 y.o. [id number] at 33w6d presents from prenatal testing after prolonged decelerations noted.   This IUP is complicated by DMII, morbid obesity, AMA, cHTN, and history of  x 1.  Patient denies contractions, denies vaginal bleeding, denies LOF.   Fetal Movement: normal.        Review of patient's allergies indicates:  No Known Allergies  Past Medical History:   Diagnosis Date    Chronic hypertension affecting pregnancy     Diabetes mellitus type II, controlled, with no complications 10/11/2017    Infertility, female     States she has been trying to conceive for four years. Was evaluated at [facility] clinic and was given Clomid for three months two years ago.    Iron deficiency anemia 2018     Past Surgical History:   Procedure Laterality Date     SECTION      none       Family History   Problem Relation Age of Onset    Cancer Father         prostate    Diabetes Maternal Aunt     Breast cancer Neg Hx     Congenital heart disease Neg Hx      Social History     Tobacco Use    Smoking status: Never Smoker    Smokeless tobacco: Never Used   Substance Use Topics    Alcohol use: No    Drug use: No     Review of Systems   Constitutional: Negative for chills and fever.   HENT: Negative for facial swelling.    Eyes: Negative for visual disturbance.   Respiratory: Negative for chest tightness and shortness of breath.    Cardiovascular: Negative for chest pain.   Gastrointestinal: Negative for abdominal pain, nausea and vomiting.   Genitourinary: Negative for dysuria, vaginal bleeding and vaginal discharge.   Skin: Negative.    Neurological: Negative for headaches.   Psychiatric/Behavioral: Negative.        Physical Exam     Initial Vitals [20 0949]   BP Pulse Resp Temp SpO2   (!) 143/73 78 18 98 °F (36.7 °C) 100 %      MAP       --          Physical Exam    Vitals reviewed.  Constitutional: She appears well-developed and well-nourished.   HENT:   Head: Normocephalic and atraumatic.   Eyes: EOM are normal.   Neck: Normal range of motion. Neck supple.   Cardiovascular: Normal rate and regular rhythm.   Pulmonary/Chest: No respiratory distress.   Abdominal: Soft. There is no tenderness.   Genitourinary: Vagina normal and uterus normal.   Musculoskeletal: Normal range of motion.   Neurological: She is alert and oriented to person, place, and time.   Skin: Skin is warm and dry.   Psychiatric: She has a normal mood and affect.         ED Course   Obtain Fetal nonstress test (NST)  Date/Time: 1/13/2020 1:57 PM  Performed by: Chika Estevez MD  Authorized by: Sushma K Reddy, MD     Nonstress Test:     Decelerations:  None    Accelerations:  15 bpm    Baseline:  140    Contractions:  Not present  Biophysical Profile:     Nonstress Test Interpretation: reactive      Overall Impression:  Reassuring      Labs Reviewed   CBC W/ AUTO DIFFERENTIAL - Abnormal; Notable for the following components:       Result Value    RBC 3.94 (*)     Hemoglobin 10.5 (*)     Hematocrit 33.9 (*)     Mean Corpuscular Hemoglobin 26.6 (*)     Mean Corpuscular Hemoglobin Conc 31.0 (*)     Immature Granulocytes 0.8 (*)     Immature Grans (Abs) 0.06 (*)     All other components within normal limits   COMPREHENSIVE METABOLIC PANEL - Abnormal; Notable for the following components:    Glucose 212 (*)     Albumin 2.9 (*)     Alkaline Phosphatase 146 (*)     AST 9 (*)     All other components within normal limits   PROTEIN / CREATININE RATIO, URINE - Abnormal; Notable for the following components:    Protein, Urine Random 22 (*)     Prot/Creat Ratio, Ur 0.21 (*)     All other components within normal limits   POCT GLUCOSE - Abnormal; Notable for the following components:    POCT Glucose 244 (*)     All other components within normal limits          Imaging Results    None          Medical  Decision Making:   Differential Diagnosis:   Cat 2 tracing  ED Management:  NST x 1 hour > ensure reassuring tracing.  Cricket   Patient POCT > 250. 2 units of insulin given.   Due to elevated blood pressures, decreasing fetal growth, and borderline P/C ratio > [facility] recommends Steroids. Will admit to antepartum for monitoring of glucose levels during steroid admission.   Other:   I have discussed this case with another health care provider.       <> Summary of the Discussion: Dr. Hawley                   ED Course as of Jan 13 1430   Mon Jan 13, 2020   1057 Fetal decelerations in PNT  Decreased interval fetal growth on last US (15%)  2 hours of prolonged monitoring in KUN - if non reactive >> obtain BPP    [LB]      ED Course User Index  [LB] Colette Hawley MD                Clinical Impression:       ICD-10-CM ICD-9-CM   1. 33 weeks gestation of pregnancy Z3A.33 V22.2   2. Non-reassuring fetal heart rate with late deceleration O36.8390 659.70              Chika Estevez M.D.  [facility] PGY1               Chika Estevez MD  Resident  01/13/20 1352       Chika Estevez MD  Resident  01/13/20 1355

## 2020-01-13 NOTE — HOSPITAL COURSE
01/13/2020 - patient seen in KUN for NRFHT. FHT in KUN reactive and reassuring. Bps elevated, mild range but above patient's baseline. Pre-E workup done, PCR slightly elevated. 24 hour urine started. BMZ series started. Sliding scale initiated.

## 2020-01-13 NOTE — HPI
Wang Warren is a 39 y.o. [id number] at 33w6d who presents from prenatal testing due to NRFHT; she had two large decelerations in PNT and was immediately taken to the KUN for further evaluation. In the KUN, prolonged monitoring was initiated and NST was noted to be reactive and reassuring. She has no labor complaints. She was found to have blood pressures in the 150s systolic, prompting a pre-eclampsia workup. She had normal labs except for P/C ratio of 0.21. She has no symptoms of pre-eclampsia. The patient also has a h/o DM2 and most recent A1c in 2019 was 8.1.  Patient denies contractions, denies vaginal bleeding, denies LOF.   Fetal Movement: normal.   This IUP is complicated by DM2, CHTN, morbid obesity, iron deficiency anemia, and h/o CS x 1 for NRFHT.

## 2020-01-13 NOTE — SUBJECTIVE & OBJECTIVE
"  OB History    Para Term  AB Living   2 1 1 0 0 1   SAB TAB Ectopic Multiple Live Births   0 0 0 0 1      # Outcome Date GA Lbr Conrado/2nd Weight Sex Delivery Anes PTL Lv   2 Current            1 Term 18 37w1d  2.82 kg (6 lb 3.5 oz) F CS-LTranv EPI N ROBERTO      Complications: Fetal Intolerance      Name: DAYNA, GIRL SHOWNMINKE      Apgar1: 7  Apgar5: 8     Past Medical History:   Diagnosis Date    Chronic hypertension affecting pregnancy     Diabetes mellitus type II, controlled, with no complications 10/11/2017    Infertility, female     States she has been trying to conceive for four years. [facility] for [facility] two [facility].    Iron deficiency anemia 2018     Past Surgical History:   Procedure Laterality Date     SECTION      none         PTA Medications   Medication Sig    BD INSULIN SYRINGE ULTRA-FINE 1 mL 31 gauge x 5/16 Syrg     BD ULTRA-FINE MINI PEN NEEDLE 31 gauge x 3/16" Ndle Use 3 (three) times daily with meals.    blood sugar diagnostic (ONETOUCH VERIO) Strp use to test blood sugar 4 times daily    ergocalciferol (VITAMIN D2) 50,000 unit Cap Take 1 capsule (50,000 Units total) by mouth every 7 days.    ferrous sulfate 325 mg (65 mg iron) Tab tablet TK 1 T PO daily    fluticasone (FLONASE) 50 mcg/actuation nasal spray 1 spray (50 mcg total) by Each Nare route 2 (two) times daily as needed for Rhinitis.    insulin (LANTUS SOLOSTAR U-100 INSULIN) glargine 100 units/mL (3mL) SubQ pen Inject 15 Units into the skin every evening.    insulin lispro (HUMALOG U-100 INSULIN) 100 unit/mL injection Inject 12 units under the skin with breakfast, 8 units with lunch and 10 units before dinner.    insulin syringe-needle U-100 1 mL 31 gauge x 5/16 Syrg use for daily insulin injections; up to three times daily    lancets (ONETOUCH ULTRASOFT LANCETS) Misc 1 lancet by Misc.(Non-Drug; Combo Route) route 4 (four) times daily. "    metFORMIN (GLUCOPHAGE) 500 MG tablet Take 1 tablet (500 mg total) by mouth 2 (two) times daily with meals.    NIFEdipine (PROCARDIA-XL) 60 MG (OSM) 24 hr tablet Take 1 tablet (60 mg total) by mouth once daily.    PRENATAL VIT CALC,IRON,FOLIC (PRENATAL VITAMIN ORAL) Take by mouth.       Review of patient's allergies indicates:  No Known Allergies     Family History     Problem Relation (Age of Onset)    Cancer Father    Diabetes Maternal Aunt        Tobacco Use    Smoking status: Never Smoker    Smokeless tobacco: Never Used   Substance and Sexual Activity    Alcohol use: No    Drug use: No    Sexual activity: Yes     Partners: Male     Birth control/protection: None     Review of Systems   Constitutional: Negative for appetite change, chills and fever.   Eyes: Negative for visual disturbance.   Respiratory: Negative for shortness of breath.    Cardiovascular: Negative for chest pain.   Gastrointestinal: Negative for abdominal pain, constipation, diarrhea, nausea and vomiting.   Genitourinary: Negative for dysuria, hematuria, pelvic pain, vaginal bleeding and vaginal discharge.   Musculoskeletal: Negative for back pain.   Neurological: Negative for headaches.   Psychiatric/Behavioral: Negative for depression.      Objective:     Vital Signs (Most Recent):  Temp: 97.3 °F (36.3 °C) (01/13/20 1340)  Pulse: 79 (01/13/20 1406)  Resp: 16 (01/13/20 1340)  BP: 114/69 (01/13/20 1341)  SpO2: 100 % (01/13/20 1403) Vital Signs (24h Range):  Temp:  [97.3 °F (36.3 °C)-98 °F (36.7 °C)] 97.3 °F (36.3 °C)  Pulse:  [73-83] 79  Resp:  [16-18] 16  SpO2:  [97 %-100 %] 100 %  BP: (114-152)/(67-78) 114/69        BMI: 54.27 kg/m^2    FHT: 130, moderate variability, + accels, - decels. Cat 1 (reassuring)  TOCO: None visible    Physical Exam:   Constitutional: She is oriented to person, place, and time. She appears well-developed and well-nourished. No distress.    HENT:   Head: Normocephalic and atraumatic.    Eyes: EOM are  normal.    Neck: Normal range of motion.    Cardiovascular: Normal rate.     Pulmonary/Chest: Effort normal. No respiratory distress.        Abdominal: Soft. There is no tenderness. There is no rebound and no guarding.                 Neurological: She is alert and oriented to person, place, and time.    Skin: Skin is warm and dry. She is not diaphoretic.    Psychiatric: She has a normal mood and affect.       Cervix:  Deferred  Presentation:  Transverse, head somewhat down but not fully engaged in pelvis.     Significant Labs:  Lab Results   Component Value Date    GROUPTRH O POS 02/07/2018    HEPBSAG Negative 10/31/2019    STREPBCULT  01/24/2018     STREPTOCOCCUS AGALACTIAE (GROUP B)  Beta-hemolytic streptococci are routinely susceptible to   penicillins,cephalosporins and carbapenems.         CBC:   Recent Labs   Lab 01/13/20  1131   WBC 7.21   RBC 3.94*   HGB 10.5*   HCT 33.9*      MCV 86   MCH 26.6*   MCHC 31.0*     CMP:   Recent Labs   Lab 01/13/20  1131   *   CALCIUM 9.8   ALBUMIN 2.9*   PROT 7.2      K 3.9   CO2 24      BUN 12   CREATININE 0.8   ALKPHOS 146*   ALT 15   AST 9*   BILITOT 0.3     PCR: 0.21

## 2020-01-14 LAB
BASOPHILS # BLD AUTO: 0.02 K/UL (ref 0–0.2)
BASOPHILS NFR BLD: 0.2 % (ref 0–1.9)
DIFFERENTIAL METHOD: ABNORMAL
EOSINOPHIL # BLD AUTO: 0 K/UL (ref 0–0.5)
EOSINOPHIL NFR BLD: 0.2 % (ref 0–8)
ERYTHROCYTE [DISTWIDTH] IN BLOOD BY AUTOMATED COUNT: 13.4 % (ref 11.5–14.5)
HCT VFR BLD AUTO: 29.7 % (ref 37–48.5)
HGB BLD-MCNC: 9.5 G/DL (ref 12–16)
HIV 1+2 AB+HIV1 P24 AG SERPL QL IA: NEGATIVE
IMM GRANULOCYTES # BLD AUTO: 0.1 K/UL (ref 0–0.04)
IMM GRANULOCYTES NFR BLD AUTO: 0.8 % (ref 0–0.5)
LYMPHOCYTES # BLD AUTO: 1.6 K/UL (ref 1–4.8)
LYMPHOCYTES NFR BLD: 12 % (ref 18–48)
MCH RBC QN AUTO: 27.5 PG (ref 27–31)
MCHC RBC AUTO-ENTMCNC: 32 G/DL (ref 32–36)
MCV RBC AUTO: 86 FL (ref 82–98)
MONOCYTES # BLD AUTO: 0.7 K/UL (ref 0.3–1)
MONOCYTES NFR BLD: 5.7 % (ref 4–15)
NEUTROPHILS # BLD AUTO: 10.6 K/UL (ref 1.8–7.7)
NEUTROPHILS NFR BLD: 81.1 % (ref 38–73)
NRBC BLD-RTO: 0 /100 WBC
PLATELET # BLD AUTO: 307 K/UL (ref 150–350)
PMV BLD AUTO: 11.2 FL (ref 9.2–12.9)
POCT GLUCOSE: 122 MG/DL (ref 70–110)
POCT GLUCOSE: 129 MG/DL (ref 70–110)
POCT GLUCOSE: 133 MG/DL (ref 70–110)
POCT GLUCOSE: 183 MG/DL (ref 70–110)
RBC # BLD AUTO: 3.45 M/UL (ref 4–5.4)
WBC # BLD AUTO: 13.06 K/UL (ref 3.9–12.7)

## 2020-01-14 PROCEDURE — 27201108 HC SURGICEL

## 2020-01-14 PROCEDURE — 99232 PR SUBSEQUENT HOSPITAL CARE,LEVL II: ICD-10-PCS | Mod: ,,, | Performed by: OBSTETRICS & GYNECOLOGY

## 2020-01-14 PROCEDURE — 99232 SBSQ HOSP IP/OBS MODERATE 35: CPT | Mod: ,,, | Performed by: OBSTETRICS & GYNECOLOGY

## 2020-01-14 PROCEDURE — 63600175 PHARM REV CODE 636 W HCPCS: Performed by: UROLOGY

## 2020-01-14 PROCEDURE — 63600175 PHARM REV CODE 636 W HCPCS: Performed by: STUDENT IN AN ORGANIZED HEALTH CARE EDUCATION/TRAINING PROGRAM

## 2020-01-14 PROCEDURE — 51702 INSERT TEMP BLADDER CATH: CPT

## 2020-01-14 PROCEDURE — 85025 COMPLETE CBC W/AUTO DIFF WBC: CPT

## 2020-01-14 PROCEDURE — 11000001 HC ACUTE MED/SURG PRIVATE ROOM

## 2020-01-14 PROCEDURE — 25000003 PHARM REV CODE 250: Performed by: STUDENT IN AN ORGANIZED HEALTH CARE EDUCATION/TRAINING PROGRAM

## 2020-01-14 PROCEDURE — 36415 COLL VENOUS BLD VENIPUNCTURE: CPT

## 2020-01-14 PROCEDURE — 25000003 PHARM REV CODE 250: Performed by: UROLOGY

## 2020-01-14 PROCEDURE — C9399 UNCLASSIFIED DRUGS OR BIOLOG: HCPCS | Performed by: STUDENT IN AN ORGANIZED HEALTH CARE EDUCATION/TRAINING PROGRAM

## 2020-01-14 RX ORDER — INSULIN ASPART 100 [IU]/ML
3 INJECTION, SOLUTION INTRAVENOUS; SUBCUTANEOUS
Status: DISCONTINUED | OUTPATIENT
Start: 2020-01-14 | End: 2020-01-17 | Stop reason: HOSPADM

## 2020-01-14 RX ORDER — INSULIN ASPART 100 [IU]/ML
5 INJECTION, SOLUTION INTRAVENOUS; SUBCUTANEOUS
Status: DISCONTINUED | OUTPATIENT
Start: 2020-01-14 | End: 2020-01-17 | Stop reason: HOSPADM

## 2020-01-14 RX ORDER — OXYCODONE HYDROCHLORIDE 5 MG/1
10 TABLET ORAL EVERY 4 HOURS PRN
Status: DISPENSED | OUTPATIENT
Start: 2020-01-14 | End: 2020-01-15

## 2020-01-14 RX ORDER — GLUCAGON 1 MG
1 KIT INJECTION
Status: DISCONTINUED | OUTPATIENT
Start: 2020-01-14 | End: 2020-01-14

## 2020-01-14 RX ORDER — INSULIN ASPART 100 [IU]/ML
3 INJECTION, SOLUTION INTRAVENOUS; SUBCUTANEOUS
Status: DISCONTINUED | OUTPATIENT
Start: 2020-01-14 | End: 2020-01-14

## 2020-01-14 RX ORDER — GLUCAGON 1 MG
1 KIT INJECTION
Status: DISCONTINUED | OUTPATIENT
Start: 2020-01-14 | End: 2020-01-17 | Stop reason: HOSPADM

## 2020-01-14 RX ORDER — IBUPROFEN 200 MG
16 TABLET ORAL
Status: DISCONTINUED | OUTPATIENT
Start: 2020-01-14 | End: 2020-01-17 | Stop reason: HOSPADM

## 2020-01-14 RX ORDER — PRENATAL WITH FERROUS FUM AND FOLIC ACID 3080; 920; 120; 400; 22; 1.84; 3; 20; 10; 1; 12; 200; 27; 25; 2 [IU]/1; [IU]/1; MG/1; [IU]/1; MG/1; MG/1; MG/1; MG/1; MG/1; MG/1; UG/1; MG/1; MG/1; MG/1; MG/1
1 TABLET ORAL DAILY
Status: DISCONTINUED | OUTPATIENT
Start: 2020-01-14 | End: 2020-01-17 | Stop reason: HOSPADM

## 2020-01-14 RX ORDER — HYDROCODONE BITARTRATE AND ACETAMINOPHEN 5; 325 MG/1; MG/1
1 TABLET ORAL EVERY 4 HOURS PRN
Status: DISCONTINUED | OUTPATIENT
Start: 2020-01-14 | End: 2020-01-17 | Stop reason: HOSPADM

## 2020-01-14 RX ORDER — HYDROCODONE BITARTRATE AND ACETAMINOPHEN 10; 325 MG/1; MG/1
1 TABLET ORAL EVERY 4 HOURS PRN
Status: DISCONTINUED | OUTPATIENT
Start: 2020-01-14 | End: 2020-01-17 | Stop reason: HOSPADM

## 2020-01-14 RX ORDER — OXYCODONE HYDROCHLORIDE 5 MG/1
5 TABLET ORAL EVERY 4 HOURS PRN
Status: DISPENSED | OUTPATIENT
Start: 2020-01-14 | End: 2020-01-15

## 2020-01-14 RX ORDER — DOCUSATE SODIUM 100 MG/1
200 CAPSULE, LIQUID FILLED ORAL 2 TIMES DAILY
Status: DISCONTINUED | OUTPATIENT
Start: 2020-01-14 | End: 2020-01-17 | Stop reason: HOSPADM

## 2020-01-14 RX ORDER — ADHESIVE BANDAGE
30 BANDAGE TOPICAL 2 TIMES DAILY PRN
Status: DISCONTINUED | OUTPATIENT
Start: 2020-01-14 | End: 2020-01-17 | Stop reason: HOSPADM

## 2020-01-14 RX ORDER — SIMETHICONE 80 MG
1 TABLET,CHEWABLE ORAL EVERY 6 HOURS PRN
Status: DISCONTINUED | OUTPATIENT
Start: 2020-01-14 | End: 2020-01-17 | Stop reason: HOSPADM

## 2020-01-14 RX ORDER — BISACODYL 10 MG
10 SUPPOSITORY, RECTAL RECTAL ONCE AS NEEDED
Status: DISCONTINUED | OUTPATIENT
Start: 2020-01-14 | End: 2020-01-17 | Stop reason: HOSPADM

## 2020-01-14 RX ORDER — IBUPROFEN 200 MG
24 TABLET ORAL
Status: DISCONTINUED | OUTPATIENT
Start: 2020-01-14 | End: 2020-01-17 | Stop reason: HOSPADM

## 2020-01-14 RX ORDER — ONDANSETRON 2 MG/ML
4 INJECTION INTRAMUSCULAR; INTRAVENOUS EVERY 6 HOURS PRN
Status: ACTIVE | OUTPATIENT
Start: 2020-01-14 | End: 2020-01-15

## 2020-01-14 RX ORDER — INSULIN ASPART 100 [IU]/ML
1-10 INJECTION, SOLUTION INTRAVENOUS; SUBCUTANEOUS
Status: DISCONTINUED | OUTPATIENT
Start: 2020-01-14 | End: 2020-01-17 | Stop reason: HOSPADM

## 2020-01-14 RX ORDER — ONDANSETRON 8 MG/1
8 TABLET, ORALLY DISINTEGRATING ORAL EVERY 8 HOURS PRN
Status: DISCONTINUED | OUTPATIENT
Start: 2020-01-14 | End: 2020-01-17 | Stop reason: HOSPADM

## 2020-01-14 RX ORDER — ACETAMINOPHEN 325 MG/1
650 TABLET ORAL EVERY 6 HOURS
Status: DISPENSED | OUTPATIENT
Start: 2020-01-14 | End: 2020-01-15

## 2020-01-14 RX ORDER — IBUPROFEN 600 MG/1
600 TABLET ORAL EVERY 6 HOURS
Status: DISCONTINUED | OUTPATIENT
Start: 2020-01-14 | End: 2020-01-17 | Stop reason: HOSPADM

## 2020-01-14 RX ORDER — ENOXAPARIN SODIUM 100 MG/ML
40 INJECTION SUBCUTANEOUS EVERY 24 HOURS
Status: DISCONTINUED | OUTPATIENT
Start: 2020-01-14 | End: 2020-01-17 | Stop reason: HOSPADM

## 2020-01-14 RX ORDER — HYDROCORTISONE 25 MG/G
CREAM TOPICAL 3 TIMES DAILY PRN
Status: DISCONTINUED | OUTPATIENT
Start: 2020-01-14 | End: 2020-01-17 | Stop reason: HOSPADM

## 2020-01-14 RX ORDER — SODIUM CHLORIDE, SODIUM LACTATE, POTASSIUM CHLORIDE, CALCIUM CHLORIDE 600; 310; 30; 20 MG/100ML; MG/100ML; MG/100ML; MG/100ML
INJECTION, SOLUTION INTRAVENOUS CONTINUOUS
Status: ACTIVE | OUTPATIENT
Start: 2020-01-14 | End: 2020-01-15

## 2020-01-14 RX ORDER — AMOXICILLIN 250 MG
1 CAPSULE ORAL NIGHTLY PRN
Status: DISCONTINUED | OUTPATIENT
Start: 2020-01-14 | End: 2020-01-17 | Stop reason: HOSPADM

## 2020-01-14 RX ORDER — OXYTOCIN/RINGER'S LACTATE 30/500 ML
95 PLASTIC BAG, INJECTION (ML) INTRAVENOUS ONCE
Status: DISCONTINUED | OUTPATIENT
Start: 2020-01-14 | End: 2020-01-17 | Stop reason: HOSPADM

## 2020-01-14 RX ORDER — MUPIROCIN 20 MG/G
OINTMENT TOPICAL 2 TIMES DAILY
Status: DISCONTINUED | OUTPATIENT
Start: 2020-01-14 | End: 2020-01-17 | Stop reason: HOSPADM

## 2020-01-14 RX ORDER — DIPHENHYDRAMINE HCL 25 MG
25 CAPSULE ORAL EVERY 4 HOURS PRN
Status: DISCONTINUED | OUTPATIENT
Start: 2020-01-14 | End: 2020-01-17 | Stop reason: HOSPADM

## 2020-01-14 RX ORDER — METFORMIN HYDROCHLORIDE 500 MG/1
500 TABLET ORAL 2 TIMES DAILY WITH MEALS
Status: DISCONTINUED | OUTPATIENT
Start: 2020-01-14 | End: 2020-01-17 | Stop reason: HOSPADM

## 2020-01-14 RX ORDER — INSULIN ASPART 100 [IU]/ML
1-10 INJECTION, SOLUTION INTRAVENOUS; SUBCUTANEOUS EVERY 6 HOURS PRN
Status: DISCONTINUED | OUTPATIENT
Start: 2020-01-14 | End: 2020-01-14

## 2020-01-14 RX ORDER — NIFEDIPINE 30 MG/1
60 TABLET, EXTENDED RELEASE ORAL DAILY
Status: DISCONTINUED | OUTPATIENT
Start: 2020-01-14 | End: 2020-01-17 | Stop reason: HOSPADM

## 2020-01-14 RX ORDER — KETOROLAC TROMETHAMINE 30 MG/ML
30 INJECTION, SOLUTION INTRAMUSCULAR; INTRAVENOUS EVERY 6 HOURS
Status: COMPLETED | OUTPATIENT
Start: 2020-01-14 | End: 2020-01-14

## 2020-01-14 RX ADMIN — INSULIN ASPART 3 UNITS: 100 INJECTION, SOLUTION INTRAVENOUS; SUBCUTANEOUS at 02:01

## 2020-01-14 RX ADMIN — ACETAMINOPHEN 650 MG: 325 TABLET ORAL at 04:01

## 2020-01-14 RX ADMIN — KETOROLAC TROMETHAMINE 30 MG: 30 INJECTION, SOLUTION INTRAMUSCULAR at 04:01

## 2020-01-14 RX ADMIN — DOCUSATE SODIUM 200 MG: 100 CAPSULE, LIQUID FILLED ORAL at 08:01

## 2020-01-14 RX ADMIN — OXYCODONE HYDROCHLORIDE 10 MG: 5 TABLET ORAL at 07:01

## 2020-01-14 RX ADMIN — ACETAMINOPHEN 650 MG: 325 TABLET ORAL at 03:01

## 2020-01-14 RX ADMIN — INSULIN ASPART 5 UNITS: 100 INJECTION, SOLUTION INTRAVENOUS; SUBCUTANEOUS at 08:01

## 2020-01-14 RX ADMIN — ACETAMINOPHEN 650 MG: 325 TABLET ORAL at 10:01

## 2020-01-14 RX ADMIN — DOCUSATE SODIUM 200 MG: 100 CAPSULE, LIQUID FILLED ORAL at 10:01

## 2020-01-14 RX ADMIN — INSULIN ASPART 2 UNITS: 100 INJECTION, SOLUTION INTRAVENOUS; SUBCUTANEOUS at 12:01

## 2020-01-14 RX ADMIN — MUPIROCIN: 20 OINTMENT TOPICAL at 08:01

## 2020-01-14 RX ADMIN — OXYCODONE HYDROCHLORIDE 10 MG: 5 TABLET ORAL at 02:01

## 2020-01-14 RX ADMIN — NIFEDIPINE 60 MG: 30 TABLET, FILM COATED, EXTENDED RELEASE ORAL at 08:01

## 2020-01-14 RX ADMIN — KETOROLAC TROMETHAMINE 30 MG: 30 INJECTION, SOLUTION INTRAMUSCULAR at 03:01

## 2020-01-14 RX ADMIN — INSULIN DETEMIR 6 UNITS: 100 INJECTION, SOLUTION SUBCUTANEOUS at 10:01

## 2020-01-14 RX ADMIN — PRENATAL VIT W/ FE FUMARATE-FA TAB 27-0.8 MG 1 TABLET: 27-0.8 TAB at 08:01

## 2020-01-14 RX ADMIN — KETOROLAC TROMETHAMINE 30 MG: 30 INJECTION, SOLUTION INTRAMUSCULAR at 10:01

## 2020-01-14 RX ADMIN — METFORMIN HYDROCHLORIDE 500 MG: 500 TABLET ORAL at 08:01

## 2020-01-14 RX ADMIN — ENOXAPARIN SODIUM 40 MG: 100 INJECTION SUBCUTANEOUS at 05:01

## 2020-01-14 RX ADMIN — MUPIROCIN: 20 OINTMENT TOPICAL at 10:01

## 2020-01-14 RX ADMIN — IBUPROFEN 600 MG: 600 TABLET, FILM COATED ORAL at 10:01

## 2020-01-14 RX ADMIN — INSULIN ASPART 5 UNITS: 100 INJECTION, SOLUTION INTRAVENOUS; SUBCUTANEOUS at 07:01

## 2020-01-14 RX ADMIN — METFORMIN HYDROCHLORIDE 500 MG: 500 TABLET ORAL at 07:01

## 2020-01-14 RX ADMIN — OXYCODONE HYDROCHLORIDE 5 MG: 5 TABLET ORAL at 07:01

## 2020-01-14 NOTE — NURSING
0015 rn called to Dr. Sierra to report POCT glucose result in rec. Room is 211. rec'd order to continue to do accuchecks every 2 hours and to use sliding scale insulin per md order.

## 2020-01-14 NOTE — TRANSFER OF CARE
"Anesthesia Transfer of Care Note    Patient: Wang Warren    Procedure(s) Performed: Procedure(s) (LRB):   SECTION (N/A)    Patient location: Labor and Delivery    Anesthesia Type: CSE    Transport from OR: Transported from OR on room air with adequate spontaneous ventilation    Post pain: adequate analgesia    Post assessment: tolerated procedure well and no apparent anesthetic complications    Post vital signs: stable    Level of consciousness: awake, alert and oriented    Nausea/Vomiting: no nausea/vomiting    Complications: none    Transfer of care protocol was followed      Last vitals:   Visit Vitals  /68   Pulse 81   Temp 37.1 °C (98.8 °F) (Temporal)   Resp 18   Ht 5' 2" (1.575 m)   Wt 133.8 kg (295 lb)   LMP 2019   SpO2 97%   Breastfeeding? Yes   BMI 53.96 kg/m²     "

## 2020-01-14 NOTE — PROGRESS NOTES
POSTPARTUM PROGRESS NOTE     Wang Warren is a 39 y.o. female POD #1 status post Repeat  section at 33w6d for NRFHT in a pregnancy complicated by cHTN, DM2, PRASHANT, MO. Patient is doing well this morning. She denies nausea, vomiting, fever or chills.  Patient reports mild abdominal pain that is adequately relieved by oral pain medications. Lochia is mild to moderate  and stable. Patient still has a alves in place and has no yet ambulated. She has passed flatus.  Patient does plan to breast feed, but is currently pumping for baby in the NICU. Dr. Valenzuela will manage contraception.    Objective:       Temp:  [97.2 °F (36.2 °C)-98.8 °F (37.1 °C)] 98.4 °F (36.9 °C)  Pulse:  [66-92] 74  Resp:  [16-18] 18  SpO2:  [90 %-100 %] 98 %  BP: (114-152)/(57-81) 120/57    General:   alert, appears stated age and cooperative   Lungs:   Non-labored respirations    Heart:   regular rate and rhythm   Abdomen:  Soft, nondistended    Uterus:  firm located at the umblicus.    Incision: Bandage in place, clean, dry and intact   Extremities: no pedal edema noted     Lab Review  No results found for this or any previous visit (from the past 4 hour(s)).    I/O    Intake/Output Summary (Last 24 hours) at 2020 0645  Last data filed at 2020 0526  Gross per 24 hour   Intake --   Output 1670.4 ml   Net -1670.4 ml        Assessment:     Patient Active Problem List   Diagnosis    BMI 50.0-59.9, adult     delivery delivered    Insulin dependent diabetes mellitus    Diabetes mellitus type II, controlled, with no complications    Benign essential HTN    Class 3 severe obesity with body mass index (BMI) of 50.0 to 59.9 in adult    Iron deficiency anemia    Diabetes mellitus    Vitamin D insufficiency    Supervision of high risk pregnancy in second trimester    Pregnancy with type 2 diabetes mellitus in third trimester    33 weeks gestation of pregnancy    Hypertension affecting pregnancy in third  trimester    Non-reassuring fetal heart tones complicating pregnancy, antepartum    S/P repeat low transverse         Plan:   1. Postpartum care:  - Patient doing well. Continue routine management and advances.  - Continue PO pain meds. Pain well controlled.  - Heme: H/h 10/34 >>> morning pending  - Encourage ambulation  - Circumcision: deferred for now as baby is in NICU  - Contraception to be discussed at 6 week pp visit  - Lactation consult PRN    2. DMII  - Last 24 hour B-211  - Aspart 5/3/5  - Levemir 6u QHS  - Sliding scale insulin   - Metformin 500 BID    3. cHTN  - Procardia 60 XL  - BP: (114-152)/(57-81) 120/57  - P/C: 0.21, Cr: 0.8, LFTs wnl    4. Morbid Obesity   - Lovenox ppx   - TEDs/SCDs        Dispo: As patient meets milestones, will plan to discharge POD#2-#4.    Chika Estevez M.D.  [facility] PGY1    Doing well, no questions,no problems. /57 no symptoms  I have reviewed the resident's note, evaluated the patient and agree with the diagnosis and management plan

## 2020-01-14 NOTE — PLAN OF CARE
Pump at least 8 or more times in a 24 hour day. Pumping both breast at once is best. Wash kit after every use by rinsing with cold water, wash with hot soapy water, rinse with cold water and air dry on paper towels.Sterilize kit once a day with Volex sterilizer bag. Label each breastmilk container with baby label . Write on label: date, time and medications taken.Refrigerate breastmilk in the [facility] fridge or bring it to the NICU when visiting. Pump at babies bedside if able to get out of bed. Watch baby on NICVUE if unable to go to the NICU. If you need more labels or containers ask nurse. IF you have any questions about pumping please ask your nurse or lactation consultant.

## 2020-01-14 NOTE — L&D DELIVERY NOTE
Section Operative Note  Procedure Date: 20    Procedure: Repeat  Section via Pfannenstiel skin incision    Indications:   1. Non-reassuring fetal heart tones     Pre-operative Diagnosis:   1. IUP at 33 week 6 day pregnancy  2. Chronic hypertension  3. Iron deficiency anemia  4. Insulin dependent diabetes mellitus   5. Maternal obesity - BMI: 54    Post-operative Diagnosis:   1. same  2. S/p RLTCS    Surgeon: Linda Herrera MD    Assistants: Jojo Candelaria, PGY-2    Anesthesia: Spinal anesthesia    Findings:   1. Viable male infant in cephalic presentation  2. Normal appearing placenta, discarded  3. Anatomically normal appearing uterus, bilateral tubes and ovaries    Estimated Blood Loss: 500 mL recorded, clinically approximately 800 ccs           Total IV Fluids: 1000 ccs     UOP: 500 mL    Specimens: single viable male infant , Placenta which was discarded    PreOp CBC:   Lab Results   Component Value Date    WBC 9.20 2020    HGB 10.2 (L) 2020    HCT 32.0 (L) 2020    MCV 85 2020     2020                     Complications:  None; patient tolerated the procedure well.           Disposition: PACU - hemodynamically stable.           Condition: stable    Procedure Details   Patient initially presented today to the [facility] due to prolonged decelerations x2 noted in prenatal testing. While monitoring, patient had no further decelerations. In addition, while the patient does have chronic hypertension, her blood pressures were elevated slightly higher than normal prompting inpatient admission for 24 hour urine and steroid administration. During continued monitoring, she was noted to have additional two large late decelerations with minimal variability. In light of co-morbidities and recent non-reassuring fetal heart rate tracing, decision was made to proceed with repeat LTCS as mode of delivery. Risks, benefits and alternatives were discussed with patient and she  wished to proceed.     After induction of anesthesia, the patient was prepped and draped in the usual sterile manner while placed in a dorsal supine position with a left lateral tilt.  A alves catheter was also placed per nursing. Preoperative antibiotics were administered and an allis test was performed yielding adequate anesthesia.  A Pfannenstiel incision was made and carried down through the subcutaneous tissue to the fascia. Fascial incision was made and extended transversely. The fascia was grasped with Kocher clamps and  from the underlying rectus tissue superiorly and inferiorly. The peritoneum was identified, found to be free of adherent bowel and entered with assistance of Hemostats and Metzenbaum scissors. Peritoneal incision was extended longitudinally. The vesico-uterine peritoneum was identified and bladder blade was inserted. Additional dissection was necessary through the peritoneum inferiorly and laterally with use of Bovie for adequate visualization. A low transverse uterine incision was made with knife and extended with cephalocaudal traction. The amniotic sac was ruptured at time of uterine incision, and the infant was noted to be in cephalic position. The head was brought to the incision and elevated out of the pelvis. The patient delivered a single viable male infant without difficulty.  Infant weighed 1870 grams with Apgar scores of 8/8 at one and five minutes respectively. After the umbilical cord was clamped and cut cord blood was obtained for evaluation. The placenta was removed intact and appeared normal and was discarded. The uterus was exteriorized. The uterine outline, tubes and ovaries appeared normal. The uterine incision was closed with running locked sutures of 0 Vicryl. Due to continued additional oozing noted from hysterotomy, a figure of eight suture of 0_Vicryl was placed at the midportion of the incision, and then Surgicel was placed over the hysterotomy. Hemostasis  was observed after placement. The uterus was returned to the abdominal cavity. Incision was reinspected and small area of additional oozing from denuded tissue was noted.  A small remaining portion of Surgical was placed in the area with good hemostasis noted afterwards. The abdominal cavity was irrigated to remove clots. The peritoneum and rectus muscle was reapproximated with 0 vicryl. The fascia was then reapproximated with running sutures of 0 Vicryl. The subcutaneous fat was reapproximated with interrupted sutures of 2-0 vicryl. The skin was reapproximated with 4-0 monocryl in subcuticular fashion.    Instrument, sponge, and needle counts were correct prior the abdominal closure and at the conclusion of the case.     Pt tolerated procedure well and was taken to recovery in stable condition    Jojo Candelaria MD  OB/GYN  PGY-2    I WAS SCRUBBED AND PRESENT THROUGHOUT THE PROCEDURE    Delivery Information for Anderson Warren    Birth information:  YOB: 2021   Time of birth: 10:38 PM   Sex: male   Head Delivery Date/Time: 2020 10:38 PM   Delivery type: , Low Transverse   Gestational Age: 33w6d    Delivery Providers    Delivering clinician:  Jojo Candelaria MD   Provider Role    Linda Herrera MD             Measurements    Weight:  1870 g  Length:  44.5 cm         Apgars    Living status:  Living  Apgars:   1 min.:   5 min.:   10 min.:   15 min.:   20 min.:     Skin color:          Heart rate:          Reflex irritability:          Muscle tone:          Respiratory effort:          Total:   8 8      Apgars assigned by:  NICU         Operative Delivery    Forceps attempted?:  No  Vacuum extractor attempted?:  No         Shoulder Dystocia    Shoulder dystocia present?:  No           Presentation    Presentation:  Vertex  Position:  Left Occiput Anterior           Interventions/Resuscitation    Method:  NICU Attended       Cord    Vessels:  3 vessels  Complications:   None  Delayed Cord Clamping?:  No  Cord Blood Disposition:  Sent with Baby  Gases Sent?:  Yes  Stem Cell Collection (by MD):  No       Placenta    Placenta delivery date/time:  2020  Placenta removal:  Manual removal  Placenta appearance:  Intact  Placenta disposition:  discarded           Labor Events:       labor:       Labor Onset Date/Time:         Dilation Complete Date/Time:         Start Pushing Date/Time:         Start Pushing Date/Time:       Rupture Date/Time:              Rupture type:           Fluid Amount:        Fluid Color:        Fluid Odor:        Membrane Status:                 steroids:       Antibiotics given for GBS:       Induction:       Indications for induction:        Augmentation:       Indications for augmentation:       Labor complications:       Additional complications:          Cervical ripening:                     Delivery:      Episiotomy:       Indication for Episiotomy:       Perineal Lacerations:   Repaired:      Periurethral Laceration:   Repaired:     Labial Laceration:   Repaired:     Sulcus Laceration:   Repaired:     Vaginal Laceration:   Repaired:     Cervical Laceration:   Repaired:     Repair suture:       Repair # of packets:       Last Value - EBL - Nursing (mL):       Sum - EBL - Nursing (mL): 0     Last Value - EBL - Anesthesia (mL):      Calculated QBL (mL): 500      Vaginal Sweep Performed:       Surgicount Correct:         Other providers:       Anesthesia    Method:  Spinal          Details (if applicable):  Trial of Labor No    Categorization: Repeat    Priority: Routine   Indications for : Repeat Section;Other (Add Comments)   Incision Type: low transverse     Additional  information:  Forceps:    Vacuum:    Breech:    Observed anomalies    Other (Comments):

## 2020-01-14 NOTE — ANESTHESIA PROCEDURE NOTES
CSE    Patient location during procedure: OR  Start time: 1/13/2020 9:55 PM  Timeout: 1/13/2020 9:55 PM  End time: 1/13/2020 10:05 PM    Staffing  Authorizing Provider: Mika Fuller MD  Performing Provider: Rui Watt MD    Preanesthetic Checklist  Completed: patient identified, site marked, surgical consent, pre-op evaluation, timeout performed, IV checked, risks and benefits discussed and monitors and equipment checked  CSE  Patient position: sitting  Prep: ChloraPrep  Patient monitoring: cardiac monitor, continuous pulse ox and heart rate  Approach: midline  Spinal Needle  Needle type: pencil-tip   Needle gauge: 25 G  Needle length: 3.5 in  Epidural Needle  Injection technique: KADI air  Needle type: Tuohy   Needle gauge: 17 G  Needle length: 3.5 in  Needle insertion depth: 9 cm  Location: L2-3  Catheter  Catheter type: [employer]  Catheter size: 19 G  Catheter at skin depth: 15 cm  Additional Documentation: incremental injection, negative aspiration for CSF and negative aspiration for heme  Assessment  Sensory level: T4   Dermatomal levels determined by ice  Intrathecal Medications:  administered: primary anesthetic mcg of

## 2020-01-14 NOTE — LACTATION NOTE
LC visit to the room. Mother just got back from NICU. Mother has pumped for another NICU so she is aware of how to pump and clean pump parts. Reviewed pumping 8 or more in 24, labeling breastmilk, and cleaning pump parts. Reviewed NICU breastfeeding/pumping hand out. Mother is set up to pump at baby's bedside.

## 2020-01-14 NOTE — NURSING
2049 rn called Dr Candelaria to report accucheck result of 180 at this time rec'd new order to give 2 units aspart insulin

## 2020-01-14 NOTE — NURSING
1853 Dr. Herrera in rec. Room. rn gave report of current accucheck result 174 and last sliding scale aspart insulin dose 2 units at 1807. MD reviewed beta-hydroxybutyrate result is negative. MD gave rn new order to repeat 2 units per sliding scale..

## 2020-01-14 NOTE — PROGRESS NOTES
Notified Dr. Herrera of [facility]. MD stated to administer insulin to patient per sliding scale.

## 2020-01-14 NOTE — ANESTHESIA POSTPROCEDURE EVALUATION
Anesthesia Post Evaluation    Patient: Wang Warren    Procedure(s) Performed: Procedure(s) (LRB):   SECTION (N/A)    Final Anesthesia Type: CSE    Patient location during evaluation: floor  Patient participation: Yes- Able to Participate  Level of consciousness: awake and alert and oriented  Post-procedure vital signs: reviewed and stable  Pain management: adequate  Airway patency: patent    PONV status at discharge: No PONV  Anesthetic complications: no      Cardiovascular status: blood pressure returned to baseline and hemodynamically stable  Respiratory status: unassisted, spontaneous ventilation and room air  Hydration status: euvolemic  Follow-up not needed.          Vitals Value Taken Time   /60 2020 12:05 PM   Temp 36.8 °C (98.2 °F) 2020 12:05 PM   Pulse 73 2020 12:05 PM   Resp 18 2020 12:05 PM   SpO2 99 % 2020 12:05 PM         No case tracking events are documented in the log.      Pain/Gil Score: Pain Rating Prior to Med Admin: 7 (2020  4:04 PM)  Pain Rating Post Med Admin: 7 (2020  3:00 PM)

## 2020-01-14 NOTE — PROGRESS NOTES
Updated Dr. Candelaria that patient left for the NICU at 3:37 am (urine ouput 177.4mL). Pt came back at 5:26 am (urine output 18 mL). Dr. Candelaria stated to encouraged PO intake and call back if urine output doesn't increase.

## 2020-01-15 LAB
POCT GLUCOSE: 100 MG/DL (ref 70–110)
POCT GLUCOSE: 107 MG/DL (ref 70–110)
POCT GLUCOSE: 132 MG/DL (ref 70–110)
POCT GLUCOSE: 149 MG/DL (ref 70–110)
RPR SER QL: NORMAL

## 2020-01-15 PROCEDURE — 11000001 HC ACUTE MED/SURG PRIVATE ROOM

## 2020-01-15 PROCEDURE — 99232 SBSQ HOSP IP/OBS MODERATE 35: CPT | Mod: ,,, | Performed by: OBSTETRICS & GYNECOLOGY

## 2020-01-15 PROCEDURE — 99232 PR SUBSEQUENT HOSPITAL CARE,LEVL II: ICD-10-PCS | Mod: ,,, | Performed by: OBSTETRICS & GYNECOLOGY

## 2020-01-15 PROCEDURE — 63600175 PHARM REV CODE 636 W HCPCS: Performed by: STUDENT IN AN ORGANIZED HEALTH CARE EDUCATION/TRAINING PROGRAM

## 2020-01-15 PROCEDURE — 25000003 PHARM REV CODE 250: Performed by: STUDENT IN AN ORGANIZED HEALTH CARE EDUCATION/TRAINING PROGRAM

## 2020-01-15 RX ADMIN — IBUPROFEN 600 MG: 600 TABLET, FILM COATED ORAL at 06:01

## 2020-01-15 RX ADMIN — DOCUSATE SODIUM 200 MG: 100 CAPSULE, LIQUID FILLED ORAL at 09:01

## 2020-01-15 RX ADMIN — HYDROCODONE BITARTRATE AND ACETAMINOPHEN 1 TABLET: 10; 325 TABLET ORAL at 12:01

## 2020-01-15 RX ADMIN — INSULIN ASPART 3 UNITS: 100 INJECTION, SOLUTION INTRAVENOUS; SUBCUTANEOUS at 12:01

## 2020-01-15 RX ADMIN — IBUPROFEN 600 MG: 600 TABLET, FILM COATED ORAL at 05:01

## 2020-01-15 RX ADMIN — HYDROCODONE BITARTRATE AND ACETAMINOPHEN 1 TABLET: 10; 325 TABLET ORAL at 01:01

## 2020-01-15 RX ADMIN — HYDROCODONE BITARTRATE AND ACETAMINOPHEN 1 TABLET: 10; 325 TABLET ORAL at 05:01

## 2020-01-15 RX ADMIN — DOCUSATE SODIUM 200 MG: 100 CAPSULE, LIQUID FILLED ORAL at 08:01

## 2020-01-15 RX ADMIN — HYDROCODONE BITARTRATE AND ACETAMINOPHEN 1 TABLET: 10; 325 TABLET ORAL at 09:01

## 2020-01-15 RX ADMIN — INSULIN ASPART 5 UNITS: 100 INJECTION, SOLUTION INTRAVENOUS; SUBCUTANEOUS at 08:01

## 2020-01-15 RX ADMIN — ENOXAPARIN SODIUM 40 MG: 100 INJECTION SUBCUTANEOUS at 05:01

## 2020-01-15 RX ADMIN — IBUPROFEN 600 MG: 600 TABLET, FILM COATED ORAL at 11:01

## 2020-01-15 RX ADMIN — INSULIN DETEMIR 6 UNITS: 100 INJECTION, SOLUTION SUBCUTANEOUS at 09:01

## 2020-01-15 RX ADMIN — METFORMIN HYDROCHLORIDE 500 MG: 500 TABLET ORAL at 06:01

## 2020-01-15 RX ADMIN — METFORMIN HYDROCHLORIDE 500 MG: 500 TABLET ORAL at 08:01

## 2020-01-15 RX ADMIN — HYDROCODONE BITARTRATE AND ACETAMINOPHEN 1 TABLET: 10; 325 TABLET ORAL at 04:01

## 2020-01-15 RX ADMIN — HYDROCODONE BITARTRATE AND ACETAMINOPHEN 1 TABLET: 10; 325 TABLET ORAL at 10:01

## 2020-01-15 RX ADMIN — INSULIN ASPART 5 UNITS: 100 INJECTION, SOLUTION INTRAVENOUS; SUBCUTANEOUS at 06:01

## 2020-01-15 RX ADMIN — PRENATAL VIT W/ FE FUMARATE-FA TAB 27-0.8 MG 1 TABLET: 27-0.8 TAB at 08:01

## 2020-01-15 RX ADMIN — NIFEDIPINE 60 MG: 30 TABLET, FILM COATED, EXTENDED RELEASE ORAL at 08:01

## 2020-01-15 NOTE — PROGRESS NOTES
POSTPARTUM PROGRESS NOTE     Wang Warren is a 39 y.o. female POD #2 status post Repeat  section at 33w6d for NRFHT in a pregnancy complicated by cHTN, DM2, PRASHANT, MO. Patient is doing well this morning. She denies nausea, vomiting, fever or chills.  Patient reports mild abdominal pain that is adequately relieved by oral pain medications. Lochia is mild to moderate  and stable. Patient has ambulated and voided w/o difficulty. She has passed flatus.  Patient does plan to breast feed, but is currently pumping for baby in the NICU. Dr. Valenzuela will manage contraception.    Objective:       Temp:  [97.4 °F (36.3 °C)-98.2 °F (36.8 °C)] 97.9 °F (36.6 °C)  Pulse:  [58-73] 68  Resp:  [18] 18  SpO2:  [97 %-100 %] 99 %  BP: (114-136)/(52-73) 136/63    General:   alert, appears stated age and cooperative   Lungs:   Non-labored respirations    Heart:   regular rate and rhythm   Abdomen:  Soft, nondistended    Uterus:  firm located at the umblicus.    Incision: Incision clean, dry and intact   Extremities: no pedal edema noted     Lab Review  No results found for this or any previous visit (from the past 4 hour(s)).    I/O    Intake/Output Summary (Last 24 hours) at 1/15/2020 0714  Last data filed at 1/15/2020 0000  Gross per 24 hour   Intake --   Output 2350 ml   Net -2350 ml        Assessment:     Patient Active Problem List   Diagnosis    BMI 50.0-59.9, adult     delivery delivered    Insulin dependent diabetes mellitus    Diabetes mellitus type II, controlled, with no complications    Benign essential HTN    Class 3 severe obesity with body mass index (BMI) of 50.0 to 59.9 in adult    Iron deficiency anemia    Diabetes mellitus    Vitamin D insufficiency    Supervision of high risk pregnancy in second trimester    Pregnancy with type 2 diabetes mellitus in third trimester    33 weeks gestation of pregnancy    Hypertension affecting pregnancy in third trimester    Non-reassuring fetal  heart tones complicating pregnancy, antepartum    S/P repeat low transverse         Plan:   1. Postpartum care:  - Patient doing well. Continue routine management and advances.  - Continue PO pain meds. Pain well controlled.  - Heme: H/h 10/34 >>> 9.5/29.7  - Encourage ambulation  - Circumcision: deferred for now as baby is in NICU  - Contraception to be discussed at 6 week pp visit  - Lactation consult PRN    2. DMII  - Last 24 hour B-131  - Aspart 5/3/5  - Levemir 6u QHS  - Sliding scale insulin   - Metformin 500 BID    3. cHTN  - Procardia 60 XL  - BP: (114-136)/(52-73) 136/63  - P/C: 0.21, Cr: 0.8, LFTs wnl    4. Morbid Obesity   - Lovenox ppx   - TEDs/SCDs        Dispo: As patient meets milestones, will plan to discharge POD#2-#4.    Chika Estevez M.D.  SANDIP PGY1

## 2020-01-15 NOTE — LACTATION NOTE
This note was copied from a baby's chart.  Lactation Note: Met mother at bedside; Introduced self. Discussed the importance of frequent pumping in first two weeks to establish a full breast milk supply. Encouraged pumping 8 or more times in 24 hours and skin to skin care when baby able. Pumping supplies brought to bedside and assisted mother with pumping at bedside. Mother stated that she breast fed her first baby with good supply. Mom voiced eagerness to pump frequently and desires to latch baby to breast when able. Mother voiced having a personal breast pump for use at home from [facility]. Encouragement and support offered to mom.   Patrizia Pascual, BSN, RN, CLC, IBCLC

## 2020-01-15 NOTE — DISCHARGE SUMMARY
Delivery Discharge Summary  Obstetrics      Primary OB Clinician: Mariela Valenzuela MD     Admission date: 2020  Discharge date: 2020    Disposition: To home, self care    Discharge Diagnosis List:      Patient Active Problem List   Diagnosis    BMI 50.0-59.9, adult     delivery delivered    Insulin dependent diabetes mellitus    Diabetes mellitus type II, controlled, with no complications    Benign essential HTN    Class 3 severe obesity with body mass index (BMI) of 50.0 to 59.9 in adult    Iron deficiency anemia    Diabetes mellitus    Vitamin D insufficiency    Supervision of high risk pregnancy in second trimester    Pregnancy with type 2 diabetes mellitus in third trimester    33 weeks gestation of pregnancy    Hypertension affecting pregnancy in third trimester    Non-reassuring fetal heart tones complicating pregnancy, antepartum    S/P repeat low transverse        Procedure: RLTCS    Hospital Course:  Wang Warren is a 39 y.o. now , POD #4 who was admitted on 2020 at 33w6d for NRFHTs in the setting of cHTN and DM2. Patient was subsequently admitted to labor and delivery unit with signed consents. Patient continued to have late decelerations and RLTCS was performed without complications. Please see delivery note for further details.     Patient was put on half of her pregnancy insulin during the postpartum period in addition to metformin 500 BID. She was continued on Procardia 60XL.    On discharge day, patient's pain is controlled with oral pain medications. Pt is tolerating ambulation without SOB or CP, and regular diet without N/V. Reports lochia is mild. Denies any HA, vision changes, F/C, LE swelling. Denies any breast pain/soreness.    Pt in stable condition and ready for discharge. She has been instructed to start and/or continue medications and follow up with her obstetrics provider as listed below.    Pertinent  studies:  CBC  Recent Labs   Lab 20  1131 20  1743 20  1111   WBC 7.21 9.20 13.06*   HGB 10.5* 10.2* 9.5*   HCT 33.9* 32.0* 29.7*   MCV 86 85 86    312 307          Immunization History   Administered Date(s) Administered    DTP 1980, 1980, 1980, 1981, 1984    Influenza 2018    Influenza - Quadrivalent - MDCK - PF 2017    Influenza - Quadrivalent - PF (6 months and older) 2019    MMR 1981, 1999    Pneumococcal Conjugate - 7 Valent 2011    Pneumococcal Polysaccharide - 23 Valent 2018    Poliovirus 1980, 1980, 1980, 1981, 1984    Td (ADULT) 1999    Tdap 2013, 2018, 2019        Delivery:    Episiotomy:     Lacerations:     Repair suture:     Repair # of packets:     Blood loss (ml):       Birth information:  YOB: 2021   Time of birth: 10:38 PM   Sex: male   Delivery type: , Low Transverse   Gestational Age: 33w6d    Delivery Clinician:      Other providers:       Additional  information:  Forceps:    Vacuum:    Breech:    Observed anomalies      Living?:           APGARS  One minute Five minutes Ten minutes   Skin color:         Heart rate:         Grimace:         Muscle tone:         Breathing:         Totals: 8  8        Placenta: Delivered:       appearance      Patient Instructions:   Current Discharge Medication List      START taking these medications    Details   HYDROcodone-acetaminophen (NORCO) 5-325 mg per tablet Take 1 tablet by mouth every 6 (six) hours as needed.  Qty: 25 tablet, Refills: 0    Comments: Quantity prescribed more than 7 day supply? Yes, quantity medically necessary      ibuprofen (ADVIL,MOTRIN) 600 MG tablet Take 1 tablet (600 mg total) by mouth every 6 (six) hours as needed for Pain.  Qty: 30 tablet, Refills: 1      !! insulin lispro (HUMALOG U-100 INSULIN) 100 unit/mL injection Inject 3 Units into the skin  "with lunch.  Qty: 0.9 mL, Refills: 11      !! insulin lispro (HUMALOG U-100 INSULIN) 100 unit/mL injection Inject 5 Units into the skin daily with dinner or evening meal.  Qty: 1.5 mL, Refills: 11      !! NIFEdipine (PROCARDIA-XL) 60 MG (OSM) 24 hr tablet Take 1 tablet (60 mg total) by mouth once daily.  Qty: 30 tablet, Refills: 2       !! - Potential duplicate medications found. Please discuss with provider.      CONTINUE these medications which have CHANGED    Details   !! BD INSULIN SYRINGE ULTRA-FINE 1 mL 31 gauge x 5/16 Syrg Inject 1 Syringe into the muscle once daily.  Qty: 60 each, Refills: 0      BD ULTRA-FINE MINI PEN NEEDLE 31 gauge x 3/16" Ndle Use 3 (three) times daily with meals.  Qty: 100 each, Refills: 11    Associated Diagnoses: Controlled type 2 diabetes mellitus without complication, with long-term current use of insulin      blood sugar diagnostic (ONETOUCH VERIO) Strp use to test blood sugar 4 times daily  Qty: 100 each, Refills: 3    Associated Diagnoses: Controlled type 2 diabetes mellitus without complication, with long-term current use of insulin      insulin (LANTUS SOLOSTAR U-100 INSULIN) glargine 100 units/mL (3mL) SubQ pen Inject 6 Units into the skin every evening.  Qty: 15 mL, Refills: 3      !! insulin lispro (HUMALOG U-100 INSULIN) 100 unit/mL injection Inject 5 Units into the skin daily with breakfast.  Qty: 10 mL, Refills: 3      metFORMIN (GLUCOPHAGE) 500 MG tablet Take 1 tablet (500 mg total) by mouth 2 (two) times daily with meals.  Qty: 180 tablet, Refills: 1       !! - Potential duplicate medications found. Please discuss with provider.      CONTINUE these medications which have NOT CHANGED    Details   ergocalciferol (VITAMIN D2) 50,000 unit Cap Take 1 capsule (50,000 Units total) by mouth every 7 days.  Qty: 12 capsule, Refills: 0    Associated Diagnoses: Vitamin D insufficiency      ferrous sulfate 325 mg (65 mg iron) Tab tablet TK 1 T PO daily  Refills: 1      fluticasone " (FLONASE) 50 mcg/actuation nasal spray 1 spray (50 mcg total) by Each Nare route 2 (two) times daily as needed for Rhinitis.  Qty: 16 g, Refills: 0      !! insulin syringe-needle U-100 1 mL 31 gauge x 5/16 Syrg use for daily insulin injections; up to three times daily  Qty: 100 each, Refills: 1      lancets (ONETOUCH ULTRASOFT LANCETS) Misc 1 lancet by Misc.(Non-Drug; Combo Route) route 4 (four) times daily.  Qty: 100 each, Refills: 3      !! NIFEdipine (PROCARDIA-XL) 60 MG (OSM) 24 hr tablet Take 1 tablet (60 mg total) by mouth once daily.  Qty: 90 tablet, Refills: 1      PRENATAL VIT CALC,IRON,FOLIC (PRENATAL VITAMIN ORAL) Take by mouth.       !! - Potential duplicate medications found. Please discuss with provider.          Discharge Procedure Orders   Diet diabetic     Pelvic Rest   Order Comments: Pelvic rest until follow up visit. Nothing in vagina -no sex, tampons, douching, etc.     Notify your health care provider if you experience any of the following:   Order Comments: Heavy vaginal bleeding saturating more than 1 pad per hr for at least consecutive 2 hrs.     Notify your health care provider if you experience any of the following:  increased confusion or weakness     Notify your health care provider if you experience any of the following:  persistent dizziness, light-headedness, or visual disturbances     Notify your health care provider if you experience any of the following:  severe persistent headache     Notify your health care provider if you experience any of the following:  difficulty breathing or increased cough     Notify your health care provider if you experience any of the following:  redness, tenderness, or signs of infection (pain, swelling, redness, odor or green/yellow discharge around incision site)     Notify your health care provider if you experience any of the following:  severe uncontrolled pain     Notify your health care provider if you experience any of the following:  persistent  nausea and vomiting or diarrhea     Notify your health care provider if you experience any of the following:  temperature >100.4     Activity as tolerated       Follow-up Information     Mariela Valenzuela MD. Go in 1 week.    Specialty:  Obstetrics and Gynecology  Why:  Blood pressure check  Contact information:  [address] [facility] [id number] [phone]             Mariela Valenzuela MD. Schedule an appointment as soon as possible for a visit in 6 weeks.    Specialty:  Obstetrics and Gynecology  Why:  Postpartum appointment  Contact information:  [address] [facility] [id number] [phone]                    Esperanza Blas MD  OBGYN, PGY-2    I have reviewed and concur with the resident's history, physical assessment and plan.  I have personally interviewed and examined the patient at bedside.    Meka Daniel MD  Obstetrics and Gynecology  [facility]

## 2020-01-15 NOTE — LACTATION NOTE
Pt pumping, states pumping each breast individually is more comfortable than bilateral due to breast size.  Pt has number and ID stickers for bottles, and is aware how to store and transport milk. Reviewed cleaning and sanitization of pump parts. LC reviewed techniques to increase supply, and provided washclothes and heat packs; Pt aware of how to use NICView. All questions answered and pt verbalized understanding.       01/15/20 1045   Maternal Assessment   Breast Shape pendulous   Breast Density soft   Areola elastic   Nipples everted   Maternal Infant Feeding   Maternal Preparation hand hygiene   Equipment Type   Breast Pump Type double electric, hospital grade   Breast Pump Flange Type hard   Breast Pump Flange Size 24 mm   Breast Pumping   Breast Pumping Interventions frequent pumping encouraged   Breast Pumping double electric breast pump utilized;bilateral breasts pumped until soft

## 2020-01-16 LAB
POCT GLUCOSE: 121 MG/DL (ref 70–110)
POCT GLUCOSE: 128 MG/DL (ref 70–110)
POCT GLUCOSE: 94 MG/DL (ref 70–110)
POCT GLUCOSE: 97 MG/DL (ref 70–110)

## 2020-01-16 PROCEDURE — 99232 SBSQ HOSP IP/OBS MODERATE 35: CPT | Mod: ,,, | Performed by: OBSTETRICS & GYNECOLOGY

## 2020-01-16 PROCEDURE — 11000001 HC ACUTE MED/SURG PRIVATE ROOM

## 2020-01-16 PROCEDURE — 63600175 PHARM REV CODE 636 W HCPCS: Performed by: STUDENT IN AN ORGANIZED HEALTH CARE EDUCATION/TRAINING PROGRAM

## 2020-01-16 PROCEDURE — 25000003 PHARM REV CODE 250: Performed by: STUDENT IN AN ORGANIZED HEALTH CARE EDUCATION/TRAINING PROGRAM

## 2020-01-16 PROCEDURE — 99232 PR SUBSEQUENT HOSPITAL CARE,LEVL II: ICD-10-PCS | Mod: ,,, | Performed by: OBSTETRICS & GYNECOLOGY

## 2020-01-16 RX ADMIN — INSULIN DETEMIR 6 UNITS: 100 INJECTION, SOLUTION SUBCUTANEOUS at 09:01

## 2020-01-16 RX ADMIN — METFORMIN HYDROCHLORIDE 500 MG: 500 TABLET ORAL at 05:01

## 2020-01-16 RX ADMIN — METFORMIN HYDROCHLORIDE 500 MG: 500 TABLET ORAL at 08:01

## 2020-01-16 RX ADMIN — DOCUSATE SODIUM 200 MG: 100 CAPSULE, LIQUID FILLED ORAL at 09:01

## 2020-01-16 RX ADMIN — ENOXAPARIN SODIUM 40 MG: 100 INJECTION SUBCUTANEOUS at 05:01

## 2020-01-16 RX ADMIN — INSULIN ASPART 5 UNITS: 100 INJECTION, SOLUTION INTRAVENOUS; SUBCUTANEOUS at 05:01

## 2020-01-16 RX ADMIN — HYDROCODONE BITARTRATE AND ACETAMINOPHEN 1 TABLET: 10; 325 TABLET ORAL at 09:01

## 2020-01-16 RX ADMIN — IBUPROFEN 600 MG: 600 TABLET, FILM COATED ORAL at 05:01

## 2020-01-16 RX ADMIN — PRENATAL VIT W/ FE FUMARATE-FA TAB 27-0.8 MG 1 TABLET: 27-0.8 TAB at 08:01

## 2020-01-16 RX ADMIN — HYDROCODONE BITARTRATE AND ACETAMINOPHEN 1 TABLET: 10; 325 TABLET ORAL at 12:01

## 2020-01-16 RX ADMIN — INSULIN ASPART 5 UNITS: 100 INJECTION, SOLUTION INTRAVENOUS; SUBCUTANEOUS at 08:01

## 2020-01-16 RX ADMIN — IBUPROFEN 600 MG: 600 TABLET, FILM COATED ORAL at 12:01

## 2020-01-16 RX ADMIN — HYDROCODONE BITARTRATE AND ACETAMINOPHEN 1 TABLET: 10; 325 TABLET ORAL at 03:01

## 2020-01-16 RX ADMIN — NIFEDIPINE 60 MG: 30 TABLET, FILM COATED, EXTENDED RELEASE ORAL at 08:01

## 2020-01-16 RX ADMIN — IBUPROFEN 600 MG: 600 TABLET, FILM COATED ORAL at 11:01

## 2020-01-16 RX ADMIN — DOCUSATE SODIUM 200 MG: 100 CAPSULE, LIQUID FILLED ORAL at 08:01

## 2020-01-16 RX ADMIN — HYDROCODONE BITARTRATE AND ACETAMINOPHEN 1 TABLET: 10; 325 TABLET ORAL at 05:01

## 2020-01-16 RX ADMIN — INSULIN ASPART 3 UNITS: 100 INJECTION, SOLUTION INTRAVENOUS; SUBCUTANEOUS at 12:01

## 2020-01-16 NOTE — LACTATION NOTE
LC rounds, pt reports she has pumped 6x last 24 hours, support and encouragement provided. Discussed importance of frequent, consistent pumping to build/maintain milk supply. Pt to sterilize pump parts today. V/u of education and questions answered. LC number on board for further questions/assistance.

## 2020-01-16 NOTE — NURSING
Received notice from micro lab [facility], that patient's GBS status was positive. Notified the NICU nurse taking care of baby.

## 2020-01-17 VITALS
HEIGHT: 62 IN | TEMPERATURE: 98 F | SYSTOLIC BLOOD PRESSURE: 126 MMHG | WEIGHT: 293 LBS | RESPIRATION RATE: 18 BRPM | HEART RATE: 71 BPM | OXYGEN SATURATION: 98 % | DIASTOLIC BLOOD PRESSURE: 64 MMHG | BODY MASS INDEX: 53.92 KG/M2

## 2020-01-17 LAB
BACTERIA SPEC AEROBE CULT: ABNORMAL
POCT GLUCOSE: 105 MG/DL (ref 70–110)
POCT GLUCOSE: 94 MG/DL (ref 70–110)

## 2020-01-17 PROCEDURE — 25000003 PHARM REV CODE 250: Performed by: STUDENT IN AN ORGANIZED HEALTH CARE EDUCATION/TRAINING PROGRAM

## 2020-01-17 PROCEDURE — 99238 HOSP IP/OBS DSCHRG MGMT 30/<: CPT | Mod: ,,, | Performed by: OBSTETRICS & GYNECOLOGY

## 2020-01-17 PROCEDURE — 99238 PR HOSPITAL DISCHARGE DAY,<30 MIN: ICD-10-PCS | Mod: ,,, | Performed by: OBSTETRICS & GYNECOLOGY

## 2020-01-17 RX ORDER — HYDROCODONE BITARTRATE AND ACETAMINOPHEN 5; 325 MG/1; MG/1
1 TABLET ORAL EVERY 6 HOURS PRN
Qty: 25 TABLET | Refills: 0 | Status: SHIPPED | OUTPATIENT
Start: 2020-01-17 | End: 2021-12-11 | Stop reason: SDUPTHER

## 2020-01-17 RX ORDER — NIFEDIPINE 60 MG/1
60 TABLET, EXTENDED RELEASE ORAL DAILY
Qty: 30 TABLET | Refills: 2 | Status: SHIPPED | OUTPATIENT
Start: 2020-01-17 | End: 2020-04-27 | Stop reason: SDUPTHER

## 2020-01-17 RX ORDER — PEN NEEDLE, DIABETIC 31 GX5/16"
100 NEEDLE, DISPOSABLE MISCELLANEOUS
Qty: 100 EACH | Refills: 11 | Status: SHIPPED | OUTPATIENT
Start: 2020-01-17 | End: 2020-05-18 | Stop reason: SDUPTHER

## 2020-01-17 RX ORDER — IBUPROFEN 600 MG/1
600 TABLET ORAL EVERY 6 HOURS PRN
Qty: 30 TABLET | Refills: 1 | Status: SHIPPED | OUTPATIENT
Start: 2020-01-17 | End: 2021-12-11 | Stop reason: SDUPTHER

## 2020-01-17 RX ORDER — INSULIN GLARGINE 100 [IU]/ML
6 INJECTION, SOLUTION SUBCUTANEOUS NIGHTLY
Qty: 15 ML | Refills: 3 | Status: SHIPPED | OUTPATIENT
Start: 2020-01-17 | End: 2021-01-16

## 2020-01-17 RX ORDER — INSULIN LISPRO 100 [IU]/ML
3 INJECTION, SOLUTION INTRAVENOUS; SUBCUTANEOUS
Qty: 0.9 ML | Refills: 11 | Status: SHIPPED | OUTPATIENT
Start: 2020-01-17 | End: 2021-01-16

## 2020-01-17 RX ORDER — PEN NEEDLE, DIABETIC 29 G X1/2"
1 NEEDLE, DISPOSABLE MISCELLANEOUS DAILY
Qty: 60 EACH | Refills: 0 | Status: SHIPPED | OUTPATIENT
Start: 2020-01-17

## 2020-01-17 RX ORDER — METFORMIN HYDROCHLORIDE 500 MG/1
500 TABLET ORAL 2 TIMES DAILY WITH MEALS
Qty: 180 TABLET | Refills: 1 | Status: SHIPPED | OUTPATIENT
Start: 2020-01-17 | End: 2021-02-13

## 2020-01-17 RX ORDER — INSULIN LISPRO 100 [IU]/ML
5 INJECTION, SOLUTION INTRAVENOUS; SUBCUTANEOUS
Qty: 10 ML | Refills: 3 | Status: SHIPPED | OUTPATIENT
Start: 2020-01-17 | End: 2021-01-16

## 2020-01-17 RX ORDER — INSULIN LISPRO 100 [IU]/ML
5 INJECTION, SOLUTION INTRAVENOUS; SUBCUTANEOUS
Qty: 1.5 ML | Refills: 11 | Status: SHIPPED | OUTPATIENT
Start: 2020-01-17 | End: 2021-01-16

## 2020-01-17 RX ADMIN — HYDROCODONE BITARTRATE AND ACETAMINOPHEN 1 TABLET: 10; 325 TABLET ORAL at 12:01

## 2020-01-17 RX ADMIN — IBUPROFEN 600 MG: 600 TABLET, FILM COATED ORAL at 12:01

## 2020-01-17 RX ADMIN — HYDROCODONE BITARTRATE AND ACETAMINOPHEN 1 TABLET: 10; 325 TABLET ORAL at 02:01

## 2020-01-17 RX ADMIN — PRENATAL VIT W/ FE FUMARATE-FA TAB 27-0.8 MG 1 TABLET: 27-0.8 TAB at 08:01

## 2020-01-17 RX ADMIN — HYDROCODONE BITARTRATE AND ACETAMINOPHEN 1 TABLET: 10; 325 TABLET ORAL at 06:01

## 2020-01-17 RX ADMIN — INSULIN ASPART 3 UNITS: 100 INJECTION, SOLUTION INTRAVENOUS; SUBCUTANEOUS at 12:01

## 2020-01-17 RX ADMIN — IBUPROFEN 600 MG: 600 TABLET, FILM COATED ORAL at 06:01

## 2020-01-17 RX ADMIN — DOCUSATE SODIUM 200 MG: 100 CAPSULE, LIQUID FILLED ORAL at 08:01

## 2020-01-17 RX ADMIN — INSULIN ASPART 5 UNITS: 100 INJECTION, SOLUTION INTRAVENOUS; SUBCUTANEOUS at 08:01

## 2020-01-17 RX ADMIN — NIFEDIPINE 60 MG: 30 TABLET, FILM COATED, EXTENDED RELEASE ORAL at 08:01

## 2020-01-17 RX ADMIN — METFORMIN HYDROCHLORIDE 500 MG: 500 TABLET ORAL at 08:01

## 2020-01-17 NOTE — DISCHARGE INSTRUCTIONS
Preparation and Hygiene:    1. Shower daily.  2. Wear a clean bra each day and wash daily in warm soapy water.  3. Change wet or moist breast pads frequently.  Moist pads can promote growth of germs.  4. Actively wash your hands, paying close attention to the area around and under your fingernails, thoroughly with soap and water for 15 seconds before pumping or handling your milk.  Re-wash your hands if you touch anything (scratching your nose, answering the phone, etc) while pumping or handling your milk.   5. Before pumping your breasts, assemble the pump collection kit and have ready the sterile container and labels.  Place these items on a clean surface next to the breastpump.  6. Each time after you have finished pumping, take apart all of the parts of the breastpump collection kit and place them in a separate cleaning container (do not place them in the sink).  Be sure to remove the yellow valve from the breastshield and separate the white membrane from the yellow valve.  Rinse all of these parts with cool water.  Then use a new sponge and/or bottle brush and dishwashing detergent to clean the parts.  Rinse off the soapy water with cool water and air dry on a clean towel covered with a clean cloth.  All parts may also be washed after each use in the top rack of a .  7. Once each day, sterilize all of the parts of the breastpump collection kit.  This can be done by boiling the kit parts for 10 minutes or by using a Quick Clean Micro-Steam Bag made by [employer]  8. If condensation appears in the tubing, continue to run the pump with the tubing attached for 1-2 minutes or until the tubing is dry.   9. Notify your babys nurse or doctor if you become ill or need to take any medication, even over-the-counter medicines.        Collection and Storage of Expressed Breastmilk:         1. Pump your breasts at least 8-10 times every 24 hours.  Double pump (both breasts at  the same time) for at least 15-20  minutes using the most suction that is comfortable.    2. Write the date and time of pumping and the name of any medications you are takingon the babys pre-printed hospital identification label.   3. Place your babys pre-printed hospital identification label on each container of breastmilk.  Additional pre-printed labels can be obtained from your babys nurse.  If your expressed breastmilk is not correctly labeled, the nurse cannot feed the milk to the baby.       4. Place a brightly colored sticker on the top of each container of milk pumped during the first 30 days.  This identifies the milk as special and having higher levels of nutrients and anti-infective properties that are so important for your baby.  Additional stickers can be obtained from the lactation consultants or your babys nurse.  5.   Do not touch the inside of the storage containers or lids.  6.      Pour the amount of expressed milk needed for 1 of your babys feedings into each   storage container. Use a new container(s) for each pumping.  Additional storage   containers can be obtained from your babys nurse.        7.       Tightly screw the lid onto the container and place immediately into the       refrigerator fordaily transportation to the hospital.   Do not freeze your milk      unless asked to do so by your babys nurse.  However, if you are not able to      visit your baby each day, place the expressed breastmilk in the freezer.  8.   Expressed breastmilk should be refrigerated or frozen within 1 hour of      pumping.  9.      Do not store expressed breastmilk on the door of your refrigerator or freezer where the temperature is warmer.         Transportation of Expressed Breastmilk:    1. Refrigerated breastmilk or frozen milk should be packed tightly together in a cooler with frozen, blue gel-packs to keep the milk frozen.  DO NOT USE ICE CUBES (WET ICE) TO TRANSPORT FROZEN MILK.   A clean towel can be used to fill any extra space  between containers of frozen milk.  2.    Bring your expressed milk from home each time you visit the baby.        NICU lactation warmline:  [phone]  MBU lactation warmline:  [phone]

## 2020-01-17 NOTE — PLAN OF CARE
Lactation note:   did DC teaching for NICU mother pumping for her baby. Mother has NICU Breastfeeding guide for lactation. Reviewed how to work pump, how to keep track of pumpings, how to label nicu breastmilk, how to clean pump parts and bring milk to NICU soon after pumping. Mother aware to pump 8 or more times a day for 15-20 minutes. She has been pumping close to this amount and getting between 15-20 mls. Mother is aware of proper techniques for transporting her breastmilk and is aware of the written instructions in her Mother's NICU Breastfeeding Guide. Mother is using an electric breast pump at home and is aware that she can use the [employer] breastpump at the baby's bedside when she visits. Discussed pumping at infant's bedside, hands on pumping and skin to skin to help with milk supply. [place] [facility] [place] and the [place]ICU  phone number to call for further questions.

## 2020-01-17 NOTE — PROGRESS NOTES
POSTPARTUM PROGRESS NOTE     Wang Warren is a 39 y.o. female POD #4 status post Repeat  section at 33w6d for NRFHT in a pregnancy complicated by cHTN, DM2, PRASHANT, MO. Patient is doing well this morning. She denies nausea, vomiting, fever or chills. Patient reports moderate abdominal pain that is adequately relieved by oral pain medications. Lochia is mild to moderate and stable. Patient has ambulated and voided w/o difficulty. She has passed flatus. Patient does plan to breast feed, but is currently pumping for baby in the NICU. Dr. Valenzuela will manage contraception.    Objective:       Temp:  [98 °F (36.7 °C)-98.7 °F (37.1 °C)] 98.1 °F (36.7 °C)  Pulse:  [59-72] 59  Resp:  [18-20] 20  SpO2:  [97 %-99 %] 97 %  BP: (116-143)/(55-70) 137/70    General:   alert, appears stated age and cooperative   Lungs:   Non-labored respirations    Heart:   regular rate and rhythm   Abdomen:  Soft, nondistended    Uterus:  firm located at the umblicus.    Incision: Incision clean, dry and intact   Extremities: no pedal edema noted     Lab Review  No results found for this or any previous visit (from the past 4 hour(s)).    I/O  No intake or output data in the 24 hours ending 20 0519     Assessment:     Patient Active Problem List   Diagnosis    BMI 50.0-59.9, adult     delivery delivered    Insulin dependent diabetes mellitus    Diabetes mellitus type II, controlled, with no complications    Benign essential HTN    Class 3 severe obesity with body mass index (BMI) of 50.0 to 59.9 in adult    Iron deficiency anemia    Diabetes mellitus    Vitamin D insufficiency    Supervision of high risk pregnancy in second trimester    Pregnancy with type 2 diabetes mellitus in third trimester    33 weeks gestation of pregnancy    Hypertension affecting pregnancy in third trimester    Non-reassuring fetal heart tones complicating pregnancy, antepartum    S/P repeat low transverse      Non-reassuring fetal heart rate with late deceleration        Plan:   1. Postpartum care:  - Patient doing well. Continue routine management and advances.  - Continue PO pain meds. Pain well controlled.  - Heme: H/h 10/34 >>> 9.5/29.7  - Encourage ambulation  - Circumcision: deferred for now as baby is in NICU  - Contraception to be discussed at 6 week pp visit  - Lactation consult PRN    2. DMII  - Last 24 hour Bs-120s  - Aspart /3/5  - Levemir 6u QHS  - Sliding scale insulin   - Metformin 500 BID    3. cHTN  - Procardia 60 XL  - BP: (116-143)/(55-70) 137/70  - P/C: 0.21, Cr: 0.8, LFTs wnl    4. Morbid Obesity   - Lovenox ppx   - TEDs/SCDs      Dispo: Discharge home today.    Esperanza Blas MD  OBGYN, PGY-2    I have reviewed and concur with the resident's history, physical assessment and plan.  I have personally interviewed and examined the patient at bedside.    Meka Daniel MD  Obstetrics and Gynecology  [facility]

## 2020-01-28 ENCOUNTER — TELEPHONE (OUTPATIENT)
Dept: OBSTETRICS AND GYNECOLOGY | Facility: CLINIC | Age: 40
End: 2020-01-28

## 2020-01-28 ENCOUNTER — PATIENT MESSAGE (OUTPATIENT)
Dept: OBSTETRICS AND GYNECOLOGY | Facility: CLINIC | Age: 40
End: 2020-01-28

## 2020-01-28 NOTE — TELEPHONE ENCOUNTER
----- Message from Diogenes Valenzuela, Patient Care Assistant sent at 1/28/2020 12:30 PM CST -----  Contact: MATILDA MCINTOSH [[id number]]  Name of Who is Calling: MATILDA MCINTOSH [[id number]]    What is the request in detail: Requesting a follow up appointment for C section.Please contact to further discuss and advise      Can the clinic reply by [facility]: No      [facility]:   [phone]

## 2020-02-03 ENCOUNTER — PATIENT MESSAGE (OUTPATIENT)
Dept: ADMINISTRATIVE | Facility: OTHER | Age: 40
End: 2020-02-03

## 2020-02-06 ENCOUNTER — POSTPARTUM VISIT (OUTPATIENT)
Dept: OBSTETRICS AND GYNECOLOGY | Facility: CLINIC | Age: 40
End: 2020-02-06
Payer: MEDICAID

## 2020-02-06 VITALS
SYSTOLIC BLOOD PRESSURE: 142 MMHG | BODY MASS INDEX: 53.68 KG/M2 | WEIGHT: 291.69 LBS | DIASTOLIC BLOOD PRESSURE: 90 MMHG | HEIGHT: 62 IN

## 2020-02-06 DIAGNOSIS — E11.9 CONTROLLED TYPE 2 DIABETES MELLITUS WITHOUT COMPLICATION, UNSPECIFIED WHETHER LONG TERM INSULIN USE: ICD-10-CM

## 2020-02-06 DIAGNOSIS — I10 BENIGN ESSENTIAL HTN: ICD-10-CM

## 2020-02-06 PROCEDURE — 99999 PR PBB SHADOW E&M-EST. PATIENT-LVL III: CPT | Mod: PBBFAC,,, | Performed by: OBSTETRICS & GYNECOLOGY

## 2020-02-06 PROCEDURE — 59430 PR CARE AFTER DELIVERY ONLY: ICD-10-PCS | Mod: ,,, | Performed by: OBSTETRICS & GYNECOLOGY

## 2020-02-06 PROCEDURE — 99213 OFFICE O/P EST LOW 20 MIN: CPT | Mod: PBBFAC | Performed by: OBSTETRICS & GYNECOLOGY

## 2020-02-06 PROCEDURE — 99999 PR PBB SHADOW E&M-EST. PATIENT-LVL III: ICD-10-PCS | Mod: PBBFAC,,, | Performed by: OBSTETRICS & GYNECOLOGY

## 2020-02-06 RX ORDER — ACETAMINOPHEN AND CODEINE PHOSPHATE 120; 12 MG/5ML; MG/5ML
1 SOLUTION ORAL DAILY
Qty: 28 TABLET | Refills: 12 | Status: SHIPPED | OUTPATIENT
Start: 2020-02-06 | End: 2024-01-12 | Stop reason: SDUPTHER

## 2020-02-06 NOTE — PROGRESS NOTES
"CC: Post-partum follow-up    HPI:  Wang Warren is a 39 y.o. female  presents for postpartum visit s/p a repeat LTCS at 33 weeks for NRFHTs.    Delivery Date: 20  Delivery MD: Javier  Gender: Male  Birth Weight: see delivery summary  Breast Feeding: yes  Depression: no  Contraception: micronor    ROS:  GENERAL: No fever, chills, fatigability or weight loss.  VULVAR: No pain, no lesions and no itching.  VAGINAL: No relaxation, no itching, no discharge, no abnormal bleeding and no lesions.  ABDOMEN: No abdominal pain. Denies nausea. Denies vomiting. No diarrhea. No constipation  BREAST: Denies pain. No lumps. No discharge.  URINARY: No incontinence, no nocturia, no frequency and no dysuria.  CARDIOVASCULAR: No chest pain. No shortness of breath. No leg cramps.  NEUROLOGICAL: No headaches. No vision changes.    PHYSICAL EXAM:  BP (!) 142/90   Ht 5' 2" (1.575 m)   Wt 132.3 kg (291 lb 10.7 oz)   LMP 2019   BMI 53.35 kg/m²   ABDOMEN: incision healing well, no erythema, induration or drainage  PELVIC: deferred    Diagnosis:  1. Postpartum care and examination    2. Controlled type 2 diabetes mellitus without complication, unspecified whether long term insulin use    3. Benign essential HTN        Plan:   Doing well postpartum.  CHTN - she states that she took her procardia late this morning. Consider increasing dose if persistently elevated.  DMII - continue current regimen. Follow up with PCP.  Contraception - micronor. UPT neg.   Pap up to date.  Orders Placed This Encounter    norethindrone (MICRONOR) 0.35 mg tablet     Patient was counseled today on A.C.S. Pap guidelines and recommendations for yearly pelvic exams and monthly self breast exams; to see her PCP for other health maintenance.    FOLLOW UP: in 1 year for routine exam.    "

## 2020-02-16 ENCOUNTER — PATIENT MESSAGE (OUTPATIENT)
Dept: OBSTETRICS AND GYNECOLOGY | Facility: CLINIC | Age: 40
End: 2020-02-16

## 2020-02-17 PROBLEM — O09.92 SUPERVISION OF HIGH RISK PREGNANCY IN SECOND TRIMESTER: Status: RESOLVED | Noted: 2019-10-31 | Resolved: 2020-02-17

## 2020-04-25 ENCOUNTER — PATIENT MESSAGE (OUTPATIENT)
Dept: OBSTETRICS AND GYNECOLOGY | Facility: CLINIC | Age: 40
End: 2020-04-25

## 2020-04-26 ENCOUNTER — PATIENT MESSAGE (OUTPATIENT)
Dept: OBSTETRICS AND GYNECOLOGY | Facility: CLINIC | Age: 40
End: 2020-04-26

## 2020-04-27 ENCOUNTER — TELEPHONE (OUTPATIENT)
Dept: OBSTETRICS AND GYNECOLOGY | Facility: CLINIC | Age: 40
End: 2020-04-27

## 2020-04-27 RX ORDER — NIFEDIPINE 60 MG/1
60 TABLET, EXTENDED RELEASE ORAL DAILY
Qty: 30 TABLET | Refills: 2 | Status: SHIPPED | OUTPATIENT
Start: 2020-04-27 | End: 2020-07-28 | Stop reason: SDUPTHER

## 2020-04-27 NOTE — TELEPHONE ENCOUNTER
----- Message from Lynette Reyes sent at 4/27/2020  9:47 AM CDT -----  Contact: self  Patient states she no longer has a PCP, and only has 1 pill for blood pressure left. Patient is requesting a refill. Patient can be reached at [phone]

## 2020-05-14 ENCOUNTER — LAB VISIT (OUTPATIENT)
Dept: PRIMARY CARE CLINIC | Facility: CLINIC | Age: 40
End: 2020-05-14
Payer: MEDICAID

## 2020-05-14 DIAGNOSIS — Z00.6 RESEARCH STUDY PATIENT: Primary | ICD-10-CM

## 2020-05-14 LAB — SARS-COV-2 IGG SERPLBLD QL IA.RAPID: NEGATIVE

## 2020-05-14 PROCEDURE — U0002 COVID-19 LAB TEST NON-CDC: HCPCS

## 2020-05-14 PROCEDURE — 86769 SARS-COV-2 COVID-19 ANTIBODY: CPT

## 2020-05-14 NOTE — RESEARCH
Date of Consent: 05/14/2020     Sponsor: [facility]    Study Title/IRB Number: Observational study of Sars-CoV2 Immunoglobulin G (IgG) seroprevalence among the [facility] population over time 2020.163  Principle Investigator: Emelia Yee, PhD    Did the patient need translation services? No   name: N/A    Prior to the Informed Consent (IC) being signed, or any study protocol required data collection, testing, procedure, or intervention being performed, the following was done and/or discussed:   Patient was given a paper copy of the IC for review    Patient was given study FAQ   Purpose of the study and qualifications to participate    Study design and tests or procedures done at this visit   Confidentiality and HIPAA Authorization for Release of Medical Records for the research trial/ subject's rights/research related injury   Risk, Benefits, Alternative Treatments, Compensation and Costs   Participation in the research trial is voluntary and patient may withdraw at anytime   Contact information for study related questions    Patient verbalizes understanding of the above: Yes  Contact information for PI and IRB given to patient: Yes  Patient able to adequately summarize: the purpose of the study, the risks associated with the study, and all procedures, testing, and follow-ups associated with the study: Yes    The consent was discussed verbally with the patient and all questions were answered satisfactorily. Patient gave verbal consent for the Seroprevalence research study with an IRB approval date of 05/08/2020.      The Consent, Consent Witness and name of Clinical Research Coordinator consenting was captured and documented in [employer].    All Inclusion and Exclusion Criteria reviewed, subject meets all Inclusion criteria and does not meet any Exclusion Criteria at this time.     Patient Eligibility was confirmed.    Patient responded to survey questions.    The following biospecimen  collection procedures were collected:    -Nasopharyngeal Swab Collection  -Blood collection

## 2020-05-15 LAB — SARS-COV-2 RNA RESP QL NAA+PROBE: NOT DETECTED

## 2020-05-15 RX ORDER — SYRINGE,SAFETY WITH NEEDLE,1ML 25GX1"
SYRINGE (EA) MISCELLANEOUS
Qty: 100 EACH | Refills: 1 | Status: SHIPPED | OUTPATIENT
Start: 2020-05-15

## 2020-05-18 ENCOUNTER — PATIENT MESSAGE (OUTPATIENT)
Dept: OBSTETRICS AND GYNECOLOGY | Facility: CLINIC | Age: 40
End: 2020-05-18

## 2020-05-18 DIAGNOSIS — Z79.4 CONTROLLED TYPE 2 DIABETES MELLITUS WITHOUT COMPLICATION, WITH LONG-TERM CURRENT USE OF INSULIN: ICD-10-CM

## 2020-05-18 DIAGNOSIS — E11.9 CONTROLLED TYPE 2 DIABETES MELLITUS WITHOUT COMPLICATION, WITH LONG-TERM CURRENT USE OF INSULIN: ICD-10-CM

## 2020-05-18 RX ORDER — PEN NEEDLE, DIABETIC 31 GX5/16"
100 NEEDLE, DISPOSABLE MISCELLANEOUS
Qty: 100 EACH | Refills: 11 | Status: SHIPPED | OUTPATIENT
Start: 2020-05-18

## 2020-07-06 ENCOUNTER — TELEPHONE (OUTPATIENT)
Dept: OBSTETRICS AND GYNECOLOGY | Facility: CLINIC | Age: 40
End: 2020-07-06

## 2020-07-06 NOTE — TELEPHONE ENCOUNTER
----- Message from Zack Pederson sent at 7/6/2020 11:09 AM CDT -----  Contact: Patient  Pt called and would like to schedule an appt    Pt can be reached at [phone]

## 2020-07-07 ENCOUNTER — TELEPHONE (OUTPATIENT)
Dept: OBSTETRICS AND GYNECOLOGY | Facility: CLINIC | Age: 40
End: 2020-07-07

## 2020-07-07 NOTE — TELEPHONE ENCOUNTER
Called pt and scheduled appointment. Pt stated she does not have a PCP yet due to her insurance. no one taking new patients at this time. Pt would like a refill on her bp medication. Pt verbalized understanding.

## 2020-07-07 NOTE — TELEPHONE ENCOUNTER
----- Message from Desire Hebert sent at 7/7/2020  9:47 AM CDT -----  Patient is calling to schedule appointment. Due to insurance I can not schedule     Patient can be reached at [phone]

## 2020-07-21 ENCOUNTER — PATIENT OUTREACH (OUTPATIENT)
Dept: ADMINISTRATIVE | Facility: OTHER | Age: 40
End: 2020-07-21

## 2020-07-21 DIAGNOSIS — E11.9 CONTROLLED TYPE 2 DIABETES MELLITUS WITHOUT COMPLICATION, UNSPECIFIED WHETHER LONG TERM INSULIN USE: Primary | ICD-10-CM

## 2020-07-21 NOTE — PROGRESS NOTES
Care Everywhere: updated  Immunization:   Health Maintenance:   Media Review: reviewed for outside mammogram report  Legacy Review:   Order placed: hemoglobin a1c   Upcoming appts:  Mammogram scheduling ticket is sent to patient's portal

## 2020-08-05 ENCOUNTER — PATIENT OUTREACH (OUTPATIENT)
Dept: ADMINISTRATIVE | Facility: OTHER | Age: 40
End: 2020-08-05

## 2020-09-11 ENCOUNTER — TELEPHONE (OUTPATIENT)
Dept: OBSTETRICS AND GYNECOLOGY | Facility: CLINIC | Age: 40
End: 2020-09-11

## 2020-09-11 DIAGNOSIS — Z12.31 ENCOUNTER FOR SCREENING MAMMOGRAM FOR MALIGNANT NEOPLASM OF BREAST: Primary | ICD-10-CM

## 2020-09-17 NOTE — LETTER
October 7, 2017      Leyda Boss, ABELINO  [address]  [address]  [place] [address]           [facility]  [address]  [place] [address]  Phone: [phone]          Patient: Wang Warren   MR Number: [id number]   YOB: 1980   Date of Visit: 10/4/2017       Dear Leyda Boss:    Thank you for referring Wang Warren to me for evaluation. Attached you will find relevant portions of my assessment and plan of care.    If you have questions, please do not hesitate to call me. I look forward to following Wang Warren along with you.    Sincerely,    Keshia Reyes MD    Enclosure  CC:  No Recipients    If you would like to receive this communication electronically, please contact [email or web address] or [phone] to request more information on [employer] access.    For providers and/or their staff who would like to refer a patient to [facility], please contact us through our one-stop-shop provider referral line, [facility], at [phone].    If you feel you have received this communication in error or would no longer like to receive these types of communications, please e-mail [email or web address]          None

## 2020-11-25 ENCOUNTER — TELEPHONE (OUTPATIENT)
Dept: OPTOMETRY | Facility: CLINIC | Age: 40
End: 2020-11-25

## 2020-11-25 NOTE — TELEPHONE ENCOUNTER
Pt wants to schedule annual exam for her and her .  Lm for patient to call back or send message via [employer].

## 2020-11-25 NOTE — TELEPHONE ENCOUNTER
Schedule routine and contact exam 01/08 @ 820 . Schedule  Narcisa Warren as well. ----- Message from Rhonda Armsa sent at 11/25/2020  1:54 PM CST -----  Regarding: Returning your call  Contact: Wang  Ms. Warren was returning your call. She can be reached at [phone]

## 2020-12-21 PROBLEM — Z3A.33 33 WEEKS GESTATION OF PREGNANCY: Status: RESOLVED | Noted: 2020-01-13 | Resolved: 2020-12-21

## 2020-12-23 ENCOUNTER — HOSPITAL ENCOUNTER (OUTPATIENT)
Dept: RADIOLOGY | Facility: OTHER | Age: 40
Discharge: HOME | End: 2020-12-23
Attending: OBSTETRICS & GYNECOLOGY
Payer: MEDICAID

## 2020-12-23 DIAGNOSIS — Z12.31 ENCOUNTER FOR SCREENING MAMMOGRAM FOR MALIGNANT NEOPLASM OF BREAST: ICD-10-CM

## 2020-12-23 PROCEDURE — 77063 MAMMO DIGITAL SCREENING BILAT WITH TOMO: ICD-10-PCS | Mod: 26,,, | Performed by: RADIOLOGY

## 2020-12-23 PROCEDURE — 77067 SCR MAMMO BI INCL CAD: CPT | Mod: TC

## 2020-12-23 PROCEDURE — 77063 BREAST TOMOSYNTHESIS BI: CPT | Mod: 26,,, | Performed by: RADIOLOGY

## 2020-12-23 PROCEDURE — 77067 SCR MAMMO BI INCL CAD: CPT | Mod: 26,,, | Performed by: RADIOLOGY

## 2020-12-23 PROCEDURE — 77067 MAMMO DIGITAL SCREENING BILAT WITH TOMO: ICD-10-PCS | Mod: 26,,, | Performed by: RADIOLOGY

## 2021-01-07 ENCOUNTER — OFFICE VISIT (OUTPATIENT)
Dept: PODIATRY | Facility: CLINIC | Age: 41
End: 2021-01-07
Payer: MEDICAID

## 2021-01-07 VITALS
WEIGHT: 291.69 LBS | BODY MASS INDEX: 53.68 KG/M2 | DIASTOLIC BLOOD PRESSURE: 96 MMHG | HEART RATE: 73 BPM | SYSTOLIC BLOOD PRESSURE: 170 MMHG | HEIGHT: 62 IN

## 2021-01-07 DIAGNOSIS — M21.619 BUNION: Primary | ICD-10-CM

## 2021-01-07 DIAGNOSIS — E11.9 CONTROLLED TYPE 2 DIABETES MELLITUS WITHOUT COMPLICATION, WITHOUT LONG-TERM CURRENT USE OF INSULIN: ICD-10-CM

## 2021-01-07 DIAGNOSIS — B35.1 ONYCHOMYCOSIS DUE TO DERMATOPHYTE: ICD-10-CM

## 2021-01-07 DIAGNOSIS — B35.3 TINEA PEDIS OF RIGHT FOOT: ICD-10-CM

## 2021-01-07 PROCEDURE — 17999 UNLISTD PX SKN MUC MEMB SUBQ: CPT | Mod: CSM,,, | Performed by: PODIATRIST

## 2021-01-07 PROCEDURE — 99214 OFFICE O/P EST MOD 30 MIN: CPT | Mod: PBBFAC,PN | Performed by: PODIATRIST

## 2021-01-07 PROCEDURE — 99214 OFFICE O/P EST MOD 30 MIN: CPT | Mod: ,,, | Performed by: PODIATRIST

## 2021-01-07 PROCEDURE — 99999 PR PBB SHADOW E&M-EST. PATIENT-LVL IV: ICD-10-PCS | Mod: PBBFAC,,, | Performed by: PODIATRIST

## 2021-01-07 PROCEDURE — 17999 PR NON-COVERED FOOT CARE: ICD-10-PCS | Mod: CSM,,, | Performed by: PODIATRIST

## 2021-01-07 PROCEDURE — 99214 PR OFFICE/OUTPT VISIT, EST, LEVL IV, 30-39 MIN: ICD-10-PCS | Mod: ,,, | Performed by: PODIATRIST

## 2021-01-07 PROCEDURE — 99999 PR PBB SHADOW E&M-EST. PATIENT-LVL IV: CPT | Mod: PBBFAC,,, | Performed by: PODIATRIST

## 2021-01-07 RX ORDER — CICLOPIROX OLAMINE 7.7 MG/G
CREAM TOPICAL 2 TIMES DAILY
Qty: 90 G | Refills: 2 | Status: SHIPPED | OUTPATIENT
Start: 2021-01-07

## 2021-01-07 RX ORDER — CICLOPIROX 80 MG/ML
SOLUTION TOPICAL NIGHTLY
Qty: 6.6 ML | Refills: 11 | Status: SHIPPED | OUTPATIENT
Start: 2021-01-07 | End: 2023-11-08

## 2021-01-08 ENCOUNTER — TELEPHONE (OUTPATIENT)
Dept: OPTOMETRY | Facility: CLINIC | Age: 41
End: 2021-01-08

## 2021-01-08 ENCOUNTER — TELEPHONE (OUTPATIENT)
Dept: OPHTHALMOLOGY | Facility: CLINIC | Age: 41
End: 2021-01-08

## 2021-01-08 ENCOUNTER — OFFICE VISIT (OUTPATIENT)
Dept: OPTOMETRY | Facility: CLINIC | Age: 41
End: 2021-01-08
Payer: MEDICAID

## 2021-01-08 DIAGNOSIS — E11.9 TYPE 2 DIABETES MELLITUS WITHOUT RETINOPATHY: ICD-10-CM

## 2021-01-08 DIAGNOSIS — H52.203 MYOPIA WITH ASTIGMATISM, BILATERAL: ICD-10-CM

## 2021-01-08 DIAGNOSIS — H18.612 STABLE KERATOCONUS OF LEFT EYE: Primary | ICD-10-CM

## 2021-01-08 DIAGNOSIS — H52.13 MYOPIA WITH ASTIGMATISM, BILATERAL: ICD-10-CM

## 2021-01-08 DIAGNOSIS — H53.022 REFRACTIVE AMBLYOPIA OF LEFT EYE: ICD-10-CM

## 2021-01-08 PROCEDURE — 92014 PR EYE EXAM, EST PATIENT,COMPREHESV: ICD-10-PCS | Mod: S$PBB,,, | Performed by: OPTOMETRIST

## 2021-01-08 PROCEDURE — 99214 OFFICE O/P EST MOD 30 MIN: CPT | Mod: PBBFAC,PO | Performed by: OPTOMETRIST

## 2021-01-08 PROCEDURE — 92014 COMPRE OPH EXAM EST PT 1/>: CPT | Mod: S$PBB,,, | Performed by: OPTOMETRIST

## 2021-01-08 PROCEDURE — 92015 PR REFRACTION: ICD-10-PCS | Mod: ,,, | Performed by: OPTOMETRIST

## 2021-01-08 PROCEDURE — 92015 DETERMINE REFRACTIVE STATE: CPT | Mod: ,,, | Performed by: OPTOMETRIST

## 2021-01-08 PROCEDURE — 99999 PR PBB SHADOW E&M-EST. PATIENT-LVL IV: ICD-10-PCS | Mod: PBBFAC,,, | Performed by: OPTOMETRIST

## 2021-01-08 PROCEDURE — 99999 PR PBB SHADOW E&M-EST. PATIENT-LVL IV: CPT | Mod: PBBFAC,,, | Performed by: OPTOMETRIST

## 2021-01-22 ENCOUNTER — OFFICE VISIT (OUTPATIENT)
Dept: OPHTHALMOLOGY | Facility: CLINIC | Age: 41
End: 2021-01-22
Attending: OPHTHALMOLOGY
Payer: MEDICAID

## 2021-01-22 DIAGNOSIS — H18.603 KERATOCONUS OF BOTH EYES: Primary | ICD-10-CM

## 2021-01-22 PROCEDURE — 92004 PR EYE EXAM, NEW PATIENT,COMPREHESV: ICD-10-PCS | Mod: S$PBB,,, | Performed by: OPHTHALMOLOGY

## 2021-01-22 PROCEDURE — 99999 PR PBB SHADOW E&M-EST. PATIENT-LVL IV: CPT | Mod: PBBFAC,,, | Performed by: OPHTHALMOLOGY

## 2021-01-22 PROCEDURE — 92004 COMPRE OPH EXAM NEW PT 1/>: CPT | Mod: S$PBB,,, | Performed by: OPHTHALMOLOGY

## 2021-01-22 PROCEDURE — 99214 OFFICE O/P EST MOD 30 MIN: CPT | Mod: PBBFAC | Performed by: OPHTHALMOLOGY

## 2021-01-22 PROCEDURE — 99999 PR PBB SHADOW E&M-EST. PATIENT-LVL IV: ICD-10-PCS | Mod: PBBFAC,,, | Performed by: OPHTHALMOLOGY

## 2021-02-25 ENCOUNTER — TELEPHONE (OUTPATIENT)
Dept: OBSTETRICS AND GYNECOLOGY | Facility: CLINIC | Age: 41
End: 2021-02-25

## 2021-03-01 ENCOUNTER — TELEPHONE (OUTPATIENT)
Dept: OBSTETRICS AND GYNECOLOGY | Facility: CLINIC | Age: 41
End: 2021-03-01

## 2021-03-19 ENCOUNTER — OFFICE VISIT (OUTPATIENT)
Dept: OBSTETRICS AND GYNECOLOGY | Facility: CLINIC | Age: 41
End: 2021-03-19
Payer: MEDICAID

## 2021-03-19 ENCOUNTER — LAB VISIT (OUTPATIENT)
Dept: LAB | Facility: OTHER | Age: 41
End: 2021-03-19
Attending: OBSTETRICS & GYNECOLOGY
Payer: MEDICAID

## 2021-03-19 VITALS
HEIGHT: 62 IN | DIASTOLIC BLOOD PRESSURE: 62 MMHG | SYSTOLIC BLOOD PRESSURE: 138 MMHG | BODY MASS INDEX: 53.92 KG/M2 | WEIGHT: 293 LBS

## 2021-03-19 DIAGNOSIS — Z11.3 ROUTINE SCREENING FOR STI (SEXUALLY TRANSMITTED INFECTION): ICD-10-CM

## 2021-03-19 DIAGNOSIS — Z31.69 ENCOUNTER FOR PRECONCEPTION CONSULTATION: Primary | ICD-10-CM

## 2021-03-19 DIAGNOSIS — E11.9 CONTROLLED TYPE 2 DIABETES MELLITUS WITHOUT COMPLICATION, UNSPECIFIED WHETHER LONG TERM INSULIN USE: ICD-10-CM

## 2021-03-19 DIAGNOSIS — E03.8 SUBCLINICAL HYPOTHYROIDISM: Primary | ICD-10-CM

## 2021-03-19 DIAGNOSIS — Z31.69 ENCOUNTER FOR PRECONCEPTION CONSULTATION: ICD-10-CM

## 2021-03-19 DIAGNOSIS — Z12.4 CERVICAL CANCER SCREENING: ICD-10-CM

## 2021-03-19 LAB
ALBUMIN SERPL BCP-MCNC: 4.1 G/DL (ref 3.5–5.2)
ALP SERPL-CCNC: 93 U/L (ref 55–135)
ALT SERPL W/O P-5'-P-CCNC: 24 U/L (ref 10–44)
ANION GAP SERPL CALC-SCNC: 9 MMOL/L (ref 8–16)
AST SERPL-CCNC: 18 U/L (ref 10–40)
BILIRUB SERPL-MCNC: 0.7 MG/DL (ref 0.1–1)
BUN SERPL-MCNC: 13 MG/DL (ref 6–20)
CALCIUM SERPL-MCNC: 9.3 MG/DL (ref 8.7–10.5)
CHLORIDE SERPL-SCNC: 103 MMOL/L (ref 95–110)
CO2 SERPL-SCNC: 27 MMOL/L (ref 23–29)
CREAT SERPL-MCNC: 0.9 MG/DL (ref 0.5–1.4)
ERYTHROCYTE [DISTWIDTH] IN BLOOD BY AUTOMATED COUNT: 13.5 % (ref 11.5–14.5)
EST. GFR  (AFRICAN AMERICAN): >60 ML/MIN/1.73 M^2
EST. GFR  (NON AFRICAN AMERICAN): >60 ML/MIN/1.73 M^2
GLUCOSE SERPL-MCNC: 100 MG/DL (ref 70–110)
HCT VFR BLD AUTO: 42.1 % (ref 37–48.5)
HGB BLD-MCNC: 12.7 G/DL (ref 12–16)
MCH RBC QN AUTO: 25.8 PG (ref 27–31)
MCHC RBC AUTO-ENTMCNC: 30.2 G/DL (ref 32–36)
MCV RBC AUTO: 85 FL (ref 82–98)
PLATELET # BLD AUTO: 402 K/UL (ref 150–350)
PMV BLD AUTO: 10.1 FL (ref 9.2–12.9)
POTASSIUM SERPL-SCNC: 3.8 MMOL/L (ref 3.5–5.1)
PROT SERPL-MCNC: 8.3 G/DL (ref 6–8.4)
RBC # BLD AUTO: 4.93 M/UL (ref 4–5.4)
SODIUM SERPL-SCNC: 139 MMOL/L (ref 136–145)
T4 FREE SERPL-MCNC: 0.79 NG/DL (ref 0.71–1.51)
TSH SERPL DL<=0.005 MIU/L-ACNC: 5.25 UIU/ML (ref 0.4–4)
WBC # BLD AUTO: 6.3 K/UL (ref 3.9–12.7)

## 2021-03-19 PROCEDURE — 80061 LIPID PANEL: CPT | Performed by: OBSTETRICS & GYNECOLOGY

## 2021-03-19 PROCEDURE — 36415 COLL VENOUS BLD VENIPUNCTURE: CPT | Performed by: OBSTETRICS & GYNECOLOGY

## 2021-03-19 PROCEDURE — 86592 SYPHILIS TEST NON-TREP QUAL: CPT | Performed by: OBSTETRICS & GYNECOLOGY

## 2021-03-19 PROCEDURE — 99215 OFFICE O/P EST HI 40 MIN: CPT | Mod: PBBFAC | Performed by: OBSTETRICS & GYNECOLOGY

## 2021-03-19 PROCEDURE — 99396 PREV VISIT EST AGE 40-64: CPT | Mod: 25,S$PBB,, | Performed by: OBSTETRICS & GYNECOLOGY

## 2021-03-19 PROCEDURE — 84443 ASSAY THYROID STIM HORMONE: CPT | Performed by: OBSTETRICS & GYNECOLOGY

## 2021-03-19 PROCEDURE — 87481 CANDIDA DNA AMP PROBE: CPT | Mod: 59 | Performed by: OBSTETRICS & GYNECOLOGY

## 2021-03-19 PROCEDURE — 99999 PR PBB SHADOW E&M-EST. PATIENT-LVL V: ICD-10-PCS | Mod: PBBFAC,,, | Performed by: OBSTETRICS & GYNECOLOGY

## 2021-03-19 PROCEDURE — 80053 COMPREHEN METABOLIC PANEL: CPT | Performed by: OBSTETRICS & GYNECOLOGY

## 2021-03-19 PROCEDURE — 86703 HIV-1/HIV-2 1 RESULT ANTBDY: CPT | Performed by: OBSTETRICS & GYNECOLOGY

## 2021-03-19 PROCEDURE — 87624 HPV HI-RISK TYP POOLED RSLT: CPT | Performed by: OBSTETRICS & GYNECOLOGY

## 2021-03-19 PROCEDURE — 83036 HEMOGLOBIN GLYCOSYLATED A1C: CPT | Performed by: INTERNAL MEDICINE

## 2021-03-19 PROCEDURE — 85027 COMPLETE CBC AUTOMATED: CPT | Performed by: OBSTETRICS & GYNECOLOGY

## 2021-03-19 PROCEDURE — 99396 PR PREVENTIVE VISIT,EST,40-64: ICD-10-PCS | Mod: 25,S$PBB,, | Performed by: OBSTETRICS & GYNECOLOGY

## 2021-03-19 PROCEDURE — 88175 CYTOPATH C/V AUTO FLUID REDO: CPT | Performed by: OBSTETRICS & GYNECOLOGY

## 2021-03-19 PROCEDURE — 84439 ASSAY OF FREE THYROXINE: CPT | Performed by: INTERNAL MEDICINE

## 2021-03-19 PROCEDURE — 86803 HEPATITIS C AB TEST: CPT | Performed by: OBSTETRICS & GYNECOLOGY

## 2021-03-19 PROCEDURE — 99999 PR PBB SHADOW E&M-EST. PATIENT-LVL V: CPT | Mod: PBBFAC,,, | Performed by: OBSTETRICS & GYNECOLOGY

## 2021-03-19 PROCEDURE — 87340 HEPATITIS B SURFACE AG IA: CPT | Performed by: OBSTETRICS & GYNECOLOGY

## 2021-03-20 ENCOUNTER — PATIENT MESSAGE (OUTPATIENT)
Dept: OBSTETRICS AND GYNECOLOGY | Facility: CLINIC | Age: 41
End: 2021-03-20

## 2021-03-20 ENCOUNTER — PATIENT MESSAGE (OUTPATIENT)
Dept: MATERNAL FETAL MEDICINE | Facility: CLINIC | Age: 41
End: 2021-03-20

## 2021-03-20 LAB
C TRACH RRNA SPEC QL NAA+PROBE: NEGATIVE
CHOLEST SERPL-MCNC: 132 MG/DL (ref 120–199)
CHOLEST/HDLC SERPL: 3.4 {RATIO} (ref 2–5)
ESTIMATED AVG GLUCOSE: 235 MG/DL (ref 68–131)
ESTIMATED AVG GLUCOSE: 235 MG/DL (ref 68–131)
HBA1C MFR BLD: 9.8 % (ref 4–5.6)
HBA1C MFR BLD: 9.8 % (ref 4–5.6)
HDLC SERPL-MCNC: 39 MG/DL (ref 40–75)
HDLC SERPL: 29.5 % (ref 20–50)
LDLC SERPL CALC-MCNC: 66.2 MG/DL (ref 63–159)
N GONORRHOEA RRNA SPEC QL NAA+PROBE: NEGATIVE
NONHDLC SERPL-MCNC: 93 MG/DL
TRIGL SERPL-MCNC: 134 MG/DL (ref 30–150)

## 2021-03-22 ENCOUNTER — TELEPHONE (OUTPATIENT)
Dept: MATERNAL FETAL MEDICINE | Facility: CLINIC | Age: 41
End: 2021-03-22

## 2021-03-22 LAB
BACTERIAL VAGINOSIS DNA: POSITIVE
CANDIDA GLABRATA DNA: NEGATIVE
CANDIDA KRUSEI DNA: NEGATIVE
CANDIDA RRNA VAG QL PROBE: NEGATIVE
HBV SURFACE AG SERPL QL IA: NEGATIVE
HCV AB SERPL QL IA: NEGATIVE
HIV 1+2 AB+HIV1 P24 AG SERPL QL IA: NEGATIVE
T VAGINALIS RRNA GENITAL QL PROBE: NEGATIVE

## 2021-03-22 RX ORDER — METRONIDAZOLE 500 MG/1
500 TABLET ORAL EVERY 12 HOURS
Qty: 14 TABLET | Refills: 0 | Status: SHIPPED | OUTPATIENT
Start: 2021-03-22 | End: 2021-03-30

## 2021-03-23 ENCOUNTER — TELEPHONE (OUTPATIENT)
Dept: OBSTETRICS AND GYNECOLOGY | Facility: CLINIC | Age: 41
End: 2021-03-23

## 2021-03-23 LAB — RPR SER QL: NORMAL

## 2021-03-25 LAB
CLINICAL INFO: NORMAL
CYTO CVX: NORMAL
CYTOLOGIST CVX/VAG CYTO: NORMAL
CYTOLOGY CMNT CVX/VAG CYTO-IMP: NORMAL
CYTOLOGY PAP THIN PREP EXPLANATION: NORMAL
DATE OF PREVIOUS PAP: NORMAL
DATE PREVIOUS BX: NO
HPV I/H RISK 4 DNA CVX QL NAA+PROBE: NOT DETECTED
LMP START DATE: NORMAL
SPECIMEN SOURCE CVX/VAG CYTO: NORMAL
STAT OF ADQ CVX/VAG CYTO-IMP: NORMAL

## 2021-03-27 ENCOUNTER — PATIENT MESSAGE (OUTPATIENT)
Dept: OBSTETRICS AND GYNECOLOGY | Facility: CLINIC | Age: 41
End: 2021-03-27

## 2021-04-05 ENCOUNTER — PATIENT MESSAGE (OUTPATIENT)
Dept: MATERNAL FETAL MEDICINE | Facility: CLINIC | Age: 41
End: 2021-04-05

## 2021-04-20 ENCOUNTER — OFFICE VISIT (OUTPATIENT)
Dept: MATERNAL FETAL MEDICINE | Facility: CLINIC | Age: 41
End: 2021-04-20
Payer: MEDICAID

## 2021-04-20 DIAGNOSIS — Z31.69 ENCOUNTER FOR PRECONCEPTION CONSULTATION: ICD-10-CM

## 2021-04-20 DIAGNOSIS — Z98.891 S/P REPEAT LOW TRANSVERSE C-SECTION: ICD-10-CM

## 2021-04-20 DIAGNOSIS — R79.89 ELEVATED TSH: ICD-10-CM

## 2021-04-20 DIAGNOSIS — I10 BENIGN ESSENTIAL HTN: ICD-10-CM

## 2021-04-20 DIAGNOSIS — Z31.69 PRE-CONCEPTION COUNSELING: ICD-10-CM

## 2021-04-20 PROCEDURE — 99215 PR OFFICE/OUTPT VISIT, EST, LEVL V, 40-54 MIN: ICD-10-PCS | Mod: 95,,, | Performed by: OBSTETRICS & GYNECOLOGY

## 2021-04-20 PROCEDURE — 99215 OFFICE O/P EST HI 40 MIN: CPT | Mod: 95,,, | Performed by: OBSTETRICS & GYNECOLOGY

## 2021-04-21 PROBLEM — O09.529 AMA (ADVANCED MATERNAL AGE) MULTIGRAVIDA 35+: Status: ACTIVE | Noted: 2017-09-19

## 2021-04-21 PROBLEM — Z31.69 PRE-CONCEPTION COUNSELING: Status: ACTIVE | Noted: 2021-04-21

## 2021-04-21 PROBLEM — O09.529 AMA (ADVANCED MATERNAL AGE) MULTIGRAVIDA 35+: Status: RESOLVED | Noted: 2017-09-19 | Resolved: 2021-04-21

## 2021-04-21 PROBLEM — R79.89 ELEVATED TSH: Status: ACTIVE | Noted: 2021-04-21

## 2021-04-27 ENCOUNTER — PATIENT MESSAGE (OUTPATIENT)
Dept: OBSTETRICS AND GYNECOLOGY | Facility: CLINIC | Age: 41
End: 2021-04-27

## 2021-04-29 DIAGNOSIS — Z31.69 ENCOUNTER FOR PRECONCEPTION CONSULTATION: Primary | ICD-10-CM

## 2021-05-04 ENCOUNTER — TELEPHONE (OUTPATIENT)
Dept: OBSTETRICS AND GYNECOLOGY | Facility: CLINIC | Age: 41
End: 2021-05-04

## 2021-05-04 RX ORDER — FLUCONAZOLE 150 MG/1
150 TABLET ORAL
Qty: 2 TABLET | Refills: 0 | Status: SHIPPED | OUTPATIENT
Start: 2021-05-04 | End: 2024-01-13

## 2021-05-21 NOTE — TELEPHONE ENCOUNTER
Left voicemail to schedule next available appointment that works with patient's schedule.   Statement Selected

## 2021-06-24 ENCOUNTER — LAB VISIT (OUTPATIENT)
Dept: LAB | Facility: OTHER | Age: 41
End: 2021-06-24
Attending: OBSTETRICS & GYNECOLOGY
Payer: MEDICAID

## 2021-06-24 DIAGNOSIS — E03.8 SUBCLINICAL HYPOTHYROIDISM: ICD-10-CM

## 2021-06-24 LAB
T4 FREE SERPL-MCNC: 0.76 NG/DL (ref 0.71–1.51)
TSH SERPL DL<=0.005 MIU/L-ACNC: 3.85 UIU/ML (ref 0.4–4)

## 2021-06-24 PROCEDURE — 84439 ASSAY OF FREE THYROXINE: CPT | Performed by: OBSTETRICS & GYNECOLOGY

## 2021-06-24 PROCEDURE — 36415 COLL VENOUS BLD VENIPUNCTURE: CPT | Performed by: OBSTETRICS & GYNECOLOGY

## 2021-06-24 PROCEDURE — 84443 ASSAY THYROID STIM HORMONE: CPT | Performed by: OBSTETRICS & GYNECOLOGY

## 2021-09-28 ENCOUNTER — TELEPHONE (OUTPATIENT)
Dept: GENETICS | Facility: CLINIC | Age: 41
End: 2021-09-28

## 2021-11-01 ENCOUNTER — TELEPHONE (OUTPATIENT)
Dept: GENETICS | Facility: CLINIC | Age: 41
End: 2021-11-01
Payer: MEDICAID

## 2021-11-02 ENCOUNTER — OFFICE VISIT (OUTPATIENT)
Dept: GENETICS | Facility: CLINIC | Age: 41
End: 2021-11-02
Payer: MEDICAID

## 2021-11-02 DIAGNOSIS — Z31.69 ENCOUNTER FOR PRECONCEPTION CONSULTATION: ICD-10-CM

## 2021-11-02 DIAGNOSIS — Z31.69 ENCOUNTER FOR PRECONCEPTION CONSULTATION: Primary | ICD-10-CM

## 2021-11-02 PROCEDURE — 99999 PR PBB SHADOW E&M-EST. PATIENT-LVL III: ICD-10-PCS | Mod: PBBFAC,,,

## 2021-11-02 PROCEDURE — 96040 PR GENETIC COUNSELING, EACH 30 MIN: CPT | Mod: ,,, | Performed by: MEDICAL GENETICS

## 2021-11-02 PROCEDURE — 99213 OFFICE O/P EST LOW 20 MIN: CPT | Mod: PBBFAC | Performed by: GENETIC COUNSELOR, MS

## 2021-11-02 PROCEDURE — 99999 PR PBB SHADOW E&M-EST. PATIENT-LVL III: CPT | Mod: PBBFAC,,,

## 2021-11-02 PROCEDURE — 96040 PR GENETIC COUNSELING, EACH 30 MIN: ICD-10-PCS | Mod: ,,, | Performed by: MEDICAL GENETICS

## 2021-11-02 PROCEDURE — 99499 NO LOS: ICD-10-PCS | Mod: S$PBB,,, | Performed by: MEDICAL GENETICS

## 2021-11-02 PROCEDURE — 99499 UNLISTED E&M SERVICE: CPT | Mod: S$PBB,,, | Performed by: MEDICAL GENETICS

## 2021-11-16 ENCOUNTER — TELEPHONE (OUTPATIENT)
Dept: OBSTETRICS AND GYNECOLOGY | Facility: CLINIC | Age: 41
End: 2021-11-16
Payer: MEDICAID

## 2021-11-16 DIAGNOSIS — Z12.31 ENCOUNTER FOR SCREENING MAMMOGRAM FOR MALIGNANT NEOPLASM OF BREAST: Primary | ICD-10-CM

## 2021-12-11 ENCOUNTER — HOSPITAL ENCOUNTER (EMERGENCY)
Facility: OTHER | Age: 41
Discharge: HOME | End: 2021-12-11
Attending: EMERGENCY MEDICINE
Payer: MEDICAID

## 2021-12-11 VITALS
HEART RATE: 92 BPM | DIASTOLIC BLOOD PRESSURE: 84 MMHG | TEMPERATURE: 98 F | SYSTOLIC BLOOD PRESSURE: 152 MMHG | OXYGEN SATURATION: 97 % | RESPIRATION RATE: 16 BRPM

## 2021-12-11 DIAGNOSIS — M79.672 PAIN IN LEFT FOOT: ICD-10-CM

## 2021-12-11 LAB
HCV AB SERPL QL IA: NEGATIVE
HIV 1+2 AB+HIV1 P24 AG SERPL QL IA: NEGATIVE

## 2021-12-11 PROCEDURE — 96372 THER/PROPH/DIAG INJ SC/IM: CPT

## 2021-12-11 PROCEDURE — 86803 HEPATITIS C AB TEST: CPT | Performed by: EMERGENCY MEDICINE

## 2021-12-11 PROCEDURE — 63600175 PHARM REV CODE 636 W HCPCS: Performed by: EMERGENCY MEDICINE

## 2021-12-11 PROCEDURE — 99284 EMERGENCY DEPT VISIT MOD MDM: CPT | Mod: 25

## 2021-12-11 PROCEDURE — 87389 HIV-1 AG W/HIV-1&-2 AB AG IA: CPT | Performed by: EMERGENCY MEDICINE

## 2021-12-11 RX ORDER — IBUPROFEN 600 MG/1
600 TABLET ORAL EVERY 8 HOURS PRN
Qty: 30 TABLET | Refills: 0 | Status: SHIPPED | OUTPATIENT
Start: 2021-12-11

## 2021-12-11 RX ORDER — HYDROCODONE BITARTRATE AND ACETAMINOPHEN 5; 325 MG/1; MG/1
1 TABLET ORAL EVERY 8 HOURS PRN
Qty: 9 TABLET | Refills: 0 | Status: SHIPPED | OUTPATIENT
Start: 2021-12-11

## 2021-12-11 RX ORDER — KETOROLAC TROMETHAMINE 30 MG/ML
15 INJECTION, SOLUTION INTRAMUSCULAR; INTRAVENOUS
Status: COMPLETED | OUTPATIENT
Start: 2021-12-11 | End: 2021-12-11

## 2021-12-11 RX ADMIN — KETOROLAC TROMETHAMINE 15 MG: 30 INJECTION, SOLUTION INTRAMUSCULAR at 06:12

## 2021-12-30 ENCOUNTER — LAB VISIT (OUTPATIENT)
Dept: PRIMARY CARE CLINIC | Facility: OTHER | Age: 41
End: 2021-12-30
Attending: INTERNAL MEDICINE
Payer: MEDICAID

## 2021-12-30 DIAGNOSIS — Z20.822 ENCOUNTER FOR LABORATORY TESTING FOR COVID-19 VIRUS: ICD-10-CM

## 2021-12-30 PROCEDURE — U0003 INFECTIOUS AGENT DETECTION BY NUCLEIC ACID (DNA OR RNA); SEVERE ACUTE RESPIRATORY SYNDROME CORONAVIRUS 2 (SARS-COV-2) (CORONAVIRUS DISEASE [COVID-19]), AMPLIFIED PROBE TECHNIQUE, MAKING USE OF HIGH THROUGHPUT TECHNOLOGIES AS DESCRIBED BY CMS-2020-01-R: HCPCS | Performed by: INTERNAL MEDICINE

## 2021-12-31 ENCOUNTER — PATIENT MESSAGE (OUTPATIENT)
Dept: ADMINISTRATIVE | Facility: OTHER | Age: 41
End: 2021-12-31
Payer: MEDICAID

## 2022-01-01 LAB
SARS-COV-2 RNA RESP QL NAA+PROBE: NORMAL
TEST PERFORMANCE INFO SPEC: NORMAL

## 2022-02-01 ENCOUNTER — PATIENT MESSAGE (OUTPATIENT)
Dept: GENETICS | Facility: CLINIC | Age: 42
End: 2022-02-01
Payer: MEDICAID

## 2022-03-16 ENCOUNTER — OFFICE VISIT (OUTPATIENT)
Dept: PODIATRY | Facility: CLINIC | Age: 42
End: 2022-03-16
Payer: MEDICAID

## 2022-03-16 VITALS
BODY MASS INDEX: 42.88 KG/M2 | SYSTOLIC BLOOD PRESSURE: 141 MMHG | HEIGHT: 62 IN | WEIGHT: 233 LBS | HEART RATE: 78 BPM | DIASTOLIC BLOOD PRESSURE: 92 MMHG

## 2022-03-16 DIAGNOSIS — E11.9 CONTROLLED TYPE 2 DIABETES MELLITUS WITHOUT COMPLICATION, WITHOUT LONG-TERM CURRENT USE OF INSULIN: ICD-10-CM

## 2022-03-16 DIAGNOSIS — M21.619 BUNION: Primary | ICD-10-CM

## 2022-03-16 PROCEDURE — 3072F LOW RISK FOR RETINOPATHY: CPT | Mod: CPTII,,, | Performed by: PODIATRIST

## 2022-03-16 PROCEDURE — 99213 PR OFFICE/OUTPT VISIT, EST, LEVL III, 20-29 MIN: ICD-10-PCS | Mod: S$PBB,,, | Performed by: PODIATRIST

## 2022-03-16 PROCEDURE — 99999 PR PBB SHADOW E&M-EST. PATIENT-LVL V: ICD-10-PCS | Mod: PBBFAC,,, | Performed by: PODIATRIST

## 2022-03-16 PROCEDURE — 99999 PR PBB SHADOW E&M-EST. PATIENT-LVL V: CPT | Mod: PBBFAC,,, | Performed by: PODIATRIST

## 2022-03-16 PROCEDURE — 3077F PR MOST RECENT SYSTOLIC BLOOD PRESSURE >= 140 MM HG: ICD-10-PCS | Mod: CPTII,,, | Performed by: PODIATRIST

## 2022-03-16 PROCEDURE — 3080F DIAST BP >= 90 MM HG: CPT | Mod: CPTII,,, | Performed by: PODIATRIST

## 2022-03-16 PROCEDURE — 1160F PR REVIEW ALL MEDS BY PRESCRIBER/CLIN PHARMACIST DOCUMENTED: ICD-10-PCS | Mod: CPTII,,, | Performed by: PODIATRIST

## 2022-03-16 PROCEDURE — 3008F BODY MASS INDEX DOCD: CPT | Mod: CPTII,,, | Performed by: PODIATRIST

## 2022-03-16 PROCEDURE — 1159F PR MEDICATION LIST DOCUMENTED IN MEDICAL RECORD: ICD-10-PCS | Mod: CPTII,,, | Performed by: PODIATRIST

## 2022-03-16 PROCEDURE — 3072F PR LOW RISK FOR RETINOPATHY: ICD-10-PCS | Mod: CPTII,,, | Performed by: PODIATRIST

## 2022-03-16 PROCEDURE — 1159F MED LIST DOCD IN RCRD: CPT | Mod: CPTII,,, | Performed by: PODIATRIST

## 2022-03-16 PROCEDURE — 3080F PR MOST RECENT DIASTOLIC BLOOD PRESSURE >= 90 MM HG: ICD-10-PCS | Mod: CPTII,,, | Performed by: PODIATRIST

## 2022-03-16 PROCEDURE — 3077F SYST BP >= 140 MM HG: CPT | Mod: CPTII,,, | Performed by: PODIATRIST

## 2022-03-16 PROCEDURE — 99213 OFFICE O/P EST LOW 20 MIN: CPT | Mod: S$PBB,,, | Performed by: PODIATRIST

## 2022-03-16 PROCEDURE — 99215 OFFICE O/P EST HI 40 MIN: CPT | Mod: PBBFAC,PN | Performed by: PODIATRIST

## 2022-03-16 PROCEDURE — 3008F PR BODY MASS INDEX (BMI) DOCUMENTED: ICD-10-PCS | Mod: CPTII,,, | Performed by: PODIATRIST

## 2022-03-16 PROCEDURE — 1160F RVW MEDS BY RX/DR IN RCRD: CPT | Mod: CPTII,,, | Performed by: PODIATRIST

## 2022-03-16 NOTE — PROGRESS NOTES
Subjective:      Patient ID: Wang Warren is a 41 y.o. female.    Chief Complaint: Routine Foot Care    Diabetes, increased risk amputation needing evaluation/management/optomization of foot care.      Review of Systems   Constitutional: Negative for chills, diaphoresis, fever, malaise/fatigue and night sweats.   Cardiovascular: Negative for claudication, cyanosis, leg swelling and syncope.   Skin: Negative for color change, dry skin, nail changes, rash, suspicious lesions and unusual hair distribution.   Musculoskeletal: Negative for falls, joint pain, joint swelling, muscle cramps, muscle weakness and stiffness.   Gastrointestinal: Negative for constipation, diarrhea, nausea and vomiting.   Neurological: Negative for brief paralysis, disturbances in coordination, focal weakness, numbness, paresthesias, sensory change and tremors.           Objective:      Physical Exam  Constitutional:       General: She is not in acute distress.     Appearance: She is well-developed. She is not diaphoretic.   Cardiovascular:      Pulses:           Popliteal pulses are 2+ on the right side and 2+ on the left side.        Dorsalis pedis pulses are 2+ on the right side and 2+ on the left side.        Posterior tibial pulses are 2+ on the right side and 2+ on the left side.      Comments: Capillary refill 3 seconds all toes/distal feet, all toes/both feet warm to touch.      Negative lymphadenopathy bilateral popliteal fossa and tarsal tunnel.      Negavie lower extremity edema bilateral.    Musculoskeletal:      Right ankle: No swelling, deformity, ecchymosis or lacerations. Normal range of motion. Normal pulse.      Right Achilles Tendon: Normal. No defects. Barragan's test negative.      Comments: Mild to moderate hallux valgus right with medial bunion without pain or signs of pressure trauma.    Otherwise, Normal angle, base, station of gait. All ten toes without clubbing, cyanosis, or signs of ischemia.  No pain to  palpation bilateral lower extremities.  Range of motion, stability, muscle strength, and muscle tone normal bilateral feet and legs.    Lymphadenopathy:      Lower Body: No right inguinal adenopathy. No left inguinal adenopathy.      Comments: Negative lymphadenopathy bilateral popliteal fossa and tarsal tunnel.    Negative lymphangitic streaking bilateral feet/ankles/legs.   Skin:     General: Skin is warm and dry.      Capillary Refill: Capillary refill takes 2 to 3 seconds.      Coloration: Skin is not pale.      Findings: No abrasion, bruising, burn, ecchymosis, erythema, laceration, lesion or rash.      Nails: There is no clubbing.      Comments:   Skin is normal age and health appropriate color, turgor, texture, and temperature bilateral lower extremities without ulceration, hyperpigmentation, discoloration, masses nodules or cords palpated.  No ecchymosis, erythema, edema, or cardinal signs of infection bilateral lower extremities.     Neurological:      Mental Status: She is alert and oriented to person, place, and time.      Sensory: No sensory deficit.      Motor: No tremor, atrophy or abnormal muscle tone.      Gait: Gait normal.      Deep Tendon Reflexes:      Reflex Scores:       Patellar reflexes are 2+ on the right side and 2+ on the left side.       Achilles reflexes are 2+ on the right side and 2+ on the left side.     Comments: Negative tinel sign to percussion sural, superficial peroneal, deep peroneal, saphenous, and posterior tibial nerves right and left ankles and feet.     Psychiatric:         Behavior: Behavior is cooperative.               Assessment:       Encounter Diagnoses   Name Primary?    Bunion - Right Foot Yes    Controlled type 2 diabetes mellitus without complication, without long-term current use of insulin          Plan:       Wang was seen today for routine foot care.    Diagnoses and all orders for this visit:    Bunion - Right Foot  -      DIABETES FOOT  EXAM    Controlled type 2 diabetes mellitus without complication, without long-term current use of insulin  -      DIABETES FOOT EXAM      I counseled the patient on her conditions, their implications and medical management.        - Shoe inspection. Diabetic Foot Education. Patient reminded of the importance of good nutrition and blood sugar control to help prevent podiatric complications of diabetes. Patient instructed on proper foot hygeine. We discussed wearing proper shoe gear, daily foot inspections, never walking without protective shoe gear, never putting sharp instruments to feet, routine podiatric visits annually.      Discussed conservative treatment with shoes of adequate dimensions, material, and style to alleviate symptoms and delay or prevent surgical intervention.         No follow-ups on file.

## 2022-03-23 ENCOUNTER — OFFICE VISIT (OUTPATIENT)
Dept: URGENT CARE | Facility: CLINIC | Age: 42
End: 2022-03-23
Payer: MEDICAID

## 2022-03-23 VITALS
RESPIRATION RATE: 16 BRPM | TEMPERATURE: 98 F | HEART RATE: 88 BPM | WEIGHT: 233 LBS | OXYGEN SATURATION: 96 % | DIASTOLIC BLOOD PRESSURE: 91 MMHG | HEIGHT: 62 IN | SYSTOLIC BLOOD PRESSURE: 151 MMHG | BODY MASS INDEX: 42.88 KG/M2

## 2022-03-23 DIAGNOSIS — M19.079 1ST MTP ARTHRITIS: Primary | ICD-10-CM

## 2022-03-23 PROCEDURE — 1159F PR MEDICATION LIST DOCUMENTED IN MEDICAL RECORD: ICD-10-PCS | Mod: CPTII,S$GLB,, | Performed by: PHYSICIAN ASSISTANT

## 2022-03-23 PROCEDURE — 3080F PR MOST RECENT DIASTOLIC BLOOD PRESSURE >= 90 MM HG: ICD-10-PCS | Mod: CPTII,S$GLB,, | Performed by: PHYSICIAN ASSISTANT

## 2022-03-23 PROCEDURE — 3072F LOW RISK FOR RETINOPATHY: CPT | Mod: CPTII,S$GLB,, | Performed by: PHYSICIAN ASSISTANT

## 2022-03-23 PROCEDURE — 3077F PR MOST RECENT SYSTOLIC BLOOD PRESSURE >= 140 MM HG: ICD-10-PCS | Mod: CPTII,S$GLB,, | Performed by: PHYSICIAN ASSISTANT

## 2022-03-23 PROCEDURE — 1160F PR REVIEW ALL MEDS BY PRESCRIBER/CLIN PHARMACIST DOCUMENTED: ICD-10-PCS | Mod: CPTII,S$GLB,, | Performed by: PHYSICIAN ASSISTANT

## 2022-03-23 PROCEDURE — 1159F MED LIST DOCD IN RCRD: CPT | Mod: CPTII,S$GLB,, | Performed by: PHYSICIAN ASSISTANT

## 2022-03-23 PROCEDURE — 3072F PR LOW RISK FOR RETINOPATHY: ICD-10-PCS | Mod: CPTII,S$GLB,, | Performed by: PHYSICIAN ASSISTANT

## 2022-03-23 PROCEDURE — 3008F BODY MASS INDEX DOCD: CPT | Mod: CPTII,S$GLB,, | Performed by: PHYSICIAN ASSISTANT

## 2022-03-23 PROCEDURE — 99203 PR OFFICE/OUTPT VISIT, NEW, LEVL III, 30-44 MIN: ICD-10-PCS | Mod: S$GLB,,, | Performed by: PHYSICIAN ASSISTANT

## 2022-03-23 PROCEDURE — 3077F SYST BP >= 140 MM HG: CPT | Mod: CPTII,S$GLB,, | Performed by: PHYSICIAN ASSISTANT

## 2022-03-23 PROCEDURE — 99203 OFFICE O/P NEW LOW 30 MIN: CPT | Mod: S$GLB,,, | Performed by: PHYSICIAN ASSISTANT

## 2022-03-23 PROCEDURE — 3080F DIAST BP >= 90 MM HG: CPT | Mod: CPTII,S$GLB,, | Performed by: PHYSICIAN ASSISTANT

## 2022-03-23 PROCEDURE — 3008F PR BODY MASS INDEX (BMI) DOCUMENTED: ICD-10-PCS | Mod: CPTII,S$GLB,, | Performed by: PHYSICIAN ASSISTANT

## 2022-03-23 PROCEDURE — 1160F RVW MEDS BY RX/DR IN RCRD: CPT | Mod: CPTII,S$GLB,, | Performed by: PHYSICIAN ASSISTANT

## 2022-03-23 RX ORDER — NAPROXEN 500 MG/1
500 TABLET ORAL 2 TIMES DAILY WITH MEALS
Qty: 30 TABLET | Refills: 0 | Status: SHIPPED | OUTPATIENT
Start: 2022-03-23 | End: 2023-03-23

## 2022-03-23 NOTE — PROGRESS NOTES
"Subjective:       Patient ID: Wang Warren is a 41 y.o. female.    Vitals:  height is 5' 2" (1.575 m) and weight is 105.7 kg (233 lb). Her temperature is 97.9 °F (36.6 °C). Her blood pressure is 151/91 (abnormal) and her pulse is 88. Her respiration is 16 and oxygen saturation is 96%.     Chief Complaint: Toe Pain    Pt stated that she went to the nail shop around [facility] and the  informed her that her right big toenail had cracked . In an attempt to save the toenail , the  placed an overlay on the toenail.  The overlay started to come off over the past couple of days the patient removed it yesterday. Pt stated that she has been using peroxide and neosporin to keep the wound clean.  She started with pain over the 1st MTP joint yesterday.  She says she has abundant this area and sees a podiatrist but nothing has ever been done for it as it has not bothered her.  She denies any other trauma to the area.  She says the area in the toenail bed is not painful it is down in the 1st MTP joint.  She has no history of gout.  She has not done anything for her pain.  Pts pharmacy has been updated .     Toe Pain   The incident occurred more than 1 week ago. The incident occurred at home. The injury mechanism is unknown. The pain is present in the right toes. The quality of the pain is described as aching. The pain is at a severity of 4/10. The pain is mild. The pain has been fluctuating since onset. Pertinent negatives include no inability to bear weight, loss of motion, loss of sensation, muscle weakness, numbness or tingling. It is unknown if a foreign body is present. The symptoms are aggravated by movement and weight bearing. Treatments tried: peroxide. The treatment provided no relief.       Constitution: Negative for chills and fever.   Cardiovascular: Negative for chest pain.   Eyes: Negative for blurred vision.   Respiratory: Negative for shortness of breath.    Musculoskeletal: " Positive for pain, joint pain, joint swelling and abnormal ROM of joint. Negative for trauma.   Skin: Positive for color change. Negative for rash.   Neurological: Negative for headaches, altered mental status and numbness.   Psychiatric/Behavioral: Negative for altered mental status.       Objective:      Physical Exam   Constitutional: She is oriented to person, place, and time. She appears well-developed. No distress.   HENT:   Head: Normocephalic and atraumatic.   Eyes: Conjunctivae are normal.   Cardiovascular: Normal rate, regular rhythm and normal heart sounds.   No murmur heard.Exam reveals no gallop and no friction rub.   Pulmonary/Chest: Effort normal and breath sounds normal. No respiratory distress. She has no wheezes.   Musculoskeletal:      Right foot: Decreased range of motion. Tenderness (over bunion), swelling and bunion present.        Feet:    Neurological: She is alert and oriented to person, place, and time.   Skin: Skin is warm, dry and not diaphoretic.   Psychiatric: Her behavior is normal. Judgment and thought content normal.   Nursing note and vitals reviewed.        Assessment:       1. 1st MTP arthritis          Plan:         1st MTP arthritis  -     naproxen (NAPROSYN) 500 MG tablet; Take 1 tablet (500 mg total) by mouth 2 (two) times daily with meals.  Dispense: 30 tablet; Refill: 0                   Patient Instructions   - Rest.  - Drink plenty of fluids.  - Take Tylenol as directed as needed for fever/pain.  Do not take more than the recommended dose.  - follow up with your PCP within the next 1-2 weeks as needed.   - Ice for the next 24-48 hours.    - Elevate when possible.   - Warm compresses to the affected area for 20 minutes at a time.  - You must understand that you have received an Urgent Care treatment only and that you may be released before all of your medical problems are known or treated.   - You, the patient, will arrange for follow up care as instructed.   - If your  condition worsens or fails to improve we recommend that you receive another evaluation at the ER immediately or contact your PCP to discuss your concerns.   - You can call [phone] or [phone] to help schedule an appointment with the appropriate provider.

## 2022-03-23 NOTE — LETTER
March 23, 2022      [facility] - Urgent Care  [address]  [place] [address]  Phone: [phone]  Fax: [phone]       Patient: Wang Warren   YOB: 1980  Date of Visit: 03/23/2022    To Whom It May Concern:    Mohini Warren  was at [facility] on 03/23/2022. The patient may return to work/school on 03/28/2022 with no restrictions. If you have any questions or concerns, or if I can be of further assistance, please do not hesitate to contact me.    Sincerely,         Rosalba He PA-C

## 2022-03-23 NOTE — PATIENT INSTRUCTIONS
- Rest.  - Drink plenty of fluids.  - Take Tylenol as directed as needed for fever/pain.  Do not take more than the recommended dose.  - follow up with your PCP within the next 1-2 weeks as needed.   - Ice for the next 24-48 hours.    - Elevate when possible.   - Warm compresses to the affected area for 20 minutes at a time.  - You must understand that you have received an Urgent Care treatment only and that you may be released before all of your medical problems are known or treated.   - You, the patient, will arrange for follow up care as instructed.   - If your condition worsens or fails to improve we recommend that you receive another evaluation at the ER immediately or contact your PCP to discuss your concerns.   - You can call [phone] or [phone] to help schedule an appointment with the appropriate provider.

## 2022-03-27 ENCOUNTER — OFFICE VISIT (OUTPATIENT)
Dept: URGENT CARE | Facility: CLINIC | Age: 42
End: 2022-03-27
Payer: MEDICAID

## 2022-03-27 VITALS
HEIGHT: 62 IN | SYSTOLIC BLOOD PRESSURE: 123 MMHG | WEIGHT: 233 LBS | OXYGEN SATURATION: 95 % | DIASTOLIC BLOOD PRESSURE: 77 MMHG | BODY MASS INDEX: 42.88 KG/M2 | TEMPERATURE: 98 F | RESPIRATION RATE: 18 BRPM | HEART RATE: 82 BPM

## 2022-03-27 DIAGNOSIS — L03.031 CELLULITIS OF TOE OF RIGHT FOOT: ICD-10-CM

## 2022-03-27 DIAGNOSIS — M79.674 PAIN OF RIGHT GREAT TOE: Primary | ICD-10-CM

## 2022-03-27 PROCEDURE — 3072F PR LOW RISK FOR RETINOPATHY: ICD-10-PCS | Mod: CPTII,S$GLB,,

## 2022-03-27 PROCEDURE — 3074F SYST BP LT 130 MM HG: CPT | Mod: CPTII,S$GLB,,

## 2022-03-27 PROCEDURE — 3008F BODY MASS INDEX DOCD: CPT | Mod: CPTII,S$GLB,,

## 2022-03-27 PROCEDURE — 1160F RVW MEDS BY RX/DR IN RCRD: CPT | Mod: CPTII,S$GLB,,

## 2022-03-27 PROCEDURE — 99213 PR OFFICE/OUTPT VISIT, EST, LEVL III, 20-29 MIN: ICD-10-PCS | Mod: S$GLB,,,

## 2022-03-27 PROCEDURE — 73630 X-RAY EXAM OF FOOT: CPT | Mod: RT,S$GLB,, | Performed by: RADIOLOGY

## 2022-03-27 PROCEDURE — 3072F LOW RISK FOR RETINOPATHY: CPT | Mod: CPTII,S$GLB,,

## 2022-03-27 PROCEDURE — 3074F PR MOST RECENT SYSTOLIC BLOOD PRESSURE < 130 MM HG: ICD-10-PCS | Mod: CPTII,S$GLB,,

## 2022-03-27 PROCEDURE — 99213 OFFICE O/P EST LOW 20 MIN: CPT | Mod: S$GLB,,,

## 2022-03-27 PROCEDURE — 1159F PR MEDICATION LIST DOCUMENTED IN MEDICAL RECORD: ICD-10-PCS | Mod: CPTII,S$GLB,,

## 2022-03-27 PROCEDURE — 1160F PR REVIEW ALL MEDS BY PRESCRIBER/CLIN PHARMACIST DOCUMENTED: ICD-10-PCS | Mod: CPTII,S$GLB,,

## 2022-03-27 PROCEDURE — 73630 XR FOOT COMPLETE 3 VIEW RIGHT: ICD-10-PCS | Mod: RT,S$GLB,, | Performed by: RADIOLOGY

## 2022-03-27 PROCEDURE — 3078F DIAST BP <80 MM HG: CPT | Mod: CPTII,S$GLB,,

## 2022-03-27 PROCEDURE — 1159F MED LIST DOCD IN RCRD: CPT | Mod: CPTII,S$GLB,,

## 2022-03-27 PROCEDURE — 3008F PR BODY MASS INDEX (BMI) DOCUMENTED: ICD-10-PCS | Mod: CPTII,S$GLB,,

## 2022-03-27 PROCEDURE — 3078F PR MOST RECENT DIASTOLIC BLOOD PRESSURE < 80 MM HG: ICD-10-PCS | Mod: CPTII,S$GLB,,

## 2022-03-27 RX ORDER — CEPHALEXIN 500 MG/1
500 CAPSULE ORAL EVERY 6 HOURS
Qty: 20 CAPSULE | Refills: 0 | Status: SHIPPED | OUTPATIENT
Start: 2022-03-27 | End: 2022-04-02

## 2022-03-27 NOTE — PROGRESS NOTES
"Subjective:       Patient ID: Wang Warren is a 41 y.o. female.    Vitals:  height is 5' 2" (1.575 m) and weight is 105.7 kg (233 lb). Her temperature is 97.8 °F (36.6 °C). Her blood pressure is 123/77 and her pulse is 82. Her respiration is 18 and oxygen saturation is 95%.     Chief Complaint: Foot Swelling (Right foot )    Pt is a 40 y/o female with a hx of T2DM and HTN who presents with right big toe pain. Around [place] patient cracked the toenail of her R great toe. Pt reports that a  then placed on overlay over her toe. Followed up with her podiatrist 3/16/22 with no issues. Pt then removed the overlay 5 days ago. Pain and swelling started following removal of the overlay. Was seen at  3/23/22 and given naproxen for pain. Pt reports no improvement. Swelling and pain to the 1st MTP joint. Denies any purulent drainage, warmth, or erythema. Denies any tingling or numbness. No hx of diabetic foot ulcers. Pt states she tries to take good care of her feet due to her diabetic hx. Does report hx of arthritis and bunion on her feet. Denies hx of gout. Denies injuring her foot. Pt states that she was wearing her 's surgical boot; however, even that is too painful to put on now. Denies any fever, chills, N/V/D. Pt reports diabetes well-controlled and that last A1c 6.4%.      Constitution: Negative for chills and fever.   HENT: Negative for ear pain and congestion.    Neck: Negative for neck pain and neck stiffness.   Cardiovascular: Negative for chest pain.   Eyes: Negative for eye itching and eye redness.   Respiratory: Negative for chest tightness and shortness of breath.    Gastrointestinal: Negative for abdominal pain, nausea, vomiting and diarrhea.   Musculoskeletal: Positive for joint pain, joint swelling, abnormal ROM of joint and arthritis. Negative for gout.   Skin: Negative for rash and erythema.   Neurological: Negative for dizziness, light-headedness and headaches.     "   Objective:      Physical Exam   Constitutional: She is oriented to person, place, and time. She appears well-developed.   HENT:   Head: Normocephalic and atraumatic. Head is without abrasion, without contusion and without laceration.   Ears:   Right Ear: External ear normal.   Left Ear: External ear normal.   Nose: Nose normal.   Mouth/Throat: Oropharynx is clear and moist and mucous membranes are normal.   Eyes: Conjunctivae, EOM and lids are normal. Pupils are equal, round, and reactive to light.   Neck: Trachea normal and phonation normal. Neck supple.   Cardiovascular: Normal rate, regular rhythm and normal heart sounds.   Pulmonary/Chest: Effort normal and breath sounds normal. No stridor. No respiratory distress.   Musculoskeletal:        Feet:    Neurological: She is alert and oriented to person, place, and time.   Skin: Skin is warm, dry, intact and no rash. Capillary refill takes less than 2 seconds. No abrasion, No burn, No bruising, No erythema and No ecchymosis   Psychiatric: Her speech is normal and behavior is normal. Judgment and thought content normal.   Nursing note and vitals reviewed.            X-Ray Foot Complete Right    Result Date: 3/27/2022  EXAMINATION: XR FOOT COMPLETE 3 VIEW RIGHT CLINICAL HISTORY: . Pain in right toe(s) TECHNIQUE: AP, lateral, and oblique views of the right foot were performed. COMPARISON: Left foot radiograph December 11, 2021 FINDINGS: Findings suspicious for subtle medial eccentric erosion of the distal great toe metatarsal.  Mild soft tissue swelling about the midfoot. No displaced fracture or subluxation.  No suspicious bone lesion. No soft tissue gas.  No radiopaque foreign body.     Findings suspicious for subtle medial eccentric erosion of the distal great toe metatarsal, possible gout. Mild soft tissue swelling about the midfoot. Electronically signed by: Juan José Live MD Date:    03/27/2022 Time:    10:34    Assessment:       1. Pain of right great toe    2.  Cellulitis of toe of right foot          Plan:         Pain of right great toe  -     X-Ray Foot Complete Right; Future; Expected date: 03/27/2022    Cellulitis of toe of right foot  -     cephALEXin (KEFLEX) 500 MG capsule; Take 1 capsule (500 mg total) by mouth every 6 (six) hours. for 5 days  Dispense: 20 capsule; Refill: 0           Medical Decision Making:   Initial Assessment:   Pt is a 42 y/o female with a hx of T2DM and HTN who presents with right big toe pain x6 days. VSS. Afebrile. On exam, there is TTP and swelling to the 1st MTP joint of R big toe. Distal nail of R big toe is missing, with ttp and erythema to medial nail fold. No purulent drainage.  Differential Diagnosis:   DDx includes cellulitis, abscess, arthritis, pain from bunion  Clinical Tests:   Radiological Study: Ordered and Reviewed  Urgent Care Management:  Reviewed XR with patient. Findings suspicious for subtle medial eccentric erosion of the distal great toe metatarsal, suggesting possible gout. Pt was given naproxen at last visit but has only taken one dose. I advised patient to take naproxen for the next 7 days for possible gout. However, I also have concern that there may be developing cellulitis where she removed the overlay on her nail. Will treat for non-purulent cellulitis with keflex. Discussed clinic return vs ED precautions. Pt has f/up with podiatry in 4 weeks. Pt verbalizes understanding and agrees with plan.         Patient Instructions    Gout Discharge Instructions    If your condition worsens or fails to improve we recommend that you receive another evaluation at the ER immediately or contact your PCP to discuss your concerns or return here. You must understand that you've received an urgent care treatment only and that you may be released before all your medical problems are known or treated. You the patient will arrange for follouwp care as instructed.   Take meds as prescribed  You were given a prescription NSAID  (NAPROSYN), therefore do not also take any over the counter NSAID like ibuprofen, aleve, advil, motrin etc   RICE which means rest, ice compression and elevation are helpful.     Cellulitis  - Rest.    - Drink plenty of fluids.    - Tylenol or Ibuprofen as directed as needed for fever/pain.      - You have been given an antibiotic to treat your condition today.    - Please complete the antibiotic as directed on the bottle.   - If you are female and on oral birth control pills, use additional methods to prevent pregnancy while on antibiotics and for one cycle after.     - Keep the wound clean and dry.   - Wash daily with soap and water  - Warm water soaks (with or without epsom salt) 2-3 times a day for 5-10 minutes at a time     - YOU SHOULD EXPERIENCE SIGNIFICANT IMPROVEMENT IN SYMPTOMS IN THE NEXT 1-2 DAYS.  IF SYMPTOMS WORSEN, GO TO ER.  IF YOU DO NOT EXPERIENCE ANY IMPROVEMENT IN SYMPTOMS AND THE NEXT 1-2 DAYS WHILE YOU TAKE ANTIBIOTICS, RETURN TO CLINIC OR SEEK MEDICAL ATTENTION IMMEDIATELY.  - Follow up with your PCP or specialty clinic as directed in the next 1-2 weeks if not improved or as needed.  You can call [phone] to schedule an appointment with the appropriate provider.    - Go to the ER if you DEVELOP ANY NEW OR WORSENING SYMPTOMS.     - You must understand that you have received an Urgent Care treatment only and that you may be released before all of your medical problems are known or treated.   - You, the patient, will arrange for follow up care as instructed.   - If your condition worsens or fails to improve we recommend that you receive another evaluation at the ER immediately or contact your PCP to discuss your concerns or return here.

## 2022-03-27 NOTE — PATIENT INSTRUCTIONS
Gout Discharge Instructions    If your condition worsens or fails to improve we recommend that you receive another evaluation at the ER immediately or contact your PCP to discuss your concerns or return here. You must understand that you've received an urgent care treatment only and that you may be released before all your medical problems are known or treated. You the patient will arrange for follouwp care as instructed.   Take meds as prescribed  You were given a prescription NSAID (NAPROSYN), therefore do not also take any over the counter NSAID like ibuprofen, aleve, advil, motrin etc   RICE which means rest, ice compression and elevation are helpful.     Cellulitis  - Rest.    - Drink plenty of fluids.    - Tylenol or Ibuprofen as directed as needed for fever/pain.      - You have been given an antibiotic to treat your condition today.    - Please complete the antibiotic as directed on the bottle.   - If you are female and on oral birth control pills, use additional methods to prevent pregnancy while on antibiotics and for one cycle after.     - Keep the wound clean and dry.   - Wash daily with soap and water  - Warm water soaks (with or without epsom salt) 2-3 times a day for 5-10 minutes at a time     - YOU SHOULD EXPERIENCE SIGNIFICANT IMPROVEMENT IN SYMPTOMS IN THE NEXT 1-2 DAYS.  IF SYMPTOMS WORSEN, GO TO ER.  IF YOU DO NOT EXPERIENCE ANY IMPROVEMENT IN SYMPTOMS AND THE NEXT 1-2 DAYS WHILE YOU TAKE ANTIBIOTICS, RETURN TO CLINIC OR SEEK MEDICAL ATTENTION IMMEDIATELY.  - Follow up with your PCP or specialty clinic as directed in the next 1-2 weeks if not improved or as needed.  You can call [phone] to schedule an appointment with the appropriate provider.    - Go to the ER if you DEVELOP ANY NEW OR WORSENING SYMPTOMS.     - You must understand that you have received an Urgent Care treatment only and that you may be released before all of your medical problems are known or treated.   - You, the  patient, will arrange for follow up care as instructed.   - If your condition worsens or fails to improve we recommend that you receive another evaluation at the ER immediately or contact your PCP to discuss your concerns or return here.

## 2022-03-27 NOTE — LETTER
March 27, 2022      [facility]gent Care  [address], [address]  [place] [address]  Phone: [phone]  Fax: [phone]       Patient: Wang Warren   YOB: 1980  Date of Visit: 03/27/2022    To Whom It May Concern:    Mohini Warren  was at [facility] on 03/27/2022. The patient may return to work/school on 4/4/22 with no restrictions. If you have any questions or concerns, or if I can be of further assistance, please do not hesitate to contact me.    Sincerely,    Aditya Randolph PA-C

## 2022-03-27 NOTE — LETTER
March 27, 2022      [facility] - Urgent Care  [address], [address]  [place] [address]  Phone: [phone]  Fax: [phone]       Patient: Wang Warren   YOB: 1980  Date of Visit: 03/27/2022    To Whom It May Concern:    Mohini Warren  was at [facility] on 03/27/2022. The patient may return to work/school on 4/4/22 with no restrictions. If you have any questions or concerns, or if I can be of further assistance, please do not hesitate to contact me.    Sincerely,    Aditya Randolph PA-C

## 2022-06-01 ENCOUNTER — TELEPHONE (OUTPATIENT)
Dept: PODIATRY | Facility: CLINIC | Age: 42
End: 2022-06-01
Payer: MEDICAID

## 2022-06-01 NOTE — TELEPHONE ENCOUNTER
Staff spoke with patient and informed her that Dr. Lino is out and that patient could schedule with another provider.    Patient stated that it was ok.    Staff scheduled patient with Dr. Randolph 6/7 at 9:15a.    Patient communicated understanding.          ----- Message from Day Olivera sent at 6/1/2022  9:44 AM CDT -----  Regarding: Patient call back  Who called: MATILDA MCINTOSH [[id number]]    What is the request in detail: Patient is requesting a call back. Patient states she is having foot pain and would like to be seen next Monday, Tuesday or Wednesday. She would like to further discuss.   Please advise.    Can the clinic reply by [facility]? No    Best call back number: [phone]    Additional Information: N/A

## 2022-06-03 ENCOUNTER — OFFICE VISIT (OUTPATIENT)
Dept: URGENT CARE | Facility: CLINIC | Age: 42
End: 2022-06-03
Payer: MEDICAID

## 2022-06-03 VITALS
RESPIRATION RATE: 18 BRPM | DIASTOLIC BLOOD PRESSURE: 87 MMHG | TEMPERATURE: 97 F | SYSTOLIC BLOOD PRESSURE: 142 MMHG | BODY MASS INDEX: 41.71 KG/M2 | HEIGHT: 62 IN | HEART RATE: 78 BPM | WEIGHT: 226.63 LBS | OXYGEN SATURATION: 98 %

## 2022-06-03 DIAGNOSIS — M79.671 RIGHT FOOT PAIN: Primary | ICD-10-CM

## 2022-06-03 PROCEDURE — 3077F PR MOST RECENT SYSTOLIC BLOOD PRESSURE >= 140 MM HG: ICD-10-PCS | Mod: CPTII,S$GLB,, | Performed by: FAMILY MEDICINE

## 2022-06-03 PROCEDURE — 3072F PR LOW RISK FOR RETINOPATHY: ICD-10-PCS | Mod: CPTII,S$GLB,, | Performed by: FAMILY MEDICINE

## 2022-06-03 PROCEDURE — 3008F PR BODY MASS INDEX (BMI) DOCUMENTED: ICD-10-PCS | Mod: CPTII,S$GLB,, | Performed by: FAMILY MEDICINE

## 2022-06-03 PROCEDURE — 3079F DIAST BP 80-89 MM HG: CPT | Mod: CPTII,S$GLB,, | Performed by: FAMILY MEDICINE

## 2022-06-03 PROCEDURE — 1160F RVW MEDS BY RX/DR IN RCRD: CPT | Mod: CPTII,S$GLB,, | Performed by: FAMILY MEDICINE

## 2022-06-03 PROCEDURE — 1159F MED LIST DOCD IN RCRD: CPT | Mod: CPTII,S$GLB,, | Performed by: FAMILY MEDICINE

## 2022-06-03 PROCEDURE — 3079F PR MOST RECENT DIASTOLIC BLOOD PRESSURE 80-89 MM HG: ICD-10-PCS | Mod: CPTII,S$GLB,, | Performed by: FAMILY MEDICINE

## 2022-06-03 PROCEDURE — 3008F BODY MASS INDEX DOCD: CPT | Mod: CPTII,S$GLB,, | Performed by: FAMILY MEDICINE

## 2022-06-03 PROCEDURE — 99213 OFFICE O/P EST LOW 20 MIN: CPT | Mod: S$GLB,,, | Performed by: FAMILY MEDICINE

## 2022-06-03 PROCEDURE — 3072F LOW RISK FOR RETINOPATHY: CPT | Mod: CPTII,S$GLB,, | Performed by: FAMILY MEDICINE

## 2022-06-03 PROCEDURE — 1160F PR REVIEW ALL MEDS BY PRESCRIBER/CLIN PHARMACIST DOCUMENTED: ICD-10-PCS | Mod: CPTII,S$GLB,, | Performed by: FAMILY MEDICINE

## 2022-06-03 PROCEDURE — 1159F PR MEDICATION LIST DOCUMENTED IN MEDICAL RECORD: ICD-10-PCS | Mod: CPTII,S$GLB,, | Performed by: FAMILY MEDICINE

## 2022-06-03 PROCEDURE — 3077F SYST BP >= 140 MM HG: CPT | Mod: CPTII,S$GLB,, | Performed by: FAMILY MEDICINE

## 2022-06-03 PROCEDURE — 99213 PR OFFICE/OUTPT VISIT, EST, LEVL III, 20-29 MIN: ICD-10-PCS | Mod: S$GLB,,, | Performed by: FAMILY MEDICINE

## 2022-06-03 RX ORDER — COLCHICINE 0.6 MG/1
0.6 TABLET ORAL 2 TIMES DAILY
Qty: 60 TABLET | Refills: 0 | Status: SHIPPED | OUTPATIENT
Start: 2022-06-03 | End: 2022-07-03

## 2022-06-03 NOTE — LETTER
Martha 3, 2022      [facility] - Urgent Care  [address], [address]  [place] [address]  Phone: [phone]  Fax: [phone]       Patient: Wang Warren   YOB: 1980  Date of Visit: 06/03/2022    To Whom It May Concern:    Mohini Warren  was at [facility] on 06/03/2022. The patient may return to work/school on 6/7/2022 with no restrictions. If you have any questions or concerns, or if I can be of further assistance, please do not hesitate to contact me.    Sincerely,    Dee Cox NP

## 2022-06-03 NOTE — PROGRESS NOTES
"Subjective:       Patient ID: Wang Warren is a 41 y.o. female.    Vitals:  height is 5' 2" (1.575 m) and weight is 102.8 kg (226 lb 9.8 oz). Her temperature is 97 °F (36.1 °C). Her blood pressure is 142/87 (abnormal) and her pulse is 78. Her respiration is 18 and oxygen saturation is 98%.     Chief Complaint: Foot Swelling    Pt is coming in with right foot swelling that started about two days ago. Pt says she have had an injury on the same foot about a month ago. Pt says theres pain when walking and standing. Pt says she took ibuprofen to help with no relief.   Provider note begins below:  Pt reports she has hx of right foot pain, has appt with podiatry Tuesday. She says pain too severe. She denies hx of gout in the past, est with pcp. She works as a  so having pain with driving, drives touring buses in [place]. She says pain possibly from bunion or arthritis, she is unsure. She had to call in to work today so needed to come get checked. Pain x 3 days, she says she needs to be off for a few days, she gets relief with ace, elevation and epsom soaks, so needs to be off work for a few days.     Edema  This is a new problem. The current episode started in the past 7 days. The problem occurs constantly. The problem has been gradually worsening. Associated symptoms include arthralgias and joint swelling. Pertinent negatives include no chills, diaphoresis, fatigue, fever or myalgias. The symptoms are aggravated by standing and walking. Treatments tried: ibuprofen  The treatment provided no relief.       Constitution: Negative for activity change, appetite change, chills, sweating, fatigue, fever, unexpected weight change and generalized weakness.   Musculoskeletal: Positive for pain, joint pain, joint swelling and arthritis. Negative for trauma, abnormal ROM of joint, gout, back pain, muscle cramps, muscle ache and history of spine disorder.   Skin: Negative for erythema.       Objective:    "   Physical Exam   Constitutional: She is oriented to person, place, and time. She appears well-developed.  Non-toxic appearance. She does not appear ill. No distress.   HENT:   Head: Normocephalic and atraumatic.   Ears:   Right Ear: External ear normal.   Left Ear: External ear normal.   Nose: Nose normal.   Mouth/Throat: Oropharynx is clear and moist.   Eyes: Conjunctivae, EOM and lids are normal. Pupils are equal, round, and reactive to light.   Neck: Trachea normal and phonation normal. Neck supple.   Cardiovascular:   Pulses:       Dorsalis pedis pulses are 2+ on the right side and 2+ on the left side.   Musculoskeletal: Normal range of motion.         General: Normal range of motion.      Right foot: Right great toe: Exhibits decreased ROM, swelling and tenderness.   Neurological: She is alert and oriented to person, place, and time.   Skin: Skin is warm, dry, intact and not diaphoretic. No erythema   Psychiatric: Her speech is normal and behavior is normal. Judgment and thought content normal.   Nursing note and vitals reviewed.        Assessment:       1. Right foot pain          Plan:       Possibly gout, SDM try colchicine, has fu Tuesday.  Reviewed previous xray with pt  rtw note provided  Gout diet advised, fluid advised    Discussed results/diagnosis/plan with patient in clinic. Strict precautions given to patient to monitor for worsening signs and symptoms. Advised to follow up with PCP or specialist.    Explained side effects of medications prescribed with patient and informed him/her to discontinue use if he/she has any side effects and to inform UC or PCP if this occurs. All questions answered. Strict ED verses clinic return precautions stressed and given in depth. Advised if symptoms worsens of fail to improve he/she should go to the Emergency Room. Discharge and follow-up instructions given verbally/printed with the patient who expressed understanding and willingness to comply with my  recommendations. Patient voiced understanding and in agreement with current treatment plan. Patient exits the exam room in no acute distress. Conversant and engaged during discharge discussion, verbalized understanding.        Right foot pain  -     colchicine (COLCRYS) 0.6 mg tablet; Take 1 tablet (0.6 mg total) by mouth 2 (two) times daily.  Dispense: 60 tablet; Refill: 0           Medical Decision Making:   History:   Old Medical Records: I decided to obtain old medical records.  Old Records Summarized: records from clinic visits.  Clinical Tests:   Radiological Study: Reviewed       Patient Instructions   General Discharge Instructions   PLEASE READ YOUR DISCHARGE INSTRUCTIONS ENTIRELY AS IT CONTAINS IMPORTANT INFORMATION.  If you were prescribed a narcotic or controlled medication, do not drive or operate heavy equipment or machinery while taking these medications.  If you were prescribed antibiotics, please take them to completion.  You must understand that you've received an Urgent Care treatment only and that you may be released before all your medical problems are known or treated. You, the patient, will arrange for follow up care as instructed.    OVER THE COUNTER RECOMMENDATIONS/SUGGESTIONS.    Make sure to stay well hydrated.    Use Nasal Saline to mechanically move any post nasal drip from your eustachian tube or from the back of your throat.    Use warm salt water gargles to ease your throat pain. Warm salt water gargles as needed for sore throat- 1/2 tsp salt to 1 cup warm water, gargle as desired.    Use an antihistamine such as Claritin, Zyrtec or Allegra to dry you out.    Use pseudoephedrine (behind the counter) to decongest. Pseudoephedrine 30 mg up to 240 mg /day. It can raise your blood pressure and give you palpitations.    Use mucinex (guaifenesin) to break up mucous up to 2400mg/day to loosen any mucous.    The mucinex DM pill has a cough suppressant that can be sedating. It can be used at  night to stop the tickle at the back of your throat.    You can use Mucinex D (it has guaifenesin and a high dose of pseudoephedrine) in the mornings to help decongest.    Use Afrin in each nare for no longer than 3 days, as it is addictive. It can also dry out your mucous membranes and cause elevated blood pressure. This is especially useful if you are flying.    Use Flonase 1-2 sprays/nostril per day. It is a local acting steroid nasal spray, if you develop a bloody nose, stop using the medication immediately.    Sometimes Nyquil at night is beneficial to help you get some rest, however it is sedating and it does have an antihistamine, and tylenol.    Honey is a natural cough suppressant that can be used.    Tylenol up to 4,000 mg a day is safe for short periods and can be used for body aches, pain, and fever. However in high doses and prolonged use it can cause liver irritation.    Ibuprofen is a non-steroidal anti-inflammatory that can be used for body aches, pain, and fever.However it can also cause stomach irritation if over used.     Follow up with your PCP or specialty clinic as instructed in the next 2-3 days if not improved or as needed. You can call [phone] to schedule an appointment with appropriate provider.      If you condition worsens, we recommend that you receive another evaluation at the emergency room immediately or contact your primary medical clinic's after hours call service to discuss your concerns.      Please return here or go to the Emergency Department for any concerns or worsening condition.   You can also call [phone] to schedule an appointment with the appropriate provider.    Please return here or go to the Emergency Department for any concerns or worsening of condition.    Thank you for choosing [facility]!    Our goal in the Urgent Care is to always provide outstanding medical care. You may receive a survey by mail or e-mail in the next week regarding your  experience today. We would greatly appreciate you completing and returning the survey. Your feedback provides us with a way to recognize our staff who provide very good care, and it helps us learn how to improve when your experience was below our aspiration of excellence.      We appreciate you trusting us with your medical care. We hope you feel better soon. We will be happy to take care of you for all of your future medical needs.    Sincerely,    MARYJO Oh   Gout Discharge Instructions    If your condition worsens or fails to improve we recommend that you receive another evaluation at the ER immediately or contact your PCP to discuss your concerns or return here. You must understand that you've received an urgent care treatment only and that you may be released before all your medical problems are known or treated. You the patient will arrange for follouwp care as instructed.   Take meds as prescribed  You were given a prescription NSAID (NAPROSYN), therefore do not also take any over the counter NSAID like ibuprofen, aleve, advil, motrin etc   RICE which means rest, ice compression and elevation are helpful.

## 2022-06-03 NOTE — PATIENT INSTRUCTIONS
1/14/2019        RE: Essie Jernigan  [address]  [place] [address]        Essie Jernigan is a 89 year old female seen  January 14, 2019 at [facility] where she has resided for 7 years (admit 2012) seen for routine visit.     Patient is seen in her room, up to  before lunch.    Bright and joking; states she feels well.   Denies pain, dyspnea or other symptoms.    No new concerns reported by nursing staff.     Stands to transfer bed to , no recent falls.    By chart review, patient has been in LTC since fall with pelvic fracture in 2012.   Had frontal lobe CVI in 2013, with resulting cognitive changes, no hospitalization since then.   Has a h/o HTN but not currently needing bp lowering meds; she has a h/o diastolic CHF, aortic stenosis and PAF.  Treated with just ASA given age and goals of care.        PMH:  PAF /Atrial flutter  Chronic diastolic CHF   ECHO 60-65% in 2013, JAMESON, MR, aortic sclerosis  Dementia, late onset  S/p right frontal lobe CVI, 2013  HTN  S/p hip fracture, 2007  S/p pelvic fracture, 2012  BCC, face  OA, s/p PADDY    SH:  , previously lived independently in an apartment.     Daughter, grandchildren live locally.     ROS:  Limited, but negative except for above.    SLUMS 16/30   BIMS 10/15  Wt Readings from Last 5 Encounters:   01/14/19 58.9 kg (129 lb 12.8 oz)   11/09/18 58.1 kg (128 lb)   09/12/18 57.6 kg (127 lb)   07/13/18 56.3 kg (124 lb 3.2 oz)   03/05/18 55.8 kg (123 lb)        EXAM:  Alert, NAD  /76   Pulse 78   Temp 97.2  F (36.2  C)   Resp 18   Wt 58.9 kg (129 lb 12.8 oz)   SpO2 95%   BMI 19.74 kg/m      +kyphosis  Neck supple without adenopathy  Lungs clear bilaterally with fair air movement  Heart RRR s1s2, 2/6 CHEN, occ ectopy  Abd soft, NT, no distention, +BS  Ext trace edema, with tubi-  Psych: affect pleasant  Neuro: non-focal.    Labs reviewed.     IMP/PLAN:  (I50.42) Chronic combined systolic and diastolic congestive heart failure (H)  General Discharge Instructions   PLEASE READ YOUR DISCHARGE INSTRUCTIONS ENTIRELY AS IT CONTAINS IMPORTANT INFORMATION.  If you were prescribed a narcotic or controlled medication, do not drive or operate heavy equipment or machinery while taking these medications.  If you were prescribed antibiotics, please take them to completion.  You must understand that you've received an Urgent Care treatment only and that you may be released before all your medical problems are known or treated. You, the patient, will arrange for follow up care as instructed.    OVER THE COUNTER RECOMMENDATIONS/SUGGESTIONS.    Make sure to stay well hydrated.    Use Nasal Saline to mechanically move any post nasal drip from your eustachian tube or from the back of your throat.    Use warm salt water gargles to ease your throat pain. Warm salt water gargles as needed for sore throat- 1/2 tsp salt to 1 cup warm water, gargle as desired.    Use an antihistamine such as Claritin, Zyrtec or Allegra to dry you out.    Use pseudoephedrine (behind the counter) to decongest. Pseudoephedrine 30 mg up to 240 mg /day. It can raise your blood pressure and give you palpitations.    Use mucinex (guaifenesin) to break up mucous up to 2400mg/day to loosen any mucous.    The mucinex DM pill has a cough suppressant that can be sedating. It can be used at night to stop the tickle at the back of your throat.    You can use Mucinex D (it has guaifenesin and a high dose of pseudoephedrine) in the mornings to help decongest.    Use Afrin in each nare for no longer than 3 days, as it is addictive. It can also dry out your mucous membranes and cause elevated blood pressure. This is especially useful if you are flying.    Use Flonase 1-2 sprays/nostril per day. It is a local acting steroid nasal spray, if you develop a bloody nose, stop using the medication immediately.    Sometimes Nyquil at night is beneficial to help you get some rest, however it is sedating and it    Comment: mild LE edema, likely stasis  Plan: follow for symptoms, treat if uncomfortable.  Continue tubi-, elevation as tolerated.          (I48.0) PAF (paroxysmal atrial fibrillation) (H)  Comment: controlled VR without rate-slowing medications.    Plan: daily low dose aspirin    (G30.9,  F01.50,  F02.80) Mixed Alzheimer's and vascular dementia  Comment: gradual decline  Plan: LTC support for meds, meals, activity, cares.       (M80.88XG) Osteoporotic compression fracture of spine  Comment: occ pain  Plan: local measures, prn analgesia  Remains on daily vitamin D.     (Z86.73) History of CVA (cerebrovascular accident)  Comment: right frontal lobe  Plan: continue ASA for secondary prevention.     (K59.01) Slow transit constipation  Comment: well-managed  Plan: continue daily Senna, monitor    AD: DNR/DNI, comfort focus     Alejandra Orozco MD             Sincerely,        Alejandra Orozco MD     does have an antihistamine, and tylenol.    Honey is a natural cough suppressant that can be used.    Tylenol up to 4,000 mg a day is safe for short periods and can be used for body aches, pain, and fever. However in high doses and prolonged use it can cause liver irritation.    Ibuprofen is a non-steroidal anti-inflammatory that can be used for body aches, pain, and fever.However it can also cause stomach irritation if over used.     Follow up with your PCP or specialty clinic as instructed in the next 2-3 days if not improved or as needed. You can call [phone] to schedule an appointment with appropriate provider.      If you condition worsens, we recommend that you receive another evaluation at the emergency room immediately or contact your primary medical clinic's after hours call service to discuss your concerns.      Please return here or go to the Emergency Department for any concerns or worsening condition.   You can also call [phone] to schedule an appointment with the appropriate provider.    Please return here or go to the Emergency Department for any concerns or worsening of condition.    Thank you for choosing [facility]!    Our goal in the Urgent Care is to always provide outstanding medical care. You may receive a survey by mail or e-mail in the next week regarding your experience today. We would greatly appreciate you completing and returning the survey. Your feedback provides us with a way to recognize our staff who provide very good care, and it helps us learn how to improve when your experience was below our aspiration of excellence.      We appreciate you trusting us with your medical care. We hope you feel better soon. We will be happy to take care of you for all of your future medical needs.    Sincerely,    MARYJO Oh   Gout Discharge Instructions    If your condition worsens or fails to improve we recommend that you receive another evaluation at the ER  immediately or contact your PCP to discuss your concerns or return here. You must understand that you've received an urgent care treatment only and that you may be released before all your medical problems are known or treated. You the patient will arrange for follouwp care as instructed.   Take meds as prescribed  You were given a prescription NSAID (NAPROSYN), therefore do not also take any over the counter NSAID like ibuprofen, aleve, advil, motrin etc   RICE which means rest, ice compression and elevation are helpful.

## 2022-06-07 ENCOUNTER — APPOINTMENT (OUTPATIENT)
Dept: RADIOLOGY | Facility: OTHER | Age: 42
End: 2022-06-07
Attending: PODIATRIST
Payer: MEDICAID

## 2022-06-07 ENCOUNTER — OFFICE VISIT (OUTPATIENT)
Dept: PODIATRY | Facility: CLINIC | Age: 42
End: 2022-06-07
Payer: MEDICAID

## 2022-06-07 VITALS
WEIGHT: 226 LBS | HEIGHT: 62 IN | BODY MASS INDEX: 41.59 KG/M2 | HEART RATE: 79 BPM | DIASTOLIC BLOOD PRESSURE: 90 MMHG | SYSTOLIC BLOOD PRESSURE: 151 MMHG

## 2022-06-07 DIAGNOSIS — M79.671 ACUTE PAIN OF RIGHT FOOT: ICD-10-CM

## 2022-06-07 DIAGNOSIS — M21.619 BUNION: Primary | ICD-10-CM

## 2022-06-07 DIAGNOSIS — B35.1 ONYCHOMYCOSIS DUE TO DERMATOPHYTE: ICD-10-CM

## 2022-06-07 DIAGNOSIS — E11.9 CONTROLLED TYPE 2 DIABETES MELLITUS WITHOUT COMPLICATION, WITHOUT LONG-TERM CURRENT USE OF INSULIN: ICD-10-CM

## 2022-06-07 DIAGNOSIS — M21.619 BUNION: ICD-10-CM

## 2022-06-07 PROCEDURE — 11721 DEBRIDE NAIL 6 OR MORE: CPT | Mod: PBBFAC,PN | Performed by: PODIATRIST

## 2022-06-07 PROCEDURE — 1159F MED LIST DOCD IN RCRD: CPT | Mod: CPTII,,, | Performed by: PODIATRIST

## 2022-06-07 PROCEDURE — 3072F PR LOW RISK FOR RETINOPATHY: ICD-10-PCS | Mod: CPTII,,, | Performed by: PODIATRIST

## 2022-06-07 PROCEDURE — 99214 PR OFFICE/OUTPT VISIT, EST, LEVL IV, 30-39 MIN: ICD-10-PCS | Mod: 25,S$PBB,, | Performed by: PODIATRIST

## 2022-06-07 PROCEDURE — 3080F DIAST BP >= 90 MM HG: CPT | Mod: CPTII,,, | Performed by: PODIATRIST

## 2022-06-07 PROCEDURE — 99999 PR PBB SHADOW E&M-EST. PATIENT-LVL V: CPT | Mod: PBBFAC,,, | Performed by: PODIATRIST

## 2022-06-07 PROCEDURE — 3072F LOW RISK FOR RETINOPATHY: CPT | Mod: CPTII,,, | Performed by: PODIATRIST

## 2022-06-07 PROCEDURE — 11721 ROUTINE FOOT CARE: ICD-10-PCS | Mod: S$PBB,,, | Performed by: PODIATRIST

## 2022-06-07 PROCEDURE — 3077F PR MOST RECENT SYSTOLIC BLOOD PRESSURE >= 140 MM HG: ICD-10-PCS | Mod: CPTII,,, | Performed by: PODIATRIST

## 2022-06-07 PROCEDURE — 99999 PR PBB SHADOW E&M-EST. PATIENT-LVL V: ICD-10-PCS | Mod: PBBFAC,,, | Performed by: PODIATRIST

## 2022-06-07 PROCEDURE — 3008F PR BODY MASS INDEX (BMI) DOCUMENTED: ICD-10-PCS | Mod: CPTII,,, | Performed by: PODIATRIST

## 2022-06-07 PROCEDURE — 3008F BODY MASS INDEX DOCD: CPT | Mod: CPTII,,, | Performed by: PODIATRIST

## 2022-06-07 PROCEDURE — 99214 OFFICE O/P EST MOD 30 MIN: CPT | Mod: 25,S$PBB,, | Performed by: PODIATRIST

## 2022-06-07 PROCEDURE — 73630 X-RAY EXAM OF FOOT: CPT | Mod: 26,RT,, | Performed by: RADIOLOGY

## 2022-06-07 PROCEDURE — 1160F RVW MEDS BY RX/DR IN RCRD: CPT | Mod: CPTII,,, | Performed by: PODIATRIST

## 2022-06-07 PROCEDURE — 1159F PR MEDICATION LIST DOCUMENTED IN MEDICAL RECORD: ICD-10-PCS | Mod: CPTII,,, | Performed by: PODIATRIST

## 2022-06-07 PROCEDURE — 99215 OFFICE O/P EST HI 40 MIN: CPT | Mod: 25,PBBFAC,PN | Performed by: PODIATRIST

## 2022-06-07 PROCEDURE — 73630 XR FOOT COMPLETE 3 VIEW RIGHT: ICD-10-PCS | Mod: 26,RT,, | Performed by: RADIOLOGY

## 2022-06-07 PROCEDURE — 3077F SYST BP >= 140 MM HG: CPT | Mod: CPTII,,, | Performed by: PODIATRIST

## 2022-06-07 PROCEDURE — 73630 X-RAY EXAM OF FOOT: CPT | Mod: TC,RT

## 2022-06-07 PROCEDURE — 3080F PR MOST RECENT DIASTOLIC BLOOD PRESSURE >= 90 MM HG: ICD-10-PCS | Mod: CPTII,,, | Performed by: PODIATRIST

## 2022-06-07 PROCEDURE — 1160F PR REVIEW ALL MEDS BY PRESCRIBER/CLIN PHARMACIST DOCUMENTED: ICD-10-PCS | Mod: CPTII,,, | Performed by: PODIATRIST

## 2022-06-07 RX ORDER — METHYLPREDNISOLONE 4 MG/1
4 TABLET ORAL DAILY
Qty: 21 TABLET | Refills: 0 | Status: SHIPPED | OUTPATIENT
Start: 2022-06-07

## 2022-06-07 RX ORDER — DICLOFENAC SODIUM 30 MG/G
GEL TOPICAL 2 TIMES DAILY PRN
Qty: 100 G | Refills: 1 | Status: SHIPPED | OUTPATIENT
Start: 2022-06-07

## 2022-06-07 NOTE — PROGRESS NOTES
"Subjective:      Patient ID: Wang Warren is a 41 y.o. female.    Chief Complaint: Foot Pain (Foot Pain (Dr. Lino patient))    Diabetes, increased risk amputation needing evaluation/management/optomization of foot care.      right foot pain for the past week.  Stops her from walking and driving. She drives for work.  She does not recall acute trauma to the area.     history of similar pain to this area.         Patient Active Problem List   Diagnosis    BMI 50.0-59.9, adult     delivery delivered    Insulin dependent diabetes mellitus    Diabetes mellitus type II, controlled, with no complications    Benign essential HTN    Class 3 severe obesity with body mass index (BMI) of 50.0 to 59.9 in adult    Iron deficiency anemia    Diabetes mellitus    Vitamin D insufficiency    Pregnancy with type 2 diabetes mellitus in third trimester    Hypertension affecting pregnancy in third trimester    Non-reassuring fetal heart tones complicating pregnancy, antepartum    S/P repeat low transverse     Pre-conception counseling and AMA    Elevated TSH         Current Outpatient Medications on File Prior to Visit   Medication Sig Dispense Refill    BD INSULIN SYRINGE ULTRA-FINE 1 mL 31 gauge x 5/16 Syrg Inject 1 Syringe into the muscle once daily. 60 each 0    BD ULTRA-FINE MINI PEN NEEDLE 31 gauge x 3/16" Ndle Use 3 (three) times daily with meals. 100 each 11    blood sugar diagnostic (ONETOUCH VERIO) Strp use to test blood sugar 4 times daily 100 each 3    blood sugar diagnostic Strp test three times a day 100 each 6    blood sugar diagnostic Strp as directed three times a day In Vitro 30 days 100 each 6    blood sugar diagnostic Strp Test blood sugar three times a day 100 each 6    blood sugar diagnostic Strp as directed in vitro three times a day for 30 days 100 each 6    blood-glucose meter Misc use a directed capillary once a day 1 each 0    ciclopirox (LOPROX) 0.77 % " "Crea Apply topically 2 (two) times daily. 90 g 2    ciclopirox (PENLAC) 8 % Soln Apply topically to affected area nightly. 6.6 mL 11    colchicine (COLCRYS) 0.6 mg tablet Take 1 tablet (0.6 mg total) by mouth 2 (two) times daily. 60 tablet 0    ferrous sulfate 325 mg (65 mg iron) Tab tablet TK 1 T PO daily  1    fluconazole (DIFLUCAN) 150 MG Tab Take 1 tablet (150 mg total) by mouth every 72 hours as needed. 2 tablet 0    fluticasone (FLONASE) 50 mcg/actuation nasal spray 1 spray (50 mcg total) by Each Nare route 2 (two) times daily as needed for Rhinitis. 16 g 0    HYDROcodone-acetaminophen (NORCO) 5-325 mg per tablet Take 1 tablet by mouth every 8 (eight) hours as needed for Pain. 9 tablet 0    ibuprofen (ADVIL,MOTRIN) 600 MG tablet Take 1 tablet (600 mg total) by mouth every 8 (eight) hours as needed for Pain. 30 tablet 0    insulin (LANTUS SOLOSTAR U-100 INSULIN) glargine 100 units/mL (3mL) SubQ pen inject as directed once a day Subcutaneous 30 days 15 mL 1    insulin (LANTUS SOLOSTAR U-100 INSULIN) glargine 100 units/mL (3mL) SubQ pen as directed 10 units q day once a day Subcutaneous 30 days 15 mL 6    insulin (LANTUS SOLOSTAR U-100 INSULIN) glargine 100 units/mL (3mL) SubQ pen as directed 10 units every day once a day Subcutaneous 30 days 15 mL 6    insulin (LANTUS SOLOSTAR U-100 INSULIN) glargine 100 units/mL (3mL) SubQ pen Inject 15 units under the skin once a day as directed 15 mL 6    insulin lispro 100 unit/mL injection inject 10 units under the skin per meal three times a day with meals 10 mL 6    insulin lispro 100 unit/mL injection as directed 10 units per meal three times a day with meals Subcutaneous 30 days 10 mL 6    insulin lispro 100 unit/mL injection Inject 10 units under the skin three times a day with meals as directed 10 mL 6    insulin syringe-needle U-100 (BD INSULIN SYRINGE ULTRA-FINE) 0.3 mL 30 gauge x 1/2" Syrg use as directed 6 units once a day 30 each 6    insulin " "syringe-needle U-100 0.5 mL 31 gauge x 5/16" Syrg Inject under the skin once a day 100 each 6    insulin syringe-needle U-100 1 mL 31 gauge x 5/16 Syrg use for daily insulin injections; up to three times daily 100 each 1    insulin syringe-needle U-100 1 mL 31 gauge x 5/16 Syrg inject with insulins 100 each 6    lancets (ONETOUCH ULTRASOFT LANCETS) Misc 1 lancet by Misc.(Non-Drug; Combo Route) route 4 (four) times daily. 100 each 3    lancets 33 gauge Misc test three times a day 100 each 6    lancets 33 gauge Misc use as directed three times a day capillary 30 days 100 each 6    lancets 33 gauge Misc Test blood sugar three times a day 100 each 6    lancets 33 gauge Misc as directed capillary three times a day for 30 days 100 each 6    metFORMIN (GLUCOPHAGE) 500 MG tablet Take 1 Tablet with a meal Twice Daily Orally 60 tablet 6    metFORMIN (GLUCOPHAGE) 500 MG tablet Take 1 tablet with a meal twice daily Orally 30 day(s) 60 tablet 6    metFORMIN (GLUCOPHAGE) 500 MG tablet Take 1 tablet by mouth with a meal twice a day 60 tablet 6    metFORMIN (GLUCOPHAGE) 500 MG tablet Take 1 tablet by mouth with a meal twice daily 30 day(s) 60 tablet 6    naproxen (NAPROSYN) 500 MG tablet Take 1 tablet (500 mg total) by mouth 2 (two) times daily with meals. 30 tablet 0    NIFEdipine (PROCARDIA-XL) 60 MG (OSM) 24 hr tablet take 1 tablet Once a day Orally 30 day(s) 30 tablet 6    NIFEdipine (PROCARDIA-XL) 60 MG (OSM) 24 hr tablet Take 1 tablet (60 mg total) by mouth once daily. 30 tablet 6    NIFEdipine (PROCARDIA-XL) 60 MG (OSM) 24 hr tablet take 1 tablet by mouth once a day 30 tablet 6    NIFEdipine (PROCARDIA-XL) 60 MG (OSM) 24 hr tablet Take one tablet by mouth once daily. 30 tablet 6    NIFEdipine (PROCARDIA-XL) 60 MG (OSM) 24 hr tablet Take 1 tablet (60 mg total) by mouth once daily. 30 tablet 6    NIFEdipine (PROCARDIA-XL) 60 MG (OSM) 24 hr tablet Take 1 tablet by mouth once daily. 30 tablet 6    pen needle, " "diabetic 31 gauge x 3/16" Ndle as directed once a day subcutaneous 30 days 100 each 6    pen needle, diabetic 31 gauge x 3/16" Ndle Inject under the skin once a day 100 each 6    pen needle, diabetic 31 gauge x 5/16" Ndle as directed once a day subcutaneous 30 days 100 each 6    PRENATAL VIT CALC,IRON,FOLIC (PRENATAL VITAMIN ORAL) Take by mouth.      norethindrone (MICRONOR) 0.35 mg tablet Take 1 tablet (0.35 mg total) by mouth once daily. 28 tablet 12     No current facility-administered medications on file prior to visit.         Review of patient's allergies indicates:  No Known Allergies                Objective:        Physical Exam  Constitutional:       General: She is not in acute distress.     Appearance: She is well-developed. She is not diaphoretic.   Cardiovascular:      Pulses:           Popliteal pulses are 2+ on the right side and 2+ on the left side.        Dorsalis pedis pulses are 2+ on the right side and 2+ on the left side.        Posterior tibial pulses are 2+ on the right side and 2+ on the left side.      Comments: Capillary refill 3 seconds all toes/distal feet, all toes/both feet warm to touch.      Negative lymphadenopathy bilateral popliteal fossa and tarsal tunnel.      Negavie lower extremity edema bilateral.    Musculoskeletal:      Right ankle: No swelling, deformity, ecchymosis or lacerations. Normal range of motion. Normal pulse.      Right Achilles Tendon: Normal. No defects. Barragan's test negative.      Comments: Mild to moderate hallux valgus right with medial bunion with pain with direct palpation.  Localized swelling and warmth.  Mild redness.  No ascending cellulitis.   Pain with range of motion.            Lymphadenopathy:      Lower Body: No right inguinal adenopathy. No left inguinal adenopathy.      Comments: Negative lymphadenopathy bilateral popliteal fossa and tarsal tunnel.    Negative lymphangitic streaking bilateral feet/ankles/legs.   Skin:     General: Skin is " warm and dry.      Capillary Refill: Capillary refill takes 2 to 3 seconds.      Coloration: Skin is not pale.      Findings: No abrasion, bruising, burn, ecchymosis, erythema, laceration, lesion or rash.      Nails: There is no clubbing.      Comments:   Skin is normal age and health appropriate color, turgor, texture, and temperature bilateral lower extremities without ulceration, hyperpigmentation, discoloration, masses nodules or cords palpated.  No ecchymosis, erythema, edema, or cardinal signs of infection bilateral lower extremities.     Neurological:      Mental Status: She is alert and oriented to person, place, and time.      Sensory: No sensory deficit.      Motor: No tremor, atrophy or abnormal muscle tone.      Gait: Gait normal.      Deep Tendon Reflexes:      Reflex Scores:       Patellar reflexes are 2+ on the right side and 2+ on the left side.       Achilles reflexes are 2+ on the right side and 2+ on the left side.     Comments: Negative tinel sign to percussion sural, superficial peroneal, deep peroneal, saphenous, and posterior tibial nerves right and left ankles and feet.     Psychiatric:         Behavior: Behavior is cooperative.               Assessment:       Encounter Diagnoses   Name Primary?    Bunion Yes    Onychomycosis due to dermatophyte     Controlled type 2 diabetes mellitus without complication, without long-term current use of insulin     Acute pain of right foot          Plan:       Wang was seen today for foot pain.    Diagnoses and all orders for this visit:    Bunion  -     X-Ray Foot Complete Right; Future  -     Uric acid; Future  -     methylPREDNISolone (MEDROL DOSEPACK) 4 mg tablet; Take 1 tablet (4 mg total) by mouth once daily. Use as instructed on dose pack  -     diclofenac sodium (SOLARAZE) 3 % gel; Apply topically 2 (two) times daily as needed (for foot pain).    Onychomycosis due to dermatophyte    Controlled type 2 diabetes mellitus without complication,  without long-term current use of insulin    Acute pain of right foot  -     X-Ray Foot Complete Right; Future  -     Uric acid; Future  -     methylPREDNISolone (MEDROL DOSEPACK) 4 mg tablet; Take 1 tablet (4 mg total) by mouth once daily. Use as instructed on dose pack  -     diclofenac sodium (SOLARAZE) 3 % gel; Apply topically 2 (two) times daily as needed (for foot pain).    Other orders  -     Routine Foot Care      · I counseled the patient on her conditions, their implications and medical management.  · I ordered xrays and reviewed the xrays with the patient.  · Meds as ordered.  · Labs as ordered.   · Separately, foot care.       Routine Foot Care    Date/Time: 6/7/2022 9:15 AM  Performed by: Azucena Randolph DPM  Authorized by: Azucena Randolph DPM     Consent Done?:  Yes (Verbal)    Nail Care Type:  Debride  Location(s): All  (Left 1st Toe, Left 3rd Toe, Left 2nd Toe, Left 4th Toe, Left 5th Toe, Right 1st Toe, Right 2nd Toe, Right 3rd Toe, Right 4th Toe and Right 5th Toe)  Patient tolerance:  Patient tolerated the procedure well with no immediate complications     With patient's permission, the toenails mentioned above were reduced and debrided using a nail nipper, removing offending nail and debris. The patient will continue to monitor the areas daily, inspect the feet, wear protective shoe gear when ambulatory, and moisturizer to maintain skin integrity.

## 2022-06-08 ENCOUNTER — LAB VISIT (OUTPATIENT)
Dept: LAB | Facility: OTHER | Age: 42
End: 2022-06-08
Attending: PODIATRIST
Payer: MEDICAID

## 2022-06-08 ENCOUNTER — OFFICE VISIT (OUTPATIENT)
Dept: OPTOMETRY | Facility: CLINIC | Age: 42
End: 2022-06-08
Payer: MEDICAID

## 2022-06-08 DIAGNOSIS — H53.022 REFRACTIVE AMBLYOPIA OF LEFT EYE: ICD-10-CM

## 2022-06-08 DIAGNOSIS — H52.203 MYOPIA WITH ASTIGMATISM, BILATERAL: ICD-10-CM

## 2022-06-08 DIAGNOSIS — E11.9 TYPE 2 DIABETES MELLITUS WITHOUT RETINOPATHY: Primary | ICD-10-CM

## 2022-06-08 DIAGNOSIS — M21.619 BUNION: ICD-10-CM

## 2022-06-08 DIAGNOSIS — H52.13 MYOPIA WITH ASTIGMATISM, BILATERAL: ICD-10-CM

## 2022-06-08 DIAGNOSIS — M79.671 ACUTE PAIN OF RIGHT FOOT: ICD-10-CM

## 2022-06-08 DIAGNOSIS — H18.612 STABLE KERATOCONUS OF LEFT EYE: ICD-10-CM

## 2022-06-08 LAB — URATE SERPL-MCNC: 7.2 MG/DL (ref 2.4–5.7)

## 2022-06-08 PROCEDURE — 92014 COMPRE OPH EXAM EST PT 1/>: CPT | Mod: S$PBB,,, | Performed by: OPTOMETRIST

## 2022-06-08 PROCEDURE — 1159F PR MEDICATION LIST DOCUMENTED IN MEDICAL RECORD: ICD-10-PCS | Mod: CPTII,,, | Performed by: OPTOMETRIST

## 2022-06-08 PROCEDURE — 2023F PR DILATED RETINAL EXAM W/O EVID OF RETINOPATHY: ICD-10-PCS | Mod: CPTII,,, | Performed by: OPTOMETRIST

## 2022-06-08 PROCEDURE — 92014 PR EYE EXAM, EST PATIENT,COMPREHESV: ICD-10-PCS | Mod: S$PBB,,, | Performed by: OPTOMETRIST

## 2022-06-08 PROCEDURE — 99999 PR PBB SHADOW E&M-EST. PATIENT-LVL IV: ICD-10-PCS | Mod: PBBFAC,,, | Performed by: OPTOMETRIST

## 2022-06-08 PROCEDURE — 1159F MED LIST DOCD IN RCRD: CPT | Mod: CPTII,,, | Performed by: OPTOMETRIST

## 2022-06-08 PROCEDURE — 2023F DILAT RTA XM W/O RTNOPTHY: CPT | Mod: CPTII,,, | Performed by: OPTOMETRIST

## 2022-06-08 PROCEDURE — 36415 COLL VENOUS BLD VENIPUNCTURE: CPT | Performed by: PODIATRIST

## 2022-06-08 PROCEDURE — 1160F RVW MEDS BY RX/DR IN RCRD: CPT | Mod: CPTII,,, | Performed by: OPTOMETRIST

## 2022-06-08 PROCEDURE — 1160F PR REVIEW ALL MEDS BY PRESCRIBER/CLIN PHARMACIST DOCUMENTED: ICD-10-PCS | Mod: CPTII,,, | Performed by: OPTOMETRIST

## 2022-06-08 PROCEDURE — 99999 PR PBB SHADOW E&M-EST. PATIENT-LVL IV: CPT | Mod: PBBFAC,,, | Performed by: OPTOMETRIST

## 2022-06-08 PROCEDURE — 84550 ASSAY OF BLOOD/URIC ACID: CPT | Performed by: PODIATRIST

## 2022-06-08 PROCEDURE — 99214 OFFICE O/P EST MOD 30 MIN: CPT | Mod: PBBFAC | Performed by: OPTOMETRIST

## 2022-06-08 NOTE — PROGRESS NOTES
JAVIER     ADONIS: [birth year]  Chief complaint (CC): Patient is here for annual eye exam today.  Patient   hasn't noticed any vision changes since the last exam. Patient has lost a   lot of weight and states she is no longer diabetic.  Glasses? + 1 yr. old  Contacts? -  H/o eye surgery, injections or laser: -  H/o eye injury: -  Known eye conditions? See above  Family h/o eye conditions? -  Eye gtts? -      (-) Flashes (-)  Floaters (-) Mucous   (-)  Tearing (-) Itching (-) Burning   (-) Headaches (-) Eye Pain/discomfort (-) Irritation   (-)  Redness (-) Double vision (-) Blurry vision    Diabetic? -  A1c? Hemoglobin A1C 6.5 with Daughter's of [facility]       Date                     Value               Ref Range             Status                03/19/2021               9.8 (H)             4.0 - 5.6 %           Final                 03/19/2021               9.8 (H)             4.0 - 5.6 %           Final                 12/12/2019               8.1 (H)             4.0 - 5.6 %           Final                    Last edited by Latoya Saunders, OD on 6/8/2022 10:08 AM. (History)            Assessment /Plan     For exam results, see Encounter Report.    Type 2 diabetes mellitus without retinopathy    Refractive amblyopia of left eye    Myopia with astigmatism, bilateral    Stable keratoconus of left eye    1. BS control. No signs of diabetic retinopathy. Monitor with annual exam.  2-3. SRx released to patient. Patient educated on lens options. Normal ocular health. RTC 1 year for routine exam.   4. Pt saw Dr Mandel last year and decided against cross linking.Poor [facility] longstanding. Steep [place] OS. Monitor.

## 2022-06-15 NOTE — PROGRESS NOTES
POSTPARTUM PROGRESS NOTE     Wang Warren is a 39 y.o. female POD #3 status post Repeat  section at 33w6d for NRFHT in a pregnancy complicated by cHTN, DM2, PRASHANT, MO. Patient is doing well this morning. She denies nausea, vomiting, fever or chills.  Patient reports mild abdominal pain that is adequately relieved by oral pain medications. Lochia is mild to moderate  and stable. Patient has ambulated and voided w/o difficulty. She has passed flatus.  Patient does plan to breast feed, but is currently pumping for baby in the NICU. Dr. Valenzuela will manage contraception.    Objective:       Temp:  [97.5 °F (36.4 °C)-98 °F (36.7 °C)] 98 °F (36.7 °C)  Pulse:  [60-68] 60  Resp:  [18] 18  SpO2:  [95 %-100 %] 95 %  BP: (102-137)/(50-69) 128/60    General:   alert, appears stated age and cooperative   Lungs:   Non-labored respirations    Heart:   regular rate and rhythm   Abdomen:  Soft, nondistended    Uterus:  firm located at the umblicus.    Incision: Incision clean, dry and intact   Extremities: no pedal edema noted     Lab Review  No results found for this or any previous visit (from the past 4 hour(s)).    I/O  No intake or output data in the 24 hours ending 20 0652     Assessment:     Patient Active Problem List   Diagnosis    BMI 50.0-59.9, adult     delivery delivered    Insulin dependent diabetes mellitus    Diabetes mellitus type II, controlled, with no complications    Benign essential HTN    Class 3 severe obesity with body mass index (BMI) of 50.0 to 59.9 in adult    Iron deficiency anemia    Diabetes mellitus    Vitamin D insufficiency    Supervision of high risk pregnancy in second trimester    Pregnancy with type 2 diabetes mellitus in third trimester    33 weeks gestation of pregnancy    Hypertension affecting pregnancy in third trimester    Non-reassuring fetal heart tones complicating pregnancy, antepartum    S/P repeat low transverse         Plan:    1. Postpartum care:  - Patient doing well. Continue routine management and advances.  - Continue PO pain meds. Pain well controlled.  - Heme: H/h 10/34 >>> 9.5/29.7  - Encourage ambulation  - Circumcision: deferred for now as baby is in NICU  - Contraception to be discussed at 6 week pp visit  - Lactation consult PRN    2. DMII  - Last 24 hour Bs-150s  - Aspart 5/3/5  - Levemir 6u QHS  - Sliding scale insulin   - Metformin 500 BID    3. cHTN  - Procardia 60 XL  - BP: (102-137)/(50-69) 128/60  - P/C: 0.21, Cr: 0.8, LFTs wnl  - No indication for oral med at this time    4. Morbid Obesity   - Lovenox ppx   - TEDs/SCDs      Dispo: Patient stable for D/C, but would like to stay 1 more night 2/2 to baby in NICU. Will plan to D/C tomorrow.     Chika Estevez M.D.  SANDIP PGY1     101.3

## 2022-06-23 ENCOUNTER — OFFICE VISIT (OUTPATIENT)
Dept: PODIATRY | Facility: CLINIC | Age: 42
End: 2022-06-23
Payer: MEDICAID

## 2022-06-23 ENCOUNTER — LAB VISIT (OUTPATIENT)
Dept: LAB | Facility: HOSPITAL | Age: 42
End: 2022-06-23
Attending: PODIATRIST
Payer: MEDICAID

## 2022-06-23 VITALS
HEART RATE: 76 BPM | HEIGHT: 62 IN | SYSTOLIC BLOOD PRESSURE: 136 MMHG | WEIGHT: 226 LBS | BODY MASS INDEX: 41.59 KG/M2 | DIASTOLIC BLOOD PRESSURE: 77 MMHG

## 2022-06-23 DIAGNOSIS — M10.00 ACUTE IDIOPATHIC GOUT, UNSPECIFIED SITE: ICD-10-CM

## 2022-06-23 DIAGNOSIS — M79.671 FOOT PAIN, RIGHT: ICD-10-CM

## 2022-06-23 DIAGNOSIS — M21.619 BUNION: Primary | ICD-10-CM

## 2022-06-23 DIAGNOSIS — M21.619 BUNION: ICD-10-CM

## 2022-06-23 LAB
ANION GAP SERPL CALC-SCNC: 7 MMOL/L (ref 8–16)
BASOPHILS # BLD AUTO: 0.07 K/UL (ref 0–0.2)
BASOPHILS NFR BLD: 1.1 % (ref 0–1.9)
BUN SERPL-MCNC: 34 MG/DL (ref 6–20)
CALCIUM SERPL-MCNC: 9.5 MG/DL (ref 8.7–10.5)
CHLORIDE SERPL-SCNC: 106 MMOL/L (ref 95–110)
CO2 SERPL-SCNC: 25 MMOL/L (ref 23–29)
CREAT SERPL-MCNC: 1.2 MG/DL (ref 0.5–1.4)
CRP SERPL-MCNC: 5.9 MG/L (ref 0–8.2)
DIFFERENTIAL METHOD: ABNORMAL
EOSINOPHIL # BLD AUTO: 0.4 K/UL (ref 0–0.5)
EOSINOPHIL NFR BLD: 6.1 % (ref 0–8)
ERYTHROCYTE [DISTWIDTH] IN BLOOD BY AUTOMATED COUNT: 14.4 % (ref 11.5–14.5)
ERYTHROCYTE [SEDIMENTATION RATE] IN BLOOD BY WESTERGREN METHOD: 53 MM/HR (ref 0–36)
EST. GFR  (AFRICAN AMERICAN): >60 ML/MIN/1.73 M^2
EST. GFR  (NON AFRICAN AMERICAN): 55.9 ML/MIN/1.73 M^2
GLUCOSE SERPL-MCNC: 182 MG/DL (ref 70–110)
HCT VFR BLD AUTO: 35.9 % (ref 37–48.5)
HGB BLD-MCNC: 11.3 G/DL (ref 12–16)
IMM GRANULOCYTES # BLD AUTO: 0.03 K/UL (ref 0–0.04)
IMM GRANULOCYTES NFR BLD AUTO: 0.5 % (ref 0–0.5)
LYMPHOCYTES # BLD AUTO: 2.1 K/UL (ref 1–4.8)
LYMPHOCYTES NFR BLD: 34.3 % (ref 18–48)
MCH RBC QN AUTO: 27.4 PG (ref 27–31)
MCHC RBC AUTO-ENTMCNC: 31.5 G/DL (ref 32–36)
MCV RBC AUTO: 87 FL (ref 82–98)
MONOCYTES # BLD AUTO: 0.4 K/UL (ref 0.3–1)
MONOCYTES NFR BLD: 7.2 % (ref 4–15)
NEUTROPHILS # BLD AUTO: 3.1 K/UL (ref 1.8–7.7)
NEUTROPHILS NFR BLD: 50.8 % (ref 38–73)
NRBC BLD-RTO: 0 /100 WBC
PLATELET # BLD AUTO: 297 K/UL (ref 150–450)
PMV BLD AUTO: 11.3 FL (ref 9.2–12.9)
POTASSIUM SERPL-SCNC: 4.1 MMOL/L (ref 3.5–5.1)
RBC # BLD AUTO: 4.12 M/UL (ref 4–5.4)
SODIUM SERPL-SCNC: 138 MMOL/L (ref 136–145)
WBC # BLD AUTO: 6.09 K/UL (ref 3.9–12.7)

## 2022-06-23 PROCEDURE — 3075F PR MOST RECENT SYSTOLIC BLOOD PRESS GE 130-139MM HG: ICD-10-PCS | Mod: CPTII,,, | Performed by: PODIATRIST

## 2022-06-23 PROCEDURE — 85652 RBC SED RATE AUTOMATED: CPT | Performed by: PODIATRIST

## 2022-06-23 PROCEDURE — 3078F PR MOST RECENT DIASTOLIC BLOOD PRESSURE < 80 MM HG: ICD-10-PCS | Mod: CPTII,,, | Performed by: PODIATRIST

## 2022-06-23 PROCEDURE — 3075F SYST BP GE 130 - 139MM HG: CPT | Mod: CPTII,,, | Performed by: PODIATRIST

## 2022-06-23 PROCEDURE — 99214 OFFICE O/P EST MOD 30 MIN: CPT | Mod: PBBFAC,PN | Performed by: PODIATRIST

## 2022-06-23 PROCEDURE — 99999 PR PBB SHADOW E&M-EST. PATIENT-LVL IV: ICD-10-PCS | Mod: PBBFAC,,, | Performed by: PODIATRIST

## 2022-06-23 PROCEDURE — 3008F PR BODY MASS INDEX (BMI) DOCUMENTED: ICD-10-PCS | Mod: CPTII,,, | Performed by: PODIATRIST

## 2022-06-23 PROCEDURE — 3072F PR LOW RISK FOR RETINOPATHY: ICD-10-PCS | Mod: CPTII,,, | Performed by: PODIATRIST

## 2022-06-23 PROCEDURE — 3078F DIAST BP <80 MM HG: CPT | Mod: CPTII,,, | Performed by: PODIATRIST

## 2022-06-23 PROCEDURE — 1160F PR REVIEW ALL MEDS BY PRESCRIBER/CLIN PHARMACIST DOCUMENTED: ICD-10-PCS | Mod: CPTII,,, | Performed by: PODIATRIST

## 2022-06-23 PROCEDURE — 80048 BASIC METABOLIC PNL TOTAL CA: CPT | Performed by: PODIATRIST

## 2022-06-23 PROCEDURE — 1160F RVW MEDS BY RX/DR IN RCRD: CPT | Mod: CPTII,,, | Performed by: PODIATRIST

## 2022-06-23 PROCEDURE — 99214 OFFICE O/P EST MOD 30 MIN: CPT | Mod: S$PBB,,, | Performed by: PODIATRIST

## 2022-06-23 PROCEDURE — 1159F PR MEDICATION LIST DOCUMENTED IN MEDICAL RECORD: ICD-10-PCS | Mod: CPTII,,, | Performed by: PODIATRIST

## 2022-06-23 PROCEDURE — 3072F LOW RISK FOR RETINOPATHY: CPT | Mod: CPTII,,, | Performed by: PODIATRIST

## 2022-06-23 PROCEDURE — 1159F MED LIST DOCD IN RCRD: CPT | Mod: CPTII,,, | Performed by: PODIATRIST

## 2022-06-23 PROCEDURE — 86140 C-REACTIVE PROTEIN: CPT | Performed by: PODIATRIST

## 2022-06-23 PROCEDURE — 3008F BODY MASS INDEX DOCD: CPT | Mod: CPTII,,, | Performed by: PODIATRIST

## 2022-06-23 PROCEDURE — 99999 PR PBB SHADOW E&M-EST. PATIENT-LVL IV: CPT | Mod: PBBFAC,,, | Performed by: PODIATRIST

## 2022-06-23 PROCEDURE — 36415 COLL VENOUS BLD VENIPUNCTURE: CPT | Mod: PN | Performed by: PODIATRIST

## 2022-06-23 PROCEDURE — 99214 PR OFFICE/OUTPT VISIT, EST, LEVL IV, 30-39 MIN: ICD-10-PCS | Mod: S$PBB,,, | Performed by: PODIATRIST

## 2022-06-23 PROCEDURE — 85025 COMPLETE CBC W/AUTO DIFF WBC: CPT | Performed by: PODIATRIST

## 2022-06-23 RX ORDER — LIDOCAINE HYDROCHLORIDE 20 MG/ML
JELLY TOPICAL
Qty: 30 ML | Refills: 2 | Status: SHIPPED | OUTPATIENT
Start: 2022-06-23

## 2022-06-23 NOTE — PROGRESS NOTES
Subjective:      Patient ID: Wang Warren is a 41 y.o. female.    Chief Complaint: Foot Pain (R foot)      In stance sharp pain and bunion right big toe joint.  Gradual onset worsening rapidly over a few days on 06/07/2022.  Patient was room for colchicine that day follow up with Podiatry which shoe had a   Elevateduric acid and Medrol Dosepak which she has now completed.  She relates she is improved in her symptoms without complete resolution.  She ambulates today in flip-flops but relates stiff-soled shoes are more comfortable.  She denies trauma and surgery the right foot.    Review of Systems   Constitutional: Negative for chills, diaphoresis, fever, malaise/fatigue and night sweats.   Cardiovascular: Negative for claudication, cyanosis, leg swelling and syncope.   Skin: Negative for color change, dry skin, nail changes, rash, suspicious lesions and unusual hair distribution.   Musculoskeletal: Negative for falls, joint pain, joint swelling, muscle cramps, muscle weakness and stiffness.   Gastrointestinal: Negative for constipation, diarrhea, nausea and vomiting.   Neurological: Negative for brief paralysis, disturbances in coordination, focal weakness, numbness, paresthesias, sensory change and tremors.           Objective:      Physical Exam  Constitutional:       General: She is not in acute distress.     Appearance: She is well-developed. She is not diaphoretic.   Cardiovascular:      Pulses:           Popliteal pulses are 2+ on the right side and 2+ on the left side.        Dorsalis pedis pulses are 2+ on the right side and 2+ on the left side.        Posterior tibial pulses are 2+ on the right side and 2+ on the left side.      Comments: Capillary refill 3 seconds all toes/distal feet, all toes/both feet warm to touch.      Negative lymphadenopathy bilateral popliteal fossa and tarsal tunnel.      Negavie lower extremity edema bilateral.    Musculoskeletal:      Right ankle: No swelling,  deformity, ecchymosis or lacerations. Normal range of motion. Normal pulse.      Right Achilles Tendon: Normal. No defects. Barragan's test negative.      Comments:  Pain to palpation range of motion right 1st MTPJ with significant hallux valgus which is partially reducible tracks laterally without being track bound.  There is mild dull erythema in the area without lymphangitis or ascending cellulitis.  Otherwise, right foot is without deformity, loss of function, signs of acute trauma.   Lymphadenopathy:      Lower Body: No right inguinal adenopathy. No left inguinal adenopathy.      Comments: Negative lymphadenopathy bilateral popliteal fossa and tarsal tunnel.    Negative lymphangitic streaking bilateral feet/ankles/legs.   Skin:     General: Skin is warm and dry.      Capillary Refill: Capillary refill takes 2 to 3 seconds.      Coloration: Skin is not pale.      Findings: No abrasion, bruising, burn, ecchymosis, erythema, laceration, lesion or rash.      Nails: There is no clubbing.      Comments:     Mild dull erythema medial 1st MTPJ right  without ulceration, drainage, pus, tracking, fluctuance, malodor, or cardinal signs infection.    Neurological:      Mental Status: She is alert and oriented to person, place, and time.      Sensory: No sensory deficit.      Motor: No tremor, atrophy or abnormal muscle tone.      Gait: Gait normal.      Deep Tendon Reflexes:      Reflex Scores:       Patellar reflexes are 2+ on the right side and 2+ on the left side.       Achilles reflexes are 2+ on the right side and 2+ on the left side.  Psychiatric:         Behavior: Behavior is cooperative.               Assessment:       Encounter Diagnoses   Name Primary?    Bunion Yes    Acute idiopathic gout, unspecified site     Foot pain, right          Plan:       Wang was seen today for foot pain.    Diagnoses and all orders for this visit:    Bunion  -     Basic Metabolic Panel; Future  -     CBC Auto Differential;  Future  -     C-Reactive Protein; Future  -     Sedimentation rate; Future    Acute idiopathic gout, unspecified site  -     Basic Metabolic Panel; Future  -     CBC Auto Differential; Future  -     C-Reactive Protein; Future  -     Sedimentation rate; Future    Foot pain, right      I counseled the patient on her conditions, their implications and medical management.     lidocaine gel.  This can safely be used with her current medications and additional source of relief particularly at night.     continue colchicine.  Hydrate well 4 L water daily.      Blood work to monitor renal function evaluate for blood cells inflammatory markers.      Ambulate minimally in stiff-soled shoe.      Letter return to work next week.          No follow-ups on file.

## 2022-07-07 ENCOUNTER — OFFICE VISIT (OUTPATIENT)
Dept: PODIATRY | Facility: CLINIC | Age: 42
End: 2022-07-07
Payer: MEDICAID

## 2022-07-07 ENCOUNTER — TELEPHONE (OUTPATIENT)
Dept: PODIATRY | Facility: CLINIC | Age: 42
End: 2022-07-07

## 2022-07-07 VITALS
BODY MASS INDEX: 41.59 KG/M2 | SYSTOLIC BLOOD PRESSURE: 150 MMHG | WEIGHT: 226 LBS | HEART RATE: 79 BPM | HEIGHT: 62 IN | DIASTOLIC BLOOD PRESSURE: 86 MMHG

## 2022-07-07 DIAGNOSIS — E11.9 CONTROLLED TYPE 2 DIABETES MELLITUS WITHOUT COMPLICATION, WITHOUT LONG-TERM CURRENT USE OF INSULIN: ICD-10-CM

## 2022-07-07 DIAGNOSIS — M10.00 ACUTE IDIOPATHIC GOUT, UNSPECIFIED SITE: ICD-10-CM

## 2022-07-07 DIAGNOSIS — M79.671 FOOT PAIN, RIGHT: ICD-10-CM

## 2022-07-07 DIAGNOSIS — M21.619 BUNION: Primary | ICD-10-CM

## 2022-07-07 PROCEDURE — 3008F BODY MASS INDEX DOCD: CPT | Mod: CPTII,,, | Performed by: PODIATRIST

## 2022-07-07 PROCEDURE — 99999 PR PBB SHADOW E&M-EST. PATIENT-LVL V: ICD-10-PCS | Mod: PBBFAC,,, | Performed by: PODIATRIST

## 2022-07-07 PROCEDURE — 1160F PR REVIEW ALL MEDS BY PRESCRIBER/CLIN PHARMACIST DOCUMENTED: ICD-10-PCS | Mod: CPTII,,, | Performed by: PODIATRIST

## 2022-07-07 PROCEDURE — 3077F SYST BP >= 140 MM HG: CPT | Mod: CPTII,,, | Performed by: PODIATRIST

## 2022-07-07 PROCEDURE — 99999 PR PBB SHADOW E&M-EST. PATIENT-LVL V: CPT | Mod: PBBFAC,,, | Performed by: PODIATRIST

## 2022-07-07 PROCEDURE — 99213 OFFICE O/P EST LOW 20 MIN: CPT | Mod: S$PBB,,, | Performed by: PODIATRIST

## 2022-07-07 PROCEDURE — 3072F PR LOW RISK FOR RETINOPATHY: ICD-10-PCS | Mod: CPTII,,, | Performed by: PODIATRIST

## 2022-07-07 PROCEDURE — 99215 OFFICE O/P EST HI 40 MIN: CPT | Mod: PBBFAC,PN | Performed by: PODIATRIST

## 2022-07-07 PROCEDURE — 3072F LOW RISK FOR RETINOPATHY: CPT | Mod: CPTII,,, | Performed by: PODIATRIST

## 2022-07-07 PROCEDURE — 1160F RVW MEDS BY RX/DR IN RCRD: CPT | Mod: CPTII,,, | Performed by: PODIATRIST

## 2022-07-07 PROCEDURE — 99213 PR OFFICE/OUTPT VISIT, EST, LEVL III, 20-29 MIN: ICD-10-PCS | Mod: S$PBB,,, | Performed by: PODIATRIST

## 2022-07-07 PROCEDURE — 1159F MED LIST DOCD IN RCRD: CPT | Mod: CPTII,,, | Performed by: PODIATRIST

## 2022-07-07 PROCEDURE — 3077F PR MOST RECENT SYSTOLIC BLOOD PRESSURE >= 140 MM HG: ICD-10-PCS | Mod: CPTII,,, | Performed by: PODIATRIST

## 2022-07-07 PROCEDURE — 3008F PR BODY MASS INDEX (BMI) DOCUMENTED: ICD-10-PCS | Mod: CPTII,,, | Performed by: PODIATRIST

## 2022-07-07 PROCEDURE — 3079F PR MOST RECENT DIASTOLIC BLOOD PRESSURE 80-89 MM HG: ICD-10-PCS | Mod: CPTII,,, | Performed by: PODIATRIST

## 2022-07-07 PROCEDURE — 3079F DIAST BP 80-89 MM HG: CPT | Mod: CPTII,,, | Performed by: PODIATRIST

## 2022-07-07 PROCEDURE — 1159F PR MEDICATION LIST DOCUMENTED IN MEDICAL RECORD: ICD-10-PCS | Mod: CPTII,,, | Performed by: PODIATRIST

## 2022-07-07 NOTE — PROGRESS NOTES
Subjective:      Patient ID: Wang Warren is a 41 y.o. female.    Chief Complaint: Follow-up (Bunion R foot)      In stance sharp pain and bunion right big toe joint.  Gradual onset improving some .  Colchicine, Medrol Dosepak, lidocaine help symptoms but have not resolved the problem.  She relates she is improved in her symptoms without complete resolution.  She ambulates today in flip-flops but relates stiff-soled shoes are more comfortable.  She denies trauma and surgery the right foot.  X-rays negative acute injury right 1st MTP.  Hallux valgus is present with bunion.  Blood work last visit shows acceptable renal function no signs of infection with elevated inflammatory markers as expected.    Review of Systems   Constitutional: Negative for chills, diaphoresis, fever, malaise/fatigue and night sweats.   Cardiovascular: Negative for claudication, cyanosis, leg swelling and syncope.   Skin: Negative for color change, dry skin, nail changes, rash, suspicious lesions and unusual hair distribution.   Musculoskeletal: Positive for joint pain and joint swelling. Negative for falls, muscle cramps, muscle weakness and stiffness.   Gastrointestinal: Negative for constipation, diarrhea, nausea and vomiting.   Neurological: Negative for brief paralysis, disturbances in coordination, focal weakness, numbness, paresthesias, sensory change and tremors.           Objective:      Physical Exam  Constitutional:       General: She is not in acute distress.     Appearance: She is well-developed. She is not diaphoretic.   Cardiovascular:      Pulses:           Popliteal pulses are 2+ on the right side and 2+ on the left side.        Dorsalis pedis pulses are 2+ on the right side and 2+ on the left side.        Posterior tibial pulses are 2+ on the right side and 2+ on the left side.      Comments: Capillary refill 3 seconds all toes/distal feet, all toes/both feet warm to touch.      Negative lymphadenopathy bilateral  popliteal fossa and tarsal tunnel.      Negavie lower extremity edema bilateral.    Musculoskeletal:      Right ankle: No swelling, deformity, ecchymosis or lacerations. Normal range of motion. Normal pulse.      Right Achilles Tendon: Normal. No defects. Barragan's test negative.      Comments:  Pain to palpation range of motion right 1st MTPJ with significant hallux valgus which is partially reducible tracks laterally without being track bound.  There is mild dull erythema in the area without lymphangitis or ascending cellulitis.  Otherwise, right foot is without deformity, loss of function, signs of acute trauma.   Lymphadenopathy:      Lower Body: No right inguinal adenopathy. No left inguinal adenopathy.      Comments: Negative lymphadenopathy bilateral popliteal fossa and tarsal tunnel.    Negative lymphangitic streaking bilateral feet/ankles/legs.   Skin:     General: Skin is warm and dry.      Capillary Refill: Capillary refill takes 2 to 3 seconds.      Coloration: Skin is not pale.      Findings: No abrasion, bruising, burn, ecchymosis, erythema, laceration, lesion or rash.      Nails: There is no clubbing.      Comments:     Erythema right foot resolved.    Skin is normal age and health appropriate color, turgor, texture, and temperature bilateral lower extremities without ulceration, hyperpigmentation, discoloration, masses nodules or cords palpated.  No ecchymosis, erythema, edema, or cardinal signs of infection bilateral lower extremities.     Neurological:      Mental Status: She is alert and oriented to person, place, and time.      Sensory: No sensory deficit.      Motor: No tremor, atrophy or abnormal muscle tone.      Gait: Gait normal.      Deep Tendon Reflexes:      Reflex Scores:       Patellar reflexes are 2+ on the right side and 2+ on the left side.       Achilles reflexes are 2+ on the right side and 2+ on the left side.     Comments: Negative tinel sign to percussion sural, superficial  peroneal, deep peroneal, saphenous, and posterior tibial nerves right and left ankles and feet.     Psychiatric:         Behavior: Behavior is cooperative.               Assessment:       Encounter Diagnoses   Name Primary?    Laurence Yes    Acute idiopathic gout, unspecified site     Foot pain, right     Controlled type 2 diabetes mellitus without complication, without long-term current use of insulin          Plan:       Wang was seen today for follow-up.    Diagnoses and all orders for this visit:    Laurence    Acute idiopathic gout, unspecified site    Foot pain, right    Controlled type 2 diabetes mellitus without complication, without long-term current use of insulin      I counseled the patient on her conditions, their implications and medical management.     lidocaine gel.  This can safely be used with her current medications and additional source of relief particularly at night.    Hydrate well 4 L water daily.        Ambulate minimally in fx boot dispensed today.    Rheumatology consult.      Declines steroid injection right 1st MTP pending improvement over the next couple weeks.          No follow-ups on file.

## 2022-07-21 ENCOUNTER — OFFICE VISIT (OUTPATIENT)
Dept: PODIATRY | Facility: CLINIC | Age: 42
End: 2022-07-21
Payer: MEDICAID

## 2022-07-21 VITALS
WEIGHT: 226 LBS | BODY MASS INDEX: 41.59 KG/M2 | SYSTOLIC BLOOD PRESSURE: 149 MMHG | HEART RATE: 76 BPM | HEIGHT: 62 IN | DIASTOLIC BLOOD PRESSURE: 74 MMHG

## 2022-07-21 DIAGNOSIS — E11.9 CONTROLLED TYPE 2 DIABETES MELLITUS WITHOUT COMPLICATION, WITHOUT LONG-TERM CURRENT USE OF INSULIN: ICD-10-CM

## 2022-07-21 DIAGNOSIS — M10.00 ACUTE IDIOPATHIC GOUT, UNSPECIFIED SITE: ICD-10-CM

## 2022-07-21 DIAGNOSIS — M79.671 FOOT PAIN, RIGHT: ICD-10-CM

## 2022-07-21 DIAGNOSIS — M21.619 BUNION: Primary | ICD-10-CM

## 2022-07-21 PROCEDURE — 3072F LOW RISK FOR RETINOPATHY: CPT | Mod: CPTII,,, | Performed by: PODIATRIST

## 2022-07-21 PROCEDURE — 1159F MED LIST DOCD IN RCRD: CPT | Mod: CPTII,,, | Performed by: PODIATRIST

## 2022-07-21 PROCEDURE — 1159F PR MEDICATION LIST DOCUMENTED IN MEDICAL RECORD: ICD-10-PCS | Mod: CPTII,,, | Performed by: PODIATRIST

## 2022-07-21 PROCEDURE — 1160F RVW MEDS BY RX/DR IN RCRD: CPT | Mod: CPTII,,, | Performed by: PODIATRIST

## 2022-07-21 PROCEDURE — 99213 OFFICE O/P EST LOW 20 MIN: CPT | Mod: S$PBB,,, | Performed by: PODIATRIST

## 2022-07-21 PROCEDURE — 3078F PR MOST RECENT DIASTOLIC BLOOD PRESSURE < 80 MM HG: ICD-10-PCS | Mod: CPTII,,, | Performed by: PODIATRIST

## 2022-07-21 PROCEDURE — 3008F PR BODY MASS INDEX (BMI) DOCUMENTED: ICD-10-PCS | Mod: CPTII,,, | Performed by: PODIATRIST

## 2022-07-21 PROCEDURE — 3008F BODY MASS INDEX DOCD: CPT | Mod: CPTII,,, | Performed by: PODIATRIST

## 2022-07-21 PROCEDURE — 99999 PR PBB SHADOW E&M-EST. PATIENT-LVL V: CPT | Mod: PBBFAC,,, | Performed by: PODIATRIST

## 2022-07-21 PROCEDURE — 3077F SYST BP >= 140 MM HG: CPT | Mod: CPTII,,, | Performed by: PODIATRIST

## 2022-07-21 PROCEDURE — 99999 PR PBB SHADOW E&M-EST. PATIENT-LVL V: ICD-10-PCS | Mod: PBBFAC,,, | Performed by: PODIATRIST

## 2022-07-21 PROCEDURE — 3077F PR MOST RECENT SYSTOLIC BLOOD PRESSURE >= 140 MM HG: ICD-10-PCS | Mod: CPTII,,, | Performed by: PODIATRIST

## 2022-07-21 PROCEDURE — 1160F PR REVIEW ALL MEDS BY PRESCRIBER/CLIN PHARMACIST DOCUMENTED: ICD-10-PCS | Mod: CPTII,,, | Performed by: PODIATRIST

## 2022-07-21 PROCEDURE — 3072F PR LOW RISK FOR RETINOPATHY: ICD-10-PCS | Mod: CPTII,,, | Performed by: PODIATRIST

## 2022-07-21 PROCEDURE — 99213 PR OFFICE/OUTPT VISIT, EST, LEVL III, 20-29 MIN: ICD-10-PCS | Mod: S$PBB,,, | Performed by: PODIATRIST

## 2022-07-21 PROCEDURE — 99215 OFFICE O/P EST HI 40 MIN: CPT | Mod: PBBFAC,PN | Performed by: PODIATRIST

## 2022-07-21 PROCEDURE — 3078F DIAST BP <80 MM HG: CPT | Mod: CPTII,,, | Performed by: PODIATRIST

## 2022-09-08 ENCOUNTER — TELEPHONE (OUTPATIENT)
Dept: OBSTETRICS AND GYNECOLOGY | Facility: CLINIC | Age: 42
End: 2022-09-08
Payer: MEDICAID

## 2022-09-08 NOTE — TELEPHONE ENCOUNTER
----- Message from Rhonda Newman sent at 9/8/2022 11:18 AM CDT -----  Pt called to get scheduled for a [facility] visit , and the pt states she may have had a miscarriage. The pt wanted to be scheduled on 10/03/22 in the evening. Please let me know if I can override, and schedule that appointment.

## 2022-10-03 ENCOUNTER — LAB VISIT (OUTPATIENT)
Dept: LAB | Facility: OTHER | Age: 42
End: 2022-10-03
Attending: OBSTETRICS & GYNECOLOGY
Payer: MEDICAID

## 2022-10-03 ENCOUNTER — OFFICE VISIT (OUTPATIENT)
Dept: OBSTETRICS AND GYNECOLOGY | Facility: CLINIC | Age: 42
End: 2022-10-03
Payer: MEDICAID

## 2022-10-03 VITALS
WEIGHT: 252.63 LBS | BODY MASS INDEX: 46.21 KG/M2 | DIASTOLIC BLOOD PRESSURE: 98 MMHG | SYSTOLIC BLOOD PRESSURE: 142 MMHG

## 2022-10-03 DIAGNOSIS — E11.9 TYPE 2 DIABETES MELLITUS WITHOUT COMPLICATIONS: ICD-10-CM

## 2022-10-03 DIAGNOSIS — N93.9 ABNORMAL UTERINE BLEEDING (AUB): ICD-10-CM

## 2022-10-03 DIAGNOSIS — I10 BENIGN ESSENTIAL HTN: Primary | ICD-10-CM

## 2022-10-03 LAB
BASOPHILS # BLD AUTO: 0.06 K/UL (ref 0–0.2)
BASOPHILS NFR BLD: 1 % (ref 0–1.9)
DIFFERENTIAL METHOD: ABNORMAL
EOSINOPHIL # BLD AUTO: 0.2 K/UL (ref 0–0.5)
EOSINOPHIL NFR BLD: 3 % (ref 0–8)
ERYTHROCYTE [DISTWIDTH] IN BLOOD BY AUTOMATED COUNT: 12.7 % (ref 11.5–14.5)
ESTIMATED AVG GLUCOSE: 157 MG/DL (ref 68–131)
HBA1C MFR BLD: 7.1 % (ref 4–5.6)
HCG INTACT+B SERPL-ACNC: <1.2 MIU/ML
HCT VFR BLD AUTO: 38.6 % (ref 37–48.5)
HGB BLD-MCNC: 12.3 G/DL (ref 12–16)
IMM GRANULOCYTES # BLD AUTO: 0.02 K/UL (ref 0–0.04)
IMM GRANULOCYTES NFR BLD AUTO: 0.3 % (ref 0–0.5)
LYMPHOCYTES # BLD AUTO: 2.1 K/UL (ref 1–4.8)
LYMPHOCYTES NFR BLD: 34.8 % (ref 18–48)
MCH RBC QN AUTO: 28.1 PG (ref 27–31)
MCHC RBC AUTO-ENTMCNC: 31.9 G/DL (ref 32–36)
MCV RBC AUTO: 88 FL (ref 82–98)
MONOCYTES # BLD AUTO: 0.4 K/UL (ref 0.3–1)
MONOCYTES NFR BLD: 6.2 % (ref 4–15)
NEUTROPHILS # BLD AUTO: 3.3 K/UL (ref 1.8–7.7)
NEUTROPHILS NFR BLD: 54.7 % (ref 38–73)
NRBC BLD-RTO: 0 /100 WBC
PLATELET # BLD AUTO: 339 K/UL (ref 150–450)
PMV BLD AUTO: 10.8 FL (ref 9.2–12.9)
RBC # BLD AUTO: 4.38 M/UL (ref 4–5.4)
TSH SERPL DL<=0.005 MIU/L-ACNC: 2.39 UIU/ML (ref 0.4–4)
WBC # BLD AUTO: 5.98 K/UL (ref 3.9–12.7)

## 2022-10-03 PROCEDURE — 3080F PR MOST RECENT DIASTOLIC BLOOD PRESSURE >= 90 MM HG: ICD-10-PCS | Mod: CPTII,,, | Performed by: OBSTETRICS & GYNECOLOGY

## 2022-10-03 PROCEDURE — 1160F RVW MEDS BY RX/DR IN RCRD: CPT | Mod: CPTII,,, | Performed by: OBSTETRICS & GYNECOLOGY

## 2022-10-03 PROCEDURE — 3080F DIAST BP >= 90 MM HG: CPT | Mod: CPTII,,, | Performed by: OBSTETRICS & GYNECOLOGY

## 2022-10-03 PROCEDURE — 3051F HG A1C>EQUAL 7.0%<8.0%: CPT | Mod: CPTII,,, | Performed by: OBSTETRICS & GYNECOLOGY

## 2022-10-03 PROCEDURE — 3008F BODY MASS INDEX DOCD: CPT | Mod: CPTII,,, | Performed by: OBSTETRICS & GYNECOLOGY

## 2022-10-03 PROCEDURE — 3051F PR MOST RECENT HEMOGLOBIN A1C LEVEL 7.0 - < 8.0%: ICD-10-PCS | Mod: CPTII,,, | Performed by: OBSTETRICS & GYNECOLOGY

## 2022-10-03 PROCEDURE — 1159F MED LIST DOCD IN RCRD: CPT | Mod: CPTII,,, | Performed by: OBSTETRICS & GYNECOLOGY

## 2022-10-03 PROCEDURE — 36415 COLL VENOUS BLD VENIPUNCTURE: CPT | Performed by: OBSTETRICS & GYNECOLOGY

## 2022-10-03 PROCEDURE — 84443 ASSAY THYROID STIM HORMONE: CPT | Performed by: OBSTETRICS & GYNECOLOGY

## 2022-10-03 PROCEDURE — 99999 PR PBB SHADOW E&M-EST. PATIENT-LVL IV: CPT | Mod: PBBFAC,,, | Performed by: OBSTETRICS & GYNECOLOGY

## 2022-10-03 PROCEDURE — 99396 PREV VISIT EST AGE 40-64: CPT | Mod: S$PBB,,, | Performed by: OBSTETRICS & GYNECOLOGY

## 2022-10-03 PROCEDURE — 99214 OFFICE O/P EST MOD 30 MIN: CPT | Mod: PBBFAC | Performed by: OBSTETRICS & GYNECOLOGY

## 2022-10-03 PROCEDURE — 3072F PR LOW RISK FOR RETINOPATHY: ICD-10-PCS | Mod: CPTII,,, | Performed by: OBSTETRICS & GYNECOLOGY

## 2022-10-03 PROCEDURE — 99999 PR PBB SHADOW E&M-EST. PATIENT-LVL IV: ICD-10-PCS | Mod: PBBFAC,,, | Performed by: OBSTETRICS & GYNECOLOGY

## 2022-10-03 PROCEDURE — 83036 HEMOGLOBIN GLYCOSYLATED A1C: CPT | Performed by: OBSTETRICS & GYNECOLOGY

## 2022-10-03 PROCEDURE — 84702 CHORIONIC GONADOTROPIN TEST: CPT | Performed by: OBSTETRICS & GYNECOLOGY

## 2022-10-03 PROCEDURE — 1160F PR REVIEW ALL MEDS BY PRESCRIBER/CLIN PHARMACIST DOCUMENTED: ICD-10-PCS | Mod: CPTII,,, | Performed by: OBSTETRICS & GYNECOLOGY

## 2022-10-03 PROCEDURE — 3008F PR BODY MASS INDEX (BMI) DOCUMENTED: ICD-10-PCS | Mod: CPTII,,, | Performed by: OBSTETRICS & GYNECOLOGY

## 2022-10-03 PROCEDURE — 3077F PR MOST RECENT SYSTOLIC BLOOD PRESSURE >= 140 MM HG: ICD-10-PCS | Mod: CPTII,,, | Performed by: OBSTETRICS & GYNECOLOGY

## 2022-10-03 PROCEDURE — 99396 PR PREVENTIVE VISIT,EST,40-64: ICD-10-PCS | Mod: S$PBB,,, | Performed by: OBSTETRICS & GYNECOLOGY

## 2022-10-03 PROCEDURE — 85025 COMPLETE CBC W/AUTO DIFF WBC: CPT | Performed by: OBSTETRICS & GYNECOLOGY

## 2022-10-03 PROCEDURE — 3072F LOW RISK FOR RETINOPATHY: CPT | Mod: CPTII,,, | Performed by: OBSTETRICS & GYNECOLOGY

## 2022-10-03 PROCEDURE — 3077F SYST BP >= 140 MM HG: CPT | Mod: CPTII,,, | Performed by: OBSTETRICS & GYNECOLOGY

## 2022-10-03 PROCEDURE — 1159F PR MEDICATION LIST DOCUMENTED IN MEDICAL RECORD: ICD-10-PCS | Mod: CPTII,,, | Performed by: OBSTETRICS & GYNECOLOGY

## 2022-10-03 RX ORDER — NIFEDIPINE 60 MG/1
TABLET, EXTENDED RELEASE ORAL
Qty: 30 TABLET | Refills: 6 | Status: SHIPPED | OUTPATIENT
Start: 2022-10-03

## 2022-10-03 RX ORDER — METFORMIN HYDROCHLORIDE 500 MG/1
TABLET ORAL
Qty: 60 TABLET | Refills: 6 | Status: SHIPPED | OUTPATIENT
Start: 2022-10-03 | End: 2024-01-12 | Stop reason: SDUPTHER

## 2022-10-03 NOTE — PROGRESS NOTES
Pap    Subjective:       Patient ID: Wang Warren is a 41 y.o. female.    Chief Complaint:  Well Woman (Pt states she recently had a miscarriage in August /Pt states she hasnt had care with anyone after the miscarriage /Pt states she wants to discuss getting a D&C /Pt states she had her cycle twice in September the first cylce's duration was 7-8 days. The second cycles duration was about three weeks )    History of Present Illness:  HPI  41 y.o. female  here for annual.   She describes her periods as regular, occurring monthly, 5 days of bleeding. In Aug started having severe cramping and passed a large clot, bleeding for 3-4 weeks. She then had another period on -,  bleeding for 2 weeks. Thought that she possibly had a miscarriage. She did not take any home UPTs.  denies break through bleeding.   denies vaginal itching or irritation.  denies vaginal discharge.    She is sexually active.  She uses no method for contraception.    History of abnormal pap: No  Last Pap: 3/2021 - NILM/HPV neg  Last MM2020 - Birads 1  Last Colonoscopy: N/A    Family History   Problem Relation Age of Onset    Cancer Father         prostate    Diabetes Maternal Aunt     Ovarian cancer Maternal Grandmother     Breast cancer Neg Hx     Congenital heart disease Neg Hx        GYN & OB History  Patient's last menstrual period was 2022 (exact date).   Date of Last Pap: No result found    OB History    Para Term  AB Living   3 3 2 1   3   SAB IAB Ectopic Multiple Live Births         0 2      # Outcome Date GA Lbr Conrado/2nd Weight Sex Delivery Anes PTL Lv   3  20 33w6d  1.87 kg (4 lb 2 oz) M CS-LTranv Spinal  ROBERTO   2 Term 18 37w1d  2.82 kg (6 lb 3.5 oz) F CS-LTranv EPI N ROBERTO      Complications: Fetal Intolerance   1 Term                Review of Systems  Review of Systems   Constitutional:  Negative for chills, diaphoresis, fatigue and fever.   Respiratory:  Negative for  cough and shortness of breath.    Cardiovascular:  Negative for chest pain and palpitations.   Gastrointestinal:  Negative for abdominal pain, constipation, diarrhea, nausea and vomiting.   Genitourinary:  Negative for dysmenorrhea, dyspareunia, dysuria, frequency, menorrhagia, menstrual problem, pelvic pain, vaginal bleeding, vaginal discharge and vaginal pain.   Musculoskeletal:  Negative for back pain and myalgias.   Integumentary:  Negative for rash, acne, breast mass, nipple discharge and breast skin changes.   Neurological:  Negative for headaches.   Psychiatric/Behavioral:  Negative for depression. The patient is not nervous/anxious.    Breast: Negative for mass, mastodynia, nipple discharge and skin changes        Objective:    Physical Exam:   Constitutional: She is oriented to person, place, and time. She appears well-developed and well-nourished. No distress.    HENT:   Head: Normocephalic and atraumatic.    Eyes: EOM are normal.    Neck: No thyromegaly present.     Pulmonary/Chest: Effort normal. She exhibits no mass and no tenderness. Right breast exhibits no inverted nipple, no mass, no nipple discharge, no skin change, no tenderness and no swelling. Left breast exhibits no inverted nipple, no mass, no nipple discharge, no skin change, no tenderness and no swelling. Breasts are symmetrical.        Abdominal: Soft. She exhibits no distension and no mass. There is no abdominal tenderness. There is no rebound and no guarding. No hernia.   Obese     Genitourinary:    Genitourinary Comments: Normal external female genitalia; vagina rugated, normal; cervix normal, no masses; uterus small mobile nontender; no adnexal masses palpated.             Musculoskeletal: Normal range of motion and moves all extremeties.       Neurological: She is alert and oriented to person, place, and time.    Skin: Skin is warm. No rash noted.    Psychiatric: She has a normal mood and affect. Her behavior is normal. Judgment and  thought content normal.        Assessment:        1. Benign essential HTN    2. Type 2 diabetes mellitus without complications    3. Abnormal uterine bleeding (AUB)               Plan:      Wang was seen today for annual exam.    Diagnoses and all orders for this visit:    Well woman exam with routine gynecological exam  - Pap guidelines discussed. Pap up to date.  - MMG ordered.   - Cscope age 44.   - DXA not yet indicated.   - Contraception -  declined.   - STD screening - declined.   - AUB - will check b-hcg, TSH, CBC. Given new onset AUB and risk for hyperplasia/malignancy, will obtain pelvic US and EMBx. Bleeding precautions.       Orders Placed This Encounter   Procedures    US Pelvis Comp with Transvag NON-OB (xpd    TSH    CBC Auto Differential    HCG, Quantitative    Hemoglobin A1C         No follow-ups on file.

## 2022-10-21 DIAGNOSIS — Z12.31 BREAST CANCER SCREENING BY MAMMOGRAM: Primary | ICD-10-CM

## 2022-11-08 ENCOUNTER — PROCEDURE VISIT (OUTPATIENT)
Dept: OBSTETRICS AND GYNECOLOGY | Facility: CLINIC | Age: 42
End: 2022-11-08
Payer: MEDICAID

## 2022-11-08 VITALS
BODY MASS INDEX: 46.41 KG/M2 | SYSTOLIC BLOOD PRESSURE: 118 MMHG | DIASTOLIC BLOOD PRESSURE: 79 MMHG | WEIGHT: 253.75 LBS

## 2022-11-08 DIAGNOSIS — N93.9 ABNORMAL UTERINE BLEEDING (AUB): Primary | ICD-10-CM

## 2022-11-08 LAB
B-HCG UR QL: NEGATIVE
CTP QC/QA: YES

## 2022-11-08 PROCEDURE — 88305 TISSUE EXAM BY PATHOLOGIST: CPT | Performed by: PATHOLOGY

## 2022-11-08 PROCEDURE — 81025 URINE PREGNANCY TEST: CPT | Mod: PBBFAC,PN | Performed by: OBSTETRICS & GYNECOLOGY

## 2022-11-08 PROCEDURE — 99499 NO LOS: ICD-10-PCS | Mod: S$PBB,,, | Performed by: OBSTETRICS & GYNECOLOGY

## 2022-11-08 PROCEDURE — 88305 TISSUE EXAM BY PATHOLOGIST: ICD-10-PCS | Mod: 26,,, | Performed by: PATHOLOGY

## 2022-11-08 PROCEDURE — 99499 UNLISTED E&M SERVICE: CPT | Mod: S$PBB,,, | Performed by: OBSTETRICS & GYNECOLOGY

## 2022-11-08 PROCEDURE — 58100 BIOPSY OF UTERUS LINING: CPT | Mod: PBBFAC,PN | Performed by: OBSTETRICS & GYNECOLOGY

## 2022-11-08 PROCEDURE — 88305 TISSUE EXAM BY PATHOLOGIST: CPT | Mod: 26,,, | Performed by: PATHOLOGY

## 2022-11-08 PROCEDURE — 58100 ENDOMETRIAL BIOPSY: ICD-10-PCS | Mod: S$PBB,,, | Performed by: OBSTETRICS & GYNECOLOGY

## 2022-11-08 NOTE — PROCEDURES
Endometrial biopsy    Date/Time: 11/8/2022 1:45 PM  Performed by: Mariela Valenzuela MD  Authorized by: Mariela Valenzuela MD     Consent:     Consent obtained:  Written    Consent given by:  Patient    Patient questions answered: yes      Patient agrees, verbalizes understanding, and wants to proceed: yes      Educational handouts given: yes      Instructions and paperwork completed: yes    Indication:     Indications: Menorrhagia    Pre-procedure:     Pre-procedure timeout performed: yes    Procedure:     Procedure: endometrial biopsy with Pipelle      Cervix cleaned and prepped: yes      A paracervical block was performed: no      An intracervical block was performed: no      The cervix was dilated: no      Uterus sounded: yes      Uterus sound depth (cm):  10    Specimen collected: specimen collected and sent to pathology      Patient tolerated procedure well with no complications: yes

## 2022-11-16 LAB
FINAL PATHOLOGIC DIAGNOSIS: NORMAL
GROSS: NORMAL
Lab: NORMAL

## 2022-11-17 ENCOUNTER — TELEPHONE (OUTPATIENT)
Dept: OBSTETRICS AND GYNECOLOGY | Facility: CLINIC | Age: 42
End: 2022-11-17
Payer: MEDICAID

## 2022-11-17 NOTE — TELEPHONE ENCOUNTER
Called pt in regards virtual appt no answer left vm for pt to contact office Dr. Valenzuela offer a virtual on 11/23 at 8:30am if pt would like to schedule in regards to bleeding

## 2022-11-17 NOTE — TELEPHONE ENCOUNTER
----- Message from Mariela Valenzuela MD sent at 11/16/2022  9:31 PM CST -----  Please offer her a virtual visit to follow up for the bleeding. I have 11/23 830 if that works for her? Or see what works with her job, I could probably do one in the afternoon if she needs me to or even at lunch.

## 2022-11-18 ENCOUNTER — TELEPHONE (OUTPATIENT)
Dept: OBSTETRICS AND GYNECOLOGY | Facility: CLINIC | Age: 42
End: 2022-11-18
Payer: MEDICAID

## 2022-11-18 NOTE — TELEPHONE ENCOUNTER
----- Message from Cornel Wynn sent at 11/18/2022  8:22 AM CST -----  Regarding: RETURN CALL  Who Called: MATILDA MCINTOSH [[id number]]        Who Left Message for Patient:wise        Does the patient know what this is regarding?yes        Best Call Back Number:[phone]        Additional Information:

## 2022-12-06 ENCOUNTER — TELEPHONE (OUTPATIENT)
Dept: OBSTETRICS AND GYNECOLOGY | Facility: CLINIC | Age: 42
End: 2022-12-06
Payer: MEDICAID

## 2022-12-06 NOTE — TELEPHONE ENCOUNTER
"----- Message from Rylee Krishna sent at 12/6/2022  8:46 AM CST -----  Regarding: Returning Call            Name of Who is Calling:  Shown Calos Warren    Who Left The Message:  Shown Calos Warren      What is the request in detail:  Patient called stating, "she's returning the Office's call and would like you to please call again.." Thank you!      Reply by [facility]: NO      Preferred Call Back :  [phone] (F)                                          "

## 2023-01-18 RX ORDER — FLUCONAZOLE 150 MG/1
150 TABLET ORAL DAILY
Qty: 1 TABLET | Refills: 0 | Status: SHIPPED | OUTPATIENT
Start: 2023-01-18 | End: 2023-01-20

## 2023-01-18 NOTE — TELEPHONE ENCOUNTER
----- Message from Kayla Abarca sent at 1/18/2023  8:10 AM CST -----  Contact: MATILDA MCINTOSH [[id number]]  Type: Call Back      Who called: MATILDA MCINTOSH [[id number]]      What is the request in detail: Patient is requesting a call back. Pt states that she has a yeast infection and she would like to know if a prescription can be sent over to [facility].   Please advise.     Can the clinic reply by [facility]? Yes      Would the patient rather a call back or a response via [facility]? Call back       Best call back number: [phone] (home)       Additional Information:

## 2023-05-11 ENCOUNTER — PATIENT MESSAGE (OUTPATIENT)
Dept: OBSTETRICS AND GYNECOLOGY | Facility: CLINIC | Age: 43
End: 2023-05-11
Payer: MEDICAID

## 2023-07-06 NOTE — PROGRESS NOTES
"Patient was given vaccine information sheet for the [id number] (pneumococcal polyvalent) vaccine. The area of injection was palpated using the acromion process as a landmark. This area was cleaned with alcohol. Using a 25g 1" safety needle, 0.5mL of the vaccine was placed into the Left Deltoid muscle. The injection site was dressed with a bandage. Patient experienced no complications and was discharged in stable condition. Pneumovax 23 (pneumococcal polyvalent) vaccine Lot: [id number] Exp: 1/25/20    " No

## 2023-08-28 ENCOUNTER — TELEPHONE (OUTPATIENT)
Dept: OPTOMETRY | Facility: CLINIC | Age: 43
End: 2023-08-28
Payer: MEDICAID

## 2023-08-28 NOTE — TELEPHONE ENCOUNTER
"----- Message from Wayne Lombardi sent at 8/28/2023  9:50 AM CDT -----  Scheduling Request        Patient Status: Est      Scheduling Appt: Annual (routine)      Time/Date Preference:  Soonest available      Contact Preference?: [phone] (home)       Treating Provider: Chip        Additional Notes: No availability while attempting to schedule    "Thank you for all that you do for our patients"      "

## 2023-10-23 ENCOUNTER — TELEPHONE (OUTPATIENT)
Dept: PODIATRY | Facility: CLINIC | Age: 43
End: 2023-10-23
Payer: MEDICAID

## 2023-10-23 ENCOUNTER — PATIENT MESSAGE (OUTPATIENT)
Dept: OBSTETRICS AND GYNECOLOGY | Facility: CLINIC | Age: 43
End: 2023-10-23
Payer: MEDICAID

## 2023-10-23 NOTE — TELEPHONE ENCOUNTER
Left message for patient to give a callback.      ----- Message from Cornel Wynn sent at 10/23/2023  8:39 AM CDT -----  Name of Who is Calling: MATILDA MCINTOSH [[id number]]            What is the request in detail: Patient is requesting call back to get appt for nail care and follow up with excuse 10/18-10/20 for  procedure              Can the clinic reply by [facility]: no              [facility] Back [facility]: [phone]

## 2023-10-24 ENCOUNTER — TELEPHONE (OUTPATIENT)
Dept: OBSTETRICS AND GYNECOLOGY | Facility: CLINIC | Age: 43
End: 2023-10-24
Payer: MEDICAID

## 2023-11-08 ENCOUNTER — OFFICE VISIT (OUTPATIENT)
Dept: PODIATRY | Facility: CLINIC | Age: 43
End: 2023-11-08
Payer: MEDICAID

## 2023-11-08 VITALS
HEIGHT: 62 IN | DIASTOLIC BLOOD PRESSURE: 81 MMHG | WEIGHT: 253 LBS | HEART RATE: 73 BPM | SYSTOLIC BLOOD PRESSURE: 135 MMHG | BODY MASS INDEX: 46.56 KG/M2

## 2023-11-08 DIAGNOSIS — E11.9 CONTROLLED TYPE 2 DIABETES MELLITUS WITHOUT COMPLICATION, WITHOUT LONG-TERM CURRENT USE OF INSULIN: Primary | ICD-10-CM

## 2023-11-08 DIAGNOSIS — B35.1 ONYCHOMYCOSIS DUE TO DERMATOPHYTE: ICD-10-CM

## 2023-11-08 PROCEDURE — 1159F PR MEDICATION LIST DOCUMENTED IN MEDICAL RECORD: ICD-10-PCS | Mod: CPTII,,, | Performed by: PODIATRIST

## 2023-11-08 PROCEDURE — 99999 PR PBB SHADOW E&M-EST. PATIENT-LVL V: CPT | Mod: PBBFAC,,, | Performed by: PODIATRIST

## 2023-11-08 PROCEDURE — 3072F LOW RISK FOR RETINOPATHY: CPT | Mod: CPTII,,, | Performed by: PODIATRIST

## 2023-11-08 PROCEDURE — 1160F PR REVIEW ALL MEDS BY PRESCRIBER/CLIN PHARMACIST DOCUMENTED: ICD-10-PCS | Mod: CPTII,,, | Performed by: PODIATRIST

## 2023-11-08 PROCEDURE — 99215 OFFICE O/P EST HI 40 MIN: CPT | Mod: PBBFAC,PN | Performed by: PODIATRIST

## 2023-11-08 PROCEDURE — 99999 PR PBB SHADOW E&M-EST. PATIENT-LVL V: ICD-10-PCS | Mod: PBBFAC,,, | Performed by: PODIATRIST

## 2023-11-08 PROCEDURE — 3075F SYST BP GE 130 - 139MM HG: CPT | Mod: CPTII,,, | Performed by: PODIATRIST

## 2023-11-08 PROCEDURE — 3079F PR MOST RECENT DIASTOLIC BLOOD PRESSURE 80-89 MM HG: ICD-10-PCS | Mod: CPTII,,, | Performed by: PODIATRIST

## 2023-11-08 PROCEDURE — 3075F PR MOST RECENT SYSTOLIC BLOOD PRESS GE 130-139MM HG: ICD-10-PCS | Mod: CPTII,,, | Performed by: PODIATRIST

## 2023-11-08 PROCEDURE — 3072F PR LOW RISK FOR RETINOPATHY: ICD-10-PCS | Mod: CPTII,,, | Performed by: PODIATRIST

## 2023-11-08 PROCEDURE — 3079F DIAST BP 80-89 MM HG: CPT | Mod: CPTII,,, | Performed by: PODIATRIST

## 2023-11-08 PROCEDURE — 1160F RVW MEDS BY RX/DR IN RCRD: CPT | Mod: CPTII,,, | Performed by: PODIATRIST

## 2023-11-08 PROCEDURE — 99214 OFFICE O/P EST MOD 30 MIN: CPT | Mod: S$PBB,,, | Performed by: PODIATRIST

## 2023-11-08 PROCEDURE — 1159F MED LIST DOCD IN RCRD: CPT | Mod: CPTII,,, | Performed by: PODIATRIST

## 2023-11-08 PROCEDURE — 99214 PR OFFICE/OUTPT VISIT, EST, LEVL IV, 30-39 MIN: ICD-10-PCS | Mod: S$PBB,,, | Performed by: PODIATRIST

## 2023-11-08 PROCEDURE — 3008F BODY MASS INDEX DOCD: CPT | Mod: CPTII,,, | Performed by: PODIATRIST

## 2023-11-08 PROCEDURE — 3008F PR BODY MASS INDEX (BMI) DOCUMENTED: ICD-10-PCS | Mod: CPTII,,, | Performed by: PODIATRIST

## 2023-11-08 RX ORDER — CICLOPIROX 80 MG/ML
SOLUTION TOPICAL NIGHTLY
Qty: 6.6 ML | Refills: 11 | Status: SHIPPED | OUTPATIENT
Start: 2023-11-08

## 2023-11-08 NOTE — PROGRESS NOTES
Subjective:      Patient ID: Wang Warren is a 42 y.o. female.    Chief Complaint: Diabetic Foot Exam (Foot Exam/PCP )    Diabetes, increased risk amputation needing evaluation/management/optomization of foot care.    Cc2 thick discolored misshapen toenails 2 and 5 right.  Gradual onset, worsening over the past several months.  Aggravated by socks shoes pressure ambulation.  No prior medical treatment.  No self-treatment.  Denies trauma and surgery both feet.    Chief Complaint   Patient presents with    Diabetic Foot Exam     Foot Exam/PCP        Casual shoes both feet.    Review of Systems   Constitutional: Negative for chills, diaphoresis, fever, malaise/fatigue and night sweats.   Cardiovascular:  Negative for claudication, cyanosis, leg swelling and syncope.   Skin:  Positive for nail changes. Negative for color change, dry skin, rash, suspicious lesions and unusual hair distribution.   Musculoskeletal:  Negative for falls, joint pain, joint swelling, muscle cramps, muscle weakness and stiffness.   Gastrointestinal:  Negative for constipation, diarrhea, nausea and vomiting.   Neurological:  Negative for brief paralysis, disturbances in coordination, focal weakness, numbness, paresthesias, sensory change and tremors.         Objective:      Physical Exam  Constitutional:       General: She is not in acute distress.     Appearance: She is well-developed. She is not diaphoretic.   Cardiovascular:      Pulses:           Popliteal pulses are 2+ on the right side and 2+ on the left side.        Dorsalis pedis pulses are 2+ on the right side and 2+ on the left side.        Posterior tibial pulses are 2+ on the right side and 2+ on the left side.      Comments: Capillary refill 3 seconds all toes/distal feet, all toes/both feet warm to touch.      Negative lymphadenopathy bilateral popliteal fossa and tarsal tunnel.      Negavie lower extremity edema bilateral.    Musculoskeletal:      Right ankle: No  swelling, deformity, ecchymosis or lacerations. Normal range of motion. Normal pulse.      Right Achilles Tendon: Normal. No defects. Barragan's test negative.      Comments: Normal angle, base, station of gait. All ten toes without clubbing, cyanosis, or signs of ischemia.  No pain to palpation bilateral lower extremities.  Range of motion, stability, muscle strength, and muscle tone normal bilateral feet and legs.    Lymphadenopathy:      Lower Body: No right inguinal adenopathy. No left inguinal adenopathy.      Comments: Negative lymphadenopathy bilateral popliteal fossa and tarsal tunnel.    Negative lymphangitic streaking bilateral feet/ankles/legs.   Skin:     General: Skin is warm and dry.      Capillary Refill: Capillary refill takes 2 to 3 seconds.      Coloration: Skin is not pale.      Findings: No abrasion, bruising, burn, ecchymosis, erythema, laceration, lesion or rash.      Nails: There is no clubbing.      Comments: Skin is normal age and health appropriate color, turgor, texture, and temperature bilateral lower extremities without ulceration, hyperpigmentation, discoloration, masses nodules or cords palpated.  No ecchymosis, erythema, edema, or cardinal signs of infection bilateral lower extremities.    Toenails 2nd, 5th  right are hypertrophic, dystrophic, discolored tanish brown with tan, gray crumbly subungual debris.  Tender to distal nail plate pressure, without periungual skin abnormality of each.       Neurological:      Mental Status: She is alert and oriented to person, place, and time.      Sensory: No sensory deficit.      Motor: No tremor, atrophy or abnormal muscle tone.      Gait: Gait normal.      Comments: Negative tinel sign to percussion sural, superficial peroneal, deep peroneal, saphenous, and posterior tibial nerves right and left ankles and feet.     Psychiatric:         Behavior: Behavior is cooperative.           Assessment:       Encounter Diagnoses   Name Primary?     Controlled type 2 diabetes mellitus without complication, without long-term current use of insulin Yes    Onychomycosis due to dermatophyte          Plan:       Wang was seen today for diabetic foot exam.    Diagnoses and all orders for this visit:    Controlled type 2 diabetes mellitus without complication, without long-term current use of insulin    Onychomycosis due to dermatophyte    Other orders  -     ciclopirox (PENLAC) 8 % Soln; Apply topically nightly.      I counseled the patient on her conditions, their implications and medical management.        The patient has received literature on basic diabetic foot care.  Patient will inspect feet daily, wear protective shoe gear when ambulatory, and apply moisturizer to skin as needed to maintain elasticity and help prevent ulceration.    Discussed conservative treatment with shoes of adequate dimensions, material, and style to alleviate symptoms and delay or prevent surgical intervention.    Penlac    Inspect feet multiple times daily for signs of occurrence/recurrence ulceration.           No follow-ups on file.

## 2024-01-12 ENCOUNTER — TELEPHONE (OUTPATIENT)
Dept: OPTOMETRY | Facility: CLINIC | Age: 44
End: 2024-01-12
Payer: MEDICAID

## 2024-01-12 DIAGNOSIS — E11.9 TYPE 2 DIABETES MELLITUS WITHOUT COMPLICATIONS: ICD-10-CM

## 2024-01-12 NOTE — TELEPHONE ENCOUNTER
----- Message from Seema Weems sent at 1/12/2024  8:06 AM CST -----  Regarding: Scheduling Request      Appt Type:     Ep Appt - annual eye exam    Date/Time Preference:   Mornings, First week of February    Treating Provider:    Dr Saunders    Caller Name:     Wang      Contact Preference:    [phone]     Comments/notes:   I was unable to schedule the appt in Epic.

## 2024-01-13 ENCOUNTER — OFFICE VISIT (OUTPATIENT)
Dept: OBSTETRICS AND GYNECOLOGY | Facility: CLINIC | Age: 44
End: 2024-01-13
Payer: MEDICAID

## 2024-01-13 VITALS
HEIGHT: 62 IN | BODY MASS INDEX: 48.16 KG/M2 | SYSTOLIC BLOOD PRESSURE: 142 MMHG | DIASTOLIC BLOOD PRESSURE: 88 MMHG | WEIGHT: 261.69 LBS

## 2024-01-13 DIAGNOSIS — N76.0 ACUTE VAGINITIS: Primary | ICD-10-CM

## 2024-01-13 PROCEDURE — 3079F DIAST BP 80-89 MM HG: CPT | Mod: CPTII,,, | Performed by: ADVANCED PRACTICE MIDWIFE

## 2024-01-13 PROCEDURE — 3077F SYST BP >= 140 MM HG: CPT | Mod: CPTII,,, | Performed by: ADVANCED PRACTICE MIDWIFE

## 2024-01-13 PROCEDURE — 1159F MED LIST DOCD IN RCRD: CPT | Mod: CPTII,,, | Performed by: ADVANCED PRACTICE MIDWIFE

## 2024-01-13 PROCEDURE — 99212 OFFICE O/P EST SF 10 MIN: CPT | Mod: S$PBB,,, | Performed by: ADVANCED PRACTICE MIDWIFE

## 2024-01-13 PROCEDURE — 99214 OFFICE O/P EST MOD 30 MIN: CPT | Mod: PBBFAC | Performed by: ADVANCED PRACTICE MIDWIFE

## 2024-01-13 PROCEDURE — 3008F BODY MASS INDEX DOCD: CPT | Mod: CPTII,,, | Performed by: ADVANCED PRACTICE MIDWIFE

## 2024-01-13 PROCEDURE — 99999 PR PBB SHADOW E&M-EST. PATIENT-LVL IV: CPT | Mod: PBBFAC,,, | Performed by: ADVANCED PRACTICE MIDWIFE

## 2024-01-13 RX ORDER — FLUCONAZOLE 200 MG/1
200 TABLET ORAL EVERY OTHER DAY
Qty: 2 TABLET | Refills: 0 | Status: SHIPPED | OUTPATIENT
Start: 2024-01-13 | End: 2024-01-16

## 2024-01-13 RX ORDER — ACETAMINOPHEN AND CODEINE PHOSPHATE 120; 12 MG/5ML; MG/5ML
1 SOLUTION ORAL DAILY
Qty: 84 TABLET | Refills: 3 | Status: SHIPPED | OUTPATIENT
Start: 2024-01-13

## 2024-01-13 RX ORDER — METFORMIN HYDROCHLORIDE 500 MG/1
TABLET ORAL
Qty: 60 TABLET | Refills: 11 | Status: SHIPPED | OUTPATIENT
Start: 2024-01-13

## 2024-01-13 NOTE — TELEPHONE ENCOUNTER
Refill Routing Note   Medication(s) are not appropriate for processing by [facility] for the following reason(s):      Patient not seen by provider within 15 months    [facility] action(s):  Defer Care Due:  None identified            Appointments  past 12m or future 3m with PCP    Date Provider   Last Visit   11/8/2022 Mariela Valenzuela MD   Next Visit   Visit date not found Mariela Valenzuela MD   ED visits in past 90 days: 0        Note composed:6:23 PM 01/12/2024

## 2024-01-13 NOTE — PROGRESS NOTES
"Wang Warren is a 43 y.o. female  presents to [facility] with complaint of vaginal discharge and itch over last 2 weeks.     ROS:  GENERAL: No fever, chills, fatigability or weight loss.  VULVAR: No pain, no lesions and no itching.  VAGINAL: No relaxation, no abnormal bleeding and no lesions.Discharge and itch.  ABDOMEN: No abdominal pain. Denies nausea. Denies vomiting. No diarrhea. No constipation  URINARY: No incontinence, no nocturia, no frequency and no dysuria.    Review of patient's allergies indicates:  No Known Allergies    Current Outpatient Medications:     BD INSULIN SYRINGE ULTRA-FINE 1 mL 31 gauge x 5/16 Syrg, Inject 1 Syringe into the muscle once daily., Disp: 60 each, Rfl: 0    BD ULTRA-FINE MINI PEN NEEDLE 31 gauge x 3/16" Ndle, Use 3 (three) times daily with meals., Disp: 100 each, Rfl: 11    blood sugar diagnostic (ONETOUCH VERIO) Strp, use to test blood sugar 4 times daily, Disp: 100 each, Rfl: 3    blood sugar diagnostic Strp, test three times a day, Disp: 100 each, Rfl: 6    blood sugar diagnostic Strp, as directed three times a day In Vitro 30 days, Disp: 100 each, Rfl: 6    blood sugar diagnostic Strp, Test blood sugar three times a day, Disp: 100 each, Rfl: 6    blood sugar diagnostic Strp, as directed in vitro three times a day for 30 days, Disp: 100 each, Rfl: 6    blood sugar diagnostic Strp, Use to test once daily as directed, Disp: 50 each, Rfl: 6    blood-glucose meter (TRUE METRIX GLUCOSE METER) Misc, Use to test once daily as directed, Disp: 1 each, Rfl: 0    blood-glucose meter Misc, use a directed capillary once a day, Disp: 1 each, Rfl: 0    ciclopirox (LOPROX) 0.77 % Crea, Apply topically 2 (two) times daily., Disp: 90 g, Rfl: 2    ciclopirox (PENLAC) 8 % Soln, Apply topically nightly., Disp: 6.6 mL, Rfl: 11    diclofenac sodium (SOLARAZE) 3 % gel, Apply topically 2 (two) times daily as needed (for foot pain)., Disp: 100 g, Rfl: 1    ferrous sulfate " "325 mg (65 mg iron) Tab tablet, TK 1 T PO daily, Disp: , Rfl: 1    hydroquinone 4 % Crea, Apply to dark spots daily then discontinue when clear, Disp: 28.35 g, Rfl: 2    insulin (LANTUS SOLOSTAR U-100 INSULIN) glargine 100 units/mL (3mL) SubQ pen, inject as directed once a day Subcutaneous 30 days, Disp: 15 mL, Rfl: 1    insulin (LANTUS SOLOSTAR U-100 INSULIN) glargine 100 units/mL (3mL) SubQ pen, as directed 10 units q day once a day Subcutaneous 30 days, Disp: 15 mL, Rfl: 6    insulin (LANTUS SOLOSTAR U-100 INSULIN) glargine 100 units/mL (3mL) SubQ pen, as directed 10 units every day once a day Subcutaneous 30 days, Disp: 15 mL, Rfl: 6    insulin (LANTUS SOLOSTAR U-100 INSULIN) glargine 100 units/mL (3mL) SubQ pen, Inject 15 units under the skin once a day as directed, Disp: 15 mL, Rfl: 6    insulin lispro 100 unit/mL injection, inject 10 units under the skin per meal three times a day with meals, Disp: 10 mL, Rfl: 6    insulin lispro 100 unit/mL injection, as directed 10 units per meal three times a day with meals Subcutaneous 30 days, Disp: 10 mL, Rfl: 6    insulin lispro 100 unit/mL injection, Inject 10 units under the skin three times a day with meals as directed, Disp: 10 mL, Rfl: 6    insulin syringe-needle U-100 (BD INSULIN SYRINGE ULTRA-FINE) 0.3 mL 30 gauge x 1/2" Syrg, use as directed 6 units once a day, Disp: 30 each, Rfl: 6    insulin syringe-needle U-100 0.5 mL 31 gauge x 5/16" Syrg, Inject under the skin once a day, Disp: 100 each, Rfl: 6    insulin syringe-needle U-100 1 mL 31 gauge x 5/16 Syrg, use for daily insulin injections; up to three times daily, Disp: 100 each, Rfl: 1    insulin syringe-needle U-100 1 mL 31 gauge x 5/16 Syrg, inject with insulins, Disp: 100 each, Rfl: 6    ketoconazole (NIZORAL) 2 % shampoo, Apply to wet skin on face then rinse 2 to 3 times a week, Disp: 120 mL, Rfl: 2    lancets (ONETOUCH ULTRASOFT LANCETS) Misc, 1 lancet by Misc.(Non-Drug; Combo Route) route 4 (four) times " daily., Disp: 100 each, Rfl: 3    lancets 33 gauge Misc, test three times a day, Disp: 100 each, Rfl: 6    lancets 33 gauge Misc, use as directed three times a day capillary 30 days, Disp: 100 each, Rfl: 6    lancets 33 gauge Misc, Test blood sugar three times a day, Disp: 100 each, Rfl: 6    lancets 33 gauge Misc, as directed capillary three times a day for 30 days, Disp: 100 each, Rfl: 6    lancets 33 gauge Misc, Use to test blood glucose once daily as directed, Disp: 100 each, Rfl: 6    LIDOcaine HCL 2% (XYLOCAINE) 2 % jelly, Apply topically as needed. Apply topically once nightly to affected part of foot/feet., Disp: 30 mL, Rfl: 2    metFORMIN (GLUCOPHAGE) 500 MG tablet, Take 1 Tablet with a meal Twice Daily Orally, Disp: 60 tablet, Rfl: 6    metFORMIN (GLUCOPHAGE) 500 MG tablet, Take 1 tablet by mouth with a meal twice a day, Disp: 60 tablet, Rfl: 6    metFORMIN (GLUCOPHAGE) 500 MG tablet, Take 1 tablet by mouth with a meal twice daily 30 day(s), Disp: 60 tablet, Rfl: 6    metFORMIN (GLUCOPHAGE) 500 MG tablet, Take 1 tablet with a meal twice daily Orally 30 day(s), Disp: 60 tablet, Rfl: 11    methylPREDNISolone (MEDROL DOSEPACK) 4 mg tablet, Take 1 tablet (4 mg total) by mouth once daily. Use as instructed on dose pack, Disp: 21 tablet, Rfl: 0    NIFEdipine (PROCARDIA-XL) 60 MG (OSM) 24 hr tablet, take 1 tablet Once a day Orally 30 day(s), Disp: 30 tablet, Rfl: 6    NIFEdipine (PROCARDIA-XL) 60 MG (OSM) 24 hr tablet, Take 1 tablet (60 mg total) by mouth once daily., Disp: 30 tablet, Rfl: 6    NIFEdipine (PROCARDIA-XL) 60 MG (OSM) 24 hr tablet, Take one tablet by mouth once daily., Disp: 30 tablet, Rfl: 6    NIFEdipine (PROCARDIA-XL) 60 MG (OSM) 24 hr tablet, Take 1 tablet (60 mg total) by mouth once daily., Disp: 30 tablet, Rfl: 6    NIFEdipine (PROCARDIA-XL) 60 MG (OSM) 24 hr tablet, Take 1 tablet by mouth once daily., Disp: 30 tablet, Rfl: 6    NIFEdipine (PROCARDIA-XL) 60 MG (OSM) 24 hr tablet, take 1 tablet  "by mouth once a day, Disp: 30 tablet, Rfl: 6    NIFEdipine (PROCARDIA-XL) 60 MG (OSM) 24 hr tablet, Take 1 tablet by mouth daily, Disp: 30 tablet, Rfl: 6    norethindrone (MARIANA) 0.35 mg tablet, Take 1 tablet (0.35 mg total) by mouth once daily., Disp: 84 tablet, Rfl: 3    pen needle, diabetic 31 gauge x 3/16" Ndle, as directed once a day subcutaneous 30 days, Disp: 100 each, Rfl: 6    pen needle, diabetic 31 gauge x 3/16" Ndle, Inject under the skin once a day, Disp: 100 each, Rfl: 6    pen needle, diabetic 31 gauge x 5/16" Ndle, as directed once a day subcutaneous 30 days, Disp: 100 each, Rfl: 6    colchicine (COLCRYS) 0.6 mg tablet, Take 1 tablet (0.6 mg total) by mouth 2 (two) times daily., Disp: 60 tablet, Rfl: 0    fluconazole (DIFLUCAN) 200 MG Tab, Take 1 tablet (200 mg total) by mouth every other day. for 2 doses, Disp: 2 tablet, Rfl: 0    fluticasone (FLONASE) 50 mcg/actuation nasal spray, 1 spray (50 mcg total) by Each Nare route 2 (two) times daily as needed for Rhinitis. (Patient not taking: Reported on 1/13/2024), Disp: 16 g, Rfl: 0    HYDROcodone-acetaminophen (NORCO) 5-325 mg per tablet, Take 1 tablet by mouth every 8 (eight) hours as needed for Pain. (Patient not taking: Reported on 1/13/2024), Disp: 9 tablet, Rfl: 0    ibuprofen (ADVIL,MOTRIN) 600 MG tablet, Take 1 tablet (600 mg total) by mouth every 8 (eight) hours as needed for Pain. (Patient not taking: Reported on 1/13/2024), Disp: 30 tablet, Rfl: 0    PRENATAL VIT CALC,IRON,FOLIC (PRENATAL VITAMIN ORAL), Take by mouth., Disp: , Rfl:     Past Medical History:   Diagnosis Date    Chronic hypertension affecting pregnancy     Diabetes mellitus type II, controlled, with no complications 10/11/2017    Infertility, female     States she has been trying to conceive for four years. Was evaluated at [facility] and was given Clomid for three months two years ago.    Iron deficiency anemia 11/16/2018     Past Surgical History:   Procedure " "Laterality Date     SECTION       SECTION N/A 2020    Procedure:  SECTION;  Surgeon: Jojo Candelaria MD;  Location: [facility] L&D;  Service: OB/GYN;  Laterality: N/A;    none       Social History     Tobacco Use    Smoking status: Never    Smokeless tobacco: Never   Substance Use Topics    Alcohol use: No    Drug use: No     OB History    Para Term  AB Living   3 3 2 1   3   SAB IAB Ectopic Multiple Live Births         0 2      # Outcome Date GA Lbr Conrado/2nd Weight Sex Delivery Anes PTL Lv   3  20 33w6d  1.87 kg (4 lb 2 oz) M CS-LTranv Spinal  ROBERTO   2 Term 18 37w1d  2.82 kg (6 lb 3.5 oz) F CS-LTranv EPI N ROBERTO      Complications: Fetal Intolerance   1 Term                BP (!) 142/88   Ht 5' 2" (1.575 m)   Wt 118.7 kg (261 lb 11 oz)   LMP 2023   BMI 47.86 kg/m²     PHYSICAL EXAM:  GENERAL: Calm and appropriate affect, alert, oriented x4  SKIN: Color appropriate for race, warm and dry, clean and intact with no rashes.  RESP: Even, unlabored breathing  ABDOMEN: Soft, nontender, no masses.  :   Normal external female genitalia without lesions. Normal hair distribution. Adequate perineal body, normal urethral meatus.  Vagina pink and well rugated, no lesions, vaginal discharge - on exam pt noted to have started her cycle. Moderate blood with some candida type discharge mixed in- culture not sent.  No significant cystocele or rectocele.  Cervix -no cervical motion tenderness.      ASSESSMENT / PLAN:    ICD-10-CM ICD-9-CM    1. Acute vaginitis  N76.0 616.10 fluconazole (DIFLUCAN) 200 MG Tab        Acute vaginitis  -     fluconazole (DIFLUCAN) 200 MG Tab; Take 1 tablet (200 mg total) by mouth every other day. for 2 doses  Dispense: 2 tablet; Refill: 0    Discussed meds and recommendation for 2-3 days of Monistat along with 1 diflucan - advised topical application of monistat to relieve itching.     Patient was counseled today on vaginitis prevention " including :  a. avoiding feminine products such as deoderant soaps, body wash, bubble bath, douches, scented toilet paper, deoderant tampons or pads, feminine wipes, chronic pad use, etc.  b. avoiding other vulvovaginal irritants such as long hot baths, humidity, tight, synthetic clothing, chlorine and sitting around in wet bathing suits  c. wearing cotton underwear, avoiding thong underwear and no underwear to bed  d. taking showers instead of baths and use a hair dryer on cool setting afterwards to dry  e. wearing cotton to exercise and shower immediately after exercise and change clothes  f. using polyurethane condoms without spermicide if sexually active and symptoms are triggered by intercourse  g. Discussed use of vagisil, along with repHresh and probiotics    FOLLOW UP:   Pending lab results, PRN lack of improvement.

## 2024-01-13 NOTE — TELEPHONE ENCOUNTER
Refill Routing Note   Medication(s) are not appropriate for processing by [facility] for the following reason(s):      Patient not seen by provider within 15 months  Required labs outdated    [facility] action(s):  Defer Care Due:  None identified            Appointments  past 12m or future 3m with PCP    Date Provider   Last Visit   11/8/2022 Mariela Valenzuela MD   Next Visit   1/12/2024 Mariela Valenzuela MD   ED visits in past 90 days: 0        Note composed:6:23 PM 01/12/2024

## 2024-02-05 ENCOUNTER — TELEPHONE (OUTPATIENT)
Dept: OPTOMETRY | Facility: CLINIC | Age: 44
End: 2024-02-05
Payer: MEDICAID

## 2024-02-05 NOTE — TELEPHONE ENCOUNTER
----- Message from Ofe Zaragoza sent at 2/5/2024  9:40 AM CST -----  Regarding: Scheduling Request  Contact: Wang Warren  Scheduling Request          Appt Type:  EP    Date/Time Preference: Wednesday and Thursday    Treating Provider: Chip    Caller Name: Wang Warren     Contact Preference: [phone] (home)      Comments/notes: Patient calling to schedule an appt with Dr. Saunders. Requesting a call back as nothing is available in appt desk for [facility] location.

## 2024-11-22 NOTE — PROGRESS NOTES
General Patient Message: Requesting a call to verify patients personal information fro date of services please call asap    Caller Information       Contact Date/Time Type Contact Phone/Fax    11/22/2024 05:21 PM CST Phone (Incoming) Bill (Other) [phone]     [facility] life insurance            Alternative phone number: n/a    Can a detailed message be left? Yes - Voicemail   Patient has been advised the message will be addressed within 2-3 business days.              Copied from CRM #[id number]. Topic:  Schedule Appointment -  Schedule Primary Care  >> Nov 22, 2024  5:51 PM Jess FRANCIS wrote:  Elo Ibrahim called to confirm appointment information.  Non-patient calling is approved HIPAA to release information.   >Completed once patient was verified.  >Selected 'Wrap Up CRM'  >Chose appropriate 'Resolve' reason.   Magnesium Assessment Note/Labor note    Subjective:         Wang Warren is a 37 y.o. female at 37w0d with CHTN w/ SI PreE, T2DM on insulin, who is on IV MgSO4 for seizure prophylaxis.    MD to bedside for Mag check and cervical exam. Patient denies headaches, vision changes, right upper quadrant pain. Denies CP, denies SOB. Patient reports no nausea/no vomiting.     Objective:      Temp:  [96.4 °F (35.8 °C)-97.9 °F (36.6 °C)] 96.8 °F (36 °C)  Pulse:  [61-88] 67  Resp:  [18] 18  SpO2:  [95 %-100 %] 100 %  BP: (126-178)/(66-99) 148/92    I/O last 3 completed shifts:  In: 916.7 [I.V.:866.7; IV Piggyback:50]  Out: 375 [Urine:375]  No intake/output data recorded.    General:   alert, appears stated age and cooperative   Lungs:   clear to auscultation bilaterally   Heart::   regular rate and rhythm, S1, S2 normal, no murmur, click, rub or gallop   Extremities: peripheral pulses normal, 1+ LE edema, no clubbing or cyanosis   DTRs- 2+  bilaterally, TEDs/SCDs in place     NST: reassuring, Category 1, 120 bpm, moderate BTBV, (+) accelerations, (--) decelerations  TOCO: q2-5 min   SVE: 0/40/-3    Lab Review    Recent Labs  Lab 18  1032 18  2225   WBC 7.37 7.18   HGB 10.7* 10.6*   HCT 33.1* 33.0*   MCV 88 88    279        Recent Labs  Lab 18  1032 18  2225 18  0220    139  --    K 3.9 3.8  --     109  --    CO2 22* 24  --    BUN 17 21*  --    CREATININE 0.9 0.9  --    GLU 62* 52*  --    PROT 6.7 6.5  --    BILITOT 0.3 0.2  --    ALKPHOS 132 148*  --    ALT 13 14  --    AST 13 17  --    UTPCR 0.13  --  0.10        Assessment:     37 y.o.  female at 37w0d, on IV magnesium sulfate for cHTN with MAXIMILIAN.    Active Hospital Problems    Diagnosis  POA    *Encounter for induction of labor [Z34.90]  Not Applicable    Diabetes mellitus affecting pregnancy in third trimester [O24.913]  Yes     Diagnosed in pregnancy but A1C indicates pre-existing condition.      BMI  50.0-59.9, adult [Z68.43]  Not Applicable    Chronic hypertension affecting pregnancy [O10.919]  Yes     No medications      Advanced maternal age, primigravida in second trimester, antepartum [O09.512]  Yes      Resolved Hospital Problems    Diagnosis Date Resolved POA   No resolved problems to display.        Plan:     cHTN with MAXIMILIAN  - MgSO4 for seizure ppx started 2/7 @ 2322   - Continue magnesium sulfate, no signs of toxicity at this time   - Close maternal monitoring including UOP and BP    - BP: (126-178)/(66-99) 148/92     - s/p IV labetalol 20/20    - BP normal to mild range since pushes overnight     - UOP adequate  - Recheck in 2-4 hours or PRN    IOL for cHTN, now with MAXIMILIAN  - Labor course: no epidural at this time   - 2240: 0/40/-3, cytotec 50 mcg PO   - 0240: 0/40/-3, cytotec 50 mcg PO   - 0730: 0/40/-3, cytotec 50 mcg PO. Plan to place cook balloon in 1-2 hrs.  - GBS+: PCN for GBS ppx    T2DM  - BG 63, 57, 70, 78, 88  - fluids changed to D5LR this AM  - no insulin at this time, plan to start insulin infusion in active labor and titrate as needed  - asymptomatic since admission    Tona Wei MD  OBGYN - PGY 3

## 2025-05-09 NOTE — ED PROVIDER NOTES
"Encounter Date: 10/27/2019       History     Chief Complaint   Patient presents with    Vaginitis     "I think Im pregnant, and I probably got a yeast infection" Reports 8 positive UPTs at home, LMP 7/10/19.       38-year-old female presents with complaint of vaginal irritation, she states "I think I have a yeast infection. "  Patient also reports multiple positive pregnancy tests at home, and wants to know if she is pregnant.  She denies any vaginal bleeding, abdominal pain or cramping, dysuria, fever or chills.        Review of patient's allergies indicates:  No Known Allergies  Past Medical History:   Diagnosis Date    Chronic hypertension affecting pregnancy     Diabetes mellitus type II, controlled, with no complications 10/11/2017    Infertility, female     States she has been trying to conceive for four years. [facility] and was given Clomid for three months two years ago.    Iron deficiency anemia 2018     Past Surgical History:   Procedure Laterality Date     SECTION      none       Family History   Problem Relation Age of Onset    Cancer Father         prostate    Diabetes Maternal Aunt     Breast cancer Neg Hx      Social History     Tobacco Use    Smoking status: Never Smoker    Smokeless tobacco: Never Used   Substance Use Topics    Alcohol use: No    Drug use: No     Review of Systems   Constitutional: Negative for chills and fever.   HENT: Negative for congestion and sore throat.    Respiratory: Negative for cough and shortness of breath.    Cardiovascular: Negative for chest pain and palpitations.   Gastrointestinal: Negative for nausea and vomiting.   Genitourinary: Positive for vaginal discharge (Itching). Negative for dysuria and vaginal pain.   Musculoskeletal: Negative for back pain.   Skin: Negative for rash.   Neurological: Negative for weakness.   Hematological: Does not bruise/bleed easily.       Physical Exam     Initial Vitals [10/27/19 " None 2121]   BP Pulse Resp Temp SpO2   (!) 174/79 102 18 98 °F (36.7 °C) 99 %      MAP       --         Physical Exam    Nursing note and vitals reviewed.  Constitutional: She appears well-developed and well-nourished. No distress.   Obese.   HENT:   Head: Normocephalic and atraumatic.   Mouth/Throat: Oropharynx is clear and moist. No oropharyngeal exudate.   Eyes: Conjunctivae and EOM are normal. Pupils are equal, round, and reactive to light.   Neck: Neck supple.   Cardiovascular: Normal rate and normal heart sounds.   No murmur heard.  Pulmonary/Chest: Breath sounds normal. No respiratory distress. She has no wheezes. She has no rhonchi. She has no rales.   Abdominal: Soft. There is no tenderness. There is no rebound and no guarding.   Genitourinary:   Genitourinary Comments: External vulva with beefy irritation.  Copious white clumpy discharge within the vaginal vault.  Cervix is closed, no CMT or uterine or adnexal tenderness.   Musculoskeletal: She exhibits no edema or tenderness.   Neurological: She is alert and oriented to person, place, and time. She has normal strength. GCS score is 15. GCS eye subscore is 4. GCS verbal subscore is 5. GCS motor subscore is 6.   Skin: Skin is warm and dry. No rash noted.   Psychiatric: She has a normal mood and affect. Thought content normal.         ED Course   Procedures  Labs Reviewed   URINALYSIS, REFLEX TO URINE CULTURE - Abnormal; Notable for the following components:       Result Value    Appearance, UA Hazy (*)     Protein, UA 1+ (*)     Glucose, UA 3+ (*)     Ketones, UA Trace (*)     Occult Blood UA Trace (*)     Leukocytes, UA Trace (*)     All other components within normal limits    Narrative:     Preferred Collection Type->Urine, Clean Catch   VAGINAL SCREEN - Abnormal; Notable for the following components:    Clue Cells Many (*)     Budding Yeast Occasional (*)     WBC - Vaginal Screen Rare (*)     Bacteria - Vaginal Screen Moderate (*)     All other components  within normal limits   URINALYSIS MICROSCOPIC - Abnormal; Notable for the following components:    Yeast, UA Occasional (*)     All other components within normal limits    Narrative:     Preferred Collection Type->Urine, Clean Catch   POCT URINE PREGNANCY - Abnormal; Notable for the following components:    POC Preg Test, Ur Positive (*)     All other components within normal limits   C. TRACHOMATIS/N. GONORRHOEAE BY AMP DNA   POCT GLUCOSE MONITORING CONTINUOUS          Imaging Results    None          Medical Decision Making:   ED Management:  Emergent evaluation a 38-year-old female with complaint of vaginal yeast infection and possible pregnancy.  Vital signs reveal hypertension, afebrile.  On exam there is evidence of yeast infection, this is confirmed by vaginal screen.  I performed a bed I bedside ultrasound which shows a large IUP, greater than expected-   I think she is further along in the pregnancy then she realized.  Patient is high risk due to her diabetes and comorbid conditions, she is encouraged to call her OBGYN in the morning to schedule close follow-up.  She is encouraged to return to the ED for any vaginal bleeding, pelvic pain, or other concerns.                      Clinical Impression:     1. Pregnancy, unspecified gestational age    2. Vaginal candidiasis    3. Yeast vaginitis    4. Hyperglycemia                                 Ebonie Tello MD  10/27/19 [id number]

## 2025-08-18 ENCOUNTER — PATIENT MESSAGE (OUTPATIENT)
Dept: OPTOMETRY | Facility: CLINIC | Age: 45
End: 2025-08-18
Payer: COMMERCIAL

## 2025-08-18 ENCOUNTER — TELEPHONE (OUTPATIENT)
Dept: OPTOMETRY | Facility: CLINIC | Age: 45
End: 2025-08-18
Payer: COMMERCIAL

## (undated) DEVICE — DRESSING ADH ISLAND 3.6 X 14